# Patient Record
Sex: FEMALE | Race: WHITE | Employment: OTHER | URBAN - METROPOLITAN AREA
[De-identification: names, ages, dates, MRNs, and addresses within clinical notes are randomized per-mention and may not be internally consistent; named-entity substitution may affect disease eponyms.]

---

## 2017-01-23 ENCOUNTER — HOSPITAL ENCOUNTER (OUTPATIENT)
Dept: OTHER | Age: 82
Discharge: HOME | End: 2017-01-23
Attending: INTERNAL MEDICINE | Admitting: INTERNAL MEDICINE

## 2017-01-23 VITALS
OXYGEN SATURATION: 94 % | WEIGHT: 110 LBS | HEIGHT: 60 IN | HEART RATE: 66 BPM | SYSTOLIC BLOOD PRESSURE: 155 MMHG | DIASTOLIC BLOOD PRESSURE: 67 MMHG | BODY MASS INDEX: 21.6 KG/M2 | TEMPERATURE: 98.2 F | RESPIRATION RATE: 12 BRPM

## 2017-01-23 RX ORDER — SODIUM CHLORIDE, SODIUM LACTATE, POTASSIUM CHLORIDE, CALCIUM CHLORIDE 600; 310; 30; 20 MG/100ML; MG/100ML; MG/100ML; MG/100ML
INJECTION, SOLUTION INTRAVENOUS CONTINUOUS
Status: DISCONTINUED | OUTPATIENT
Start: 2017-01-23 | End: 2017-01-24 | Stop reason: HOSPADM

## 2017-01-23 RX ADMIN — SODIUM CHLORIDE, SODIUM LACTATE, POTASSIUM CHLORIDE, CALCIUM CHLORIDE: 600; 310; 30; 20 INJECTION, SOLUTION INTRAVENOUS at 11:30

## 2017-01-23 ASSESSMENT — PAIN - FUNCTIONAL ASSESSMENT: PAIN_FUNCTIONAL_ASSESSMENT: 0-10

## 2017-01-23 ASSESSMENT — PAIN SCALES - GENERAL: PAINLEVEL_OUTOF10: 0

## 2017-08-23 ENCOUNTER — INITIAL CONSULT (OUTPATIENT)
Dept: SURGERY | Age: 82
End: 2017-08-23

## 2017-08-23 VITALS
WEIGHT: 112 LBS | DIASTOLIC BLOOD PRESSURE: 86 MMHG | BODY MASS INDEX: 21.99 KG/M2 | HEIGHT: 60 IN | SYSTOLIC BLOOD PRESSURE: 130 MMHG

## 2017-08-23 DIAGNOSIS — K62.3 RECTAL PROLAPSE: Primary | ICD-10-CM

## 2017-08-23 PROCEDURE — 1090F PRES/ABSN URINE INCON ASSESS: CPT | Performed by: SURGERY

## 2017-08-23 PROCEDURE — 99203 OFFICE O/P NEW LOW 30 MIN: CPT | Performed by: SURGERY

## 2017-08-23 PROCEDURE — 1123F ACP DISCUSS/DSCN MKR DOCD: CPT | Performed by: SURGERY

## 2017-08-23 PROCEDURE — 4040F PNEUMOC VAC/ADMIN/RCVD: CPT | Performed by: SURGERY

## 2017-08-23 PROCEDURE — 1036F TOBACCO NON-USER: CPT | Performed by: SURGERY

## 2017-08-23 PROCEDURE — G8427 DOCREV CUR MEDS BY ELIG CLIN: HCPCS | Performed by: SURGERY

## 2017-08-23 PROCEDURE — G8420 CALC BMI NORM PARAMETERS: HCPCS | Performed by: SURGERY

## 2017-08-23 PROCEDURE — G8400 PT W/DXA NO RESULTS DOC: HCPCS | Performed by: SURGERY

## 2017-08-25 ENCOUNTER — HOSPITAL ENCOUNTER (OUTPATIENT)
Dept: MAMMOGRAPHY | Age: 82
Discharge: HOME | End: 2017-08-25
Attending: FAMILY MEDICINE | Admitting: FAMILY MEDICINE

## 2017-08-25 DIAGNOSIS — Z12.31 SCREENING MAMMOGRAM, ENCOUNTER FOR: ICD-10-CM

## 2018-07-19 ENCOUNTER — OFFICE VISIT (OUTPATIENT)
Dept: ORTHOPEDIC SURGERY | Age: 83
End: 2018-07-19

## 2018-07-19 VITALS
WEIGHT: 113 LBS | SYSTOLIC BLOOD PRESSURE: 160 MMHG | DIASTOLIC BLOOD PRESSURE: 90 MMHG | HEART RATE: 61 BPM | BODY MASS INDEX: 22.19 KG/M2 | HEIGHT: 60 IN

## 2018-07-19 DIAGNOSIS — M17.11 PRIMARY OSTEOARTHRITIS OF RIGHT KNEE: Primary | ICD-10-CM

## 2018-07-19 DIAGNOSIS — M25.561 RIGHT KNEE PAIN, UNSPECIFIED CHRONICITY: ICD-10-CM

## 2018-07-19 PROCEDURE — 1101F PT FALLS ASSESS-DOCD LE1/YR: CPT | Performed by: ORTHOPAEDIC SURGERY

## 2018-07-19 PROCEDURE — 1123F ACP DISCUSS/DSCN MKR DOCD: CPT | Performed by: ORTHOPAEDIC SURGERY

## 2018-07-19 PROCEDURE — G8427 DOCREV CUR MEDS BY ELIG CLIN: HCPCS | Performed by: ORTHOPAEDIC SURGERY

## 2018-07-19 PROCEDURE — G8420 CALC BMI NORM PARAMETERS: HCPCS | Performed by: ORTHOPAEDIC SURGERY

## 2018-07-19 PROCEDURE — 1090F PRES/ABSN URINE INCON ASSESS: CPT | Performed by: ORTHOPAEDIC SURGERY

## 2018-07-19 PROCEDURE — L1812 KO ELASTIC W/JOINTS PRE OTS: HCPCS | Performed by: ORTHOPAEDIC SURGERY

## 2018-07-19 PROCEDURE — 4040F PNEUMOC VAC/ADMIN/RCVD: CPT | Performed by: ORTHOPAEDIC SURGERY

## 2018-07-19 PROCEDURE — 1036F TOBACCO NON-USER: CPT | Performed by: ORTHOPAEDIC SURGERY

## 2018-07-19 PROCEDURE — 99213 OFFICE O/P EST LOW 20 MIN: CPT | Performed by: ORTHOPAEDIC SURGERY

## 2018-07-19 NOTE — PROGRESS NOTES
chronicity Yes       Office Procedures:  Orders Placed This Encounter   Procedures    XR KNEE RIGHT (MIN 4 VIEWS)       Treatment Plan:  I discussed with the patient the nature of osteoarthritis of the knee. We talked about treatment of arthritis and the various options that are involved with this. The patient understands that the treatments can vary from essentially doing nothing to a total joint replacement arthroplasty for arthritis. I then went on to describe the utilization of glucosamine and chondroitin sulfate as a joint nutrition product. We talked about the fact that this is essentially a joint vitamin with typically minimal side effects. We also talked about utilization of prescription over-the-counter anti-inflammatory medications as the next option. We also discussed the possibility of brace wear or orthotic wear if the patient has significant varus alignment. We then went on to discuss the possibility of Visco supplementation with hyaluronate products. We talked about the typical course of this type of treatment and the fact that often times in the treatment for significant arthritis, this is successful less than half the time. We also talked about the corticosteroid injections and the fact that this can give a brief window of relief, but does not cure the problem; in fact, the pain often has a rebound effect in 6-10 weeks after the steroid has worn off. We also discussed arthroscopy surgery in attempts to debride the joint, but the fact that this is relatively unreliable treatment in the face of significant arthritis. It can occasionally be used, particularly if there is significant meniscus pathology. Lastly we discussed total joint replacement arthroplasty as the final and definitive step in treatment of arthritis. Patient realizes the magnitude of this type of treatment as well as having voiced a general understanding to the duration of the prosthesis.  The patient voiced understanding to these

## 2018-07-19 NOTE — LETTER
CONSENT TO SURGICAL OR MEDICAL PROCEDURE    PATIENTS NAMES: Enzo Fuentes 4/18/1932  80 y.o. [phone] (home)   DATE/TIME: 7/19/2018 4:00 PM    1) I consent that Dr. Mitchel Urbina perform one or more surgical and or medical procedures on the above named patient at: [address] [place] [address]/[facility] to treat the condition(s) which appear indicated by the diagnostic studies already preformed. I have been told in general terms the nature and purpose of the procedure(s) and what the procedure(s) is/are expected to accomplish. They procedure(s) are as follows:   RIGHT TOTAL KNEE REPLACEMENT (TS SONAL)     2) It has been explained to me by the informing physician that during the course of any surgical or medical procedure unforeseen condition(s) may be revealed that necessitate an extension of the original procedure(s) or a different procedure(s) than set forth in Paragraph 1. I therefore consent that the above named physician perform such additional or different procedure(s) as are necessary or desirable in the exercise of his professional judgement. 3) I have been made aware by the informing physician of certain reasonably known risks that are associated with the procedure(s) set forth in Paragraph 1.  I understand the reasonably known risk to be: Including but not limited to: CVA, infection, M.I., Phlebitis, Cardiac Arrest and Pulmonary Embolism, Loss of Circulation, Nerve Injury, Delayed Healing, Recurrence, Loss of extremity/digit, R.S.D., Screw breakage, Arthritis, Pain, Swelling, Stiffness, Failure of Prothesis, Fracture, Leg length discrepancy, Wound complication/non-healing, need for further surgery and persistent pain.   4) I have also been informed by the informing physician that there are other risks, from both known and unknown causes, that are attendant to the because_________________________________________________    The undersigned represents that he or she is duly authorized to execute to this consent for and on the behalf of the above named patient.    ________________________________             __________________________________________  Witness                                                                         Parent/Spouse/Guardian/Other:_________________    Medical Record#  Insurance  Smartphone:  Yes   Or   No  Email:                 You have signed a consent to have a total joint replacement surgery performed. Before you can proceed with surgery the following things must be done. Please use this form as a checklist.      _____   Please schedule your Physical Therapy functional evaluation. At the location on your script.    _____   Please take your lab orders and get your blood work done at a [facility].    _____  Tomi Epps will need to go to [employer] to complete registration and the medical questionnaire prior to your physical therapy evaluation. Do not schedule an appointment with your primary care physician until you have a surgery date. This pre-op exam has to be within 30 days of the surgery. _____  CT Scans will be scheduled by our office.    _____  Information about the pre-op class will be in your surgery packet that will be mailed to you after you are scheduled for surgery. Once you have completed both the labs and the evaluation please call Tushar Randolph @ [phone] to let her know. Once verification of the PT Evaluation and completed labs has been determined you will be called and set up for surgery. This may take 1-2 days to check results and return your phone call.

## 2018-08-15 ENCOUNTER — OFFICE VISIT (OUTPATIENT)
Dept: ORTHOPEDIC SURGERY | Age: 83
End: 2018-08-15

## 2018-08-15 VITALS — BODY MASS INDEX: 22.19 KG/M2 | HEIGHT: 60 IN | WEIGHT: 113 LBS

## 2018-08-15 DIAGNOSIS — M17.11 PRIMARY OSTEOARTHRITIS OF RIGHT KNEE: Primary | ICD-10-CM

## 2018-08-15 PROCEDURE — 1090F PRES/ABSN URINE INCON ASSESS: CPT | Performed by: PHYSICIAN ASSISTANT

## 2018-08-15 PROCEDURE — G8427 DOCREV CUR MEDS BY ELIG CLIN: HCPCS | Performed by: PHYSICIAN ASSISTANT

## 2018-08-15 PROCEDURE — 1036F TOBACCO NON-USER: CPT | Performed by: PHYSICIAN ASSISTANT

## 2018-08-15 PROCEDURE — 4040F PNEUMOC VAC/ADMIN/RCVD: CPT | Performed by: PHYSICIAN ASSISTANT

## 2018-08-15 PROCEDURE — 1123F ACP DISCUSS/DSCN MKR DOCD: CPT | Performed by: PHYSICIAN ASSISTANT

## 2018-08-15 PROCEDURE — 1101F PT FALLS ASSESS-DOCD LE1/YR: CPT | Performed by: PHYSICIAN ASSISTANT

## 2018-08-15 PROCEDURE — 99213 OFFICE O/P EST LOW 20 MIN: CPT | Performed by: PHYSICIAN ASSISTANT

## 2018-08-15 PROCEDURE — G8420 CALC BMI NORM PARAMETERS: HCPCS | Performed by: PHYSICIAN ASSISTANT

## 2018-08-15 NOTE — PROGRESS NOTES
Patient Active Problem List    Diagnosis Date Noted    Primary osteoarthritis of right knee 08/15/2018    Hip fracture (HCC) RIGHT 08/04/2015    Hypertension     Hypercholesteremia     Hip fracture ([address] 07/22/2015    Osteoarthritis of hip 07/08/2015    Rectal bleed 10/04/2014    Localized osteoarthrosis, lower leg 02/17/2014     Current Outpatient Prescriptions   Medication Sig Dispense Refill    melatonin 3 MG TABS tablet Take 3 mg by mouth daily      aspirin 81 MG tablet Take 81 mg by mouth daily      oxyCODONE-acetaminophen (PERCOCET) 5-325 MG per tablet Take 1-2 tablets by mouth every 4-6 hours as needed for pain S/P hip surgery 40 tablet 0    timolol (BETIMOL) 0.5 % ophthalmic solution Place 1 drop into the left eye 2 times daily      carvedilol (COREG) 3.125 MG tablet Take 1 tablet by mouth 2 times daily (with meals). (Patient taking differently:   Take 6.25 mg by mouth 2 times daily (with meals) ) 60 tablet 1    flecainide (TAMBOCOR) 50 MG tablet Take 50 mg by mouth 2 times daily.  hydrocortisone 1 % cream   Apply topically 2 times daily as needed       chlordiazepoxide (LIBRIUM) 10 MG capsule   Take 5 mg by mouth 2 times daily       atorvastatin (LIPITOR) 10 MG tablet   Take 10 mg by mouth nightly       Multiple Vitamins-Minerals (CENTRUM PO) Take 1 tablet by mouth daily. Indications: Centrum Silver      polyethylene glycol (GLYCOLAX) powder Take 17 g by mouth daily.  vitamin D (CHOLECALCIFEROL) 400 UNIT TABS tablet   Take 2,000 Units by mouth daily       Psyllium-Calcium (METAMUCIL PLUS CALCIUM) CAPS Take 1 tablet by mouth nightly. Take with 8 oz water.  latanoprost (XALATAN) 0.005 % ophthalmic solution   Place 1 drop into both eyes nightly        No current facility-administered medications for this visit. Medication Management  Patient has been treated with NSAIDs, Steroids and Analgesics with Minimal relief for 10 years.     Review of Systems:  Relevant review of systems reviewed and available in the patient's chart    Vital Signs: There were no vitals filed for this visit. Body mass index is 22.07 kg/m². Limitation in Activities of Daily Living (ADLs)  The patient is able to ambulate with the assistance of wheelchair for Ramp  steps/feet. Walking distance less than 1 block    Patient needs assistance with activities of daily living bathing and cooking. Extended Care / [facility]: No     Safety Issues: Home safety issues, Difficulty with daily activities and Risk of falls  Patient is at risk for falls/fall history: Yes    General Exam:   Constitutional: Patient is adequately groomed with no evidence of malnutrition  DTRs: Deep tendon reflexes are intact  Mental Status: The patient is oriented to time, place and person. The patient's mood and affect are appropriate. Lymphatic: The lymphatic examination bilaterally reveals all areas to be without enlargement or induration. Vascular: Examination reveals no swelling or calf tenderness. Peripheral pulses are palpable and 2+. Neurological: The patient has good coordination. There is no weakness or sensory deficit. Right Knee Examination:    Inspection:  No erythema or signs of infection. Positive knee effusion. There are no cutaneous lesions. There is a 15° valgus deformity. Palpation:  There is tenderness to palpation along the lateral joint line. Range of Motion:  10 extension to 105 of active flexion. Crepitation present throughout range of motion    Strength:  4+/5 quadriceps and hamstrings    Special Tests: The knee is stable to varus valgus stressing/anterior posterior drawer. Negative Homans test.                                 Skin: There are no rashes, ulcerations or lesions. Gait: Brought in via wheelchair. Reflex 2+ patellar    Additional Comments:     Examination of the left knee reveals intact skin.   There is medial joint line tenderness with a varus with computer navigation and Tocagen total knee replacement system. Additionally, she is a Hindu and therefore we will likely use an aquamantis and possibly Cell Saver. We would like to see the patient back once she is obtained all the necessary preoperative clearance before moving forward with surgery. We discussed the risk, benefits, and potential complications of knee replacement arthroplasty surgery. The patient voiced their understanding to concerns that include infection, deep vein thrombosis, neurological injury, and delayed rehabilitation. The patient also realizes that there are concerns regarding the potential need for manipulation under anesthesia if range of motion proves to be problematic. The patient also understands that there is always a chance of dystrophy and anesthetic complications that would include a stroke, cardiopulmonary pathology, and even death. We also discussed the rehabilitation process involved with this operation and options that involved not only the hospitalization but outpatient physical therapy and independent home exercise program.  The patient also realizes the need for a knee brace and ambulatory aids in the rehabilitation process as well as the very significant role that the patient plays in terms of rehabilitation after this type of operation. The patient also understands that although the patient typically functional by 6-8 weeks postop that it may take 9 months to year for full recovery. All questions were answered.

## 2018-08-20 ENCOUNTER — HOSPITAL ENCOUNTER (OUTPATIENT)
Age: 83
Discharge: HOME | End: 2018-08-20
Payer: MEDICARE

## 2018-08-20 LAB
ALBUMIN SERPL-MCNC: 3.7 G/DL (ref 3.4–5)
ANION GAP SERPL CALCULATED.3IONS-SCNC: 14 MMOL/L (ref 3–16)
APTT: 34.5 SEC (ref 26–36)
BACTERIA: ABNORMAL /HPF
BASOPHILS ABSOLUTE: 0 K/UL (ref 0–0.2)
BASOPHILS RELATIVE PERCENT: 0.5 %
BILIRUBIN URINE: NEGATIVE
BLOOD, URINE: ABNORMAL
BUN BLDV-MCNC: 11 MG/DL (ref 7–20)
CALCIUM SERPL-MCNC: 9.4 MG/DL (ref 8.3–10.6)
CHLORIDE BLD-SCNC: 104 MMOL/L (ref 99–110)
CLARITY: CLEAR
CO2: 24 MMOL/L (ref 21–32)
COLOR: YELLOW
CREAT SERPL-MCNC: 0.6 MG/DL (ref 0.6–1.2)
EOSINOPHILS ABSOLUTE: 0.3 K/UL (ref 0–0.6)
EOSINOPHILS RELATIVE PERCENT: 4.5 %
EPITHELIAL CELLS, UA: ABNORMAL /HPF
GFR AFRICAN AMERICAN: >60
GFR NON-AFRICAN AMERICAN: >60
GLUCOSE BLD-MCNC: 152 MG/DL (ref 70–99)
GLUCOSE URINE: NEGATIVE MG/DL
HCT VFR BLD CALC: 35 % (ref 36–48)
HEMOGLOBIN: 11.6 G/DL (ref 12–16)
INR BLD: 1.22 (ref 0.86–1.14)
KETONES, URINE: NEGATIVE MG/DL
LEUKOCYTE ESTERASE, URINE: ABNORMAL
LYMPHOCYTES ABSOLUTE: 0.9 K/UL (ref 1–5.1)
LYMPHOCYTES RELATIVE PERCENT: 13.6 %
MCH RBC QN AUTO: 31.3 PG (ref 26–34)
MCHC RBC AUTO-ENTMCNC: 33.3 G/DL (ref 31–36)
MCV RBC AUTO: 94.2 FL (ref 80–100)
MICROSCOPIC EXAMINATION: YES
MONOCYTES ABSOLUTE: 0.5 K/UL (ref 0–1.3)
MONOCYTES RELATIVE PERCENT: 8.2 %
NEUTROPHILS ABSOLUTE: 4.9 K/UL (ref 1.7–7.7)
NEUTROPHILS RELATIVE PERCENT: 73.2 %
NITRITE, URINE: NEGATIVE
PDW BLD-RTO: 12.3 % (ref 12.4–15.4)
PH UA: 7.5
PLATELET # BLD: 323 K/UL (ref 135–450)
PMV BLD AUTO: 8 FL (ref 5–10.5)
POTASSIUM SERPL-SCNC: 4 MMOL/L (ref 3.5–5.1)
PROTEIN UA: NEGATIVE MG/DL
PROTHROMBIN TIME: 13.9 SEC (ref 9.8–13)
RBC # BLD: 3.71 M/UL (ref 4–5.2)
RBC UA: ABNORMAL /HPF (ref 0–2)
SODIUM BLD-SCNC: 142 MMOL/L (ref 136–145)
SPECIFIC GRAVITY UA: 1.01
TRANSFERRIN: 225 MG/DL (ref 200–360)
URINE TYPE: ABNORMAL
UROBILINOGEN, URINE: 0.2 E.U./DL
WBC # BLD: 6.7 K/UL (ref 4–11)
WBC UA: ABNORMAL /HPF (ref 0–5)

## 2018-08-20 PROCEDURE — 85730 THROMBOPLASTIN TIME PARTIAL: CPT

## 2018-08-20 PROCEDURE — 36415 COLL VENOUS BLD VENIPUNCTURE: CPT

## 2018-08-20 PROCEDURE — 82040 ASSAY OF SERUM ALBUMIN: CPT

## 2018-08-20 PROCEDURE — 85025 COMPLETE CBC W/AUTO DIFF WBC: CPT

## 2018-08-20 PROCEDURE — 81001 URINALYSIS AUTO W/SCOPE: CPT

## 2018-08-20 PROCEDURE — 80048 BASIC METABOLIC PNL TOTAL CA: CPT

## 2018-08-20 PROCEDURE — 84466 ASSAY OF TRANSFERRIN: CPT

## 2018-08-20 PROCEDURE — 87086 URINE CULTURE/COLONY COUNT: CPT

## 2018-08-20 PROCEDURE — 85610 PROTHROMBIN TIME: CPT

## 2018-08-20 PROCEDURE — 83036 HEMOGLOBIN GLYCOSYLATED A1C: CPT

## 2018-08-21 LAB
ESTIMATED AVERAGE GLUCOSE: 128.4 MG/DL
HBA1C MFR BLD: 6.1 %
URINE CULTURE, ROUTINE: NORMAL

## 2018-08-23 ENCOUNTER — TELEPHONE (OUTPATIENT)
Dept: ORTHOPEDIC SURGERY | Age: 83
End: 2018-08-23

## 2018-08-28 ENCOUNTER — TELEPHONE (OUTPATIENT)
Dept: ORTHOPEDIC SURGERY | Age: 83
End: 2018-08-28

## 2018-08-28 DIAGNOSIS — M17.11 PRIMARY OSTEOARTHRITIS OF RIGHT KNEE: Primary | ICD-10-CM

## 2018-08-28 RX ORDER — TRAMADOL HYDROCHLORIDE 50 MG/1
50 TABLET ORAL EVERY 6 HOURS PRN
Qty: 28 TABLET | Refills: 0 | Status: SHIPPED | OUTPATIENT
Start: 2018-08-28 | End: 2018-08-30

## 2018-08-28 NOTE — TELEPHONE ENCOUNTER
Ultram sent to pharmacy. Felisha Milan will you please run and or his report. OARS   Report obtained. Patient got 120 Percocet from Dr. Lila Hummel on 8/7. Ultram prescription cancel.

## 2018-08-30 ENCOUNTER — TELEPHONE (OUTPATIENT)
Dept: ORTHOPEDIC SURGERY | Age: 83
End: 2018-08-30

## 2018-08-30 NOTE — PROGRESS NOTES
Obstructive Sleep Apnea (PREM) Screening     Patient:  Audi Gonsales    YOB: 1932      Medical Record #:  [id number]                     Date:  8/30/2018     1. Are you a loud and/or regular snorer? []  Yes       [x] No    2. Have you been observed to gasp or stop breathing during sleep? []  Yes       [x] No    3. Do you feel tired or groggy upon awakening or do you awaken with a headache?           []  Yes       [x] No    4. Are you often tired or fatigued during the wake time hours? [x]  Yes       [] No    5. Do you fall asleep sitting, reading, watching TV or driving? []  Yes       [x] No    6. Do you often have problems with memory or concentration? []  Yes       [x] No    **If patient's score is ? 3 they are considered high risk for PREM. Notify the anesthesiologist of the high risk and document in focus note. Note:  If the patient's BMI is more than 35 kg m¯² , has neck circumference > 40 cm, and/or high blood pressure the risk is greater (© American Sleep Apnea Association, 2006).

## 2018-08-31 ENCOUNTER — TELEPHONE (OUTPATIENT)
Dept: ORTHOPEDIC SURGERY | Age: 83
End: 2018-08-31

## 2018-09-04 ENCOUNTER — ANESTHESIA EVENT (OUTPATIENT)
Dept: OPERATING ROOM | Age: 83
DRG: 470 | End: 2018-09-04
Payer: MEDICARE

## 2018-09-05 ENCOUNTER — HOSPITAL ENCOUNTER (INPATIENT)
Age: 83
LOS: 2 days | Discharge: NURSING, REHAB OR LONG-TERM CARE | DRG: 470 | End: 2018-09-07
Attending: ORTHOPAEDIC SURGERY | Admitting: ORTHOPAEDIC SURGERY
Payer: MEDICARE

## 2018-09-05 ENCOUNTER — ANESTHESIA (OUTPATIENT)
Dept: OPERATING ROOM | Age: 83
DRG: 470 | End: 2018-09-05
Payer: MEDICARE

## 2018-09-05 VITALS — OXYGEN SATURATION: 100 % | DIASTOLIC BLOOD PRESSURE: 87 MMHG | SYSTOLIC BLOOD PRESSURE: 174 MMHG | TEMPERATURE: 98.6 F

## 2018-09-05 DIAGNOSIS — Z96.651 STATUS POST TOTAL RIGHT KNEE REPLACEMENT: Primary | ICD-10-CM

## 2018-09-05 DIAGNOSIS — M17.11 PRIMARY OSTEOARTHRITIS OF RIGHT KNEE: ICD-10-CM

## 2018-09-05 PROBLEM — T84.84XA PAIN DUE TO TOTAL RIGHT KNEE REPLACEMENT (HCC): Chronic | Status: ACTIVE | Noted: 2018-09-05

## 2018-09-05 PROBLEM — M25.561 CHRONIC KNEE PAIN AFTER TOTAL REPLACEMENT OF RIGHT KNEE JOINT: Status: ACTIVE | Noted: 2018-09-05

## 2018-09-05 PROBLEM — G89.29 CHRONIC KNEE PAIN AFTER TOTAL REPLACEMENT OF RIGHT KNEE JOINT: Status: ACTIVE | Noted: 2018-09-05

## 2018-09-05 LAB
GLUCOSE BLD-MCNC: 118 MG/DL (ref 70–99)
GLUCOSE BLD-MCNC: 204 MG/DL (ref 70–99)
INR BLD: 1.3 (ref 0.86–1.14)
PERFORMED ON: ABNORMAL
PERFORMED ON: ABNORMAL
PROTHROMBIN TIME: 14.8 SEC (ref 9.8–13)

## 2018-09-05 PROCEDURE — 88311 DECALCIFY TISSUE: CPT

## 2018-09-05 PROCEDURE — 88305 TISSUE EXAM BY PATHOLOGIST: CPT

## 2018-09-05 PROCEDURE — 2580000003 HC RX 258: Performed by: ORTHOPAEDIC SURGERY

## 2018-09-05 PROCEDURE — 85610 PROTHROMBIN TIME: CPT

## 2018-09-05 PROCEDURE — 51701 INSERT BLADDER CATHETER: CPT

## 2018-09-05 PROCEDURE — 6360000002 HC RX W HCPCS: Performed by: PHYSICIAN ASSISTANT

## 2018-09-05 PROCEDURE — 6360000002 HC RX W HCPCS: Performed by: ANESTHESIOLOGY

## 2018-09-05 PROCEDURE — C9290 INJ, BUPIVACAINE LIPOSOME: HCPCS | Performed by: ORTHOPAEDIC SURGERY

## 2018-09-05 PROCEDURE — 0SRC0J9 REPLACEMENT OF RIGHT KNEE JOINT WITH SYNTHETIC SUBSTITUTE, CEMENTED, OPEN APPROACH: ICD-10-PCS | Performed by: ORTHOPAEDIC SURGERY

## 2018-09-05 PROCEDURE — 2720000010 HC SURG SUPPLY STERILE: Performed by: ORTHOPAEDIC SURGERY

## 2018-09-05 PROCEDURE — 64447 NJX AA&/STRD FEMORAL NRV IMG: CPT | Performed by: ANESTHESIOLOGY

## 2018-09-05 PROCEDURE — 51798 US URINE CAPACITY MEASURE: CPT

## 2018-09-05 PROCEDURE — 7100000000 HC PACU RECOVERY - FIRST 15 MIN: Performed by: ORTHOPAEDIC SURGERY

## 2018-09-05 PROCEDURE — 0MNN0ZZ RELEASE RIGHT KNEE BURSA AND LIGAMENT, OPEN APPROACH: ICD-10-PCS | Performed by: ORTHOPAEDIC SURGERY

## 2018-09-05 PROCEDURE — C1776 JOINT DEVICE (IMPLANTABLE): HCPCS | Performed by: ORTHOPAEDIC SURGERY

## 2018-09-05 PROCEDURE — 3700000001 HC ADD 15 MINUTES (ANESTHESIA): Performed by: ORTHOPAEDIC SURGERY

## 2018-09-05 PROCEDURE — 6360000002 HC RX W HCPCS: Performed by: ORTHOPAEDIC SURGERY

## 2018-09-05 PROCEDURE — 6370000000 HC RX 637 (ALT 250 FOR IP): Performed by: PHYSICIAN ASSISTANT

## 2018-09-05 PROCEDURE — 2580000003 HC RX 258: Performed by: ANESTHESIOLOGY

## 2018-09-05 PROCEDURE — 2720000001 HC MISC SURG SUPPLY STERILE $51-500: Performed by: ORTHOPAEDIC SURGERY

## 2018-09-05 PROCEDURE — 2709999900 HC NON-CHARGEABLE SUPPLY: Performed by: ORTHOPAEDIC SURGERY

## 2018-09-05 PROCEDURE — 7100000001 HC PACU RECOVERY - ADDTL 15 MIN: Performed by: ORTHOPAEDIC SURGERY

## 2018-09-05 PROCEDURE — 3E0T3BZ INTRODUCTION OF ANESTHETIC AGENT INTO PERIPHERAL NERVES AND PLEXI, PERCUTANEOUS APPROACH: ICD-10-PCS | Performed by: ANESTHESIOLOGY

## 2018-09-05 PROCEDURE — 1200000000 HC SEMI PRIVATE

## 2018-09-05 PROCEDURE — C1713 ANCHOR/SCREW BN/BN,TIS/BN: HCPCS | Performed by: ORTHOPAEDIC SURGERY

## 2018-09-05 PROCEDURE — 2580000003 HC RX 258: Performed by: PHYSICIAN ASSISTANT

## 2018-09-05 PROCEDURE — 2500000003 HC RX 250 WO HCPCS: Performed by: ORTHOPAEDIC SURGERY

## 2018-09-05 PROCEDURE — 3700000000 HC ANESTHESIA ATTENDED CARE: Performed by: ORTHOPAEDIC SURGERY

## 2018-09-05 PROCEDURE — 6370000000 HC RX 637 (ALT 250 FOR IP): Performed by: ORTHOPAEDIC SURGERY

## 2018-09-05 PROCEDURE — 6360000002 HC RX W HCPCS: Performed by: NURSE ANESTHETIST, CERTIFIED REGISTERED

## 2018-09-05 PROCEDURE — 3600000006 HC SURGERY LEVEL 6 BASE: Performed by: ORTHOPAEDIC SURGERY

## 2018-09-05 PROCEDURE — 2500000003 HC RX 250 WO HCPCS: Performed by: NURSE ANESTHETIST, CERTIFIED REGISTERED

## 2018-09-05 PROCEDURE — 3600000016 HC SURGERY LEVEL 6 ADDTL 15MIN: Performed by: ORTHOPAEDIC SURGERY

## 2018-09-05 DEVICE — BASEPLATE TIB SZ 4 UNIV KNEE TRITANIUM TOT STBL CEM: Type: IMPLANTABLE DEVICE | Status: FUNCTIONAL

## 2018-09-05 DEVICE — CEMENT BNE 20ML 41GM FULL DOSE PMMA W/ TOBRA M VISC RADPQ: Type: IMPLANTABLE DEVICE | Status: FUNCTIONAL

## 2018-09-05 DEVICE — INSERT TIB SZ 4 THK16MM KNEE X3 TOT STBL + TRIATHLON: Type: IMPLANTABLE DEVICE | Status: FUNCTIONAL

## 2018-09-05 DEVICE — IMPLANT PAT DIA29MM THK8MM X3 SYMMETRIC TRIATHLON: Type: IMPLANTABLE DEVICE | Status: FUNCTIONAL

## 2018-09-05 DEVICE — COMPONENT FEM SZ 4 R KNEE POST STBL CEM TRIATHLON: Type: IMPLANTABLE DEVICE | Status: FUNCTIONAL

## 2018-09-05 RX ORDER — SENNA AND DOCUSATE SODIUM 50; 8.6 MG/1; MG/1
1 TABLET, FILM COATED ORAL DAILY
Status: DISCONTINUED | OUTPATIENT
Start: 2018-09-06 | End: 2018-09-07 | Stop reason: HOSPADM

## 2018-09-05 RX ORDER — TIMOLOL MALEATE 5 MG/ML
1 SOLUTION/ DROPS OPHTHALMIC 2 TIMES DAILY
Status: DISCONTINUED | OUTPATIENT
Start: 2018-09-05 | End: 2018-09-07 | Stop reason: HOSPADM

## 2018-09-05 RX ORDER — SODIUM CHLORIDE, SODIUM LACTATE, POTASSIUM CHLORIDE, CALCIUM CHLORIDE 600; 310; 30; 20 MG/100ML; MG/100ML; MG/100ML; MG/100ML
INJECTION, SOLUTION INTRAVENOUS CONTINUOUS
Status: DISCONTINUED | OUTPATIENT
Start: 2018-09-05 | End: 2018-09-07 | Stop reason: HOSPADM

## 2018-09-05 RX ORDER — OXYCODONE HYDROCHLORIDE AND ACETAMINOPHEN 5; 325 MG/1; MG/1
1 TABLET ORAL EVERY 4 HOURS PRN
Status: DISCONTINUED | OUTPATIENT
Start: 2018-09-05 | End: 2018-09-07 | Stop reason: HOSPADM

## 2018-09-05 RX ORDER — DEXAMETHASONE SODIUM PHOSPHATE 10 MG/ML
INJECTION INTRAMUSCULAR; INTRAVENOUS PRN
Status: DISCONTINUED | OUTPATIENT
Start: 2018-09-05 | End: 2018-09-05 | Stop reason: SDUPTHER

## 2018-09-05 RX ORDER — LABETALOL HYDROCHLORIDE 5 MG/ML
5 INJECTION, SOLUTION INTRAVENOUS EVERY 10 MIN PRN
Status: DISCONTINUED | OUTPATIENT
Start: 2018-09-05 | End: 2018-09-05 | Stop reason: HOSPADM

## 2018-09-05 RX ORDER — SODIUM CHLORIDE, SODIUM LACTATE, POTASSIUM CHLORIDE, CALCIUM CHLORIDE 600; 310; 30; 20 MG/100ML; MG/100ML; MG/100ML; MG/100ML
INJECTION, SOLUTION INTRAVENOUS CONTINUOUS
Status: DISCONTINUED | OUTPATIENT
Start: 2018-09-05 | End: 2018-09-05

## 2018-09-05 RX ORDER — LIDOCAINE HYDROCHLORIDE 20 MG/ML
INJECTION, SOLUTION INFILTRATION; PERINEURAL PRN
Status: DISCONTINUED | OUTPATIENT
Start: 2018-09-05 | End: 2018-09-05 | Stop reason: SDUPTHER

## 2018-09-05 RX ORDER — PROPOFOL 10 MG/ML
INJECTION, EMULSION INTRAVENOUS PRN
Status: DISCONTINUED | OUTPATIENT
Start: 2018-09-05 | End: 2018-09-05 | Stop reason: SDUPTHER

## 2018-09-05 RX ORDER — OXYCODONE HYDROCHLORIDE AND ACETAMINOPHEN 5; 325 MG/1; MG/1
2 TABLET ORAL EVERY 4 HOURS PRN
Status: DISCONTINUED | OUTPATIENT
Start: 2018-09-05 | End: 2018-09-07 | Stop reason: HOSPADM

## 2018-09-05 RX ORDER — SODIUM CHLORIDE 0.9 % (FLUSH) 0.9 %
10 SYRINGE (ML) INJECTION EVERY 12 HOURS SCHEDULED
Status: DISCONTINUED | OUTPATIENT
Start: 2018-09-05 | End: 2018-09-07 | Stop reason: HOSPADM

## 2018-09-05 RX ORDER — ROCURONIUM BROMIDE 10 MG/ML
INJECTION, SOLUTION INTRAVENOUS PRN
Status: DISCONTINUED | OUTPATIENT
Start: 2018-09-05 | End: 2018-09-05 | Stop reason: SDUPTHER

## 2018-09-05 RX ORDER — MORPHINE SULFATE 4 MG/ML
4 INJECTION, SOLUTION INTRAMUSCULAR; INTRAVENOUS
Status: DISCONTINUED | OUTPATIENT
Start: 2018-09-05 | End: 2018-09-06

## 2018-09-05 RX ORDER — MORPHINE SULFATE 2 MG/ML
2 INJECTION, SOLUTION INTRAMUSCULAR; INTRAVENOUS
Status: DISCONTINUED | OUTPATIENT
Start: 2018-09-05 | End: 2018-09-06

## 2018-09-05 RX ORDER — ACETAMINOPHEN 325 MG/1
650 TABLET ORAL EVERY 4 HOURS PRN
Status: DISCONTINUED | OUTPATIENT
Start: 2018-09-05 | End: 2018-09-07 | Stop reason: HOSPADM

## 2018-09-05 RX ORDER — ATORVASTATIN CALCIUM 10 MG/1
10 TABLET, FILM COATED ORAL NIGHTLY
Status: DISCONTINUED | OUTPATIENT
Start: 2018-09-05 | End: 2018-09-07 | Stop reason: HOSPADM

## 2018-09-05 RX ORDER — ONDANSETRON 2 MG/ML
4 INJECTION INTRAMUSCULAR; INTRAVENOUS EVERY 10 MIN PRN
Status: DISCONTINUED | OUTPATIENT
Start: 2018-09-05 | End: 2018-09-05 | Stop reason: HOSPADM

## 2018-09-05 RX ORDER — POLYETHYLENE GLYCOL 3350 17 G/17G
17 POWDER, FOR SOLUTION ORAL DAILY
Status: DISCONTINUED | OUTPATIENT
Start: 2018-09-05 | End: 2018-09-05 | Stop reason: CLARIF

## 2018-09-05 RX ORDER — TRANEXAMIC ACID 100 MG/ML
INJECTION, SOLUTION INTRAVENOUS PRN
Status: DISCONTINUED | OUTPATIENT
Start: 2018-09-05 | End: 2018-09-05 | Stop reason: HOSPADM

## 2018-09-05 RX ORDER — FAMOTIDINE 20 MG/1
20 TABLET, FILM COATED ORAL 2 TIMES DAILY
Status: DISCONTINUED | OUTPATIENT
Start: 2018-09-05 | End: 2018-09-07 | Stop reason: HOSPADM

## 2018-09-05 RX ORDER — FENTANYL CITRATE 50 UG/ML
INJECTION, SOLUTION INTRAMUSCULAR; INTRAVENOUS PRN
Status: DISCONTINUED | OUTPATIENT
Start: 2018-09-05 | End: 2018-09-05 | Stop reason: SDUPTHER

## 2018-09-05 RX ORDER — DEXTROSE MONOHYDRATE 25 G/50ML
12.5 INJECTION, SOLUTION INTRAVENOUS PRN
Status: DISCONTINUED | OUTPATIENT
Start: 2018-09-05 | End: 2018-09-07 | Stop reason: HOSPADM

## 2018-09-05 RX ORDER — ONDANSETRON 2 MG/ML
INJECTION INTRAMUSCULAR; INTRAVENOUS PRN
Status: DISCONTINUED | OUTPATIENT
Start: 2018-09-05 | End: 2018-09-05 | Stop reason: SDUPTHER

## 2018-09-05 RX ORDER — SODIUM CHLORIDE 0.9 % (FLUSH) 0.9 %
10 SYRINGE (ML) INJECTION PRN
Status: DISCONTINUED | OUTPATIENT
Start: 2018-09-05 | End: 2018-09-07 | Stop reason: HOSPADM

## 2018-09-05 RX ORDER — OXYCODONE HYDROCHLORIDE AND ACETAMINOPHEN 5; 325 MG/1; MG/1
2 TABLET ORAL PRN
Status: DISCONTINUED | OUTPATIENT
Start: 2018-09-05 | End: 2018-09-05 | Stop reason: HOSPADM

## 2018-09-05 RX ORDER — CARVEDILOL 6.25 MG/1
6.25 TABLET ORAL 2 TIMES DAILY WITH MEALS
Status: DISCONTINUED | OUTPATIENT
Start: 2018-09-05 | End: 2018-09-07 | Stop reason: HOSPADM

## 2018-09-05 RX ORDER — ONDANSETRON 2 MG/ML
4 INJECTION INTRAMUSCULAR; INTRAVENOUS EVERY 6 HOURS PRN
Status: DISCONTINUED | OUTPATIENT
Start: 2018-09-05 | End: 2018-09-07 | Stop reason: HOSPADM

## 2018-09-05 RX ORDER — NICOTINE POLACRILEX 4 MG
15 LOZENGE BUCCAL PRN
Status: DISCONTINUED | OUTPATIENT
Start: 2018-09-05 | End: 2018-09-07 | Stop reason: HOSPADM

## 2018-09-05 RX ORDER — SODIUM CHLORIDE 0.9 % (FLUSH) 0.9 %
10 SYRINGE (ML) INJECTION EVERY 12 HOURS SCHEDULED
Status: DISCONTINUED | OUTPATIENT
Start: 2018-09-05 | End: 2018-09-05 | Stop reason: HOSPADM

## 2018-09-05 RX ORDER — OXYCODONE HYDROCHLORIDE AND ACETAMINOPHEN 5; 325 MG/1; MG/1
1 TABLET ORAL PRN
Status: DISCONTINUED | OUTPATIENT
Start: 2018-09-05 | End: 2018-09-05 | Stop reason: HOSPADM

## 2018-09-05 RX ORDER — EPHEDRINE SULFATE 50 MG/ML
INJECTION INTRAVENOUS PRN
Status: DISCONTINUED | OUTPATIENT
Start: 2018-09-05 | End: 2018-09-05 | Stop reason: SDUPTHER

## 2018-09-05 RX ORDER — DEXTROSE MONOHYDRATE 50 MG/ML
100 INJECTION, SOLUTION INTRAVENOUS PRN
Status: DISCONTINUED | OUTPATIENT
Start: 2018-09-05 | End: 2018-09-07 | Stop reason: HOSPADM

## 2018-09-05 RX ORDER — DOCUSATE SODIUM 100 MG/1
100 CAPSULE, LIQUID FILLED ORAL 2 TIMES DAILY
Status: DISCONTINUED | OUTPATIENT
Start: 2018-09-05 | End: 2018-09-07 | Stop reason: HOSPADM

## 2018-09-05 RX ORDER — MEPERIDINE HYDROCHLORIDE 50 MG/ML
12.5 INJECTION INTRAMUSCULAR; INTRAVENOUS; SUBCUTANEOUS EVERY 5 MIN PRN
Status: DISCONTINUED | OUTPATIENT
Start: 2018-09-05 | End: 2018-09-05 | Stop reason: HOSPADM

## 2018-09-05 RX ORDER — LIDOCAINE HYDROCHLORIDE 10 MG/ML
1 INJECTION, SOLUTION EPIDURAL; INFILTRATION; INTRACAUDAL; PERINEURAL
Status: DISCONTINUED | OUTPATIENT
Start: 2018-09-05 | End: 2018-09-05 | Stop reason: HOSPADM

## 2018-09-05 RX ORDER — GLYCOPYRROLATE 0.2 MG/ML
INJECTION INTRAMUSCULAR; INTRAVENOUS PRN
Status: DISCONTINUED | OUTPATIENT
Start: 2018-09-05 | End: 2018-09-05 | Stop reason: SDUPTHER

## 2018-09-05 RX ORDER — SODIUM CHLORIDE 0.9 % (FLUSH) 0.9 %
10 SYRINGE (ML) INJECTION PRN
Status: DISCONTINUED | OUTPATIENT
Start: 2018-09-05 | End: 2018-09-05 | Stop reason: HOSPADM

## 2018-09-05 RX ORDER — POLYETHYLENE GLYCOL 3350 17 G/17G
17 POWDER, FOR SOLUTION ORAL DAILY
Status: DISCONTINUED | OUTPATIENT
Start: 2018-09-05 | End: 2018-09-07 | Stop reason: HOSPADM

## 2018-09-05 RX ORDER — FLECAINIDE ACETATE 100 MG/1
50 TABLET ORAL 2 TIMES DAILY
Status: DISCONTINUED | OUTPATIENT
Start: 2018-09-05 | End: 2018-09-07 | Stop reason: HOSPADM

## 2018-09-05 RX ORDER — LATANOPROST 50 UG/ML
1 SOLUTION/ DROPS OPHTHALMIC NIGHTLY
Status: DISCONTINUED | OUTPATIENT
Start: 2018-09-05 | End: 2018-09-07 | Stop reason: HOSPADM

## 2018-09-05 RX ORDER — HYDRALAZINE HYDROCHLORIDE 20 MG/ML
5 INJECTION INTRAMUSCULAR; INTRAVENOUS EVERY 10 MIN PRN
Status: DISCONTINUED | OUTPATIENT
Start: 2018-09-05 | End: 2018-09-05 | Stop reason: HOSPADM

## 2018-09-05 RX ORDER — CHLORDIAZEPOXIDE HYDROCHLORIDE 5 MG/1
5 CAPSULE, GELATIN COATED ORAL 2 TIMES DAILY
Status: DISCONTINUED | OUTPATIENT
Start: 2018-09-05 | End: 2018-09-07 | Stop reason: HOSPADM

## 2018-09-05 RX ORDER — ACETAMINOPHEN 500 MG
1000 TABLET ORAL ONCE
Status: DISCONTINUED | OUTPATIENT
Start: 2018-09-05 | End: 2018-09-05 | Stop reason: HOSPADM

## 2018-09-05 RX ADMIN — FENTANYL CITRATE 50 MCG: 50 INJECTION INTRAMUSCULAR; INTRAVENOUS at 11:53

## 2018-09-05 RX ADMIN — HYDROMORPHONE HYDROCHLORIDE 0.25 MG: 1 INJECTION, SOLUTION INTRAMUSCULAR; INTRAVENOUS; SUBCUTANEOUS at 15:44

## 2018-09-05 RX ADMIN — ATORVASTATIN CALCIUM 10 MG: 10 TABLET, FILM COATED ORAL at 19:54

## 2018-09-05 RX ADMIN — LIDOCAINE HYDROCHLORIDE 3 ML: 20 INJECTION, SOLUTION INFILTRATION; PERINEURAL at 12:00

## 2018-09-05 RX ADMIN — POLYETHYLENE GLYCOL 3350 17 G: 17 POWDER, FOR SOLUTION ORAL at 20:01

## 2018-09-05 RX ADMIN — SODIUM CHLORIDE, POTASSIUM CHLORIDE, SODIUM LACTATE AND CALCIUM CHLORIDE: 600; 310; 30; 20 INJECTION, SOLUTION INTRAVENOUS at 10:34

## 2018-09-05 RX ADMIN — HYDROMORPHONE HYDROCHLORIDE 0.25 MG: 1 INJECTION, SOLUTION INTRAMUSCULAR; INTRAVENOUS; SUBCUTANEOUS at 15:12

## 2018-09-05 RX ADMIN — FLECAINIDE ACETATE 50 MG: 100 TABLET ORAL at 19:55

## 2018-09-05 RX ADMIN — PROPOFOL 10 MG: 10 INJECTION, EMULSION INTRAVENOUS at 13:04

## 2018-09-05 RX ADMIN — PROPOFOL 10 MG: 10 INJECTION, EMULSION INTRAVENOUS at 13:07

## 2018-09-05 RX ADMIN — Medication 2 G: at 19:54

## 2018-09-05 RX ADMIN — HYDROMORPHONE HYDROCHLORIDE 0.25 MG: 1 INJECTION, SOLUTION INTRAMUSCULAR; INTRAVENOUS; SUBCUTANEOUS at 16:55

## 2018-09-05 RX ADMIN — OXYCODONE HYDROCHLORIDE AND ACETAMINOPHEN 1 TABLET: 5; 325 TABLET ORAL at 19:52

## 2018-09-05 RX ADMIN — SODIUM CHLORIDE, POTASSIUM CHLORIDE, SODIUM LACTATE AND CALCIUM CHLORIDE: 600; 310; 30; 20 INJECTION, SOLUTION INTRAVENOUS at 19:46

## 2018-09-05 RX ADMIN — GLYCOPYRROLATE 0.2 MG: 0.2 INJECTION, SOLUTION INTRAMUSCULAR; INTRAVENOUS at 12:15

## 2018-09-05 RX ADMIN — FENTANYL CITRATE 25 MCG: 50 INJECTION INTRAMUSCULAR; INTRAVENOUS at 12:01

## 2018-09-05 RX ADMIN — PHENYLEPHRINE HYDROCHLORIDE 100 MCG: 10 INJECTION INTRAVENOUS at 12:15

## 2018-09-05 RX ADMIN — DEXAMETHASONE SODIUM PHOSPHATE 10 MG: 10 INJECTION INTRAMUSCULAR; INTRAVENOUS at 12:26

## 2018-09-05 RX ADMIN — ROCURONIUM BROMIDE 10 MG: 10 SOLUTION INTRAVENOUS at 12:45

## 2018-09-05 RX ADMIN — FENTANYL CITRATE 50 MCG: 50 INJECTION INTRAMUSCULAR; INTRAVENOUS at 12:37

## 2018-09-05 RX ADMIN — PROPOFOL 100 MG: 10 INJECTION, EMULSION INTRAVENOUS at 12:00

## 2018-09-05 RX ADMIN — EPHEDRINE SULFATE 5 MG: 50 INJECTION INTRAVENOUS at 12:54

## 2018-09-05 RX ADMIN — INSULIN LISPRO 1 UNITS: 100 INJECTION, SOLUTION INTRAVENOUS; SUBCUTANEOUS at 20:13

## 2018-09-05 RX ADMIN — TIMOLOL MALEATE 1 DROP: 5 SOLUTION OPHTHALMIC at 23:24

## 2018-09-05 RX ADMIN — FENTANYL CITRATE 25 MCG: 50 INJECTION INTRAMUSCULAR; INTRAVENOUS at 12:00

## 2018-09-05 RX ADMIN — OXYCODONE HYDROCHLORIDE AND ACETAMINOPHEN 2 TABLET: 5; 325 TABLET ORAL at 23:23

## 2018-09-05 RX ADMIN — CHLORDIAZEPOXIDE HYDROCHLORIDE 5 MG: 5 CAPSULE ORAL at 23:24

## 2018-09-05 RX ADMIN — ROCURONIUM BROMIDE 50 MG: 10 SOLUTION INTRAVENOUS at 12:00

## 2018-09-05 RX ADMIN — ONDANSETRON 4 MG: 2 INJECTION INTRAMUSCULAR; INTRAVENOUS at 12:26

## 2018-09-05 RX ADMIN — PHENYLEPHRINE HYDROCHLORIDE 100 MCG: 10 INJECTION INTRAVENOUS at 12:00

## 2018-09-05 RX ADMIN — FAMOTIDINE 20 MG: 20 TABLET ORAL at 19:52

## 2018-09-05 RX ADMIN — HYDROMORPHONE HYDROCHLORIDE 0.25 MG: 1 INJECTION, SOLUTION INTRAMUSCULAR; INTRAVENOUS; SUBCUTANEOUS at 16:21

## 2018-09-05 RX ADMIN — SODIUM CHLORIDE, POTASSIUM CHLORIDE, SODIUM LACTATE AND CALCIUM CHLORIDE: 600; 310; 30; 20 INJECTION, SOLUTION INTRAVENOUS at 12:52

## 2018-09-05 RX ADMIN — FENTANYL CITRATE 25 MCG: 50 INJECTION INTRAMUSCULAR; INTRAVENOUS at 12:33

## 2018-09-05 RX ADMIN — FENTANYL CITRATE 25 MCG: 50 INJECTION INTRAMUSCULAR; INTRAVENOUS at 12:34

## 2018-09-05 RX ADMIN — SODIUM CHLORIDE, POTASSIUM CHLORIDE, SODIUM LACTATE AND CALCIUM CHLORIDE: 600; 310; 30; 20 INJECTION, SOLUTION INTRAVENOUS at 11:53

## 2018-09-05 RX ADMIN — LATANOPROST 1 DROP: 50 SOLUTION OPHTHALMIC at 23:24

## 2018-09-05 RX ADMIN — Medication 2 G: at 11:53

## 2018-09-05 RX ADMIN — CARVEDILOL 6.25 MG: 6.25 TABLET, FILM COATED ORAL at 19:52

## 2018-09-05 ASSESSMENT — PULMONARY FUNCTION TESTS
PIF_VALUE: 17
PIF_VALUE: 17
PIF_VALUE: 16
PIF_VALUE: 17
PIF_VALUE: 16
PIF_VALUE: 17
PIF_VALUE: 16
PIF_VALUE: 17
PIF_VALUE: 16
PIF_VALUE: 17
PIF_VALUE: 1
PIF_VALUE: 16
PIF_VALUE: 4
PIF_VALUE: 0
PIF_VALUE: 16
PIF_VALUE: 3
PIF_VALUE: 17
PIF_VALUE: 16
PIF_VALUE: 16
PIF_VALUE: 17
PIF_VALUE: 1
PIF_VALUE: 16
PIF_VALUE: 3
PIF_VALUE: 17
PIF_VALUE: 4
PIF_VALUE: 17
PIF_VALUE: 16
PIF_VALUE: 15
PIF_VALUE: 16
PIF_VALUE: 0
PIF_VALUE: 17
PIF_VALUE: 2
PIF_VALUE: 17
PIF_VALUE: 17
PIF_VALUE: 2
PIF_VALUE: 3
PIF_VALUE: 16
PIF_VALUE: 16
PIF_VALUE: 17
PIF_VALUE: 16
PIF_VALUE: 16
PIF_VALUE: 15
PIF_VALUE: 17
PIF_VALUE: 3
PIF_VALUE: 17
PIF_VALUE: 16
PIF_VALUE: 17
PIF_VALUE: 14
PIF_VALUE: 15
PIF_VALUE: 16
PIF_VALUE: 4
PIF_VALUE: 17
PIF_VALUE: 17
PIF_VALUE: 16
PIF_VALUE: 17
PIF_VALUE: 16
PIF_VALUE: 16
PIF_VALUE: 17
PIF_VALUE: 29
PIF_VALUE: 17
PIF_VALUE: 17
PIF_VALUE: 16
PIF_VALUE: 4
PIF_VALUE: 19
PIF_VALUE: 17
PIF_VALUE: 16
PIF_VALUE: 17
PIF_VALUE: 16
PIF_VALUE: 1
PIF_VALUE: 16
PIF_VALUE: 22
PIF_VALUE: 16
PIF_VALUE: 3
PIF_VALUE: 16
PIF_VALUE: 15
PIF_VALUE: 17
PIF_VALUE: 16
PIF_VALUE: 17
PIF_VALUE: 3
PIF_VALUE: 11
PIF_VALUE: 16
PIF_VALUE: 1
PIF_VALUE: 16
PIF_VALUE: 0
PIF_VALUE: 3
PIF_VALUE: 16
PIF_VALUE: 17
PIF_VALUE: 16
PIF_VALUE: 16
PIF_VALUE: 17
PIF_VALUE: 17
PIF_VALUE: 3
PIF_VALUE: 17
PIF_VALUE: 3
PIF_VALUE: 16
PIF_VALUE: 2
PIF_VALUE: 27
PIF_VALUE: 17
PIF_VALUE: 16
PIF_VALUE: 17
PIF_VALUE: 16
PIF_VALUE: 16
PIF_VALUE: 1
PIF_VALUE: 16
PIF_VALUE: 17
PIF_VALUE: 16
PIF_VALUE: 3
PIF_VALUE: 16
PIF_VALUE: 2
PIF_VALUE: 16
PIF_VALUE: 17
PIF_VALUE: 17
PIF_VALUE: 16
PIF_VALUE: 17
PIF_VALUE: 1
PIF_VALUE: 4
PIF_VALUE: 17
PIF_VALUE: 16
PIF_VALUE: 17
PIF_VALUE: 17
PIF_VALUE: 16
PIF_VALUE: 16
PIF_VALUE: 17
PIF_VALUE: 17
PIF_VALUE: 16
PIF_VALUE: 16
PIF_VALUE: 17
PIF_VALUE: 16

## 2018-09-05 ASSESSMENT — PROMIS GLOBAL HEALTH SCALE
IN THE PAST 7 DAYS, HOW OFTEN HAVE YOU BEEN BOTHERED BY EMOTIONAL PROBLEMS, SUCH AS FEELING ANXIOUS, DEPRESSED, OR IRRITABLE [ON A SCALE FROM 1 (NEVER) TO 5 (ALWAYS)]?: 3
IN GENERAL, HOW WOULD YOU RATE YOUR SATISFACTION WITH YOUR SOCIAL ACTIVITIES AND RELATIONSHIPS [ON A SCALE OF 1 (POOR) TO 5 (EXCELLENT)]?: 2
IN THE PAST 7 DAYS, HOW WOULD YOU RATE YOUR PAIN ON AVERAGE [ON A SCALE FROM 0 (NO PAIN) TO 10 (WORST IMAGINABLE PAIN)]?: 8
IN THE PAST 7 DAYS, HOW WOULD YOU RATE YOUR FATIGUE ON AVERAGE [ON A SCALE FROM 1 (NONE) TO 5 (VERY SEVERE)]?: 2
WHO IS THE PERSON COMPLETING THE PROMIS V1.1 SURVEY?: 0
IN GENERAL, WOULD YOU SAY YOUR HEALTH IS...[ON A SCALE OF 1 (POOR) TO 5 (EXCELLENT)]: 4
IN GENERAL, HOW WOULD YOU RATE YOUR PHYSICAL HEALTH [ON A SCALE OF 1 (POOR) TO 5 (EXCELLENT)]?: 2
TO WHAT EXTENT ARE YOU ABLE TO CARRY OUT YOUR EVERYDAY PHYSICAL ACTIVITIES SUCH AS WALKING, CLIMBING STAIRS, CARRYING GROCERIES, OR MOVING A CHAIR [ON A SCALE OF 1 (NOT AT ALL) TO 5 (COMPLETELY)]?: 2
SUM OF RESPONSES TO QUESTIONS 3, 6, 7, & 8: 14
SUM OF RESPONSES TO QUESTIONS 2, 4, 5, & 10: 10
IN GENERAL, PLEASE RATE HOW WELL YOU CARRY OUT YOUR USUAL SOCIAL ACTIVITIES (INCLUDES ACTIVITIES AT HOME, AT WORK, AND IN YOUR COMMUNITY, AND RESPONSIBILITIES AS A PARENT, CHILD, SPOUSE, EMPLOYEE, FRIEND, ETC) [ON A SCALE OF 1 (POOR) TO 5 (EXCELLENT)]?: 1
HOW IS THE PROMIS V1.1 BEING ADMINISTERED?: 0
IN GENERAL, WOULD YOU SAY YOUR QUALITY OF LIFE IS...[ON A SCALE OF 1 (POOR) TO 5 (EXCELLENT)]: 3
IN GENERAL, HOW WOULD YOU RATE YOUR MENTAL HEALTH, INCLUDING YOUR MOOD AND YOUR ABILITY TO THINK [ON A SCALE OF 1 (POOR) TO 5 (EXCELLENT)]?: 2

## 2018-09-05 ASSESSMENT — KOOS JR
GOING UP OR DOWN STAIRS: 4
RISING FROM SITTING: 3
HOW SEVERE IS YOUR KNEE STIFFNESS AFTER FIRST WAKING IN MORNING: 4
BENDING TO THE FLOOR TO PICK UP OBJECT: 3
TWISING OR PIVOTING ON KNEE: 3
STANDING UPRIGHT: 4
STRAIGHTENING KNEE FULLY: 3

## 2018-09-05 ASSESSMENT — PAIN DESCRIPTION - ORIENTATION: ORIENTATION: RIGHT

## 2018-09-05 ASSESSMENT — PAIN DESCRIPTION - PAIN TYPE: TYPE: SURGICAL PAIN

## 2018-09-05 ASSESSMENT — PAIN SCALES - GENERAL
PAINLEVEL_OUTOF10: 5
PAINLEVEL_OUTOF10: 6
PAINLEVEL_OUTOF10: 8
PAINLEVEL_OUTOF10: 6

## 2018-09-05 ASSESSMENT — PAIN DESCRIPTION - LOCATION: LOCATION: KNEE

## 2018-09-05 ASSESSMENT — PAIN - FUNCTIONAL ASSESSMENT: PAIN_FUNCTIONAL_ASSESSMENT: 0-10

## 2018-09-05 NOTE — OP NOTE
PATIENT NAME Lydia Balderas M.D. SURGERY DATE  9/5/2018 1:49 PM    AGE 80 y.o. PATIENT TYPE female    PRE-OPERATIVE DIAGNOSIS: right knee osteoarthritis    POST-OPERATIVE DIAGNOSIS: right knee osteoarthritis    PROCEDURE PERFORMED: right total knee replacement using computer navigation; all three components were cemented using tobramycin cement; Total stabilizing implants. Median parapatellar arthrotomy; with lateral retinacular release. Implants used:  SONAL TRIATHALON TKR SYSTEM WITH:    Size 4 CR Femoral component                          Size 4 Universal tibial baseplate                          Size 16 mm X3 polyethylene TS tibial insert                          Size 29 x 8 mm symmetric patellar component    FIRST ASSISTANT: Jordan Paiz PA-C    SECOND ASSISTANT: Pablo Ruiz MD    ANESTHESIA: general with adductor canal nerve block. COMPLICATIONS: None apparent. POST-OP CONDITION:  To recovery room. SPECIMENS: BONE  EBL: MINIMAL    INDICATIONS FOR SURGERY: The patient is a 80y.o.-year-old female with a long history of knee pain affecting the activities of daily living. Pt had severe pain inability to ambulate, night pain and frequent falls. The pain was refractory to conservative management including injections (corticosteroid/viscosupplementation); PT, Ambulatory aids; oral medication (NSAIDs and pain medication) and bracing for 3 - 6 months. Preop workup showed radiographic changes with osteophyte formation; loss of cartilage space; subchondral sclerosis and deformity. The patient is scheduled for a right total knee replacement. The risks, benefits and alternatives of surgery were clearly discussed and the patient wished to proceed with surgery. OPERATIVE FINDINGS: severe osteoarthritis with tricompartmental involvement and severe valus deformity and instability. Complete loss of articular cartilage, osteophyte formation and severe synovitis.      DETAILS OF cocktail was injected into the posterior capsule of the medial and lateral gutters. Next, the tibial trial, femoral trial and insert trial were then placed. The patella was resurfaced, resecting 9 mm bone off the articular surface of the patella, resurfacing with a symmetric patellar component. The pegholes were drilled. Trials were placed. The knee was put through a full range of motion using computer navigation with the post-op kinematics showing neutral alignment throughout the entire range of motion with less than 2 degrees of variation through the entire axis. The patella was tracking centrally with no need for lateral retinacular release. The flexion and extension gap was balanced both medially and laterally. Final range of motion: 5 Hyperextension to 130 degrees of flexion. Next all trials were removed. The cut bony surfaces were irrigated with copious amounts of irrigation solution. At the appropriate cement consistency the tibial, femoral and patellar components were cemented in place. All excess cement was removed. The insert was then placed. The knee was held in extension as the cement cured. The knee was irrigated with 5 liters of irrigation solution using pulsatile lavage. The tourniquet was let down before closure. Any bleeding vessels were coagulated using the Bovie cautery. The knee was then closed in layers using #2 Ethibond to close the extensor mechanism, a combination of 0- and 2-0 Monocryl to close the subcutaneous tissue and 4-0 Monocryl to close the skin. Dermaflex was applied to seal the wound and allowed to dry before a dry sterile compression dressing was applied. The patient was then taken to recovery in stable condition having tolerated the procedure well.     Devi rAgueta M.D.

## 2018-09-06 ENCOUNTER — CARE COORDINATION (OUTPATIENT)
Dept: CASE MANAGEMENT | Age: 83
End: 2018-09-06

## 2018-09-06 PROBLEM — I48.0 PAF (PAROXYSMAL ATRIAL FIBRILLATION) (HCC): Status: ACTIVE | Noted: 2018-09-06

## 2018-09-06 PROBLEM — Z95.0 PACEMAKER: Status: ACTIVE | Noted: 2018-09-06

## 2018-09-06 LAB
ANION GAP SERPL CALCULATED.3IONS-SCNC: 11 MMOL/L (ref 3–16)
BASOPHILS ABSOLUTE: 0 K/UL (ref 0–0.2)
BASOPHILS RELATIVE PERCENT: 0.3 %
BUN BLDV-MCNC: 8 MG/DL (ref 7–20)
CALCIUM SERPL-MCNC: 8.5 MG/DL (ref 8.3–10.6)
CHLORIDE BLD-SCNC: 100 MMOL/L (ref 99–110)
CO2: 27 MMOL/L (ref 21–32)
CREAT SERPL-MCNC: <0.5 MG/DL (ref 0.6–1.2)
EOSINOPHILS ABSOLUTE: 0 K/UL (ref 0–0.6)
EOSINOPHILS RELATIVE PERCENT: 0 %
GFR AFRICAN AMERICAN: >60
GFR NON-AFRICAN AMERICAN: >60
GLUCOSE BLD-MCNC: 106 MG/DL (ref 70–99)
GLUCOSE BLD-MCNC: 109 MG/DL (ref 70–99)
GLUCOSE BLD-MCNC: 115 MG/DL (ref 70–99)
GLUCOSE BLD-MCNC: 124 MG/DL (ref 70–99)
GLUCOSE BLD-MCNC: 139 MG/DL (ref 70–99)
HCT VFR BLD CALC: 28.6 % (ref 36–48)
HEMOGLOBIN: 9.6 G/DL (ref 12–16)
LYMPHOCYTES ABSOLUTE: 0.9 K/UL (ref 1–5.1)
LYMPHOCYTES RELATIVE PERCENT: 7.9 %
MCH RBC QN AUTO: 31.4 PG (ref 26–34)
MCHC RBC AUTO-ENTMCNC: 33.6 G/DL (ref 31–36)
MCV RBC AUTO: 93.4 FL (ref 80–100)
MONOCYTES ABSOLUTE: 1.2 K/UL (ref 0–1.3)
MONOCYTES RELATIVE PERCENT: 10.3 %
NEUTROPHILS ABSOLUTE: 9.8 K/UL (ref 1.7–7.7)
NEUTROPHILS RELATIVE PERCENT: 81.5 %
PDW BLD-RTO: 12.3 % (ref 12.4–15.4)
PERFORMED ON: ABNORMAL
PLATELET # BLD: 341 K/UL (ref 135–450)
PMV BLD AUTO: 7.1 FL (ref 5–10.5)
POTASSIUM REFLEX MAGNESIUM: 3.8 MMOL/L (ref 3.5–5.1)
RBC # BLD: 3.06 M/UL (ref 4–5.2)
SODIUM BLD-SCNC: 138 MMOL/L (ref 136–145)
WBC # BLD: 12.1 K/UL (ref 4–11)

## 2018-09-06 PROCEDURE — G8978 MOBILITY CURRENT STATUS: HCPCS

## 2018-09-06 PROCEDURE — 97116 GAIT TRAINING THERAPY: CPT

## 2018-09-06 PROCEDURE — 6360000002 HC RX W HCPCS: Performed by: PHYSICIAN ASSISTANT

## 2018-09-06 PROCEDURE — 80048 BASIC METABOLIC PNL TOTAL CA: CPT

## 2018-09-06 PROCEDURE — 85025 COMPLETE CBC W/AUTO DIFF WBC: CPT

## 2018-09-06 PROCEDURE — 36415 COLL VENOUS BLD VENIPUNCTURE: CPT

## 2018-09-06 PROCEDURE — 2580000003 HC RX 258: Performed by: PHYSICIAN ASSISTANT

## 2018-09-06 PROCEDURE — 97110 THERAPEUTIC EXERCISES: CPT

## 2018-09-06 PROCEDURE — G8979 MOBILITY GOAL STATUS: HCPCS

## 2018-09-06 PROCEDURE — 1200000000 HC SEMI PRIVATE

## 2018-09-06 PROCEDURE — G8988 SELF CARE GOAL STATUS: HCPCS

## 2018-09-06 PROCEDURE — 97535 SELF CARE MNGMENT TRAINING: CPT

## 2018-09-06 PROCEDURE — 6370000000 HC RX 637 (ALT 250 FOR IP): Performed by: ORTHOPAEDIC SURGERY

## 2018-09-06 PROCEDURE — 97162 PT EVAL MOD COMPLEX 30 MIN: CPT

## 2018-09-06 PROCEDURE — G8987 SELF CARE CURRENT STATUS: HCPCS

## 2018-09-06 PROCEDURE — 6370000000 HC RX 637 (ALT 250 FOR IP): Performed by: PHYSICIAN ASSISTANT

## 2018-09-06 PROCEDURE — 97165 OT EVAL LOW COMPLEX 30 MIN: CPT

## 2018-09-06 PROCEDURE — 97530 THERAPEUTIC ACTIVITIES: CPT

## 2018-09-06 RX ADMIN — OXYCODONE HYDROCHLORIDE AND ACETAMINOPHEN 2 TABLET: 5; 325 TABLET ORAL at 08:11

## 2018-09-06 RX ADMIN — CHLORDIAZEPOXIDE HYDROCHLORIDE 5 MG: 5 CAPSULE ORAL at 09:02

## 2018-09-06 RX ADMIN — CARVEDILOL 6.25 MG: 6.25 TABLET, FILM COATED ORAL at 17:44

## 2018-09-06 RX ADMIN — TIMOLOL MALEATE 1 DROP: 5 SOLUTION OPHTHALMIC at 08:13

## 2018-09-06 RX ADMIN — ENOXAPARIN SODIUM 30 MG: 30 INJECTION SUBCUTANEOUS at 19:57

## 2018-09-06 RX ADMIN — SODIUM CHLORIDE, PRESERVATIVE FREE 10 ML: 5 INJECTION INTRAVENOUS at 19:55

## 2018-09-06 RX ADMIN — DOCUSATE SODIUM 100 MG: 100 CAPSULE, LIQUID FILLED ORAL at 19:56

## 2018-09-06 RX ADMIN — OXYCODONE HYDROCHLORIDE AND ACETAMINOPHEN 1 TABLET: 5; 325 TABLET ORAL at 17:46

## 2018-09-06 RX ADMIN — FAMOTIDINE 20 MG: 20 TABLET ORAL at 08:11

## 2018-09-06 RX ADMIN — ENOXAPARIN SODIUM 30 MG: 30 INJECTION SUBCUTANEOUS at 08:12

## 2018-09-06 RX ADMIN — FLECAINIDE ACETATE 50 MG: 100 TABLET ORAL at 19:55

## 2018-09-06 RX ADMIN — FAMOTIDINE 20 MG: 20 TABLET ORAL at 19:55

## 2018-09-06 RX ADMIN — LATANOPROST 1 DROP: 50 SOLUTION OPHTHALMIC at 19:58

## 2018-09-06 RX ADMIN — ATORVASTATIN CALCIUM 10 MG: 10 TABLET, FILM COATED ORAL at 19:57

## 2018-09-06 RX ADMIN — CHLORDIAZEPOXIDE HYDROCHLORIDE 5 MG: 5 CAPSULE ORAL at 17:44

## 2018-09-06 RX ADMIN — OXYCODONE HYDROCHLORIDE AND ACETAMINOPHEN 2 TABLET: 5; 325 TABLET ORAL at 12:12

## 2018-09-06 RX ADMIN — OXYCODONE HYDROCHLORIDE AND ACETAMINOPHEN 1 TABLET: 5; 325 TABLET ORAL at 20:11

## 2018-09-06 RX ADMIN — Medication 2 G: at 03:08

## 2018-09-06 RX ADMIN — FLECAINIDE ACETATE 50 MG: 100 TABLET ORAL at 08:12

## 2018-09-06 RX ADMIN — CARVEDILOL 6.25 MG: 6.25 TABLET, FILM COATED ORAL at 08:11

## 2018-09-06 RX ADMIN — SENNOSIDES AND DOCUSATE SODIUM 1 TABLET: 8.6; 5 TABLET ORAL at 08:11

## 2018-09-06 RX ADMIN — SODIUM CHLORIDE, POTASSIUM CHLORIDE, SODIUM LACTATE AND CALCIUM CHLORIDE: 600; 310; 30; 20 INJECTION, SOLUTION INTRAVENOUS at 03:08

## 2018-09-06 RX ADMIN — DOCUSATE SODIUM 100 MG: 100 CAPSULE, LIQUID FILLED ORAL at 08:11

## 2018-09-06 RX ADMIN — OXYCODONE HYDROCHLORIDE AND ACETAMINOPHEN 2 TABLET: 5; 325 TABLET ORAL at 03:14

## 2018-09-06 RX ADMIN — TIMOLOL MALEATE 1 DROP: 5 SOLUTION OPHTHALMIC at 15:12

## 2018-09-06 ASSESSMENT — PAIN DESCRIPTION - ORIENTATION
ORIENTATION: RIGHT

## 2018-09-06 ASSESSMENT — PAIN DESCRIPTION - PAIN TYPE
TYPE: SURGICAL PAIN
TYPE: SURGICAL PAIN;ACUTE PAIN
TYPE: SURGICAL PAIN
TYPE: SURGICAL PAIN;ACUTE PAIN
TYPE: SURGICAL PAIN
TYPE: SURGICAL PAIN

## 2018-09-06 ASSESSMENT — PAIN DESCRIPTION - DESCRIPTORS: DESCRIPTORS: ACHING

## 2018-09-06 ASSESSMENT — PAIN DESCRIPTION - LOCATION
LOCATION: KNEE

## 2018-09-06 ASSESSMENT — PAIN SCALES - GENERAL
PAINLEVEL_OUTOF10: 10
PAINLEVEL_OUTOF10: 7
PAINLEVEL_OUTOF10: 10
PAINLEVEL_OUTOF10: 6
PAINLEVEL_OUTOF10: 7
PAINLEVEL_OUTOF10: 9
PAINLEVEL_OUTOF10: 9

## 2018-09-06 NOTE — CARE COORDINATION
Case Management Assessment                                                           Initial Evaluation    Patient Interviewed:9/6/18    :DaughterLowell    Support Person:Daughter    Transportation Home:Patient will need transportation to [place] [address] Prior to Admission:No    [facility] Resident Prior to Admission:No    Durable Medical Equipment/Provider: SNF to provide    Discharge Plan:Patient lives with daughter in mobile home and states that she has no assistance during the day because daughter works. Patient is requesting to go to SNF and has chose [facility]. Information faxed to [facility], and call placed to [facility] for bed availability. Will continue to follow for further needs. Discussed Care Transition Coordinator role with patient and patient agrees to be part of the bundle program.    [facility] Bundle Transition Assessment    Patient Interviewed: 9/6/18  Informed patient of [facility] Bundle Program and role of CTS/CTC. CMS Letter Given:  Yes    Living Situation:   Living arrangements: with family:  daughter                   Home: 1-story house/ trailer   Social support:a good social support network    Risk Analysis:  Has the following:  >80 (frequent admissions, frequent ED visits, Homeless, lack of support, drives, age >80, history of falls, compliance issues, balance issues, 10+ medications, uninsured, under insured)   Chronic Illness: Osteoarthritis    Fall Risk & DME:   Any previous falls or injuries in the home? No: denies   Visual Impairment:  Within Normal Limit   Difficulty hearing: None (hard of hearing, Deaf, Wears hearing aids)   Able to express needs/desires? Yes   Home Equipment: SNF to provide. States she has a rolling walker, wheel chair and shower chair. Financial Assessment   Afford medications?  Yes   Connected with the following services? none    Mode of transportation? car    Health Literacy Assessment   Does anyone help with medications at home? No  Anything preventing from taking medications at home?   No    Anticipated Needs Post Evaluation:    Follow up appointments:    Future Appointments  Date Time Provider Patricia Doll   9/18/2018 1:50 PM IDALIA Ngo AND JENI Reddy BSN  Care Transition Coordinator for [facility] bundle  [phone]

## 2018-09-06 NOTE — PLAN OF CARE
Problem: Musculor/Skeletal Functional Status  Intervention: PT Evaluation/treatment  Improve function to baseline

## 2018-09-06 NOTE — PROGRESS NOTES
Component Value Date    WBC 12.1 09/06/2018    RBC 3.06 09/06/2018    HGB 9.6 09/06/2018    HCT 28.6 09/06/2018     09/06/2018       Assessment:     Status Post right Total Knee Arthroplasty. Doing well postoperatively. Plan:      1: Continues current post-op course, IM to follow. Pt hypertensive today, continue to monitor. Pt asymptomatic. Urinary retention noted and kate placed overnight, will plan for voiding trial tomorrow am.  Labs stable this am.    2:  Continue Deep venous thrombosis prophylaxis- lovenox  3:  Continue physical therapy and OT, WBAT  4:  Continue Pain Control PRN. Cannot take NSAIDS d/t allergy. 5:  D/c planning for SNF placement, pt requesting [facility] [place]. Pt states she lives with her daughter but her daughter is unable to take off work to assist her at home.   DCP updated  6:  Bundle Program Patient    Shalom Michael PA-C

## 2018-09-06 NOTE — PLAN OF CARE
Problem: Safety:  Goal: Free from accidental physical injury  Free from accidental physical injury  Bed in low position, side rails up  X2,encouraged to call before getting out of bed,verbalized understanding. Call light remains in reach. bed alarm remains activated, nonskid socks on

## 2018-09-06 NOTE — CONSULTS
Hospital Medicine  Consult History & Physical        Chief Complaint:  Knee pain/ S/p Right total knee     Date of Service: Pt seen/examined in consultation on     History Of Present Illness:      80 y.o. female who we are asked to see/evaluate by Lisa Green MD for medical management. Pt has PMH of hypertension, proximal A. fib, pacemaker. The patient reports a several year hx of progressive knee pain 2nd to OA that is rated 10/10, worse with ambulation and relieved to some degree by rest and analgesic medication. This has progressed to the point that the analgesic meds are minimally effective and the patient decided to undergo an elective total knee replacement. The patient reports good post-operative pain control w/out associated fevers/chills or other signs/sxs of systemic illness. The patient denies any associated cardiopulmonary c/o such as CP/SOB. Denies any significant post-op N/V. Past Medical History:        Diagnosis Date    Arthritis     CAD (coronary artery disease)     Depression     Diverticulosis     Gall stone     Glaucoma     Hypercholesteremia     Hypertension     Irregular heart beat     Rectal prolapse     Uterine cancer ([facility] [address] 2008       Past Surgical History:        Procedure Laterality Date    APPENDECTOMY      COLONOSCOPY  2005    polyp    COLONOSCOPY  1010    diverticulosis    COLONOSCOPY  10/4/2014    diverticulosis, radiation colitis sigmoid    FRACTURE SURGERY      HIP SURGERY Right 8/4/15    hip pinning    HYSTERECTOMY  2008    RT in 2009   [address]  2008    [facility] SURGICAL NAVIGATION IMAGE-LESS Right 9/5/2018    RIGHT TOTAL KNEE REPLACEMENT COMPUTER NAVIGATION WITH ADDUCTOR CANAL BLOCK FOR PAIN CONTROL                     SONAL performed by Lisa Green MD at [place] [address]         Medications Prior to Admission:    Prior to Admission medications    Medication Sig Start Date End Date Taking?

## 2018-09-06 NOTE — PROGRESS NOTES
Tolerance: Patient Tolerated treatment well;Patient limited by endurance; Patient limited by pain      Plan   Plan  Times per week: BID 7x/week  Times per day: Twice a day  Plan weeks: 1 week  Specific instructions for Next Treatment: progression of functional mobility and ther ex  Current Treatment Recommendations: Strengthening, ROM, Balance Training, Transfer Training, Functional Mobility Training, Endurance Training, Stair training, Gait Training, Neuromuscular Re-education, Home Exercise Program, Safety Education & Training, Equipment Evaluation, Education, & procurement  Safety Devices  Type of devices: All fall risk precautions in place, Left in chair, Gait belt, Chair alarm in place, Nurse notified    G-Code  PT G-Codes  Functional Assessment Tool Used: AM-PAC  Score: 17  Functional Limitation: Mobility: Walking and moving around  Mobility: Walking and Moving Around Current Status (): At least 40 percent but less than 60 percent impaired, limited or restricted  Mobility: Walking and Moving Around Goal Status ():  At least 20 percent but less than 40 percent impaired, limited or restricted    AM-PAC Score  AM-PAC Inpatient Mobility Raw Score : 17  AM-PAC Inpatient T-Scale Score : 42.13  Mobility Inpatient CMS 0-100% Score: 50.57  Mobility Inpatient CMS G-Code Modifier : CK     Goals  Short term goals  Time Frame for Short term goals: 1 week, unless otherwise noted  Short term goal 1: Pt will perform bed mobility with supervision  Short term goal 2: Pt will perform sit<>stand transfers with supervision  Short term goal 3: Pt will ambulate 48' with RW and SBA  Short term goal 4: When safely able: Pt will ascend/descend 1 step with RW and SBA  Short term goal 5: By 9/7/18, pt will demonstrate RLE SLR x 10 reps without extensor lag  Patient Goals   Patient goals : \"to get better and get rid of this pain\"       Therapy Time   Individual Concurrent Group Co-treatment   Time In 0820         Time Out 0906

## 2018-09-06 NOTE — PROGRESS NOTES
Occupational Therapy   Occupational Therapy Initial Assessment/Treatment  Date: 2018   Patient Name: Amelia Pantoja  MRN: [id number]     : 1932    Date of Service: 2018    Discharge Recommendations:  [address]         Patient Diagnosis(es): The encounter diagnosis was Primary osteoarthritis of right knee. has a past medical history of Arthritis; CAD (coronary artery disease); Depression; Diverticulosis; Gall stone; Glaucoma; Hypercholesteremia; Hypertension; Irregular heart beat; Rectal prolapse; and Uterine cancer ([facility] [address] has a past surgical history that includes Hysterectomy (); Appendectomy; Pacemaker insertion; Tonsillectomy; Colonoscopy (); Colonoscopy (); Colonoscopy (10/4/2014); hip surgery (Right, 8/4/15); pacemaker placement (); fracture surgery; and pr cptr-asst surgical navigation image-less (Right, 2018). Restrictions  Restrictions/Precautions  Restrictions/Precautions: Weight Bearing, Fall Risk  Required Braces or Orthoses?: Yes (KI to RLE when OOB until adequate quad control)  Lower Extremity Weight Bearing Restrictions  Right Lower Extremity Weight Bearing: Weight Bearing As Tolerated    Subjective   General  Chart Reviewed: Yes  Patient assessed for rehabilitation services?: Yes  Family / Caregiver Present: No  Referring Practitioner: Dr. John Love 18  Diagnosis: R knee OA, s/p R TKR 18  Subjective  Subjective: Pt. pleasant and agreeable to OT evaluation, states hoping to go to rehab.   Pain Assessment  Patient Currently in Pain: Yes  Pain Assessment: 0-10  Pain Level: 9  Pain Type: Surgical pain  Pain Location: Knee  Pain Orientation: Right  Pain Descriptors: Aching  Pain Intervention(s): Repositioned, Therapeutic presence  Response to Pain Intervention: Patient Satisfied     Social/Functional History  Social/Functional History  Lives With: Daughter (who works, so patient is home alone during the day)  Type of Home: Trailer  Home Layout: One level  Home Access: Stairs to enter with rails, Ramped entrance  Entrance Stairs - Number of Steps: 1 ROBERTO  Bathroom Shower/Tub: Tub/Shower unit  Bathroom Toilet: Standard  Bathroom Equipment: Tub transfer bench  Home Equipment: Rolling walker, Quad cane, [employer]  Receives Help From: Family  ADL Assistance: Needs assistance  Bath: Moderate assistance (does sponges bathes,1x a month daughter helps her into shower)  Dressing: Independent  Grooming: Independent  Feeding: Independent  Toileting: Independent  Homemaking Responsibilities: No (daughter completes)  Ambulation Assistance: Independent (with RW in trailer, wc in community)  Transfer Assistance: Independent  Active : No  Mode of Transportation: Family  Occupation: Retired  Type of occupation: homemaker  Leisure & Hobbies: [employer], [place] [employer]       Objective   Vision: Impaired  Vision Exceptions: Wears glasses at all times  Hearing: Exceptions to [facility]  Hearing Exceptions: Hard of hearing/hearing concerns; Left hearing aid (hearing aid is not with patient at this time)    Orientation  Overall Orientation Status: Within Functional Limits  Observation/Palpation  Posture: Fair (Kyphotic in standing)    Balance  Sitting Balance: Supervision  Standing Balance: Dependent/Total (Le of 2 up to RW)  Standing Balance  Sit to stand: Dependent/Total (mod A x 2 up to RW from EOB)  Stand to sit: Dependent/Total (modA of 2)    Functional Mobility  Functional - Mobility Device: Rolling Walker  Activity: To/from bathroom  Assist Level: Minimal assistance    Toilet Transfers  Toilet - Technique: Ambulating  Equipment Used: Standard toilet  Toilet Transfer: Dependent/Total (Le of 2 with grab bar)    ADL  LE Dressing:  Moderate assistance  Toileting: Supervision     Tone RUE  RUE Tone: Normotonic  Tone LUE  LUE Tone: Normotonic  Coordination  Movements Are Fluid And Coordinated: Yes     Bed mobility  Supine to least 20 percent but less than 40 percent impaired, limited or restricted                 AM-PAC Score        AM-PAC Inpatient Daily Activity Raw Score: 17  AM-PAC Inpatient ADL T-Scale Score : 37.26  ADL Inpatient CMS 0-100% Score: 50.11  ADL Inpatient CMS G-Code Modifier : CK    Goals  Short term goals  Time Frame for Short term goals: 1 week unless otherwise specified  Short term goal 1: Pt. will complete LE dressing with Le  Short term goal 2: Pt. will complete toilet transfers with CGA by 8/10  Short term goal 3: Pt. will complete grooming task sink level with CGA for balance  Patient Goals   Patient goals : \"to get better, so I can get the other side done\"       Therapy Time   Individual Concurrent Group Co-treatment   Time In 0820         Time Out 0906         Minutes 46         Timed Code Treatment Minutes: 39 Minutes       Pearline [place], [place]

## 2018-09-07 VITALS
WEIGHT: 113 LBS | SYSTOLIC BLOOD PRESSURE: 133 MMHG | RESPIRATION RATE: 16 BRPM | TEMPERATURE: 98.2 F | HEART RATE: 61 BPM | DIASTOLIC BLOOD PRESSURE: 77 MMHG | BODY MASS INDEX: 22.19 KG/M2 | HEIGHT: 60 IN | OXYGEN SATURATION: 96 %

## 2018-09-07 LAB
BASOPHILS ABSOLUTE: 0 K/UL (ref 0–0.2)
BASOPHILS RELATIVE PERCENT: 0.5 %
EOSINOPHILS ABSOLUTE: 0.1 K/UL (ref 0–0.6)
EOSINOPHILS RELATIVE PERCENT: 1 %
HCT VFR BLD CALC: 25.8 % (ref 36–48)
HEMOGLOBIN: 8.8 G/DL (ref 12–16)
LYMPHOCYTES ABSOLUTE: 1.1 K/UL (ref 1–5.1)
LYMPHOCYTES RELATIVE PERCENT: 12 %
MCH RBC QN AUTO: 31.6 PG (ref 26–34)
MCHC RBC AUTO-ENTMCNC: 34.3 G/DL (ref 31–36)
MCV RBC AUTO: 92.3 FL (ref 80–100)
MONOCYTES ABSOLUTE: 0.9 K/UL (ref 0–1.3)
MONOCYTES RELATIVE PERCENT: 10.6 %
NEUTROPHILS ABSOLUTE: 6.7 K/UL (ref 1.7–7.7)
NEUTROPHILS RELATIVE PERCENT: 75.9 %
PDW BLD-RTO: 12.5 % (ref 12.4–15.4)
PLATELET # BLD: 286 K/UL (ref 135–450)
PMV BLD AUTO: 7.1 FL (ref 5–10.5)
RBC # BLD: 2.8 M/UL (ref 4–5.2)
WBC # BLD: 8.8 K/UL (ref 4–11)

## 2018-09-07 PROCEDURE — 6370000000 HC RX 637 (ALT 250 FOR IP): Performed by: ORTHOPAEDIC SURGERY

## 2018-09-07 PROCEDURE — 36415 COLL VENOUS BLD VENIPUNCTURE: CPT

## 2018-09-07 PROCEDURE — 6370000000 HC RX 637 (ALT 250 FOR IP): Performed by: PHYSICIAN ASSISTANT

## 2018-09-07 PROCEDURE — 97116 GAIT TRAINING THERAPY: CPT

## 2018-09-07 PROCEDURE — 85025 COMPLETE CBC W/AUTO DIFF WBC: CPT

## 2018-09-07 PROCEDURE — 97530 THERAPEUTIC ACTIVITIES: CPT

## 2018-09-07 PROCEDURE — 6360000002 HC RX W HCPCS: Performed by: PHYSICIAN ASSISTANT

## 2018-09-07 PROCEDURE — 97110 THERAPEUTIC EXERCISES: CPT

## 2018-09-07 PROCEDURE — 2580000003 HC RX 258: Performed by: PHYSICIAN ASSISTANT

## 2018-09-07 RX ORDER — OXYCODONE HYDROCHLORIDE AND ACETAMINOPHEN 5; 325 MG/1; MG/1
1-2 TABLET ORAL EVERY 4 HOURS PRN
Qty: 10 TABLET | Refills: 0 | Status: SHIPPED | OUTPATIENT
Start: 2018-09-07 | End: 2018-09-14

## 2018-09-07 RX ADMIN — DOCUSATE SODIUM 100 MG: 100 CAPSULE, LIQUID FILLED ORAL at 09:08

## 2018-09-07 RX ADMIN — SODIUM CHLORIDE, PRESERVATIVE FREE 10 ML: 5 INJECTION INTRAVENOUS at 09:08

## 2018-09-07 RX ADMIN — CHLORDIAZEPOXIDE HYDROCHLORIDE 5 MG: 5 CAPSULE ORAL at 18:05

## 2018-09-07 RX ADMIN — OXYCODONE HYDROCHLORIDE AND ACETAMINOPHEN 1 TABLET: 5; 325 TABLET ORAL at 05:21

## 2018-09-07 RX ADMIN — SENNOSIDES AND DOCUSATE SODIUM 1 TABLET: 8.6; 5 TABLET ORAL at 09:07

## 2018-09-07 RX ADMIN — CHLORDIAZEPOXIDE HYDROCHLORIDE 5 MG: 5 CAPSULE ORAL at 09:08

## 2018-09-07 RX ADMIN — POLYETHYLENE GLYCOL 3350 17 G: 17 POWDER, FOR SOLUTION ORAL at 09:08

## 2018-09-07 RX ADMIN — OXYCODONE HYDROCHLORIDE AND ACETAMINOPHEN 2 TABLET: 5; 325 TABLET ORAL at 00:07

## 2018-09-07 RX ADMIN — FAMOTIDINE 20 MG: 20 TABLET ORAL at 09:08

## 2018-09-07 RX ADMIN — ENOXAPARIN SODIUM 30 MG: 30 INJECTION SUBCUTANEOUS at 09:08

## 2018-09-07 RX ADMIN — OXYCODONE HYDROCHLORIDE AND ACETAMINOPHEN 2 TABLET: 5; 325 TABLET ORAL at 14:10

## 2018-09-07 RX ADMIN — TIMOLOL MALEATE 1 DROP: 5 SOLUTION OPHTHALMIC at 09:09

## 2018-09-07 RX ADMIN — CARVEDILOL 6.25 MG: 6.25 TABLET, FILM COATED ORAL at 09:08

## 2018-09-07 RX ADMIN — OXYCODONE HYDROCHLORIDE AND ACETAMINOPHEN 1 TABLET: 5; 325 TABLET ORAL at 18:05

## 2018-09-07 RX ADMIN — FLECAINIDE ACETATE 50 MG: 100 TABLET ORAL at 09:08

## 2018-09-07 RX ADMIN — OXYCODONE HYDROCHLORIDE AND ACETAMINOPHEN 2 TABLET: 5; 325 TABLET ORAL at 09:24

## 2018-09-07 RX ADMIN — CARVEDILOL 6.25 MG: 6.25 TABLET, FILM COATED ORAL at 18:05

## 2018-09-07 RX ADMIN — TIMOLOL MALEATE 1 DROP: 5 SOLUTION OPHTHALMIC at 16:36

## 2018-09-07 ASSESSMENT — PAIN SCALES - GENERAL
PAINLEVEL_OUTOF10: 7
PAINLEVEL_OUTOF10: 7
PAINLEVEL_OUTOF10: 9
PAINLEVEL_OUTOF10: 7
PAINLEVEL_OUTOF10: 9
PAINLEVEL_OUTOF10: 4
PAINLEVEL_OUTOF10: 4

## 2018-09-07 ASSESSMENT — PAIN DESCRIPTION - LOCATION
LOCATION: KNEE
LOCATION: KNEE

## 2018-09-07 ASSESSMENT — PAIN DESCRIPTION - ORIENTATION
ORIENTATION: RIGHT
ORIENTATION: RIGHT

## 2018-09-07 ASSESSMENT — PAIN DESCRIPTION - PAIN TYPE
TYPE: SURGICAL PAIN
TYPE: SURGICAL PAIN

## 2018-09-07 NOTE — PROGRESS NOTES
Hospitalist Progress Note      PCP: Jorge Mckeon MD    Date of Admission: 9/5/2018    Chief Complaint: Knee pain/ S/p Right total knee     Hospital Course: 80 y.o. female who we are asked to see/evaluate by Dylon Garcia MD for medical management. Pt has PMH of hypertension, proximal A. fib, pacemaker. The patient reports a several year hx of progressive knee pain 2nd to OA that is rated 10/10, worse with ambulation and relieved to some degree by rest and analgesic medication. This has progressed to the point that the analgesic meds are minimally effective and the patient decided to undergo an elective total knee replacement. Subjective: The patient reports good post-operative pain control w/out associated fevers/chills or other signs/sxs of systemic illness. The patient denies any associated cardiopulmonary c/o such as CP/SOB. Denies any significant post-op N/V.        Medications:  Reviewed    Infusion Medications    lactated ringers Stopped (09/06/18 1638)    dextrose       Scheduled Medications    bupivacaine liposome  20 mL Subcutaneous Once    atorvastatin  10 mg Oral Nightly    carvedilol  6.25 mg Oral BID WC    chlordiazePOXIDE  5 mg Oral BID    flecainide  50 mg Oral BID    latanoprost  1 drop Both Eyes Nightly    timolol  1 drop Left Eye BID    sodium chloride flush  10 mL Intravenous 2 times per day    docusate sodium  100 mg Oral BID    sennosides-docusate sodium  1 tablet Oral Daily    famotidine  20 mg Oral BID    enoxaparin  30 mg Subcutaneous BID    polyethylene glycol  17 g Oral Daily     PRN Meds: sodium chloride flush, acetaminophen, oxyCODONE-acetaminophen **OR** oxyCODONE-acetaminophen, magnesium hydroxide, ondansetron, glucose, dextrose, glucagon (rDNA), dextrose      Intake/Output Summary (Last 24 hours) at 09/07/18 1116  Last data filed at 09/07/18 1034   Gross per 24 hour   Intake              360 ml   Output             1500 ml   Net            -1140 ml Physical Exam Performed:    /70   Pulse 60   Temp 98.9 °F (37.2 °C) (Oral)   Resp 16   Ht 5' (1.524 m)   Wt 113 lb (51.3 kg)   SpO2 94%   BMI 22.07 kg/m²     General appearance: No apparent distress, appears stated age and cooperative. HEENT: Normal cephalic, atraumatic without obvious deformity. Pupils equal, round, and reactive to light. Extra ocular muscles intact. Conjunctivae/corneas clear. Neck: Supple, with full range of motion. No jugular venous distention. Trachea midline. Respiratory:  Normal respiratory effort. Clear to auscultation, bilaterally without Rales/Wheezes/Rhonchi. Cardiovascular: Regular rate and rhythm with normal S1/S2 without murmurs, rubs or gallops. Abdomen: Soft, non-tender, non-distended with normal bowel sounds. Musculoskeletal: Knee with imb  No clubbing, cyanosis or edema bilaterally. Skin: Skin color, texture, turgor normal.  No rashes or lesions. Neurologic:  Neurovascularly intact without any focal sensory/motor deficits. Cranial nerves: II-XII intact, grossly non-focal.   Psychiatric: Alert and oriented, thought content appropriate, normal insight  Capillary Refill: Brisk,< 3 seconds   Peripheral Pulses: +2 palpable, equal bilaterally     Labs:   Recent Labs      09/06/18   0619  09/07/18   0603   WBC  12.1*  8.8   HGB  9.6*  8.8*   HCT  28.6*  25.8*   PLT  341  286     Recent Labs      09/06/18   0619   NA  138   K  3.8   CL  100   CO2  27   BUN  8   CREATININE  <0.5*   CALCIUM  8.5     No results for input(s): AST, ALT, BILIDIR, BILITOT, ALKPHOS in the last 72 hours. Recent Labs      09/05/18   1045   INR  1.30*     No results for input(s): Shaheed Grief in the last 72 hours.     Urinalysis:    Lab Results   Component Value Date    NITRU Negative 08/20/2018    WBCUA 3-5 08/20/2018    BACTERIA Rare 08/20/2018    RBCUA 3-5 08/20/2018    BLOODU TRACE-LYSED 08/20/2018    SPECGRAV 1.010 08/20/2018    GLUCOSEU Negative 08/20/2018    GLUCOSEU Neg 04/25/2010       Radiology:  No orders to display           Assessment/Plan:    Active Hospital Problems    Diagnosis Date Noted    [facility]oxysmal [facility] fibrillation[facility] [I48.0] 09/06/2018    Pacemaker [Z95.0] 09/06/2018    Pain due to total right knee replacement ([facility] [V66.02EX, Z96.651] 09/05/2018    Chronic knee pain after total replacement of right knee joint [M25.561, G89.29, Z96.651] 09/05/2018    Status post total right knee replacement [Z96.651] 09/05/2018    Hypertension [I10]     Hip fracture ([facility] [S72.009A] 07/22/2015     Status post right total knee replacement   - PT OT weightbearing status and pain management per primary service  - Continue DVT prophylaxis     Paroxysmal atrial fibrillation  - At home on aspirin only continue Coreg and flecainide     History of sick sinus syndrome pacemaker in situ no current issues     Hypertension- controlled continue current medication       DVT Prophylaxis: Lovenox   Diet: DIET GENERAL;  Code Status: Full Code    PT/OT Eval Status: Active and ongoing     Dispo - Per primary team, no current medical issues     JAMILA Nuñez - CNP

## 2018-09-07 NOTE — PROGRESS NOTES
Physical Therapy  Facility/Department: [facility] C5 - MED SURG/ORTHO  Daily Treatment Note  NAME: Luz Caldwell  : 1932  MRN: [id number]    Date of Service: 2018    Discharge Recommendations:  Subacute/Skilled Nursing Facility   PT Equipment Recommendations  Equipment Needed: No    Patient Diagnosis(es): The primary encounter diagnosis was Status post total right knee replacement. A diagnosis of Primary osteoarthritis of right knee was also pertinent to this visit. has a past medical history of Arthritis; CAD (coronary artery disease); Depression; Diverticulosis; Gall stone; Glaucoma; Hypercholesteremia; Hypertension; Irregular heart beat; Rectal prolapse; and Uterine cancer ([facility] [address] has a past surgical history that includes Hysterectomy (); Appendectomy; Pacemaker insertion; Tonsillectomy; Colonoscopy (); Colonoscopy (); Colonoscopy (10/4/2014); hip surgery (Right, 8/4/15); pacemaker placement (); fracture surgery; and pr cptr-asst surgical navigation image-less (Right, 2018). Restrictions  Restrictions/Precautions  Restrictions/Precautions: Weight Bearing, Fall Risk  Required Braces or Orthoses?: No  Lower Extremity Weight Bearing Restrictions  Right Lower Extremity Weight Bearing: Weight Bearing As Tolerated  Subjective   General  Response To Previous Treatment: Patient with no complaints from previous session. Family / Caregiver Present: No  Referring Practitioner: Ashwin Mckeon PA-C  Subjective  Subjective: still in pain and was given pain med prior to PT visit. She is agreeable to therapy at this time. General Comment  Comments: ZHEN Rocha cleared pt for tx. Py resting in bed at approach. Pain Screening  Patient Currently in Pain: Yes  Pain Assessment  Pain Assessment: 0-10  Pain Level: 9  Pain Intervention(s): Repositioned; Ambulation/Increased activity; Therapeutic presence  Vital Signs  Patient Currently in Pain: Yes       Orientation  Orientation  Overall Orientation term goal 5: By 9/7/18, pt will demonstrate RLE SLR x 10 reps without extensor lag - MET 9/6/2018  Patient Goals   Patient goals : Ellie Vicente get better and get rid of this pain\"    Plan    Plan  Times per week: BID 7x/week  Times per day: Twice a day  Plan weeks: 1 week  Specific instructions for Next Treatment: progression of functional mobility and ther ex  Current Treatment Recommendations: Strengthening, ROM, Balance Training, Transfer Training, Functional Mobility Training, Endurance Training, Stair training, Gait Training, Neuromuscular Re-education, Home Exercise Program, Safety Education & Training, Equipment Evaluation, Education, & procurement  Safety Devices  Type of devices:  All fall risk precautions in place, Left in chair, Gait belt, Chair alarm in place, Nurse notified (Pt education on MD protocol for sitting in chair)     Therapy Time   Individual Concurrent Group Co-treatment   Time In 0910         Time Out 0950         Minutes 40         Timed Code Treatment Minutes: Patricia Thomposn

## 2018-09-07 NOTE — PROGRESS NOTES
SLR x 10 reps without extensor lag - MET 9/6/2018  Patient Goals   Patient goals : \"to get better and get rid of this pain\"    Plan    Plan  Times per week: BID 7x/week  Times per day: Twice a day  Plan weeks: 1 week  Specific instructions for Next Treatment: progression of functional mobility and ther ex  Current Treatment Recommendations: Strengthening, ROM, Balance Training, Transfer Training, Functional Mobility Training, Endurance Training, Stair training, Gait Training, Neuromuscular Re-education, Home Exercise Program, Safety Education & Training, Equipment Evaluation, Education, & procurement  Safety Devices  Type of devices:  All fall risk precautions in place, Left in chair, Gait belt, Chair alarm in place, Nurse notified     Therapy Time   Individual Concurrent Group Co-treatment   Time In 1410         Time Out 1439         Minutes 29         Timed Code Treatment Minutes: 1015 [facility]

## 2018-09-07 NOTE — DISCHARGE SUMMARY
Intravenous, PRN  polyethylene glycol (GLYCOLAX) packet 17 g, 17 g, Oral, Daily    Post-operatively the patients diet was advanced as tolerated and their incision was checked on POD #1. The incision is dressing in place, clean, dry and intact with no signs of infection. The patient remained neurovascularly intact in the lower extremity and had intact pulses distally. Patients calf remained soft and showed no evidence of DVT. The patient was able to move their leg and ankle/foot without any problems post-operatively. Physical therapy and occupational therapy were consulted and began working with the patient post-operatively. The patient progressed with PT/OT as would be expected and continued to improve through their stay. The patients pain was initially controlled with IV medications but we were able to transition to oral pain medications soon after arrival to the floor and their pain remained under good control through their hospital stay. From a medical standpoint the patient remained stable and continued to have the medicine team follow throughout their stay. The patients dressing was changed/incison was checked on day of d/c. The patient will be discharged at this time to SNF with their current diet restrictions and will continue to follow the total knee precautions outlined to them by us and PT/OT. Urinary retention post op, kate placed, voiding trial on POD #2    Condition on Discharge: Stable    Plan  Return visit in 2 weeks. .  Patient was instructed on the use of pain medications, the signs and symptoms of infection, and was given our number to call should they have any questions or concerns following discharge.

## 2018-09-07 NOTE — CARE COORDINATION
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 Facility: [facility]- admissions. MONIKA/AVS faxed to [phone]   RN: Maylene Duane   Phone number for report to facility: [phone]    Note: Discharging nurse to complete MONIKA, reconcile AVS, and place final copy with patient's discharge packet. RN to ensure that written prescriptions for  Level II medications are sent with patient to the facility as per protocol.

## 2018-09-07 NOTE — PLAN OF CARE
Problem: Falls - Risk of:  Goal: Will remain free from falls  Will remain free from falls   Outcome: Ongoing  Bed in lowest position, wheels locked, 2/4 side rails up, nonskid footwear on. Bed/ chair check alarm in place, call light within reach. Pt instructed to call out when needing assistance. Pt stated understanding. Nurse will continue to monitor. Problem: Pain:  Goal: Pain level will decrease  Pain level will decrease    Outcome: Ongoing  Pt scoring pain on 0-10 scale. Pain medications given per MAR. Pt instructed to call out when pain level increasing. Call light within reach. Nurse will continue to reassess and monitor. Problem: Pain - Acute:  Goal: Pain level will decrease  Pain level will decrease    Outcome: Ongoing  Pt scoring pain on 0-10 scale. Pain medications given per MAR. Pt instructed to call out when pain level increasing. Call light within reach. Nurse will continue to reassess and monitor.

## 2018-09-07 NOTE — PROGRESS NOTES
Department of Orthopedic Surgery  Physician Assistant   Progress Note    Subjective:     Post-Operative Day: 2 Status Post right Total Knee Arthroplasty  Systemic or Specific Complaints:  None today, feeling well this am.  Sitting in the chair for 1 hour. Simon still in place. Objective:     Patient Vitals for the past 24 hrs:   BP Temp Temp src Pulse Resp SpO2   09/07/18 0805 119/70 98.9 °F (37.2 °C) Oral 60 16 94 %   09/06/18 2316 126/76 97.7 °F (36.5 °C) Oral 62 16 94 %   09/06/18 1955 (!) 113/58 99.7 °F (37.6 °C) Oral 60 16 92 %       General: alert, appears stated age, cooperative and no distress   Wound: Wound clean and dry no evidence of infection. Motion: Painful range of Motion   DVT Exam: No evidence of DVT seen on physical exam.       Knee swollen but thigh soft to palpation. Moving foot and ankle. Good distal pulses. Data Review  CBC:   Lab Results   Component Value Date    WBC 8.8 09/07/2018    RBC 2.80 09/07/2018    HGB 8.8 09/07/2018    HCT 25.8 09/07/2018     09/07/2018       Assessment:     Status Post right Total Knee Arthroplasty. Doing well postoperatively. Plan:      1: Continues current post-op course, IM signed off. BP stable and labs stable today. Mentation baseline. Simon to be removed today for voiding trial.  Discussed with RN.   2:  Continue Deep venous thrombosis prophylaxis- lovenox  3:  Continue physical therapy and OT, WBAT  4:  Continue Pain Control PRN. Cannot take NSAIDS d/t allergy. 5:  D/c planning for SNF placement, pt requesting [facility]. Pt states she lives with her daughter but her daughter is unable to take off work to assist her at home. Okay for d/c today.    DCP updated  6:  Bundle Program Patient  7:  Scripts in chart    True Funez PA-C

## 2018-09-14 ENCOUNTER — CARE COORDINATION (OUTPATIENT)
Dept: CASE MANAGEMENT | Age: 83
End: 2018-09-14

## 2018-09-14 NOTE — CARE COORDINATION
[facility] Update Call    2018    Patient: Luz Caldwell Patient : 1932   MRN: [id number]  Reason for Admission: There are no discharge diagnoses documented for the most recent discharge. Discharge Date: 18 RARS: Readmission Risk Score: 93 [facility] Update    Care Transitions Interventions  Post Acute Facility:  [facility]  Post Acute Facility Update  ADLs:  Contact Guard Assist - Hands on patient for balance   Bed Mobility:  Contact Guard Assist - Hands on patient for balance   Transfer Assistance:  Contact [address] on patient for balance   Ambulation Assistance:  [address] on patient for balance   How far (in feet) is the patient ambulating?:  50   Does patient use an assistive device?:  Yes   Assistive Devices:  RW        Per Sandrine Cartagena, patient is CGA/SBA for transfers and mobility. Therapy will continue to work with patient. No d/c date set at this time.      Olaf Florence RN  Care Transitions Coordinator  [phone]

## 2018-09-21 ENCOUNTER — CARE COORDINATION (OUTPATIENT)
Dept: CASE MANAGEMENT | Age: 83
End: 2018-09-21

## 2018-09-25 ENCOUNTER — CARE COORDINATION (OUTPATIENT)
Dept: CASE MANAGEMENT | Age: 83
End: 2018-09-25

## 2018-09-25 NOTE — CARE COORDINATION
Spoke with patient. Incision status: States free from redness or drainage. Edema/Swelling: States she is having a lot of swelling. States she is elevating and icing. Pain level and status: States pain is tolerable. Use of pain medications: States she took 2 pain pills prior to discharge from SNF today. Bowels: No complaints. Home Care Agency active: States they are supposed to start. Emailed SNF to find out which agency is seeing patient. Do you have all of your medications: Yes    Changes in medications: No  Instructed patient to make follow up appt with physician within the next two weeks. Follow up appointments:    No future appointments.   Deyanira VILLALBAN  Care Transition Coordinator for ortho bundle  [phone]

## 2018-09-28 ENCOUNTER — TELEPHONE (OUTPATIENT)
Dept: ORTHOPEDIC SURGERY | Age: 83
End: 2018-09-28

## 2018-10-01 ENCOUNTER — CARE COORDINATION (OUTPATIENT)
Dept: CASE MANAGEMENT | Age: 83
End: 2018-10-01

## 2018-10-01 DIAGNOSIS — Z96.651 STATUS POST TOTAL RIGHT KNEE REPLACEMENT: Primary | ICD-10-CM

## 2018-10-01 DIAGNOSIS — M17.11 PRIMARY OSTEOARTHRITIS OF RIGHT KNEE: ICD-10-CM

## 2018-10-02 ENCOUNTER — TELEPHONE (OUTPATIENT)
Dept: ORTHOPEDIC SURGERY | Age: 83
End: 2018-10-02

## 2018-10-04 ENCOUNTER — TELEPHONE (OUTPATIENT)
Dept: ORTHOPEDIC SURGERY | Age: 83
End: 2018-10-04

## 2018-10-04 DIAGNOSIS — M17.11 PRIMARY OSTEOARTHRITIS OF RIGHT KNEE: ICD-10-CM

## 2018-10-04 DIAGNOSIS — Z96.651 STATUS POST TOTAL RIGHT KNEE REPLACEMENT: Primary | ICD-10-CM

## 2018-10-04 NOTE — TELEPHONE ENCOUNTER
----- Message from Pk James RN sent at 10/4/2018 11:56 AM EDT -----  I could not reply to prior message, so sorry for another message. Patient does not know her ROM. She was discharged from SNF on 9/25/18 and has not had any therapy since then. There is no documentation from SNF about ROM. She has an appt next week with Dr. Kyara Piper on 10/8/18. She said she has just been doing therapy on her own. She is a TKR on 9/5/18.

## 2018-10-10 ENCOUNTER — CARE COORDINATION (OUTPATIENT)
Dept: CASE MANAGEMENT | Age: 83
End: 2018-10-10

## 2018-10-10 NOTE — CARE COORDINATION
Spoke with patient. Patient is with home care  and can't talk at this time. Patient states not having any issues at this time. Will continue to follow.       Follow up appointments:    Future Appointments  Date Time Provider Patricia Doll   10/11/2018 10:20 AM BLACK López AND JENI Crane BSN  Care Transition Coordinator for ortho bundle  [phone]

## 2018-10-11 ENCOUNTER — OFFICE VISIT (OUTPATIENT)
Dept: ORTHOPEDIC SURGERY | Age: 83
End: 2018-10-11

## 2018-10-11 VITALS
WEIGHT: 113.1 LBS | HEART RATE: 61 BPM | BODY MASS INDEX: 22.2 KG/M2 | SYSTOLIC BLOOD PRESSURE: 136 MMHG | HEIGHT: 60 IN | DIASTOLIC BLOOD PRESSURE: 82 MMHG

## 2018-10-11 DIAGNOSIS — Z96.651 STATUS POST TOTAL RIGHT KNEE REPLACEMENT: Primary | ICD-10-CM

## 2018-10-11 DIAGNOSIS — M17.11 PRIMARY OSTEOARTHRITIS OF RIGHT KNEE: ICD-10-CM

## 2018-10-11 DIAGNOSIS — M25.561 RIGHT KNEE PAIN, UNSPECIFIED CHRONICITY: ICD-10-CM

## 2018-10-11 PROCEDURE — 99024 POSTOP FOLLOW-UP VISIT: CPT | Performed by: PHYSICIAN ASSISTANT

## 2018-10-17 ENCOUNTER — CARE COORDINATION (OUTPATIENT)
Dept: CASE MANAGEMENT | Age: 83
End: 2018-10-17

## 2018-10-22 ENCOUNTER — TELEPHONE (OUTPATIENT)
Dept: ORTHOPEDIC SURGERY | Age: 83
End: 2018-10-22

## 2018-10-24 ENCOUNTER — CARE COORDINATION (OUTPATIENT)
Dept: CASE MANAGEMENT | Age: 83
End: 2018-10-24

## 2018-10-25 ENCOUNTER — APPOINTMENT (OUTPATIENT)
Dept: GENERAL RADIOLOGY | Age: 83
DRG: 488 | End: 2018-10-25
Payer: MEDICARE

## 2018-10-25 ENCOUNTER — APPOINTMENT (OUTPATIENT)
Dept: CT IMAGING | Age: 83
DRG: 488 | End: 2018-10-25
Payer: MEDICARE

## 2018-10-25 ENCOUNTER — HOSPITAL ENCOUNTER (INPATIENT)
Age: 83
LOS: 12 days | Discharge: HOME HEALTH | DRG: 488 | End: 2018-11-06
Attending: EMERGENCY MEDICINE | Admitting: INTERNAL MEDICINE
Payer: MEDICARE

## 2018-10-25 DIAGNOSIS — S82.031A DISPLACED TRANSVERSE FRACTURE OF RIGHT PATELLA, INITIAL ENCOUNTER FOR CLOSED FRACTURE: Primary | ICD-10-CM
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
## 2018-10-25 PROBLEM — S82.041A DISPLACED COMMINUTED FRACTURE OF RIGHT PATELLA, INITIAL ENCOUNTER FOR CLOSED FRACTURE: Status: ACTIVE | Noted: 2018-10-25

## 2018-10-25 LAB
A/G RATIO: 1.2 (ref 1.1–2.2)
ALBUMIN SERPL-MCNC: 3.4 G/DL (ref 3.4–5)
ALP BLD-CCNC: 111 U/L (ref 40–129)
ALT SERPL-CCNC: 9 U/L (ref 10–40)
ANION GAP SERPL CALCULATED.3IONS-SCNC: 11 MMOL/L (ref 3–16)
AST SERPL-CCNC: 15 U/L (ref 15–37)
BASOPHILS ABSOLUTE: 0 K/UL (ref 0–0.2)
BASOPHILS RELATIVE PERCENT: 0.3 %
BILIRUB SERPL-MCNC: 0.5 MG/DL (ref 0–1)
BUN BLDV-MCNC: 10 MG/DL (ref 7–20)
CALCIUM SERPL-MCNC: 8.9 MG/DL (ref 8.3–10.6)
CHLORIDE BLD-SCNC: 100 MMOL/L (ref 99–110)
CO2: 26 MMOL/L (ref 21–32)
CREAT SERPL-MCNC: <0.5 MG/DL (ref 0.6–1.2)
EOSINOPHILS ABSOLUTE: 0.3 K/UL (ref 0–0.6)
EOSINOPHILS RELATIVE PERCENT: 2.5 %
GFR AFRICAN AMERICAN: >60
GFR NON-AFRICAN AMERICAN: >60
GLOBULIN: 2.9 G/DL
GLUCOSE BLD-MCNC: 109 MG/DL (ref 70–99)
HCT VFR BLD CALC: 30 % (ref 36–48)
HEMOGLOBIN: 10 G/DL (ref 12–16)
LYMPHOCYTES ABSOLUTE: 1.2 K/UL (ref 1–5.1)
LYMPHOCYTES RELATIVE PERCENT: 10.8 %
MCH RBC QN AUTO: 31.2 PG (ref 26–34)
MCHC RBC AUTO-ENTMCNC: 33.5 G/DL (ref 31–36)
MCV RBC AUTO: 93.3 FL (ref 80–100)
MONOCYTES ABSOLUTE: 0.7 K/UL (ref 0–1.3)
MONOCYTES RELATIVE PERCENT: 6.6 %
NEUTROPHILS ABSOLUTE: 8.7 K/UL (ref 1.7–7.7)
NEUTROPHILS RELATIVE PERCENT: 79.8 %
PDW BLD-RTO: 13.7 % (ref 12.4–15.4)
PLATELET # BLD: 394 K/UL (ref 135–450)
PMV BLD AUTO: 6.7 FL (ref 5–10.5)
POTASSIUM REFLEX MAGNESIUM: 4.2 MMOL/L (ref 3.5–5.1)
RBC # BLD: 3.22 M/UL (ref 4–5.2)
SODIUM BLD-SCNC: 137 MMOL/L (ref 136–145)
TOTAL PROTEIN: 6.3 G/DL (ref 6.4–8.2)
WBC # BLD: 10.9 K/UL (ref 4–11)

## 2018-10-25 PROCEDURE — 1200000000 HC SEMI PRIVATE

## 2018-10-25 PROCEDURE — 80053 COMPREHEN METABOLIC PANEL: CPT

## 2018-10-25 PROCEDURE — 6370000000 HC RX 637 (ALT 250 FOR IP): Performed by: PHYSICIAN ASSISTANT

## 2018-10-25 PROCEDURE — 6360000002 HC RX W HCPCS: Performed by: INTERNAL MEDICINE

## 2018-10-25 PROCEDURE — 2580000003 HC RX 258: Performed by: INTERNAL MEDICINE

## 2018-10-25 PROCEDURE — 6370000000 HC RX 637 (ALT 250 FOR IP): Performed by: INTERNAL MEDICINE

## 2018-10-25 PROCEDURE — 99285 EMERGENCY DEPT VISIT HI MDM: CPT

## 2018-10-25 PROCEDURE — 72125 CT NECK SPINE W/O DYE: CPT

## 2018-10-25 PROCEDURE — 85025 COMPLETE CBC W/AUTO DIFF WBC: CPT

## 2018-10-25 PROCEDURE — 93005 ELECTROCARDIOGRAM TRACING: CPT | Performed by: INTERNAL MEDICINE

## 2018-10-25 PROCEDURE — 70450 CT HEAD/BRAIN W/O DYE: CPT

## 2018-10-25 PROCEDURE — 73560 X-RAY EXAM OF KNEE 1 OR 2: CPT

## 2018-10-25 RX ORDER — ASPIRIN 81 MG/1
81 TABLET, CHEWABLE ORAL DAILY
Status: DISCONTINUED | OUTPATIENT
Start: 2018-10-26 | End: 2018-10-29

## 2018-10-25 RX ORDER — CARVEDILOL 6.25 MG/1
6.25 TABLET ORAL 2 TIMES DAILY WITH MEALS
Status: DISCONTINUED | OUTPATIENT
Start: 2018-10-26 | End: 2018-10-25

## 2018-10-25 RX ORDER — CARVEDILOL 3.12 MG/1
3.12 TABLET ORAL 2 TIMES DAILY WITH MEALS
Status: DISCONTINUED | OUTPATIENT
Start: 2018-10-25 | End: 2018-10-25

## 2018-10-25 RX ORDER — ACETAMINOPHEN 500 MG
1000 TABLET ORAL EVERY 6 HOURS PRN
Status: DISCONTINUED | OUTPATIENT
Start: 2018-10-25 | End: 2018-10-29

## 2018-10-25 RX ORDER — MORPHINE SULFATE 2 MG/ML
2 INJECTION, SOLUTION INTRAMUSCULAR; INTRAVENOUS
Status: DISCONTINUED | OUTPATIENT
Start: 2018-10-25 | End: 2018-10-26 | Stop reason: SDUPTHER

## 2018-10-25 RX ORDER — FLECAINIDE ACETATE 100 MG/1
50 TABLET ORAL 2 TIMES DAILY
Status: DISCONTINUED | OUTPATIENT
Start: 2018-10-25 | End: 2018-11-06 | Stop reason: HOSPADM

## 2018-10-25 RX ORDER — SODIUM CHLORIDE 0.9 % (FLUSH) 0.9 %
10 SYRINGE (ML) INJECTION PRN
Status: DISCONTINUED | OUTPATIENT
Start: 2018-10-25 | End: 2018-10-29

## 2018-10-25 RX ORDER — FLECAINIDE ACETATE 50 MG/1
50 TABLET ORAL 2 TIMES DAILY
Status: ON HOLD | COMMUNITY
End: 2021-03-24 | Stop reason: HOSPADM

## 2018-10-25 RX ORDER — HYDROCODONE BITARTRATE AND ACETAMINOPHEN 5; 325 MG/1; MG/1
1 TABLET ORAL ONCE
Status: COMPLETED | OUTPATIENT
Start: 2018-10-25 | End: 2018-10-25

## 2018-10-25 RX ORDER — CARVEDILOL 6.25 MG/1
6.25 TABLET ORAL 2 TIMES DAILY WITH MEALS
Status: DISCONTINUED | OUTPATIENT
Start: 2018-10-25 | End: 2018-11-06 | Stop reason: HOSPADM

## 2018-10-25 RX ORDER — LATANOPROST 50 UG/ML
1 SOLUTION/ DROPS OPHTHALMIC NIGHTLY
Status: DISCONTINUED | OUTPATIENT
Start: 2018-10-25 | End: 2018-11-06 | Stop reason: HOSPADM

## 2018-10-25 RX ORDER — TIMOLOL MALEATE 5 MG/ML
1 SOLUTION/ DROPS OPHTHALMIC 2 TIMES DAILY
Status: DISCONTINUED | OUTPATIENT
Start: 2018-10-25 | End: 2018-10-26

## 2018-10-25 RX ORDER — ONDANSETRON 2 MG/ML
4 INJECTION INTRAMUSCULAR; INTRAVENOUS EVERY 6 HOURS PRN
Status: DISCONTINUED | OUTPATIENT
Start: 2018-10-25 | End: 2018-10-29

## 2018-10-25 RX ORDER — SODIUM CHLORIDE 0.9 % (FLUSH) 0.9 %
10 SYRINGE (ML) INJECTION EVERY 12 HOURS SCHEDULED
Status: DISCONTINUED | OUTPATIENT
Start: 2018-10-25 | End: 2018-10-29

## 2018-10-25 RX ORDER — ATORVASTATIN CALCIUM 10 MG/1
10 TABLET, FILM COATED ORAL NIGHTLY
Status: DISCONTINUED | OUTPATIENT
Start: 2018-10-25 | End: 2018-11-06 | Stop reason: HOSPADM

## 2018-10-25 RX ORDER — CHLORDIAZEPOXIDE HYDROCHLORIDE 5 MG/1
5 CAPSULE, GELATIN COATED ORAL 2 TIMES DAILY
Status: DISCONTINUED | OUTPATIENT
Start: 2018-10-25 | End: 2018-11-06 | Stop reason: HOSPADM

## 2018-10-25 RX ADMIN — HYDROCODONE BITARTRATE AND ACETAMINOPHEN 1 TABLET: 5; 325 TABLET ORAL at 18:06

## 2018-10-25 RX ADMIN — FLECAINIDE ACETATE 50 MG: 100 TABLET ORAL at 21:55

## 2018-10-25 RX ADMIN — LATANOPROST 1 DROP: 50 SOLUTION OPHTHALMIC at 21:56

## 2018-10-25 RX ADMIN — CHLORDIAZEPOXIDE HYDROCHLORIDE 5 MG: 5 CAPSULE ORAL at 21:55

## 2018-10-25 RX ADMIN — ENOXAPARIN SODIUM 40 MG: 40 INJECTION SUBCUTANEOUS at 21:55

## 2018-10-25 RX ADMIN — Medication 10 ML: at 21:58

## 2018-10-25 RX ADMIN — ATORVASTATIN CALCIUM 10 MG: 10 TABLET, FILM COATED ORAL at 21:55

## 2018-10-25 RX ADMIN — CARVEDILOL 6.25 MG: 6.25 TABLET, FILM COATED ORAL at 22:18

## 2018-10-25 ASSESSMENT — ENCOUNTER SYMPTOMS
EYES NEGATIVE: 1
GASTROINTESTINAL NEGATIVE: 1
RESPIRATORY NEGATIVE: 1

## 2018-10-25 ASSESSMENT — PAIN DESCRIPTION - ONSET: ONSET: OTHER (COMMENT)

## 2018-10-25 ASSESSMENT — PAIN DESCRIPTION - ORIENTATION
ORIENTATION: RIGHT

## 2018-10-25 ASSESSMENT — PAIN SCALES - GENERAL
PAINLEVEL_OUTOF10: 0
PAINLEVEL_OUTOF10: 10
PAINLEVEL_OUTOF10: 2
PAINLEVEL_OUTOF10: 3

## 2018-10-25 ASSESSMENT — PAIN DESCRIPTION - LOCATION
LOCATION: KNEE

## 2018-10-25 ASSESSMENT — PAIN DESCRIPTION - FREQUENCY: FREQUENCY: INTERMITTENT

## 2018-10-25 ASSESSMENT — PAIN SCALES - WONG BAKER: WONGBAKER_NUMERICALRESPONSE: 10

## 2018-10-25 ASSESSMENT — PAIN DESCRIPTION - PAIN TYPE: TYPE: ACUTE PAIN

## 2018-10-25 NOTE — ED PROVIDER NOTES
negatives as per HPI. Except as noted abovein the ROS, all other systems were reviewed and negative. PAST MEDICAL HISTORY     Past Medical History:   Diagnosis Date    Arthritis     CAD (coronary artery disease)     Depression     Diverticulosis     Gall stone     Glaucoma     Hypercholesteremia     Hypertension     Irregular heart beat     Rectal prolapse     Uterine cancer ([address] 2008         SURGICAL HISTORY     Past Surgical History:   Procedure Laterality Date    APPENDECTOMY      COLONOSCOPY  2005    polyp    COLONOSCOPY  1010    diverticulosis    COLONOSCOPY  10/4/2014    diverticulosis, radiation colitis sigmoid    FRACTURE SURGERY      HIP SURGERY Right 8/4/15    hip pinning    HYSTERECTOMY  2008    RT in 2009   [address]  2008    [place] CPTR-ASST SURGICAL NAVIGATION Suhail Dickson Right 9/5/2018    RIGHT TOTAL KNEE REPLACEMENT COMPUTER NAVIGATION WITH ADDUCTOR CANAL BLOCK FOR PAIN CONTROL                     SONAL performed by Flako Vincent MD at [facility] [address]       Current Discharge Medication List      CONTINUE these medications which have NOT CHANGED    Details   aspirin 81 MG tablet Take 81 mg by mouth daily      timolol (BETIMOL) 0.5 % ophthalmic solution Place 1 drop into the left eye 2 times daily      carvedilol (COREG) 3.125 MG tablet Take 1 tablet by mouth 2 times daily (with meals). Qty: 60 tablet, Refills: 1      chlordiazepoxide (LIBRIUM) 10 MG capsule   Take 5 mg by mouth 2 times daily       atorvastatin (LIPITOR) 10 MG tablet   Take 10 mg by mouth nightly       Multiple Vitamins-Minerals (CENTRUM PO) Take 1 tablet by mouth daily. Indications: Centrum Silver      polyethylene glycol (GLYCOLAX) powder Take 17 g by mouth daily.       vitamin D (CHOLECALCIFEROL) 400 UNIT TABS tablet   Take 2,000 Units by mouth daily       Psyllium-Calcium (METAMUCIL PLUS CALCIUM) CAPS Take 1 tablet by mouth nightly. Take with 8 oz water. latanoprost (XALATAN) 0.005 % ophthalmic solution   Place 1 drop into both eyes nightly                ALLERGIES     Lisinopril; Levofloxacin; Levofloxacin; Vancomycin; Amlodipine; Avelox [moxifloxacin hcl in nacl]; Buspirone; Hydrochlorothiazide; Lidocaine; Moxifloxacin; Moxifloxacin hcl in nacl; Mupirocin; Nsaids; Paroxetine; Phenergan [promethazine hcl]; Phenothiazines; Promethazine hcl; Sulindac; Tolmetin; and Ciprofloxacin    FAMILYHISTORY       Family History   Problem Relation Age of Onset    Mental Retardation Mother     Cancer Father         lung          SOCIAL HISTORY       Social History     Social History    Marital status:      Spouse name: N/A    Number of children: N/A    Years of education: N/A     Social History Main Topics    Smoking status: Former Smoker    Smokeless tobacco: Never Used      Comment: only smoked from age [age] yrs old    Alcohol use No    Drug use: No    Sexual activity: Not Currently     Other Topics Concern    None     Social History Narrative    None       SCREENINGS    [place] Coma Scale  Eye Opening: Spontaneous  Best Verbal Response: Oriented  Best Motor Response: Obeys commands  [place] Coma Scale Score: 15        PHYSICAL EXAM    (up to 7 for level 4, 8 or more for level 5)     ED Triage Vitals   BP Temp Temp src Pulse Resp SpO2 Height Weight   -- -- -- -- -- -- -- --       Physical Exam   Constitutional: She is oriented to person, place, and time. She appears well-developed and well-nourished. HENT:   Head: Normocephalic and atraumatic. Head is without raccoon's eyes, without Jimenez's sign, without abrasion, without contusion, without right periorbital erythema and without left periorbital erythema. Right Ear: Tympanic membrane normal.   Left Ear: Tympanic membrane normal.   Nose: Nose normal. No mucosal edema or nasal deformity. No epistaxis.    Eyes: Pupils are equal, round,

## 2018-10-25 NOTE — ED NOTES
1903- Consult placed to hosptalist Dr Karolina Swartz for Eileen Albarado PA   5158- Dr. Karolina Swartz returned page and spoke to [address]  10/25/18 3715 [facility] 280  10/25/18 1909

## 2018-10-25 NOTE — ED PROVIDER NOTES
I independently performed a history and physical on [address] All diagnostic, treatment, and disposition decisions were made by myself in conjunction with the advanced practice provider. Briefly, this is a 80 y.o. female here for fall occurred at home. The patient states that she landed on her buttocks, but struck her right knee on another \"sitting\" her house. Patient states that she has significant swelling to left knee, and came here for further evaluation patient is unable to bear weight left knee. Patient does have a prior history of having a knee replacement to that knee. .    On exam, patient has significant ecchymosis, and decreased range of motion secondary to pain. The patient's x-rays demonstrate a displaced patellar fracture that will require surgery. Patient had blood drawn and sent, we did speak with orthopedics and with the hospitalist for admission. For further details of [address] [place] [address] emergency department encounter, please see documentation by advanced practice provider  Soniya Barreto.           Doreen Ramirez MD  10/25/18 8302

## 2018-10-26 PROCEDURE — 1200000000 HC SEMI PRIVATE

## 2018-10-26 PROCEDURE — 6370000000 HC RX 637 (ALT 250 FOR IP): Performed by: INTERNAL MEDICINE

## 2018-10-26 PROCEDURE — 2580000003 HC RX 258: Performed by: INTERNAL MEDICINE

## 2018-10-26 PROCEDURE — 99222 1ST HOSP IP/OBS MODERATE 55: CPT | Performed by: PHYSICIAN ASSISTANT

## 2018-10-26 PROCEDURE — 6370000000 HC RX 637 (ALT 250 FOR IP): Performed by: HOSPITALIST

## 2018-10-26 PROCEDURE — 93010 ELECTROCARDIOGRAM REPORT: CPT | Performed by: INTERNAL MEDICINE

## 2018-10-26 PROCEDURE — 6360000002 HC RX W HCPCS: Performed by: INTERNAL MEDICINE

## 2018-10-26 RX ORDER — OXYCODONE HYDROCHLORIDE AND ACETAMINOPHEN 5; 325 MG/1; MG/1
1 TABLET ORAL EVERY 6 HOURS PRN
Status: DISCONTINUED | OUTPATIENT
Start: 2018-10-26 | End: 2018-10-27

## 2018-10-26 RX ORDER — TIMOLOL MALEATE 5 MG/ML
1 SOLUTION/ DROPS OPHTHALMIC 2 TIMES DAILY
Status: DISCONTINUED | OUTPATIENT
Start: 2018-10-26 | End: 2018-11-06 | Stop reason: HOSPADM

## 2018-10-26 RX ORDER — MORPHINE SULFATE 2 MG/ML
2 INJECTION, SOLUTION INTRAMUSCULAR; INTRAVENOUS
Status: DISCONTINUED | OUTPATIENT
Start: 2018-10-26 | End: 2018-10-29

## 2018-10-26 RX ADMIN — TIMOLOL MALEATE 1 DROP: 5 SOLUTION/ DROPS OPHTHALMIC at 17:10

## 2018-10-26 RX ADMIN — CARVEDILOL 6.25 MG: 6.25 TABLET, FILM COATED ORAL at 09:26

## 2018-10-26 RX ADMIN — OXYCODONE HYDROCHLORIDE AND ACETAMINOPHEN 1 TABLET: 5; 325 TABLET ORAL at 17:34

## 2018-10-26 RX ADMIN — OXYCODONE HYDROCHLORIDE AND ACETAMINOPHEN 1 TABLET: 5; 325 TABLET ORAL at 23:35

## 2018-10-26 RX ADMIN — OXYCODONE HYDROCHLORIDE AND ACETAMINOPHEN 1 TABLET: 5; 325 TABLET ORAL at 11:25

## 2018-10-26 RX ADMIN — Medication 10 ML: at 20:45

## 2018-10-26 RX ADMIN — LATANOPROST 1 DROP: 50 SOLUTION OPHTHALMIC at 20:43

## 2018-10-26 RX ADMIN — ATORVASTATIN CALCIUM 10 MG: 10 TABLET, FILM COATED ORAL at 20:45

## 2018-10-26 RX ADMIN — Medication 10 ML: at 09:58

## 2018-10-26 RX ADMIN — FLECAINIDE ACETATE 50 MG: 100 TABLET ORAL at 20:45

## 2018-10-26 RX ADMIN — ACETAMINOPHEN 1000 MG: 500 TABLET ORAL at 06:03

## 2018-10-26 RX ADMIN — TIMOLOL MALEATE 1 DROP: 5 SOLUTION OPHTHALMIC at 10:01

## 2018-10-26 RX ADMIN — CHLORDIAZEPOXIDE HYDROCHLORIDE 5 MG: 5 CAPSULE ORAL at 09:55

## 2018-10-26 RX ADMIN — ENOXAPARIN SODIUM 40 MG: 40 INJECTION SUBCUTANEOUS at 09:54

## 2018-10-26 RX ADMIN — ASPIRIN 81 MG 81 MG: 81 TABLET ORAL at 09:54

## 2018-10-26 RX ADMIN — FLECAINIDE ACETATE 50 MG: 100 TABLET ORAL at 09:54

## 2018-10-26 RX ADMIN — CHLORDIAZEPOXIDE HYDROCHLORIDE 5 MG: 5 CAPSULE ORAL at 17:34

## 2018-10-26 ASSESSMENT — PAIN SCALES - GENERAL
PAINLEVEL_OUTOF10: 8
PAINLEVEL_OUTOF10: 4
PAINLEVEL_OUTOF10: 1
PAINLEVEL_OUTOF10: 3
PAINLEVEL_OUTOF10: 4

## 2018-10-26 ASSESSMENT — PAIN DESCRIPTION - LOCATION
LOCATION: KNEE
LOCATION: KNEE

## 2018-10-26 ASSESSMENT — PAIN DESCRIPTION - DESCRIPTORS
DESCRIPTORS: DISCOMFORT
DESCRIPTORS: ACHING

## 2018-10-26 ASSESSMENT — PAIN DESCRIPTION - PROGRESSION: CLINICAL_PROGRESSION: NOT CHANGED

## 2018-10-26 ASSESSMENT — PAIN DESCRIPTION - PAIN TYPE
TYPE: ACUTE PAIN
TYPE: ACUTE PAIN

## 2018-10-26 ASSESSMENT — PAIN DESCRIPTION - ORIENTATION
ORIENTATION: RIGHT
ORIENTATION: RIGHT

## 2018-10-26 ASSESSMENT — PAIN DESCRIPTION - ONSET: ONSET: OTHER (COMMENT)

## 2018-10-26 ASSESSMENT — PAIN DESCRIPTION - FREQUENCY: FREQUENCY: INTERMITTENT

## 2018-10-26 NOTE — PROGRESS NOTES
Alert and oriented, thought content appropriate, normal insight  Capillary Refill: Brisk,< 3 seconds   Peripheral Pulses: +2 palpable, equal bilaterally       Labs:   Recent Labs      10/25/18   1900   WBC  10.9   HGB  10.0*   HCT  30.0*   PLT  394     Recent Labs      10/25/18   1900   NA  137   K  4.2   CL  100   CO2  26   BUN  10   CREATININE  <0.5*   CALCIUM  8.9     Recent Labs      10/25/18   1900   AST  15   ALT  9*   BILITOT  0.5   ALKPHOS  111       Radiology:  CT CERVICAL SPINE WO CONTRAST   Final Result   No acute abnormality of the cervical spine. CT Head WO Contrast   Final Result   No acute intracranial hemorrhage or mass effect. Significant cerebellar atrophy. XR KNEE RIGHT (1-2 VIEWS)   Final Result   Displaced patellar fracture.                  Assessment/Plan:    Active Hospital Problems    Diagnosis Date Noted    Displaced comminuted fracture of right patella, initial encounter for closed fracture [S82.041A] 10/25/2018     PAF, paCED   S/p accidental fall ,patella fracture    PAIN MX  receNT  rt knee replacement    Ortho  Consult  PendignPOR  DVT Prophylaxis:  Diet: DIET GENERAL;  Diet NPO, After Midnight  Code Status: Full Code    PT/OT Eval Status: p    Dispo - Robin Bryan MD

## 2018-10-26 NOTE — PLAN OF CARE
Problem: Falls - Risk of:  Goal: Will remain free from falls  Will remain free from falls   Outcome: Ongoing      Problem: Risk for Impaired Skin Integrity  Goal: Tissue integrity - skin and mucous membranes  Structural intactness and normal physiological function of skin and  mucous membranes.    Outcome: Ongoing      Problem: Pain:  Goal: Patient's pain/discomfort is manageable  Patient's pain/discomfort is manageable   Outcome: Ongoing

## 2018-10-26 NOTE — PROGRESS NOTES
Knee immobilizer placed on patient's right knee/leg. Pedal pulse present, patient able to feel touch in right foot. Patient repositioned in bed to right side lying with pillow support. Patient's coccyx is WNL, heels are pink and mushy. Heels elevated on pillow. Foot of bed locked. Bed check on for patient safety. Call light within reach.  Ashley Ventura

## 2018-10-26 NOTE — CARE COORDINATION
[facility]  Received referral regarding HC services from Tyler LUTZ CM. Pt is active with [facility]. Services are currently on HOLD due to admit. Spoke with pt in follow up regarding [facility] services. Pt is agreeable to REGGIE with [facility]. Verified demographics. Liaison will continue to follow for [facility] needs until discharge.     Electronically signed by Carlos Weston RN on 10/26/2018 at 11:26 AM

## 2018-10-26 NOTE — DISCHARGE INSTR - COC
Continuity of Care Form    Patient Name: Tereza Bailey   :  1932  MRN:  [id number]    Admit date:  10/25/2018  Discharge date:  2018    Code Status Order: Full Code   Advance Directives:   [facility] Documentation     Date/Time Healthcare Directive Type of Healthcare Directive Copy in [address] Agent's Name Healthcare Agent's Phone Number    10/25/18 3777  Yes, patient has an advance directive for healthcare treatment  Living will  No, copy requested from family   daughter can bring copy  Adult Children  --  --          Admitting Physician:  Elda Bloom MD  PCP: Saúl Tejeda MD    Discharging Nurse: Count includes the [facility] Unit/Room#: 0210/0210-01  Discharging Unit Phone Number: [phone]    Emergency Contact:   Extended Emergency Contact Information  Primary Emergency Contact: Stacie Hernandez [address] Phone: [phone]  Mobile Phone: [phone]  Relation: Child    Past Surgical History:  Past Surgical History:   Procedure Laterality Date    APPENDECTOMY      COLONOSCOPY  2005    polyp    COLONOSCOPY  1010    diverticulosis    COLONOSCOPY  10/4/2014    diverticulosis, radiation colitis sigmoid    FRACTURE SURGERY      HIP SURGERY Right 8/4/15    hip pinning    HYSTERECTOMY  2008    RT in    [address]  2008    [place] CPTR-ASST SURGICAL NAVIGATION Wilfred Marilee Right 2018    RIGHT TOTAL KNEE REPLACEMENT COMPUTER NAVIGATION WITH ADDUCTOR CANAL BLOCK FOR PAIN CONTROL                     SONAL performed by Ming Ruelas MD at [place] [address].         Immunization History:   Immunization History   Administered Date(s) Administered    Influenza Virus Vaccine 09/10/2014    Pneumococcal Conjugate 7-valent 10/06/2011       Active Problems:  Patient Active Problem List   Diagnosis Code    Localized osteoarthrosis, Therapy:  Current Nutrition Therapy:   - Oral Diet:  General    Routes of Feeding: Oral  Liquids: Thin Liquids  Daily Fluid Restriction: no  Last Modified Barium Swallow with Video (Video Swallowing Test): not done    Treatments at the Time of Hospital Discharge:   Respiratory Treatments:   Oxygen Therapy:  is not on home oxygen therapy. Ventilator:        Rehab Therapies: Physical Therapy, Occupational Therapy and Skilled Nurse    HOME HEALTH CARE: LEVEL 4 SICK        -Initial home health evaluation to occur within 24-48 hours, in patient home    -Home health agency to establish plan of care for patient over 60 day period    -Medication Reconciliation    -PT/OT/Speech evaluations in home within 24-48 hours of discharge; including     -DME and home safety    -Frontload therapy 5 days, then 3x a week    -OT to evaluate if patient has [address] needs for personal care    -Frontload nursing visits daily, then qod with progression as warranted; establish plan for 60-day period    -Nursing to perform telephone visit on the days that a visit is not being made in person for the first 30 days    -Palliative Care referral    -Dietician evaluation within five days of discharge    -PCP visit within three days of discharge, followed by visit at 10 days, and 21 days    - evaluation within 24-48 hours, includes evaluation of resources and insurance to determine [place], [place], LTC, and Medicaid options       Weight Bearing Status/Restrictions: Partial weight bearing (30-50%) only on leg right leg  Other Medical Equipment (for information only, NOT a DME order):     Other Treatments:     Patient's personal belongings (please select all that are sent with patient):  Clothing,cell phone glasses    RN SIGNATURE:  Electronically signed by Myrtle Mann RN on 11/6/18 at 12:09 PM    CASE MANAGEMENT/SOCIAL WORK SECTION    Inpatient Status Date: 10/25/2018    Readmission Risk Assessment Score:  Readmission Risk Risk of Unplanned Readmission:        11           Discharging to Facility/ Agency   Name:  [facility] care    [facility] [address]  Phone: [phone]  Fax: [phone]      / signature: Electronically signed by Aliya Quach RN on 11/6/18 at 11:06 AM    PHYSICIAN SECTION    Prognosis: Good    Condition at Discharge: Stable    Rehab Potential (if transferring to Rehab): Good    Recommended Labs or Other Treatments After Discharge:   CBC, BMP in 1 week     Physician Certification: I certify the above information and transfer of Reji Hoang  is necessary for the continuing treatment of the diagnosis listed and that she requires [facility] for less 30 days.      Update Admission H&P: No change in H&P    PHYSICIAN SIGNATURE: James Ferrell MD on 11/6/18 at 11:05 PM Pisano Drum

## 2018-10-26 NOTE — PROGRESS NOTES
Pt medicated for pain 4/10 in right knee. Medicated with PRN tylenol given. See MAR and pain flow sheet. No further assistance needed at this time. Call light within reach. Bed alarm is on. Will monitor.   Velia Hand

## 2018-10-27 ENCOUNTER — ANESTHESIA EVENT (OUTPATIENT)
Dept: OPERATING ROOM | Age: 83
DRG: 488 | End: 2018-10-27
Payer: MEDICARE

## 2018-10-27 PROCEDURE — 6360000002 HC RX W HCPCS: Performed by: INTERNAL MEDICINE

## 2018-10-27 PROCEDURE — 1200000000 HC SEMI PRIVATE

## 2018-10-27 PROCEDURE — 6370000000 HC RX 637 (ALT 250 FOR IP): Performed by: INTERNAL MEDICINE

## 2018-10-27 PROCEDURE — 6370000000 HC RX 637 (ALT 250 FOR IP): Performed by: HOSPITALIST

## 2018-10-27 PROCEDURE — 2580000003 HC RX 258: Performed by: INTERNAL MEDICINE

## 2018-10-27 RX ORDER — TRAMADOL HYDROCHLORIDE 50 MG/1
50 TABLET ORAL EVERY 6 HOURS PRN
Status: DISCONTINUED | OUTPATIENT
Start: 2018-10-27 | End: 2018-10-29

## 2018-10-27 RX ORDER — POLYETHYLENE GLYCOL 3350 17 G/17G
17 POWDER, FOR SOLUTION ORAL DAILY
Status: DISCONTINUED | OUTPATIENT
Start: 2018-10-27 | End: 2018-11-06 | Stop reason: HOSPADM

## 2018-10-27 RX ADMIN — CHLORDIAZEPOXIDE HYDROCHLORIDE 5 MG: 5 CAPSULE ORAL at 08:10

## 2018-10-27 RX ADMIN — ENOXAPARIN SODIUM 40 MG: 40 INJECTION SUBCUTANEOUS at 08:11

## 2018-10-27 RX ADMIN — TRAMADOL HYDROCHLORIDE 50 MG: 50 TABLET, FILM COATED ORAL at 22:59

## 2018-10-27 RX ADMIN — CARVEDILOL 6.25 MG: 6.25 TABLET, FILM COATED ORAL at 08:10

## 2018-10-27 RX ADMIN — TIMOLOL MALEATE 1 DROP: 5 SOLUTION/ DROPS OPHTHALMIC at 08:15

## 2018-10-27 RX ADMIN — Medication 10 ML: at 08:11

## 2018-10-27 RX ADMIN — Medication 10 ML: at 21:40

## 2018-10-27 RX ADMIN — OXYCODONE HYDROCHLORIDE AND ACETAMINOPHEN 1 TABLET: 5; 325 TABLET ORAL at 06:52

## 2018-10-27 RX ADMIN — ASPIRIN 81 MG 81 MG: 81 TABLET ORAL at 08:10

## 2018-10-27 RX ADMIN — CARVEDILOL 6.25 MG: 6.25 TABLET, FILM COATED ORAL at 16:44

## 2018-10-27 RX ADMIN — TIMOLOL MALEATE 1 DROP: 5 SOLUTION/ DROPS OPHTHALMIC at 15:17

## 2018-10-27 RX ADMIN — FLECAINIDE ACETATE 50 MG: 100 TABLET ORAL at 21:40

## 2018-10-27 RX ADMIN — ONDANSETRON 4 MG: 2 INJECTION INTRAMUSCULAR; INTRAVENOUS at 16:45

## 2018-10-27 RX ADMIN — ONDANSETRON 4 MG: 2 INJECTION INTRAMUSCULAR; INTRAVENOUS at 09:23

## 2018-10-27 RX ADMIN — POLYETHYLENE GLYCOL (3350) 17 G: 17 POWDER, FOR SOLUTION ORAL at 09:23

## 2018-10-27 RX ADMIN — ATORVASTATIN CALCIUM 10 MG: 10 TABLET, FILM COATED ORAL at 21:41

## 2018-10-27 RX ADMIN — TRAMADOL HYDROCHLORIDE 50 MG: 50 TABLET, FILM COATED ORAL at 16:44

## 2018-10-27 RX ADMIN — FLECAINIDE ACETATE 50 MG: 100 TABLET ORAL at 08:10

## 2018-10-27 RX ADMIN — CHLORDIAZEPOXIDE HYDROCHLORIDE 5 MG: 5 CAPSULE ORAL at 16:44

## 2018-10-27 RX ADMIN — LATANOPROST 1 DROP: 50 SOLUTION OPHTHALMIC at 21:39

## 2018-10-27 ASSESSMENT — PAIN DESCRIPTION - DESCRIPTORS
DESCRIPTORS: DISCOMFORT
DESCRIPTORS: ACHING;DISCOMFORT
DESCRIPTORS: ACHING

## 2018-10-27 ASSESSMENT — PAIN DESCRIPTION - PAIN TYPE
TYPE: ACUTE PAIN

## 2018-10-27 ASSESSMENT — PAIN DESCRIPTION - LOCATION
LOCATION: HIP;KNEE
LOCATION: KNEE

## 2018-10-27 ASSESSMENT — PAIN SCALES - GENERAL
PAINLEVEL_OUTOF10: 5
PAINLEVEL_OUTOF10: 6
PAINLEVEL_OUTOF10: 0
PAINLEVEL_OUTOF10: 7
PAINLEVEL_OUTOF10: 0
PAINLEVEL_OUTOF10: 2
PAINLEVEL_OUTOF10: 0

## 2018-10-27 ASSESSMENT — PAIN DESCRIPTION - ORIENTATION
ORIENTATION: RIGHT

## 2018-10-27 NOTE — PROGRESS NOTES
Patient resting in bed. No s/s distress noted. Pt has declined to turn in bed this shift. Educated on importance of turning/repositioning every 2 hours to prevent skin breakdown, verbalizes understanding, but did not wish to be turned at this time, voiced she wants to \"take a nap\". Asked pt if we could help her repositioned in 1 hour, pt agrees to that.

## 2018-10-27 NOTE — ANESTHESIA PRE PROCEDURE
Department of Anesthesiology  Preprocedure Note       Name:  Dimple Betancur   Age:  80 y.o.  :  1932                                          MRN:  [id number]         Date:  10/27/2018      Surgeon: Trevor Jones):  Betina Nelson MD    Procedure: PATELLA OPEN REDUCTION INTERNAL FIXATION (Right )    Medications prior to admission:   Prior to Admission medications    Medication Sig Start Date End Date Taking? Authorizing Provider   flecainide (TAMBOCOR) 50 MG tablet Take 50 mg by mouth 2 times daily   Yes Historical Provider, MD   aspirin 81 MG tablet Take 81 mg by mouth daily   Yes Historical Provider, MD   timolol (BETIMOL) 0.5 % ophthalmic solution Place 1 drop into the left eye 2 times daily   Yes Historical Provider, MD   carvedilol (COREG) 3.125 MG tablet Take 1 tablet by mouth 2 times daily (with meals). Patient taking differently:   Take 6.25 mg by mouth 2 times daily (with meals)  10/7/14  Yes Yvonne Hagan MD   chlordiazepoxide (LIBRIUM) 10 MG capsule   Take 5 mg by mouth 2 times daily    Yes Historical Provider, MD   atorvastatin (LIPITOR) 10 MG tablet   Take 10 mg by mouth nightly    Yes Historical Provider, MD   Multiple Vitamins-Minerals (CENTRUM PO) Take 1 tablet by mouth daily. Indications: Centrum Silver 12/24/10  Yes Historical Provider, MD   polyethylene glycol (GLYCOLAX) powder Take 17 g by mouth daily. Yes Historical Provider, MD   vitamin D (CHOLECALCIFEROL) 400 UNIT TABS tablet   Take 2,000 Units by mouth daily    Yes Historical Provider, MD   Psyllium-Calcium (METAMUCIL PLUS CALCIUM) CAPS Take 1 tablet by mouth nightly. Take with 8 oz water.   12/24/10  Yes Historical Provider, MD   latanoprost (XALATAN) 0.005 % ophthalmic solution   Place 1 drop into both eyes nightly    Yes Historical Provider, MD       Current medications:    Current Facility-Administered Medications   Medication Dose Route Frequency Provider Last Rate Last Dose    polyethylene glycol (GLYCOLAX) packet 17 g  17 g Other (See Comments)     Not sure    Moxifloxacin     Moxifloxacin Hcl In Nacl Other (See Comments)     Not sure    Mupirocin     Nsaids      bleeding    Paroxetine Other (See Comments)    Phenergan [Promethazine Hcl] Swelling    Phenothiazines     Promethazine Hcl Swelling    Sulindac      Gi bleed    Tolmetin      bleeding    Ciprofloxacin Rash and Swelling       Problem List:    Patient Active Problem List   Diagnosis Code    Localized osteoarthrosis, lower leg M17.10    Rectal bleed K62.5    Osteoarthritis of hip M16.9    Hip fracture ([facility]) S72.009A    Hip fracture ([facility]) RIGHT S72.009A    Hypertension I10    Hypercholesteremia E78.00    Primary osteoarthritis of right knee M17.11    Pain due to total right knee replacement ([facility]) T84.84XA, Z96.651    Chronic knee pain after total replacement of right knee joint M25.561, G89.29, Z96.651    Status post total right knee replacement Z96.651    PAF (paroxysmal atrial fibrillation) ([facility]) I48.0    Pacemaker Z95.0    Displaced comminuted fracture of right patella, initial encounter for closed fracture S82.041A       Past Medical History:        Diagnosis Date    Arthritis     CAD (coronary artery disease)     Depression     Diverticulosis     Gall stone     Glaucoma     Hypercholesteremia     Hypertension     Irregular heart beat     Rectal prolapse     Uterine cancer ([facility] [address] 2008       Past Surgical History:        Procedure Laterality Date    APPENDECTOMY      COLONOSCOPY  2005    polyp    COLONOSCOPY  1010    diverticulosis    COLONOSCOPY  10/4/2014    diverticulosis, radiation colitis sigmoid    FRACTURE SURGERY      HIP SURGERY Right 8/4/15    hip pinning    HYSTERECTOMY  2008    RT in 2009    JOINT REPLACEMENT      KNEE SURGERY      PACEMAKER INSERTION      PACEMAKER PLACEMENT  2008    [place] CPTR-ASST SURGICAL NAVIGATION IMAGE-LESS Right 9/5/2018    RIGHT TOTAL KNEE REPLACEMENT COMPUTER NAVIGATION WITH ADDUCTOR CANAL BLOCK 98.8 °F (37.1 °C)   TempSrc: Oral Oral Oral Oral   SpO2: 92% 93% 94% 95%   Weight:       Height:           Allergies   Allergen Reactions    Lisinopril Swelling     angioedema  angioedema    Levofloxacin     Levofloxacin Other (See Comments)     unsure    Vancomycin Rash     Rash and hives. CARLY'S SYNDROME    Amlodipine     Avelox [Moxifloxacin Hcl In Nacl]     Buspirone Itching    Hydrochlorothiazide Swelling    Lidocaine Other (See Comments)     Not sure    Moxifloxacin     Moxifloxacin Hcl In Nacl Other (See Comments)     Not sure    Mupirocin     Nsaids      bleeding    Paroxetine Other (See Comments)    Phenergan [Promethazine Hcl] Swelling    Phenothiazines     Promethazine Hcl Swelling    Sulindac      Gi bleed    Tolmetin      bleeding    Ciprofloxacin Rash and Swelling       Social History   Substance Use Topics    Smoking status: Former Smoker    Smokeless tobacco: Never Used      Comment: only smoked from age [age] yrs old    Alcohol use No       LABS:    CBC  Lab Results   Component Value Date/Time    WBC 6.8 10/29/2018 09:34 AM    HGB 8.4 (L) 10/29/2018 09:34 AM    HCT 24.8 (L) 10/29/2018 09:34 AM     10/29/2018 09:34 AM     RENAL  Lab Results   Component Value Date/Time     10/29/2018 09:34 AM    K 3.9 10/29/2018 09:34 AM    K 4.2 10/25/2018 07:00 PM    CL 99 10/29/2018 09:34 AM    CO2 28 10/29/2018 09:34 AM    BUN 8 10/29/2018 09:34 AM    CREATININE <0.5 (L) 10/29/2018 09:34 AM    GLUCOSE 143 (H) 10/29/2018 09:34 AM     COAGS  Lab Results   Component Value Date/Time    PROTIME 14.3 (H) 10/29/2018 09:34 AM    INR 1.25 (H) 10/29/2018 09:34 AM    APTT 34.5 08/20/2018 11:22 AM       Hailey Stevens MD   10/27/2018

## 2018-10-27 NOTE — PROGRESS NOTES
Pt alert and oriented x 4. Morning assessment complete-see flow sheet. Due meds given- see MAR. VS stable. No s/s of distress noted. R knee immobilizer in place. Continue to monitor. Call light in reach.

## 2018-10-27 NOTE — PROGRESS NOTES
Hospitalist Progress Note      PCP: Nancy Alas MD    Date of Admission: 10/25/2018      Hospital Course:, this is a 80 y.o. female here for fall occurred at home. Patient states that she has significant swelling to left knee, and came here for further evaluation patient is unable to bear weight left knee. Patient does have a prior history of having a knee replacement to that knee. just week ago    Subjective: pain controlled  Nausea this am   Unable to  walk      Medications:  Reviewed    Infusion Medications   Scheduled Medications    polyethylene glycol  17 g Oral Daily    timolol  1 drop Left Eye BID    aspirin  81 mg Oral Daily    atorvastatin  10 mg Oral Nightly    chlordiazePOXIDE  5 mg Oral BID    latanoprost  1 drop Both Eyes Nightly    sodium chloride flush  10 mL Intravenous 2 times per day    enoxaparin  40 mg Subcutaneous Daily    flecainide  50 mg Oral BID    carvedilol  6.25 mg Oral BID WC     PRN Meds: traMADol, morphine, sodium chloride flush, acetaminophen, ondansetron      Intake/Output Summary (Last 24 hours) at 10/27/18 1414  Last data filed at 10/27/18 1334   Gross per 24 hour   Intake              840 ml   Output              800 ml   Net               40 ml       Physical Exam Performed:    /72   Pulse 80   Temp 98 °F (36.7 °C) (Axillary)   Resp 16   Ht 5' (1.524 m)   Wt 105 lb (47.6 kg)   SpO2 93%   BMI 20.51 kg/m²     General appearance: No apparent distress, appears stated age and cooperative. HEENT: Pupils equal, r  Respiratory:  Normal respiratory effort. Clear to auscultation, bilaterally without Rales/Wheezes/Rhonchi. Cardiovascular: Regular rate and rhythm with normal S1/S2 without murmurs, rubs or gallops. Abdomen: Soft, non-tender, non-distended with normal bowel sounds. Musculoskeletal: No clubbing, cyanosis or edema bilaterally. RT KNEE SWOLLEN tender  Neurologic:  Neurovascularly intact without any focal sensory/motor deficits.  Cranial

## 2018-10-28 PROCEDURE — 6360000002 HC RX W HCPCS: Performed by: INTERNAL MEDICINE

## 2018-10-28 PROCEDURE — 6370000000 HC RX 637 (ALT 250 FOR IP): Performed by: HOSPITALIST

## 2018-10-28 PROCEDURE — 2580000003 HC RX 258: Performed by: INTERNAL MEDICINE

## 2018-10-28 PROCEDURE — 1200000000 HC SEMI PRIVATE

## 2018-10-28 PROCEDURE — 6370000000 HC RX 637 (ALT 250 FOR IP): Performed by: INTERNAL MEDICINE

## 2018-10-28 RX ADMIN — TIMOLOL MALEATE 1 DROP: 5 SOLUTION/ DROPS OPHTHALMIC at 16:32

## 2018-10-28 RX ADMIN — ATORVASTATIN CALCIUM 10 MG: 10 TABLET, FILM COATED ORAL at 20:17

## 2018-10-28 RX ADMIN — FLECAINIDE ACETATE 50 MG: 100 TABLET ORAL at 20:17

## 2018-10-28 RX ADMIN — TRAMADOL HYDROCHLORIDE 50 MG: 50 TABLET, FILM COATED ORAL at 05:18

## 2018-10-28 RX ADMIN — CARVEDILOL 6.25 MG: 6.25 TABLET, FILM COATED ORAL at 08:14

## 2018-10-28 RX ADMIN — ENOXAPARIN SODIUM 40 MG: 40 INJECTION SUBCUTANEOUS at 08:15

## 2018-10-28 RX ADMIN — ASPIRIN 81 MG 81 MG: 81 TABLET ORAL at 08:14

## 2018-10-28 RX ADMIN — TIMOLOL MALEATE 1 DROP: 5 SOLUTION/ DROPS OPHTHALMIC at 08:15

## 2018-10-28 RX ADMIN — FLECAINIDE ACETATE 50 MG: 100 TABLET ORAL at 08:14

## 2018-10-28 RX ADMIN — LATANOPROST 1 DROP: 50 SOLUTION OPHTHALMIC at 20:18

## 2018-10-28 RX ADMIN — POLYETHYLENE GLYCOL (3350) 17 G: 17 POWDER, FOR SOLUTION ORAL at 08:15

## 2018-10-28 RX ADMIN — CHLORDIAZEPOXIDE HYDROCHLORIDE 5 MG: 5 CAPSULE ORAL at 16:32

## 2018-10-28 RX ADMIN — CHLORDIAZEPOXIDE HYDROCHLORIDE 5 MG: 5 CAPSULE ORAL at 08:14

## 2018-10-28 RX ADMIN — Medication 10 ML: at 20:17

## 2018-10-28 RX ADMIN — Medication 10 ML: at 08:15

## 2018-10-28 ASSESSMENT — PAIN SCALES - GENERAL: PAINLEVEL_OUTOF10: 7

## 2018-10-28 ASSESSMENT — PAIN DESCRIPTION - PAIN TYPE: TYPE: ACUTE PAIN

## 2018-10-28 ASSESSMENT — PAIN DESCRIPTION - LOCATION: LOCATION: HIP;KNEE

## 2018-10-28 ASSESSMENT — PAIN DESCRIPTION - DESCRIPTORS: DESCRIPTORS: ACHING

## 2018-10-28 ASSESSMENT — PAIN DESCRIPTION - ORIENTATION: ORIENTATION: RIGHT

## 2018-10-28 NOTE — PROGRESS NOTES
Hospitalist Progress Note      PCP: Alessio Leung MD    Date of Admission: 10/25/2018      Hospital Course:, this is a 80 y.o. female here for fall occurred at home. Patient states that she has significant swelling to left knee, and came here for further evaluation patient is unable to bear weight left knee. Patient does have a prior history of having a knee replacement to that knee. just week ago    Subjective:   pain controlled  Nausea this am   Unable to  Walk in bed with brace   [place]      Medications:  Reviewed    Infusion Medications   Scheduled Medications    polyethylene glycol  17 g Oral Daily    timolol  1 drop Left Eye BID    aspirin  81 mg Oral Daily    atorvastatin  10 mg Oral Nightly    chlordiazePOXIDE  5 mg Oral BID    latanoprost  1 drop Both Eyes Nightly    sodium chloride flush  10 mL Intravenous 2 times per day    enoxaparin  40 mg Subcutaneous Daily    flecainide  50 mg Oral BID    carvedilol  6.25 mg Oral BID WC     PRN Meds: traMADol, morphine, sodium chloride flush, acetaminophen, ondansetron      Intake/Output Summary (Last 24 hours) at 10/28/18 1225  Last data filed at 10/28/18 1039   Gross per 24 hour   Intake              480 ml   Output             1500 ml   Net            -1020 ml       Physical Exam Performed:    /80   Pulse 60   Temp 98.4 °F (36.9 °C) (Oral)   Resp 16   Ht 5' (1.524 m)   Wt 105 lb (47.6 kg)   SpO2 92%   BMI 20.51 kg/m²     General appearance: No apparent distress, appears stated age and cooperative. HEENT: Pupils equal,   Respiratory:  Normal respiratory effort. Clear to auscultation, bilaterally without Rales/Wheezes/Rhonchi. Cardiovascular: Regular rate and rhythm with normal S1/S2 without murmurs, rubs or gallops. Abdomen: Soft, non-tender, non-distended with normal bowel sounds. Musculoskeletal: No clubbing, cyanosis or edema bilaterally.   RT KNEE SWOLLEN tender  Neurologic:  Neurovascularly intact without any focal

## 2018-10-28 NOTE — PLAN OF CARE
Problem: Falls - Risk of:  Goal: Will remain free from falls  Will remain free from falls   Outcome: Ongoing    Goal: Absence of physical injury  Absence of physical injury   Outcome: Ongoing      Problem: Risk for Impaired Skin Integrity  Goal: Tissue integrity - skin and mucous membranes  Structural intactness and normal physiological function of skin and  mucous membranes.    Outcome: Ongoing      Problem: Infection:  Goal: Will remain free from infection  Will remain free from infection   Outcome: Ongoing      Problem: Safety:  Goal: Free from accidental physical injury  Free from accidental physical injury   Outcome: Ongoing    Goal: Free from intentional harm  Free from intentional harm   Outcome: Ongoing      Problem: Daily Care:  Goal: Daily care needs are met  Daily care needs are met   Outcome: Ongoing      Problem: Pain:  Goal: Patient's pain/discomfort is manageable  Patient's pain/discomfort is manageable   Outcome: Ongoing      Problem: Skin Integrity:  Goal: Skin integrity will stabilize  Skin integrity will stabilize   Outcome: Ongoing      Problem: Discharge Planning:  Goal: Patients continuum of care needs are met  Patients continuum of care needs are met   Outcome: Ongoing      Problem: Nutrition  Goal: Optimal nutrition therapy  Outcome: Ongoing

## 2018-10-29 ENCOUNTER — ANESTHESIA (OUTPATIENT)
Dept: OPERATING ROOM | Age: 83
DRG: 488 | End: 2018-10-29
Payer: MEDICARE

## 2018-10-29 VITALS — SYSTOLIC BLOOD PRESSURE: 132 MMHG | DIASTOLIC BLOOD PRESSURE: 69 MMHG | OXYGEN SATURATION: 100 %

## 2018-10-29 LAB
ABO/RH: NORMAL
ANION GAP SERPL CALCULATED.3IONS-SCNC: 9 MMOL/L (ref 3–16)
ANTIBODY SCREEN: NORMAL
BUN BLDV-MCNC: 8 MG/DL (ref 7–20)
CALCIUM SERPL-MCNC: 8.4 MG/DL (ref 8.3–10.6)
CHLORIDE BLD-SCNC: 99 MMOL/L (ref 99–110)
CO2: 28 MMOL/L (ref 21–32)
CREAT SERPL-MCNC: <0.5 MG/DL (ref 0.6–1.2)
GFR AFRICAN AMERICAN: >60
GFR NON-AFRICAN AMERICAN: >60
GLUCOSE BLD-MCNC: 132 MG/DL (ref 70–99)
GLUCOSE BLD-MCNC: 143 MG/DL (ref 70–99)
HCT VFR BLD CALC: 24.8 % (ref 36–48)
HEMOGLOBIN: 8.4 G/DL (ref 12–16)
INR BLD: 1.25 (ref 0.86–1.14)
MCH RBC QN AUTO: 31.4 PG (ref 26–34)
MCHC RBC AUTO-ENTMCNC: 33.9 G/DL (ref 31–36)
MCV RBC AUTO: 92.6 FL (ref 80–100)
PDW BLD-RTO: 13.6 % (ref 12.4–15.4)
PERFORMED ON: ABNORMAL
PLATELET # BLD: 349 K/UL (ref 135–450)
PMV BLD AUTO: 6.7 FL (ref 5–10.5)
POTASSIUM SERPL-SCNC: 3.9 MMOL/L (ref 3.5–5.1)
PROTHROMBIN TIME: 14.3 SEC (ref 9.8–13)
RBC # BLD: 2.68 M/UL (ref 4–5.2)
SODIUM BLD-SCNC: 136 MMOL/L (ref 136–145)
WBC # BLD: 6.8 K/UL (ref 4–11)

## 2018-10-29 PROCEDURE — 2580000003 HC RX 258: Performed by: ORTHOPAEDIC SURGERY

## 2018-10-29 PROCEDURE — 7100000001 HC PACU RECOVERY - ADDTL 15 MIN: Performed by: ORTHOPAEDIC SURGERY

## 2018-10-29 PROCEDURE — 0QBD0ZZ EXCISION OF RIGHT PATELLA, OPEN APPROACH: ICD-10-PCS | Performed by: ORTHOPAEDIC SURGERY

## 2018-10-29 PROCEDURE — 2580000003 HC RX 258: Performed by: PHYSICIAN ASSISTANT

## 2018-10-29 PROCEDURE — 85610 PROTHROMBIN TIME: CPT

## 2018-10-29 PROCEDURE — 3700000000 HC ANESTHESIA ATTENDED CARE: Performed by: ORTHOPAEDIC SURGERY

## 2018-10-29 PROCEDURE — 6370000000 HC RX 637 (ALT 250 FOR IP): Performed by: PHYSICIAN ASSISTANT

## 2018-10-29 PROCEDURE — 85027 COMPLETE CBC AUTOMATED: CPT

## 2018-10-29 PROCEDURE — 2580000003 HC RX 258: Performed by: INTERNAL MEDICINE

## 2018-10-29 PROCEDURE — 2709999900 HC NON-CHARGEABLE SUPPLY: Performed by: ORTHOPAEDIC SURGERY

## 2018-10-29 PROCEDURE — 0LQQ0ZZ REPAIR RIGHT KNEE TENDON, OPEN APPROACH: ICD-10-PCS | Performed by: ORTHOPAEDIC SURGERY

## 2018-10-29 PROCEDURE — 1200000000 HC SEMI PRIVATE

## 2018-10-29 PROCEDURE — 6360000002 HC RX W HCPCS: Performed by: PHYSICIAN ASSISTANT

## 2018-10-29 PROCEDURE — 6360000002 HC RX W HCPCS: Performed by: ANESTHESIOLOGY

## 2018-10-29 PROCEDURE — 2580000003 HC RX 258: Performed by: ANESTHESIOLOGY

## 2018-10-29 PROCEDURE — 86900 BLOOD TYPING SEROLOGIC ABO: CPT

## 2018-10-29 PROCEDURE — 86901 BLOOD TYPING SEROLOGIC RH(D): CPT

## 2018-10-29 PROCEDURE — 6370000000 HC RX 637 (ALT 250 FOR IP): Performed by: HOSPITALIST

## 2018-10-29 PROCEDURE — 2500000003 HC RX 250 WO HCPCS: Performed by: ORTHOPAEDIC SURGERY

## 2018-10-29 PROCEDURE — 3600000004 HC SURGERY LEVEL 4 BASE: Performed by: ORTHOPAEDIC SURGERY

## 2018-10-29 PROCEDURE — 6370000000 HC RX 637 (ALT 250 FOR IP): Performed by: INTERNAL MEDICINE

## 2018-10-29 PROCEDURE — 36415 COLL VENOUS BLD VENIPUNCTURE: CPT

## 2018-10-29 PROCEDURE — 3700000001 HC ADD 15 MINUTES (ANESTHESIA): Performed by: ORTHOPAEDIC SURGERY

## 2018-10-29 PROCEDURE — 80048 BASIC METABOLIC PNL TOTAL CA: CPT

## 2018-10-29 PROCEDURE — 86850 RBC ANTIBODY SCREEN: CPT

## 2018-10-29 PROCEDURE — APPSS15 APP SPLIT SHARED TIME 0-15 MINUTES: Performed by: PHYSICIAN ASSISTANT

## 2018-10-29 PROCEDURE — 3600000014 HC SURGERY LEVEL 4 ADDTL 15MIN: Performed by: ORTHOPAEDIC SURGERY

## 2018-10-29 PROCEDURE — 7100000000 HC PACU RECOVERY - FIRST 15 MIN: Performed by: ORTHOPAEDIC SURGERY

## 2018-10-29 PROCEDURE — 64447 NJX AA&/STRD FEMORAL NRV IMG: CPT | Performed by: ANESTHESIOLOGY

## 2018-10-29 RX ORDER — SODIUM CHLORIDE, SODIUM LACTATE, POTASSIUM CHLORIDE, CALCIUM CHLORIDE 600; 310; 30; 20 MG/100ML; MG/100ML; MG/100ML; MG/100ML
INJECTION, SOLUTION INTRAVENOUS CONTINUOUS
Status: DISCONTINUED | OUTPATIENT
Start: 2018-10-29 | End: 2018-10-30

## 2018-10-29 RX ORDER — BUPIVACAINE HYDROCHLORIDE 5 MG/ML
INJECTION, SOLUTION PERINEURAL PRN
Status: DISCONTINUED | OUTPATIENT
Start: 2018-10-29 | End: 2018-10-29 | Stop reason: HOSPADM

## 2018-10-29 RX ORDER — DEXTROSE MONOHYDRATE 25 G/50ML
12.5 INJECTION, SOLUTION INTRAVENOUS PRN
Status: DISCONTINUED | OUTPATIENT
Start: 2018-10-29 | End: 2018-11-06 | Stop reason: HOSPADM

## 2018-10-29 RX ORDER — DOCUSATE SODIUM 100 MG/1
100 CAPSULE, LIQUID FILLED ORAL 2 TIMES DAILY
Status: DISCONTINUED | OUTPATIENT
Start: 2018-10-29 | End: 2018-11-06 | Stop reason: HOSPADM

## 2018-10-29 RX ORDER — SENNA AND DOCUSATE SODIUM 50; 8.6 MG/1; MG/1
1 TABLET, FILM COATED ORAL DAILY
Status: DISCONTINUED | OUTPATIENT
Start: 2018-10-30 | End: 2018-11-06 | Stop reason: HOSPADM

## 2018-10-29 RX ORDER — MORPHINE SULFATE 2 MG/ML
2 INJECTION, SOLUTION INTRAMUSCULAR; INTRAVENOUS
Status: DISCONTINUED | OUTPATIENT
Start: 2018-10-29 | End: 2018-11-05 | Stop reason: SDUPTHER

## 2018-10-29 RX ORDER — HYDROCODONE BITARTRATE AND ACETAMINOPHEN 5; 325 MG/1; MG/1
2 TABLET ORAL EVERY 4 HOURS PRN
Status: DISCONTINUED | OUTPATIENT
Start: 2018-10-29 | End: 2018-11-06 | Stop reason: HOSPADM

## 2018-10-29 RX ORDER — SODIUM CHLORIDE 0.9 % (FLUSH) 0.9 %
10 SYRINGE (ML) INJECTION PRN
Status: DISCONTINUED | OUTPATIENT
Start: 2018-10-29 | End: 2018-11-06 | Stop reason: HOSPADM

## 2018-10-29 RX ORDER — SODIUM CHLORIDE, SODIUM LACTATE, POTASSIUM CHLORIDE, CALCIUM CHLORIDE 600; 310; 30; 20 MG/100ML; MG/100ML; MG/100ML; MG/100ML
INJECTION, SOLUTION INTRAVENOUS CONTINUOUS PRN
Status: DISCONTINUED | OUTPATIENT
Start: 2018-10-29 | End: 2018-10-29 | Stop reason: SDUPTHER

## 2018-10-29 RX ORDER — CEFAZOLIN SODIUM 2 G/50ML
2 SOLUTION INTRAVENOUS
Status: COMPLETED | OUTPATIENT
Start: 2018-10-29 | End: 2018-10-29

## 2018-10-29 RX ORDER — HYDROCODONE BITARTRATE AND ACETAMINOPHEN 5; 325 MG/1; MG/1
1 TABLET ORAL EVERY 4 HOURS PRN
Status: DISCONTINUED | OUTPATIENT
Start: 2018-10-29 | End: 2018-11-06 | Stop reason: HOSPADM

## 2018-10-29 RX ORDER — ACETAMINOPHEN 325 MG/1
650 TABLET ORAL EVERY 4 HOURS PRN
Status: DISCONTINUED | OUTPATIENT
Start: 2018-10-29 | End: 2018-11-06 | Stop reason: HOSPADM

## 2018-10-29 RX ORDER — ONDANSETRON 2 MG/ML
INJECTION INTRAMUSCULAR; INTRAVENOUS PRN
Status: DISCONTINUED | OUTPATIENT
Start: 2018-10-29 | End: 2018-10-29 | Stop reason: SDUPTHER

## 2018-10-29 RX ORDER — DEXTROSE MONOHYDRATE 50 MG/ML
100 INJECTION, SOLUTION INTRAVENOUS PRN
Status: DISCONTINUED | OUTPATIENT
Start: 2018-10-29 | End: 2018-11-06 | Stop reason: HOSPADM

## 2018-10-29 RX ORDER — MAGNESIUM HYDROXIDE 1200 MG/15ML
LIQUID ORAL CONTINUOUS PRN
Status: COMPLETED | OUTPATIENT
Start: 2018-10-29 | End: 2018-10-29

## 2018-10-29 RX ORDER — SODIUM CHLORIDE 0.9 % (FLUSH) 0.9 %
10 SYRINGE (ML) INJECTION EVERY 12 HOURS SCHEDULED
Status: DISCONTINUED | OUTPATIENT
Start: 2018-10-29 | End: 2018-11-06 | Stop reason: HOSPADM

## 2018-10-29 RX ORDER — NICOTINE POLACRILEX 4 MG
15 LOZENGE BUCCAL PRN
Status: DISCONTINUED | OUTPATIENT
Start: 2018-10-29 | End: 2018-11-06 | Stop reason: HOSPADM

## 2018-10-29 RX ORDER — PROPOFOL 10 MG/ML
INJECTION, EMULSION INTRAVENOUS PRN
Status: DISCONTINUED | OUTPATIENT
Start: 2018-10-29 | End: 2018-10-29 | Stop reason: SDUPTHER

## 2018-10-29 RX ORDER — DEXAMETHASONE SODIUM PHOSPHATE 10 MG/ML
INJECTION INTRAMUSCULAR; INTRAVENOUS PRN
Status: DISCONTINUED | OUTPATIENT
Start: 2018-10-29 | End: 2018-10-29 | Stop reason: SDUPTHER

## 2018-10-29 RX ORDER — ONDANSETRON 2 MG/ML
4 INJECTION INTRAMUSCULAR; INTRAVENOUS EVERY 6 HOURS PRN
Status: DISCONTINUED | OUTPATIENT
Start: 2018-10-29 | End: 2018-11-06 | Stop reason: HOSPADM

## 2018-10-29 RX ORDER — FAMOTIDINE 20 MG/1
20 TABLET, FILM COATED ORAL 2 TIMES DAILY
Status: DISCONTINUED | OUTPATIENT
Start: 2018-10-29 | End: 2018-11-06 | Stop reason: HOSPADM

## 2018-10-29 RX ORDER — MORPHINE SULFATE 2 MG/ML
4 INJECTION, SOLUTION INTRAMUSCULAR; INTRAVENOUS
Status: DISCONTINUED | OUTPATIENT
Start: 2018-10-29 | End: 2018-11-05 | Stop reason: SDUPTHER

## 2018-10-29 RX ADMIN — TRAMADOL HYDROCHLORIDE 50 MG: 50 TABLET, FILM COATED ORAL at 02:45

## 2018-10-29 RX ADMIN — DOCUSATE SODIUM 100 MG: 100 CAPSULE, LIQUID FILLED ORAL at 22:36

## 2018-10-29 RX ADMIN — TRAMADOL HYDROCHLORIDE 50 MG: 50 TABLET, FILM COATED ORAL at 13:58

## 2018-10-29 RX ADMIN — SODIUM CHLORIDE, POTASSIUM CHLORIDE, SODIUM LACTATE AND CALCIUM CHLORIDE: 600; 310; 30; 20 INJECTION, SOLUTION INTRAVENOUS at 18:27

## 2018-10-29 RX ADMIN — CARVEDILOL 6.25 MG: 6.25 TABLET, FILM COATED ORAL at 07:48

## 2018-10-29 RX ADMIN — TIMOLOL MALEATE 1 DROP: 5 SOLUTION/ DROPS OPHTHALMIC at 15:59

## 2018-10-29 RX ADMIN — ATORVASTATIN CALCIUM 10 MG: 10 TABLET, FILM COATED ORAL at 22:36

## 2018-10-29 RX ADMIN — Medication 10 ML: at 07:50

## 2018-10-29 RX ADMIN — ONDANSETRON 4 MG: 2 INJECTION, SOLUTION INTRAMUSCULAR; INTRAVENOUS at 18:27

## 2018-10-29 RX ADMIN — DEXAMETHASONE SODIUM PHOSPHATE 10 MG: 10 INJECTION INTRAMUSCULAR; INTRAVENOUS at 18:27

## 2018-10-29 RX ADMIN — FLECAINIDE ACETATE 50 MG: 100 TABLET ORAL at 22:36

## 2018-10-29 RX ADMIN — HYDROCODONE BITARTRATE AND ACETAMINOPHEN 2 TABLET: 5; 325 TABLET ORAL at 22:36

## 2018-10-29 RX ADMIN — TIMOLOL MALEATE 1 DROP: 5 SOLUTION/ DROPS OPHTHALMIC at 07:52

## 2018-10-29 RX ADMIN — CEFAZOLIN SODIUM 2 G: 2 SOLUTION INTRAVENOUS at 18:17

## 2018-10-29 RX ADMIN — PROPOFOL 100 MG: 10 INJECTION, EMULSION INTRAVENOUS at 18:27

## 2018-10-29 RX ADMIN — LATANOPROST 1 DROP: 50 SOLUTION OPHTHALMIC at 22:36

## 2018-10-29 RX ADMIN — FLECAINIDE ACETATE 50 MG: 100 TABLET ORAL at 07:49

## 2018-10-29 RX ADMIN — FAMOTIDINE 20 MG: 20 TABLET, FILM COATED ORAL at 22:36

## 2018-10-29 RX ADMIN — Medication 10 ML: at 22:46

## 2018-10-29 RX ADMIN — CHLORDIAZEPOXIDE HYDROCHLORIDE 5 MG: 5 CAPSULE ORAL at 07:49

## 2018-10-29 RX ADMIN — SODIUM CHLORIDE, POTASSIUM CHLORIDE, SODIUM LACTATE AND CALCIUM CHLORIDE: 600; 310; 30; 20 INJECTION, SOLUTION INTRAVENOUS at 22:38

## 2018-10-29 ASSESSMENT — PULMONARY FUNCTION TESTS
PIF_VALUE: 11
PIF_VALUE: 10
PIF_VALUE: 11
PIF_VALUE: 11
PIF_VALUE: 18
PIF_VALUE: 10
PIF_VALUE: 11
PIF_VALUE: 10
PIF_VALUE: 11
PIF_VALUE: 10
PIF_VALUE: 11
PIF_VALUE: 5
PIF_VALUE: 0
PIF_VALUE: 10
PIF_VALUE: 11
PIF_VALUE: 11
PIF_VALUE: 10
PIF_VALUE: 10
PIF_VALUE: 11
PIF_VALUE: 10
PIF_VALUE: 11
PIF_VALUE: 11
PIF_VALUE: 10
PIF_VALUE: 11
PIF_VALUE: 10
PIF_VALUE: 11
PIF_VALUE: 10
PIF_VALUE: 10
PIF_VALUE: 11
PIF_VALUE: 10
PIF_VALUE: 11
PIF_VALUE: 22
PIF_VALUE: 10
PIF_VALUE: 11
PIF_VALUE: 10
PIF_VALUE: 11
PIF_VALUE: 10
PIF_VALUE: 11
PIF_VALUE: 10
PIF_VALUE: 11
PIF_VALUE: 0
PIF_VALUE: 11
PIF_VALUE: 10
PIF_VALUE: 10
PIF_VALUE: 20
PIF_VALUE: 11
PIF_VALUE: 10
PIF_VALUE: 11
PIF_VALUE: 10
PIF_VALUE: 10
PIF_VALUE: 11
PIF_VALUE: 10
PIF_VALUE: 11
PIF_VALUE: 10
PIF_VALUE: 11
PIF_VALUE: 10
PIF_VALUE: 11
PIF_VALUE: 10
PIF_VALUE: 1
PIF_VALUE: 11
PIF_VALUE: 11

## 2018-10-29 ASSESSMENT — PAIN DESCRIPTION - FREQUENCY: FREQUENCY: INTERMITTENT

## 2018-10-29 ASSESSMENT — PAIN DESCRIPTION - PAIN TYPE
TYPE: ACUTE PAIN
TYPE: ACUTE PAIN
TYPE: SURGICAL PAIN

## 2018-10-29 ASSESSMENT — PAIN SCALES - GENERAL
PAINLEVEL_OUTOF10: 7
PAINLEVEL_OUTOF10: 3
PAINLEVEL_OUTOF10: 9
PAINLEVEL_OUTOF10: 0
PAINLEVEL_OUTOF10: 0
PAINLEVEL_OUTOF10: 7

## 2018-10-29 ASSESSMENT — PAIN DESCRIPTION - ORIENTATION
ORIENTATION: RIGHT

## 2018-10-29 ASSESSMENT — PAIN DESCRIPTION - DESCRIPTORS: DESCRIPTORS: DISCOMFORT

## 2018-10-29 ASSESSMENT — PAIN DESCRIPTION - PROGRESSION: CLINICAL_PROGRESSION: NOT CHANGED

## 2018-10-29 ASSESSMENT — PAIN DESCRIPTION - LOCATION
LOCATION: KNEE

## 2018-10-29 ASSESSMENT — PAIN DESCRIPTION - ONSET: ONSET: GRADUAL

## 2018-10-29 NOTE — PROGRESS NOTES
Pt requesting pain med for pain level 7/10 located R knee. Gave prn pain med see mar/pain flowsheet. A/O x 4. Call light within reach.

## 2018-10-30 PROBLEM — E43 SEVERE PROTEIN-CALORIE MALNUTRITION (HCC): Status: ACTIVE | Noted: 2018-10-30

## 2018-10-30 LAB
ANION GAP SERPL CALCULATED.3IONS-SCNC: 10 MMOL/L (ref 3–16)
BUN BLDV-MCNC: 11 MG/DL (ref 7–20)
CALCIUM SERPL-MCNC: 8.8 MG/DL (ref 8.3–10.6)
CHLORIDE BLD-SCNC: 102 MMOL/L (ref 99–110)
CO2: 26 MMOL/L (ref 21–32)
CREAT SERPL-MCNC: <0.5 MG/DL (ref 0.6–1.2)
GFR AFRICAN AMERICAN: >60
GFR NON-AFRICAN AMERICAN: >60
GLUCOSE BLD-MCNC: 166 MG/DL (ref 70–99)
HCT VFR BLD CALC: 24.7 % (ref 36–48)
HEMOGLOBIN: 8.6 G/DL (ref 12–16)
MCH RBC QN AUTO: 31.8 PG (ref 26–34)
MCHC RBC AUTO-ENTMCNC: 34.9 G/DL (ref 31–36)
MCV RBC AUTO: 91.1 FL (ref 80–100)
PDW BLD-RTO: 13.2 % (ref 12.4–15.4)
PLATELET # BLD: 345 K/UL (ref 135–450)
PMV BLD AUTO: 6.9 FL (ref 5–10.5)
POTASSIUM REFLEX MAGNESIUM: 4.4 MMOL/L (ref 3.5–5.1)
RBC # BLD: 2.71 M/UL (ref 4–5.2)
SODIUM BLD-SCNC: 138 MMOL/L (ref 136–145)
WBC # BLD: 6.8 K/UL (ref 4–11)

## 2018-10-30 PROCEDURE — G8978 MOBILITY CURRENT STATUS: HCPCS

## 2018-10-30 PROCEDURE — 99232 SBSQ HOSP IP/OBS MODERATE 35: CPT | Performed by: PHYSICIAN ASSISTANT

## 2018-10-30 PROCEDURE — 1200000000 HC SEMI PRIVATE

## 2018-10-30 PROCEDURE — 6370000000 HC RX 637 (ALT 250 FOR IP): Performed by: INTERNAL MEDICINE

## 2018-10-30 PROCEDURE — 97530 THERAPEUTIC ACTIVITIES: CPT

## 2018-10-30 PROCEDURE — G8979 MOBILITY GOAL STATUS: HCPCS

## 2018-10-30 PROCEDURE — 36415 COLL VENOUS BLD VENIPUNCTURE: CPT

## 2018-10-30 PROCEDURE — 85027 COMPLETE CBC AUTOMATED: CPT

## 2018-10-30 PROCEDURE — 6360000002 HC RX W HCPCS: Performed by: PHYSICIAN ASSISTANT

## 2018-10-30 PROCEDURE — 97166 OT EVAL MOD COMPLEX 45 MIN: CPT

## 2018-10-30 PROCEDURE — 97535 SELF CARE MNGMENT TRAINING: CPT

## 2018-10-30 PROCEDURE — 2580000003 HC RX 258: Performed by: PHYSICIAN ASSISTANT

## 2018-10-30 PROCEDURE — G8988 SELF CARE GOAL STATUS: HCPCS

## 2018-10-30 PROCEDURE — 97161 PT EVAL LOW COMPLEX 20 MIN: CPT

## 2018-10-30 PROCEDURE — 6370000000 HC RX 637 (ALT 250 FOR IP): Performed by: PHYSICIAN ASSISTANT

## 2018-10-30 PROCEDURE — 80048 BASIC METABOLIC PNL TOTAL CA: CPT

## 2018-10-30 PROCEDURE — 6370000000 HC RX 637 (ALT 250 FOR IP): Performed by: HOSPITALIST

## 2018-10-30 PROCEDURE — 6360000002 HC RX W HCPCS: Performed by: INTERNAL MEDICINE

## 2018-10-30 PROCEDURE — G8987 SELF CARE CURRENT STATUS: HCPCS

## 2018-10-30 RX ADMIN — CARVEDILOL 6.25 MG: 6.25 TABLET, FILM COATED ORAL at 08:41

## 2018-10-30 RX ADMIN — TIMOLOL MALEATE 1 DROP: 5 SOLUTION/ DROPS OPHTHALMIC at 08:52

## 2018-10-30 RX ADMIN — HYDROCODONE BITARTRATE AND ACETAMINOPHEN 1 TABLET: 5; 325 TABLET ORAL at 12:19

## 2018-10-30 RX ADMIN — FLECAINIDE ACETATE 50 MG: 100 TABLET ORAL at 20:35

## 2018-10-30 RX ADMIN — DOCUSATE SODIUM 100 MG: 100 CAPSULE, LIQUID FILLED ORAL at 20:35

## 2018-10-30 RX ADMIN — STANDARDIZED SENNA CONCENTRATE AND DOCUSATE SODIUM 1 TABLET: 8.6; 5 TABLET ORAL at 08:42

## 2018-10-30 RX ADMIN — FAMOTIDINE 20 MG: 20 TABLET, FILM COATED ORAL at 08:42

## 2018-10-30 RX ADMIN — Medication 10 ML: at 08:46

## 2018-10-30 RX ADMIN — ENOXAPARIN SODIUM 40 MG: 40 INJECTION SUBCUTANEOUS at 08:45

## 2018-10-30 RX ADMIN — Medication 10 ML: at 20:34

## 2018-10-30 RX ADMIN — CHLORDIAZEPOXIDE HYDROCHLORIDE 5 MG: 5 CAPSULE ORAL at 08:43

## 2018-10-30 RX ADMIN — ATORVASTATIN CALCIUM 10 MG: 10 TABLET, FILM COATED ORAL at 20:35

## 2018-10-30 RX ADMIN — FAMOTIDINE 20 MG: 20 TABLET, FILM COATED ORAL at 20:34

## 2018-10-30 RX ADMIN — LATANOPROST 1 DROP: 50 SOLUTION OPHTHALMIC at 20:37

## 2018-10-30 RX ADMIN — TIMOLOL MALEATE 1 DROP: 5 SOLUTION/ DROPS OPHTHALMIC at 17:21

## 2018-10-30 RX ADMIN — HYDROCODONE BITARTRATE AND ACETAMINOPHEN 1 TABLET: 5; 325 TABLET ORAL at 20:35

## 2018-10-30 RX ADMIN — Medication 2 G: at 02:17

## 2018-10-30 RX ADMIN — POLYETHYLENE GLYCOL (3350) 17 G: 17 POWDER, FOR SOLUTION ORAL at 08:45

## 2018-10-30 RX ADMIN — FLECAINIDE ACETATE 50 MG: 100 TABLET ORAL at 08:44

## 2018-10-30 RX ADMIN — SODIUM CHLORIDE, POTASSIUM CHLORIDE, SODIUM LACTATE AND CALCIUM CHLORIDE: 600; 310; 30; 20 INJECTION, SOLUTION INTRAVENOUS at 05:40

## 2018-10-30 RX ADMIN — Medication 2 G: at 09:57

## 2018-10-30 RX ADMIN — CHLORDIAZEPOXIDE HYDROCHLORIDE 5 MG: 5 CAPSULE ORAL at 17:22

## 2018-10-30 RX ADMIN — DOCUSATE SODIUM 100 MG: 100 CAPSULE, LIQUID FILLED ORAL at 08:43

## 2018-10-30 ASSESSMENT — PAIN DESCRIPTION - FREQUENCY: FREQUENCY: INTERMITTENT

## 2018-10-30 ASSESSMENT — PAIN DESCRIPTION - LOCATION
LOCATION: KNEE
LOCATION: KNEE

## 2018-10-30 ASSESSMENT — PAIN DESCRIPTION - PAIN TYPE
TYPE: SURGICAL PAIN
TYPE: SURGICAL PAIN

## 2018-10-30 ASSESSMENT — PAIN DESCRIPTION - PROGRESSION: CLINICAL_PROGRESSION: NOT CHANGED

## 2018-10-30 ASSESSMENT — PAIN DESCRIPTION - ORIENTATION
ORIENTATION: RIGHT
ORIENTATION: RIGHT

## 2018-10-30 ASSESSMENT — PAIN SCALES - GENERAL
PAINLEVEL_OUTOF10: 4
PAINLEVEL_OUTOF10: 2
PAINLEVEL_OUTOF10: 5
PAINLEVEL_OUTOF10: 2

## 2018-10-30 ASSESSMENT — PAIN DESCRIPTION - ONSET: ONSET: GRADUAL

## 2018-10-30 ASSESSMENT — PAIN DESCRIPTION - DESCRIPTORS: DESCRIPTORS: ACHING;DISCOMFORT

## 2018-10-30 NOTE — PROGRESS NOTES
transfusion   - Hgb stable now 8.4-->8.9     DVT Prophylaxis: Lovenox  Diet: Dietary Nutrition Supplements: Standard High Calorie Oral Supplement  DIET GENERAL;  Code Status: Full Code     Vita Nugent PA-C  10/30/2018 10:53 AM

## 2018-10-30 NOTE — PROGRESS NOTES
IM. Post-op labs reviewed and stable. KI on at all times except for hygiene. Remove Simon. 2:  Continue Deep venous thrombosis prophylaxis  3:  Continue Pain Control  4:  PT, OT. 50% WB right LE. KI, no knee flexion. 5:  Discharge pending therapy's assessment and progress with therapy. Patient would like to go to [facility] at discharge per CM's note. CM following.     Merritt Can, CNP

## 2018-10-30 NOTE — PROGRESS NOTES
Bedside report given to [facility] pt in stable condition no needs at this time.  Call light within reach

## 2018-10-30 NOTE — PROGRESS NOTES
Am assessment completed. Gave AM meds due. See MAR. A/O x4. Denies any pain. Refilled cup with ice water. Call light within reach.

## 2018-10-30 NOTE — BRIEF OP NOTE
Brief Postoperative Note  ______________________________________________________________    Patient: Morris Troy  YOB: 1932  MRN: [id number]  Date of Procedure: 10/29/2018    Pre-Op Diagnosis: RT KNEE PATELLA FRACTURE; S/P RT TKR     Post-Op Diagnosis: Same       Procedure(s):  RT KNEE INFERIOR POLE PATELLECTOMY AND PRIMARY REPAIR OF PATELLAR TENDON    Anesthesia: General    Surgeon(s):  Rodríguez العلي MD    Staff:  Surgical Assistant: Sakshi Ortega  Scrub Person First: Puja Lacy RN     Estimated Blood Loss: < 50 CC     Complications: None    Specimens:   NONE    Implants:  * No implants in log *      Drains:   Closed/Suction Drain Right Knee Accordion (Active)       Closed/Suction Drain Right Knee Accordion (Active)       Urethral Catheter Non-latex 16 fr (Active)   Catheter Indications Perioperative use in selected surgeries including but not limited to urologic, pelvic or need for intraoperative monitoring of urinary output due to prolonged surgery, large volume infusion or need for diuretic therapy in surgery; Other (specify) 10/29/2018  7:47 AM   Urine Color Yellow 10/29/2018  1:50 PM   Urine Appearance Clear 10/29/2018  1:50 PM   Output (mL) 450 mL 10/29/2018  1:50 PM       Findings: AS ABOVE    Lita Oconnor MD  Date: 10/29/2018  Time: 8:08 PM

## 2018-10-30 NOTE — PROGRESS NOTES
Nutrition Assessment    Type and Reason for Visit: Reassess    Nutrition Recommendations:   1. 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 2. Added Ensure Enlive with meals - patient prefers chocolate flavor. 3. Monitor appetite and po intake. 4. Please obtain actual, current weight for this patient to monitor for in-patient weight changes during this admission. 5. Monitor nutrition-related labs, bowel activity, and fluid/electrolyte management. Malnutrition Assessment:  · Malnutrition Status: Meets the criteria for severe malnutrition  · Context: Acute illness or injury  · Findings of the 6 clinical characteristics of malnutrition (Minimum of 2 out of 6 clinical characteristics is required to make the diagnosis of moderate or severe Protein Calorie Malnutrition based on AND/ASPEN Guidelines):  1. Energy Intake-Less than or equal to 75%, greater than or equal to 1 month    2. Weight Loss-5% loss or greater, in 1 month  3. Fat Loss-Severe subcutaneous fat loss, Orbital  4. Muscle Loss-Severe muscle mass loss, Temples (temporalis muscle), Clavicles (pectoralis and deltoids), Scapula (trapezius)  5. Fluid Accumulation-Mild fluid accumulation, Extremities (RLE + 2 edema)  6.   Strength-Not measured    Nutrition Diagnosis:   · Problem: Severe malnutrition  · Etiology: related to Acute injury/trauma, Insufficient energy/nutrient consumption, Increased demand for energy/nutrients due to     Signs and symptoms:  as evidenced by Weight loss greater than or equal to 5% in 1 month, Severe muscle loss, Severe loss of subcutaneous fat, Diet history of poor intake    Nutrition Assessment:  · Subjective Assessment: patient is severely malnourished as evidenced by fat loss, muscle wasting, recent weight loss, and decreased po intake AND she is at risk for further compromise d/t surgery on 10/29 and

## 2018-10-30 NOTE — PROGRESS NOTES
Pt requesting pain med for pain level 4/10 located in R knee sx. Gave prn pain med see mar/pain flowsheet. A/O x 4. Call light within reach.

## 2018-10-30 NOTE — OP NOTE
which  was evacuated. The skin in the anterior aspect of her knee was very  tenuous and friable. We closed this up with nylon very loosely and  would like to delay any type of physical therapy for at least the  first 3 or 4 weeks to allow for this anterior soft tissue to heal.    DETAILS OF THE PROCEDURE:  The patient was brought to the operating  room. After administration of general anesthesia and femoral nerve  block, she was placed on OR table in supine position. Right lower  extremity was prepped and draped in normal sterile fashion. The limb  was exsanguinated. Tourniquet was inflated to 300 mmHg. Straight  midline incision was made. We evacuated a fairly large organized  hematoma. The wound was then irrigated with copious amount of  irrigation solution. The inferior pole of the patella was comminuted  in numerous pieces, very tenuous and not amenable for ORIF. Therefore, we excised this using sharp dissection 10 blade. Next, we  used two core stitches of # 5 FiberWire interlocking Krackow type of  stitch. We then passed the 4 limbs of the core suture through the  patella through 3 holes, one medial, one central,and one lateral.  The  core stitches were then passed through the patella from inferior to  superior and then tied with the knee held in a hyperextended position  with an excellent repair. We further reinforced the repair using #2  Ethibond, closing the medial and lateral retinaculum as well as #5  FiberWire. Paratenon was closed using 0 Ethibond suture, subcutaneous  tissues closed using 2-0 Monocryl sutures. The tourniquet was let  down before closure and the bleeding vessels were coagulated with  Bovie cautery. The skin was very loosely approximated using 3-0 nylon  suture. Dry sterile compressive dressing and knee immobilizer were  placed. The patient was then taken to recovery room in stable  condition, having tolerated the procedure well.         Benson Hand MD    D:

## 2018-10-31 ENCOUNTER — TELEPHONE (OUTPATIENT)
Dept: ORTHOPEDIC SURGERY | Age: 83
End: 2018-10-31

## 2018-10-31 LAB
ANION GAP SERPL CALCULATED.3IONS-SCNC: 5 MMOL/L (ref 3–16)
BASOPHILS ABSOLUTE: 0 K/UL (ref 0–0.2)
BASOPHILS RELATIVE PERCENT: 0.4 %
BUN BLDV-MCNC: 17 MG/DL (ref 7–20)
CALCIUM SERPL-MCNC: 8.9 MG/DL (ref 8.3–10.6)
CHLORIDE BLD-SCNC: 98 MMOL/L (ref 99–110)
CO2: 32 MMOL/L (ref 21–32)
CREAT SERPL-MCNC: 0.6 MG/DL (ref 0.6–1.2)
EOSINOPHILS ABSOLUTE: 0.2 K/UL (ref 0–0.6)
EOSINOPHILS RELATIVE PERCENT: 2.9 %
GFR AFRICAN AMERICAN: >60
GFR NON-AFRICAN AMERICAN: >60
GLUCOSE BLD-MCNC: 128 MG/DL (ref 70–99)
GLUCOSE BLD-MCNC: 177 MG/DL (ref 70–99)
HCT VFR BLD CALC: 25 % (ref 36–48)
HEMOGLOBIN: 8.4 G/DL (ref 12–16)
LYMPHOCYTES ABSOLUTE: 1.1 K/UL (ref 1–5.1)
LYMPHOCYTES RELATIVE PERCENT: 13.5 %
MCH RBC QN AUTO: 31 PG (ref 26–34)
MCHC RBC AUTO-ENTMCNC: 33.6 G/DL (ref 31–36)
MCV RBC AUTO: 92.4 FL (ref 80–100)
MONOCYTES ABSOLUTE: 0.6 K/UL (ref 0–1.3)
MONOCYTES RELATIVE PERCENT: 7.2 %
NEUTROPHILS ABSOLUTE: 6.4 K/UL (ref 1.7–7.7)
NEUTROPHILS RELATIVE PERCENT: 76 %
PDW BLD-RTO: 13.8 % (ref 12.4–15.4)
PERFORMED ON: ABNORMAL
PLATELET # BLD: 422 K/UL (ref 135–450)
PMV BLD AUTO: 7.2 FL (ref 5–10.5)
POTASSIUM SERPL-SCNC: 3.9 MMOL/L (ref 3.5–5.1)
RBC # BLD: 2.71 M/UL (ref 4–5.2)
SODIUM BLD-SCNC: 135 MMOL/L (ref 136–145)
WBC # BLD: 8.5 K/UL (ref 4–11)

## 2018-10-31 PROCEDURE — 97530 THERAPEUTIC ACTIVITIES: CPT

## 2018-10-31 PROCEDURE — 6370000000 HC RX 637 (ALT 250 FOR IP): Performed by: INTERNAL MEDICINE

## 2018-10-31 PROCEDURE — 6360000002 HC RX W HCPCS: Performed by: INTERNAL MEDICINE

## 2018-10-31 PROCEDURE — 6370000000 HC RX 637 (ALT 250 FOR IP): Performed by: NURSE PRACTITIONER

## 2018-10-31 PROCEDURE — 2500000003 HC RX 250 WO HCPCS: Performed by: PHYSICIAN ASSISTANT

## 2018-10-31 PROCEDURE — 85025 COMPLETE CBC W/AUTO DIFF WBC: CPT

## 2018-10-31 PROCEDURE — 6370000000 HC RX 637 (ALT 250 FOR IP): Performed by: PHYSICIAN ASSISTANT

## 2018-10-31 PROCEDURE — 97535 SELF CARE MNGMENT TRAINING: CPT

## 2018-10-31 PROCEDURE — 1200000000 HC SEMI PRIVATE

## 2018-10-31 PROCEDURE — 80048 BASIC METABOLIC PNL TOTAL CA: CPT

## 2018-10-31 PROCEDURE — 97110 THERAPEUTIC EXERCISES: CPT

## 2018-10-31 PROCEDURE — 2580000003 HC RX 258: Performed by: PHYSICIAN ASSISTANT

## 2018-10-31 PROCEDURE — 6370000000 HC RX 637 (ALT 250 FOR IP): Performed by: HOSPITALIST

## 2018-10-31 PROCEDURE — 36415 COLL VENOUS BLD VENIPUNCTURE: CPT

## 2018-10-31 PROCEDURE — 99232 SBSQ HOSP IP/OBS MODERATE 35: CPT | Performed by: INTERNAL MEDICINE

## 2018-10-31 RX ORDER — BACITRACIN, NEOMYCIN, POLYMYXIN B 400; 3.5; 5 [USP'U]/G; MG/G; [USP'U]/G
OINTMENT TOPICAL DAILY
Status: DISCONTINUED | OUTPATIENT
Start: 2018-10-31 | End: 2018-11-06 | Stop reason: HOSPADM

## 2018-10-31 RX ORDER — BACITRACIN, NEOMYCIN, POLYMYXIN B 400; 3.5; 5 [USP'U]/G; MG/G; [USP'U]/G
OINTMENT TOPICAL 2 TIMES DAILY
Status: DISCONTINUED | OUTPATIENT
Start: 2018-10-31 | End: 2018-10-31

## 2018-10-31 RX ADMIN — FLECAINIDE ACETATE 50 MG: 100 TABLET ORAL at 09:04

## 2018-10-31 RX ADMIN — CARVEDILOL 6.25 MG: 6.25 TABLET, FILM COATED ORAL at 16:31

## 2018-10-31 RX ADMIN — ENOXAPARIN SODIUM 40 MG: 40 INJECTION SUBCUTANEOUS at 09:04

## 2018-10-31 RX ADMIN — Medication 10 ML: at 09:04

## 2018-10-31 RX ADMIN — CARVEDILOL 6.25 MG: 6.25 TABLET, FILM COATED ORAL at 09:04

## 2018-10-31 RX ADMIN — HYDROCODONE BITARTRATE AND ACETAMINOPHEN 1 TABLET: 5; 325 TABLET ORAL at 10:00

## 2018-10-31 RX ADMIN — HYDROCODONE BITARTRATE AND ACETAMINOPHEN 2 TABLET: 5; 325 TABLET ORAL at 14:07

## 2018-10-31 RX ADMIN — FLECAINIDE ACETATE 50 MG: 100 TABLET ORAL at 21:19

## 2018-10-31 RX ADMIN — CHLORDIAZEPOXIDE HYDROCHLORIDE 5 MG: 5 CAPSULE ORAL at 09:04

## 2018-10-31 RX ADMIN — HYDROCODONE BITARTRATE AND ACETAMINOPHEN 2 TABLET: 5; 325 TABLET ORAL at 04:42

## 2018-10-31 RX ADMIN — MORPHINE SULFATE 4 MG: 2 INJECTION, SOLUTION INTRAMUSCULAR; INTRAVENOUS at 11:11

## 2018-10-31 RX ADMIN — DOCUSATE SODIUM 100 MG: 100 CAPSULE, LIQUID FILLED ORAL at 09:04

## 2018-10-31 RX ADMIN — HYDROCODONE BITARTRATE AND ACETAMINOPHEN 2 TABLET: 5; 325 TABLET ORAL at 00:32

## 2018-10-31 RX ADMIN — TIMOLOL MALEATE 1 DROP: 5 SOLUTION/ DROPS OPHTHALMIC at 09:08

## 2018-10-31 RX ADMIN — TIMOLOL MALEATE 1 DROP: 5 SOLUTION/ DROPS OPHTHALMIC at 16:31

## 2018-10-31 RX ADMIN — CHLORDIAZEPOXIDE HYDROCHLORIDE 5 MG: 5 CAPSULE ORAL at 16:31

## 2018-10-31 RX ADMIN — ATORVASTATIN CALCIUM 10 MG: 10 TABLET, FILM COATED ORAL at 21:18

## 2018-10-31 RX ADMIN — BACITRACIN ZINC NEOMYCIN SULFATE POLYMYXIN B SULFATE: 400; 3.5; 5 OINTMENT TOPICAL at 21:22

## 2018-10-31 RX ADMIN — FAMOTIDINE 20 MG: 20 TABLET, FILM COATED ORAL at 21:18

## 2018-10-31 RX ADMIN — STANDARDIZED SENNA CONCENTRATE AND DOCUSATE SODIUM 1 TABLET: 8.6; 5 TABLET ORAL at 09:04

## 2018-10-31 RX ADMIN — HYDROCODONE BITARTRATE AND ACETAMINOPHEN 1 TABLET: 5; 325 TABLET ORAL at 18:08

## 2018-10-31 RX ADMIN — POLYETHYLENE GLYCOL (3350) 17 G: 17 POWDER, FOR SOLUTION ORAL at 09:04

## 2018-10-31 RX ADMIN — DOCUSATE SODIUM 100 MG: 100 CAPSULE, LIQUID FILLED ORAL at 21:19

## 2018-10-31 RX ADMIN — LATANOPROST 1 DROP: 50 SOLUTION OPHTHALMIC at 21:22

## 2018-10-31 RX ADMIN — FAMOTIDINE 20 MG: 20 TABLET, FILM COATED ORAL at 09:04

## 2018-10-31 ASSESSMENT — PAIN SCALES - GENERAL
PAINLEVEL_OUTOF10: 0
PAINLEVEL_OUTOF10: 6
PAINLEVEL_OUTOF10: 3
PAINLEVEL_OUTOF10: 0
PAINLEVEL_OUTOF10: 7
PAINLEVEL_OUTOF10: 9
PAINLEVEL_OUTOF10: 10
PAINLEVEL_OUTOF10: 10
PAINLEVEL_OUTOF10: 5
PAINLEVEL_OUTOF10: 2
PAINLEVEL_OUTOF10: 7
PAINLEVEL_OUTOF10: 4
PAINLEVEL_OUTOF10: 6

## 2018-10-31 ASSESSMENT — PAIN DESCRIPTION - ORIENTATION
ORIENTATION: RIGHT

## 2018-10-31 ASSESSMENT — PAIN DESCRIPTION - FREQUENCY
FREQUENCY: INTERMITTENT
FREQUENCY: CONTINUOUS
FREQUENCY: INTERMITTENT

## 2018-10-31 ASSESSMENT — PAIN DESCRIPTION - PAIN TYPE
TYPE: SURGICAL PAIN

## 2018-10-31 ASSESSMENT — PAIN DESCRIPTION - ONSET
ONSET: GRADUAL
ONSET: ON-GOING
ONSET: SUDDEN

## 2018-10-31 ASSESSMENT — PAIN DESCRIPTION - PROGRESSION
CLINICAL_PROGRESSION: GRADUALLY IMPROVING
CLINICAL_PROGRESSION: NOT CHANGED
CLINICAL_PROGRESSION: GRADUALLY WORSENING
CLINICAL_PROGRESSION: RAPIDLY WORSENING
CLINICAL_PROGRESSION: GRADUALLY WORSENING
CLINICAL_PROGRESSION: GRADUALLY IMPROVING
CLINICAL_PROGRESSION: GRADUALLY WORSENING
CLINICAL_PROGRESSION: GRADUALLY WORSENING
CLINICAL_PROGRESSION: GRADUALLY IMPROVING

## 2018-10-31 ASSESSMENT — PAIN DESCRIPTION - DESCRIPTORS
DESCRIPTORS: ACHING;DISCOMFORT
DESCRIPTORS: ACHING;CRAMPING;SHARP;DISCOMFORT
DESCRIPTORS: DISCOMFORT
DESCRIPTORS: DISCOMFORT
DESCRIPTORS: ACHING;DISCOMFORT;CRAMPING

## 2018-10-31 ASSESSMENT — PAIN DESCRIPTION - LOCATION
LOCATION: KNEE

## 2018-10-31 NOTE — PROGRESS NOTES
Department of Orthopedic Surgery  Nurse Practitioner   Progress Note    Subjective:     POD #2 right knee inferior pole patellectomy and repair of patellar tendon  Systemic or Specific Complaints: No Complaints. Patient resting in bed. Patient generally anxious about her situation and discharge planning. Objective:     Patient Vitals for the past 24 hrs:   BP Temp Temp src Pulse Resp SpO2 Height   10/31/18 0900 131/71 97.9 °F (36.6 °C) Oral 62 18 92 % -   10/31/18 0735 108/69 97.2 °F (36.2 °C) Oral 60 16 96 % -   10/31/18 0309 (!) 141/67 96.9 °F (36.1 °C) Oral 63 16 93 % -   10/30/18 2312 96/60 97.1 °F (36.2 °C) Oral 60 16 95 % -   10/30/18 1918 102/61 97.9 °F (36.6 °C) Oral 60 16 98 % -   10/30/18 1715 119/64 - - - - - -   10/30/18 1651 112/67 98.6 °F (37 °C) Oral 59 16 95 % -   10/30/18 0944 - - - - - - 5' (1.524 m)       General: alert, appears stated age, cooperative and no distress   Wound: Wound clean and dry no evidence of infection. Dressing removed. Incision CDI. Ecchymosis noted to right knee. Small open area noted lateral to incision. Draining serosanguineous. Skin otherwise intact. Minimal swelling. Motion: deferred in affected extremity   DVT Exam: No evidence of DVT seen on physical exam.     Additional exam: NVI. Moving foot and ankle without difficulty. Good pulse. Hemovac removed. Data Review  CBC:   Lab Results   Component Value Date    WBC 6.8 10/30/2018    RBC 2.71 10/30/2018    HGB 8.6 10/30/2018    HCT 24.7 10/30/2018     10/30/2018           Assessment:      s/p right knee inferior pole patellectomy and repair of patellar tendon    Plan:      1:  Continue current plan of care per IM. Post-op labs reviewed and stable. KI on at all times except for hygiene. Removed Hemovac today, 15 mL out in 24 hours. Acute blood loss anemia, monitor. 2:  Continue Deep venous thrombosis prophylaxis - Lovenox  3:  Continue Pain Control. Limit ice.   4:  PT, OT. 50% WB right LE. KI, no knee

## 2018-10-31 NOTE — PROGRESS NOTES
Pt a/o x4. Right Knee dressing changed with BLACK Perdomo. Abd pads and gauze applied with new ace wrap. Removed hemovac and gauze applied. Immobilizer in place with leg straight. Pt medicated for pain 6/10 in left knee. Medicated with PRN norco 1 tab PO. Call light within reach. Bed alarm is on. Will monitor.       10/31/18 1000   Pain Assessment   Pain Assessment 0-10   Pain Level 6   Pain Type Surgical pain   Pain Location Knee   Pain Orientation Right   Pain Frequency Intermittent   Pain Onset Gradual   Pain Descriptors Aching;Discomfort   Clinical Progression Gradually worsening   Patient's Stated Pain Goal 3   Pain Intervention(s) Medication (see eMar)

## 2018-10-31 NOTE — TELEPHONE ENCOUNTER
DATE OF PROCEDURE:  10/29/2018     PREOPERATIVE DIAGNOSIS:  Right knee patellar fracture status post  right total knee replacement.     PROCEDURE PERFORMED:  Right knee inferior pole patellectomy, primary  repair of patellar tendon.       Jessica Iqbal (Daughter) is calling. She wants to know what her prognosis is and what her restrictions are. Would like someone to call her.   [phone] or [phone]

## 2018-10-31 NOTE — PROGRESS NOTES
flecainide  50 mg Oral BID    carvedilol  6.25 mg Oral BID WC       Continuous Infusions:   dextrose         PRN Meds:  sodium chloride flush, acetaminophen, morphine **OR** morphine, magnesium hydroxide, ondansetron, glucose, dextrose, glucagon (rDNA), dextrose, HYDROcodone 5 mg - acetaminophen **OR** HYDROcodone 5 mg - acetaminophen      Data:  CBC:   Recent Labs      10/29/18   0934  10/30/18   0545  10/31/18   0948   WBC  6.8  6.8  8.5   HGB  8.4*  8.6*  8.4*   HCT  24.8*  24.7*  25.0*   MCV  92.6  91.1  92.4   PLT  349  345  422     BMP:   Recent Labs      10/29/18   0934  10/30/18   0545  10/31/18   0948   NA  136  138  135*   K  3.9  4.4  3.9   CL  99  102  98*   CO2  28  26  32   BUN  8  11  17   CREATININE  <0.5*  <0.5*  0.6     LIVER PROFILE: No results for input(s): AST, ALT, LIPASE, BILIDIR, BILITOT, ALKPHOS in the last 72 hours. Invalid input(s): AMYLASE,  ALB  PT/INR:   Recent Labs      10/29/18   0934   PROTIME  14.3*   INR  1.25*       CULTURES  None     RADIOLOGY  CT CERVICAL SPINE WO CONTRAST   Final Result   No acute abnormality of the cervical spine. CT Head WO Contrast   Final Result   No acute intracranial hemorrhage or mass effect. Significant cerebellar atrophy. XR KNEE RIGHT (1-2 VIEWS)   Final Result   Displaced patellar fracture. Assessment/Plan:  Acute comminuted closed displaced right patellar fracture  secondary to mechanical fall  S/p R knee inferior pole patellectomy and primary repair of patellar tendon   - POD# 2  - Pain is controlled  -continue  PT/OT   -encourage IS   - DC kate  - Plans for skilled rehab per patient     PAF  - paced  - Continue home medications      HTN  - Continue home medications   - Episode of hypotension over night but resolved with fluids     HLD  - Continue home medications     ABLA   - hb dropped from 10-->8. 4 pt refused blood transfusion   - Hgb stable now 8.4-->8.9-- > 8.4     DVT Prophylaxis: Lovenox  Diet:

## 2018-10-31 NOTE — PLAN OF CARE
Problem: Falls - Risk of:  Goal: Will remain free from falls  Will remain free from falls   Outcome: Ongoing  Pt is alert and oriented x4 and has remained free from falls today at this point. Pt is a high fall risk. Arm band in place and bed check on. Call light is within reach. Problem: Risk for Impaired Skin Integrity  Goal: Tissue integrity - skin and mucous membranes  Structural intactness and normal physiological function of skin and  mucous membranes. Outcome: Not Met This Shift  Right knee POD #2, dressing changed today 10/31. Pt's knee will remain straight in immobilizer. Problem: Infection:  Goal: Will remain free from infection  Will remain free from infection   Outcome: Ongoing  Afebrile this shift so far. Problem: Pain:  Goal: Patient's pain/discomfort is manageable  Patient's pain/discomfort is manageable   Outcome: Ongoing  Patient is able to rate, locate, and request meds for pain. PRN norco q4 / morphine q2 available. Pt had increase in pain today with activity up to [facility]. Per MD's to limit activity as much as possible to [facility] and back. Alternative measures used were repositioning, pillow support, and temperature control. Will reassess and monitor.

## 2018-11-01 LAB
ANION GAP SERPL CALCULATED.3IONS-SCNC: 6 MMOL/L (ref 3–16)
BASOPHILS ABSOLUTE: 0 K/UL (ref 0–0.2)
BASOPHILS RELATIVE PERCENT: 0.4 %
BUN BLDV-MCNC: 17 MG/DL (ref 7–20)
CALCIUM SERPL-MCNC: 8.9 MG/DL (ref 8.3–10.6)
CHLORIDE BLD-SCNC: 99 MMOL/L (ref 99–110)
CO2: 29 MMOL/L (ref 21–32)
CREAT SERPL-MCNC: <0.5 MG/DL (ref 0.6–1.2)
EOSINOPHILS ABSOLUTE: 0.3 K/UL (ref 0–0.6)
EOSINOPHILS RELATIVE PERCENT: 3.8 %
GFR AFRICAN AMERICAN: >60
GFR NON-AFRICAN AMERICAN: >60
GLUCOSE BLD-MCNC: 98 MG/DL (ref 70–99)
HCT VFR BLD CALC: 23.2 % (ref 36–48)
HEMOGLOBIN: 7.7 G/DL (ref 12–16)
LYMPHOCYTES ABSOLUTE: 1.3 K/UL (ref 1–5.1)
LYMPHOCYTES RELATIVE PERCENT: 15.4 %
MCH RBC QN AUTO: 30.6 PG (ref 26–34)
MCHC RBC AUTO-ENTMCNC: 33.4 G/DL (ref 31–36)
MCV RBC AUTO: 91.7 FL (ref 80–100)
MONOCYTES ABSOLUTE: 0.8 K/UL (ref 0–1.3)
MONOCYTES RELATIVE PERCENT: 9.7 %
NEUTROPHILS ABSOLUTE: 6 K/UL (ref 1.7–7.7)
NEUTROPHILS RELATIVE PERCENT: 70.7 %
PDW BLD-RTO: 13.6 % (ref 12.4–15.4)
PLATELET # BLD: 388 K/UL (ref 135–450)
PMV BLD AUTO: 7.3 FL (ref 5–10.5)
POTASSIUM SERPL-SCNC: 4.1 MMOL/L (ref 3.5–5.1)
RBC # BLD: 2.53 M/UL (ref 4–5.2)
SODIUM BLD-SCNC: 134 MMOL/L (ref 136–145)
WBC # BLD: 8.6 K/UL (ref 4–11)

## 2018-11-01 PROCEDURE — 6370000000 HC RX 637 (ALT 250 FOR IP): Performed by: HOSPITALIST

## 2018-11-01 PROCEDURE — 36415 COLL VENOUS BLD VENIPUNCTURE: CPT

## 2018-11-01 PROCEDURE — 2500000003 HC RX 250 WO HCPCS: Performed by: PHYSICIAN ASSISTANT

## 2018-11-01 PROCEDURE — 6360000002 HC RX W HCPCS: Performed by: INTERNAL MEDICINE

## 2018-11-01 PROCEDURE — 6370000000 HC RX 637 (ALT 250 FOR IP): Performed by: INTERNAL MEDICINE

## 2018-11-01 PROCEDURE — 1200000000 HC SEMI PRIVATE

## 2018-11-01 PROCEDURE — 2580000003 HC RX 258: Performed by: PHYSICIAN ASSISTANT

## 2018-11-01 PROCEDURE — 6370000000 HC RX 637 (ALT 250 FOR IP): Performed by: PHYSICIAN ASSISTANT

## 2018-11-01 PROCEDURE — APPSS15 APP SPLIT SHARED TIME 0-15 MINUTES: Performed by: PHYSICIAN ASSISTANT

## 2018-11-01 PROCEDURE — 85025 COMPLETE CBC W/AUTO DIFF WBC: CPT

## 2018-11-01 PROCEDURE — 99232 SBSQ HOSP IP/OBS MODERATE 35: CPT | Performed by: INTERNAL MEDICINE

## 2018-11-01 PROCEDURE — 80048 BASIC METABOLIC PNL TOTAL CA: CPT

## 2018-11-01 RX ORDER — FAMOTIDINE 20 MG/1
20 TABLET, FILM COATED ORAL 2 TIMES DAILY
Qty: 60 TABLET | Refills: 1 | Status: SHIPPED | OUTPATIENT
Start: 2018-11-01 | End: 2019-02-13 | Stop reason: ALTCHOICE

## 2018-11-01 RX ORDER — HYDROCODONE BITARTRATE AND ACETAMINOPHEN 5; 325 MG/1; MG/1
1-2 TABLET ORAL
Qty: 20 TABLET | Refills: 0 | Status: SHIPPED | OUTPATIENT
Start: 2018-11-01 | End: 2018-11-08

## 2018-11-01 RX ORDER — PSEUDOEPHEDRINE HCL 30 MG
100 TABLET ORAL 2 TIMES DAILY
Qty: 60 CAPSULE | Refills: 0 | Status: SHIPPED | OUTPATIENT
Start: 2018-11-01 | End: 2019-02-13 | Stop reason: ALTCHOICE

## 2018-11-01 RX ORDER — SENNA AND DOCUSATE SODIUM 50; 8.6 MG/1; MG/1
1 TABLET, FILM COATED ORAL DAILY
Qty: 30 TABLET | Refills: 1 | Status: SHIPPED | OUTPATIENT
Start: 2018-11-02

## 2018-11-01 RX ADMIN — CHLORDIAZEPOXIDE HYDROCHLORIDE 5 MG: 5 CAPSULE ORAL at 17:06

## 2018-11-01 RX ADMIN — DOCUSATE SODIUM 100 MG: 100 CAPSULE, LIQUID FILLED ORAL at 09:48

## 2018-11-01 RX ADMIN — HYDROCODONE BITARTRATE AND ACETAMINOPHEN 1 TABLET: 5; 325 TABLET ORAL at 17:39

## 2018-11-01 RX ADMIN — ACETAMINOPHEN 650 MG: 325 TABLET ORAL at 16:33

## 2018-11-01 RX ADMIN — FLECAINIDE ACETATE 50 MG: 100 TABLET ORAL at 09:48

## 2018-11-01 RX ADMIN — CHLORDIAZEPOXIDE HYDROCHLORIDE 5 MG: 5 CAPSULE ORAL at 09:48

## 2018-11-01 RX ADMIN — TIMOLOL MALEATE 1 DROP: 5 SOLUTION/ DROPS OPHTHALMIC at 17:00

## 2018-11-01 RX ADMIN — TIMOLOL MALEATE 1 DROP: 5 SOLUTION/ DROPS OPHTHALMIC at 09:53

## 2018-11-01 RX ADMIN — FAMOTIDINE 20 MG: 20 TABLET, FILM COATED ORAL at 20:31

## 2018-11-01 RX ADMIN — FLECAINIDE ACETATE 50 MG: 100 TABLET ORAL at 20:31

## 2018-11-01 RX ADMIN — DOCUSATE SODIUM 100 MG: 100 CAPSULE, LIQUID FILLED ORAL at 20:30

## 2018-11-01 RX ADMIN — HYDROCODONE BITARTRATE AND ACETAMINOPHEN 1 TABLET: 5; 325 TABLET ORAL at 13:12

## 2018-11-01 RX ADMIN — MORPHINE SULFATE 2 MG: 2 INJECTION, SOLUTION INTRAMUSCULAR; INTRAVENOUS at 20:31

## 2018-11-01 RX ADMIN — CARVEDILOL 6.25 MG: 6.25 TABLET, FILM COATED ORAL at 09:48

## 2018-11-01 RX ADMIN — Medication 10 ML: at 09:49

## 2018-11-01 RX ADMIN — CARVEDILOL 6.25 MG: 6.25 TABLET, FILM COATED ORAL at 17:05

## 2018-11-01 RX ADMIN — ENOXAPARIN SODIUM 40 MG: 40 INJECTION SUBCUTANEOUS at 09:50

## 2018-11-01 RX ADMIN — BACITRACIN ZINC NEOMYCIN SULFATE POLYMYXIN B SULFATE: 400; 3.5; 5 OINTMENT TOPICAL at 09:52

## 2018-11-01 RX ADMIN — FAMOTIDINE 20 MG: 20 TABLET, FILM COATED ORAL at 09:48

## 2018-11-01 RX ADMIN — STANDARDIZED SENNA CONCENTRATE AND DOCUSATE SODIUM 1 TABLET: 8.6; 5 TABLET ORAL at 09:48

## 2018-11-01 RX ADMIN — Medication 10 ML: at 20:31

## 2018-11-01 RX ADMIN — HYDROCODONE BITARTRATE AND ACETAMINOPHEN 1 TABLET: 5; 325 TABLET ORAL at 02:27

## 2018-11-01 RX ADMIN — ATORVASTATIN CALCIUM 10 MG: 10 TABLET, FILM COATED ORAL at 20:30

## 2018-11-01 RX ADMIN — POLYETHYLENE GLYCOL (3350) 17 G: 17 POWDER, FOR SOLUTION ORAL at 09:49

## 2018-11-01 RX ADMIN — LATANOPROST 1 DROP: 50 SOLUTION OPHTHALMIC at 20:33

## 2018-11-01 ASSESSMENT — PAIN DESCRIPTION - LOCATION
LOCATION: KNEE

## 2018-11-01 ASSESSMENT — PAIN DESCRIPTION - PAIN TYPE
TYPE: SURGICAL PAIN

## 2018-11-01 ASSESSMENT — PAIN SCALES - GENERAL
PAINLEVEL_OUTOF10: 6
PAINLEVEL_OUTOF10: 6
PAINLEVEL_OUTOF10: 3
PAINLEVEL_OUTOF10: 6
PAINLEVEL_OUTOF10: 5
PAINLEVEL_OUTOF10: 0
PAINLEVEL_OUTOF10: 4

## 2018-11-01 ASSESSMENT — PAIN DESCRIPTION - PROGRESSION
CLINICAL_PROGRESSION: GRADUALLY WORSENING

## 2018-11-01 ASSESSMENT — PAIN DESCRIPTION - ORIENTATION
ORIENTATION: RIGHT

## 2018-11-01 ASSESSMENT — PAIN DESCRIPTION - ONSET
ONSET: GRADUAL

## 2018-11-01 ASSESSMENT — PAIN DESCRIPTION - DESCRIPTORS
DESCRIPTORS: DISCOMFORT
DESCRIPTORS: DISCOMFORT
DESCRIPTORS: SORE

## 2018-11-01 ASSESSMENT — PAIN DESCRIPTION - FREQUENCY
FREQUENCY: INTERMITTENT

## 2018-11-01 NOTE — PROGRESS NOTES
Report received from [facility]. Pt denies any needs at this time. Assumed care.

## 2018-11-01 NOTE — PLAN OF CARE
Problem: Falls - Risk of:  Goal: Absence of physical injury  Absence of physical injury   Outcome: Ongoing      Problem: Safety:  Goal: Free from accidental physical injury  Free from accidental physical injury   Outcome: Ongoing    Goal: Free from intentional harm  Free from intentional harm   Outcome: Ongoing      Problem: Daily Care:  Goal: Daily care needs are met  Daily care needs are met   Outcome: Ongoing      Problem: Pain:  Goal: Pain level will decrease  Pain level will decrease   Outcome: Ongoing    Goal: Control of acute pain  Control of acute pain   Outcome: Ongoing    Goal: Control of chronic pain  Control of chronic pain   Outcome: Ongoing      Problem: Skin Integrity:  Goal: Skin integrity will stabilize  Skin integrity will stabilize   Outcome: Ongoing      Problem: Discharge Planning:  Goal: Patients continuum of care needs are met  Patients continuum of care needs are met   Outcome: Ongoing      Problem: Nutrition  Goal: Understanding of nutritional guidelines  Outcome: Ongoing

## 2018-11-01 NOTE — DISCHARGE SUMMARY
Physical Exam at Discharge:    BP (!) 143/75   Pulse 60   Temp 98 °F (36.7 °C) (Oral)   Resp 16   Ht 5' (1.524 m)   Wt 105 lb (47.6 kg)   SpO2 95%   BMI 20.51 kg/m²     Gen: Elderly female. No distress. Alert. Eyes: PERRL. No sclera icterus. No conjunctival injection. Neck: No JVD. Trachea midline. Resp: No accessory muscle use. No crackles. No wheezes. No rhonchi. CV: Regular rate. Regular rhythm. No murmur. No rub. No edema. Capillary Refill: Brisk,< 3 seconds   Peripheral Pulses: +2 palpable, equal bilaterally   GI/: Non-tender. Non-distended. Normal bowel sounds. Simon in place with clear, yellow urine  Skin: Warm and dry. No nodule on exposed extremities. No rash on exposed extremities. M/S: chronic deformities of distal joints on bilateral hands/fingers, knee immobilizer in place to RLE with expected post op swelling, able to wiggle toes on right, no pain or swelling to the LLE  Neuro: Awake. Grossly nonfocal    Psych: Oriented x 3. No anxiety or agitation. CBC:   Recent Labs      11/04/18   0543  11/05/18   0536  11/06/18   0610   WBC  6.0  6.0  5.9   HGB  7.8*  7.5*  8.4*   HCT  23.1*  22.6*  24.8*   MCV  92.6  92.8  92.4   PLT  447  445  500*     BMP:   Recent Labs      11/04/18   0543  11/05/18   0536  11/06/18   0609   NA  141  140  137   K  4.3  4.4  4.1   CL  101  103  103   CO2  29  28  27   BUN  13  12  12   CREATININE  <0.5*  <0.5*  <0.5*     CULTURES  None     RADIOLOGY  CT CERVICAL SPINE WO CONTRAST   Final Result   No acute abnormality of the cervical spine. CT Head WO Contrast   Final Result   No acute intracranial hemorrhage or mass effect. Significant cerebellar atrophy. XR KNEE RIGHT (1-2 VIEWS)   Final Result   Displaced patellar fracture.                Discharge Medications     Medication List      START taking these medications    bisacodyl 10 MG suppository  Commonly known as:  DULCOLAX  Place 1 suppository rectally daily  Start

## 2018-11-01 NOTE — PROGRESS NOTES
Progress Note    Admit Date:  10/25/2018    Subjective:  Ms. Pancho Garcia is POD#3 from S/p R knee inferior pole patellectomy and primary repair of patellar tendon with Dr. Brisa Perez. She is feeling well. States she eating without problems and her pain is controlled. seen by PT, OT   needs SNF  H/H trending down. Refuses PRBC transfusion    Objective:   Patient Vitals for the past 4 hrs:   BP Temp Temp src Pulse Resp SpO2   11/01/18 1945 103/60 98.9 °F (37.2 °C) Oral 61 16 96 %            Intake/Output Summary (Last 24 hours) at 11/01/18 1205  Last data filed at 11/01/18 0842   Gross per 24 hour   Intake              840 ml   Output                0 ml   Net              840 ml       Physical Exam:  Gen: Elderly female. No distress. Alert. Eyes: PERRL. No sclera icterus. No conjunctival injection. Neck: No JVD. Trachea midline. Resp: No accessory muscle use. No crackles. No wheezes. No rhonchi. CV: Regular rate. Regular rhythm. No murmur. No rub. No edema. Capillary Refill: Brisk,< 3 seconds   Peripheral Pulses: +2 palpable, equal bilaterally   GI/: Non-tender. Non-distended. Normal bowel sounds. Simon in place with clear, yellow urine  Skin: Warm and dry. No nodule on exposed extremities. No rash on exposed extremities. M/S: chronic deformities of distal joints on bilateral hands/fingers, knee immobilizer in place to RLE with expected post op swelling, able to wiggle toes on right, no pain or swelling to the LLE  Neuro: Awake. Grossly nonfocal    Psych: Oriented x 3. No anxiety or agitation.        Scheduled Meds:   neomycin-bacitracin-polymyxin   Topical Daily    sodium chloride flush  10 mL Intravenous 2 times per day    docusate sodium  100 mg Oral BID    sennosides-docusate sodium  1 tablet Oral Daily    famotidine  20 mg Oral BID    insulin lispro  0-3 Units Subcutaneous Nightly    polyethylene glycol  17 g Oral Daily    timolol  1 drop Left Eye BID    atorvastatin  10 mg Oral Nightly    chlordiazePOXIDE  5 mg Oral BID    latanoprost  1 drop Both Eyes Nightly    enoxaparin  40 mg Subcutaneous Daily    flecainide  50 mg Oral BID    carvedilol  6.25 mg Oral BID WC       Continuous Infusions:   dextrose         PRN Meds:  sodium chloride flush, acetaminophen, morphine **OR** morphine, magnesium hydroxide, ondansetron, glucose, dextrose, glucagon (rDNA), dextrose, HYDROcodone 5 mg - acetaminophen **OR** HYDROcodone 5 mg - acetaminophen      Data:  CBC:   Recent Labs      10/30/18   0545  10/31/18   0948  11/01/18 0522   WBC  6.8  8.5  8.6   HGB  8.6*  8.4*  7.7*   HCT  24.7*  25.0*  23.2*   MCV  91.1  92.4  91.7   PLT  345  422  388     BMP:   Recent Labs      10/30/18   0545  10/31/18   0948  11/01/18 0522   NA  138  135*  134*   K  4.4  3.9  4.1   CL  102  98*  99   CO2  26  32  29   BUN  11  17  17   CREATININE  <0.5*  0.6  <0.5*     LIVER PROFILE: No results for input(s): AST, ALT, LIPASE, BILIDIR, BILITOT, ALKPHOS in the last 72 hours. Invalid input(s): AMYLASE,  ALB  PT/INR:   No results for input(s): PROTIME, INR in the last 72 hours. CULTURES  None     RADIOLOGY  CT CERVICAL SPINE WO CONTRAST   Final Result   No acute abnormality of the cervical spine. CT Head WO Contrast   Final Result   No acute intracranial hemorrhage or mass effect. Significant cerebellar atrophy. XR KNEE RIGHT (1-2 VIEWS)   Final Result   Displaced patellar fracture.                Assessment/Plan:  Acute comminuted closed displaced right patellar fracture  secondary to mechanical fall  S/p R knee inferior pole patellectomy and primary repair of patellar tendon   - POD# 3  - Pain is controlled  - continue  PT/OT   - Knee immobilizer  - encourage IS   - DC humble  - Plans for skilled rehab      PAF  - paced  - Continue home medications      HTN  - Continue home medications   - Episode of hypotension over night but resolved with fluids     HLD  - Continue home medications     ABLA  - pt

## 2018-11-02 LAB
ANION GAP SERPL CALCULATED.3IONS-SCNC: 9 MMOL/L (ref 3–16)
BASOPHILS ABSOLUTE: 0 K/UL (ref 0–0.2)
BASOPHILS RELATIVE PERCENT: 0.3 %
BUN BLDV-MCNC: 12 MG/DL (ref 7–20)
CALCIUM SERPL-MCNC: 8.6 MG/DL (ref 8.3–10.6)
CHLORIDE BLD-SCNC: 99 MMOL/L (ref 99–110)
CO2: 28 MMOL/L (ref 21–32)
CREAT SERPL-MCNC: <0.5 MG/DL (ref 0.6–1.2)
EOSINOPHILS ABSOLUTE: 0.3 K/UL (ref 0–0.6)
EOSINOPHILS RELATIVE PERCENT: 3.3 %
GFR AFRICAN AMERICAN: >60
GFR NON-AFRICAN AMERICAN: >60
GLUCOSE BLD-MCNC: 110 MG/DL (ref 70–99)
HCT VFR BLD CALC: 22 % (ref 36–48)
HEMOGLOBIN: 7.4 G/DL (ref 12–16)
LYMPHOCYTES ABSOLUTE: 0.9 K/UL (ref 1–5.1)
LYMPHOCYTES RELATIVE PERCENT: 9.7 %
MCH RBC QN AUTO: 30.8 PG (ref 26–34)
MCHC RBC AUTO-ENTMCNC: 33.6 G/DL (ref 31–36)
MCV RBC AUTO: 91.6 FL (ref 80–100)
MONOCYTES ABSOLUTE: 0.8 K/UL (ref 0–1.3)
MONOCYTES RELATIVE PERCENT: 8.4 %
NEUTROPHILS ABSOLUTE: 7.3 K/UL (ref 1.7–7.7)
NEUTROPHILS RELATIVE PERCENT: 78.3 %
PDW BLD-RTO: 13.6 % (ref 12.4–15.4)
PLATELET # BLD: 385 K/UL (ref 135–450)
PMV BLD AUTO: 7.5 FL (ref 5–10.5)
POTASSIUM SERPL-SCNC: 3.7 MMOL/L (ref 3.5–5.1)
RBC # BLD: 2.4 M/UL (ref 4–5.2)
SODIUM BLD-SCNC: 136 MMOL/L (ref 136–145)
WBC # BLD: 9.3 K/UL (ref 4–11)

## 2018-11-02 PROCEDURE — 6370000000 HC RX 637 (ALT 250 FOR IP): Performed by: PHYSICIAN ASSISTANT

## 2018-11-02 PROCEDURE — 2500000003 HC RX 250 WO HCPCS: Performed by: PHYSICIAN ASSISTANT

## 2018-11-02 PROCEDURE — 99232 SBSQ HOSP IP/OBS MODERATE 35: CPT | Performed by: INTERNAL MEDICINE

## 2018-11-02 PROCEDURE — 6360000002 HC RX W HCPCS: Performed by: INTERNAL MEDICINE

## 2018-11-02 PROCEDURE — 80048 BASIC METABOLIC PNL TOTAL CA: CPT

## 2018-11-02 PROCEDURE — 6370000000 HC RX 637 (ALT 250 FOR IP): Performed by: HOSPITALIST

## 2018-11-02 PROCEDURE — 36415 COLL VENOUS BLD VENIPUNCTURE: CPT

## 2018-11-02 PROCEDURE — 94761 N-INVAS EAR/PLS OXIMETRY MLT: CPT

## 2018-11-02 PROCEDURE — 6370000000 HC RX 637 (ALT 250 FOR IP): Performed by: INTERNAL MEDICINE

## 2018-11-02 PROCEDURE — 1200000000 HC SEMI PRIVATE

## 2018-11-02 PROCEDURE — 6370000000 HC RX 637 (ALT 250 FOR IP): Performed by: NURSE PRACTITIONER

## 2018-11-02 PROCEDURE — 85025 COMPLETE CBC W/AUTO DIFF WBC: CPT

## 2018-11-02 PROCEDURE — 2580000003 HC RX 258: Performed by: PHYSICIAN ASSISTANT

## 2018-11-02 RX ORDER — BISACODYL 10 MG
10 SUPPOSITORY, RECTAL RECTAL DAILY
Status: DISCONTINUED | OUTPATIENT
Start: 2018-11-02 | End: 2018-11-06 | Stop reason: HOSPADM

## 2018-11-02 RX ORDER — FERROUS SULFATE 325(65) MG
325 TABLET ORAL 2 TIMES DAILY WITH MEALS
Status: DISCONTINUED | OUTPATIENT
Start: 2018-11-03 | End: 2018-11-06 | Stop reason: HOSPADM

## 2018-11-02 RX ADMIN — POLYETHYLENE GLYCOL (3350) 17 G: 17 POWDER, FOR SOLUTION ORAL at 08:07

## 2018-11-02 RX ADMIN — HYDROCODONE BITARTRATE AND ACETAMINOPHEN 1 TABLET: 5; 325 TABLET ORAL at 12:29

## 2018-11-02 RX ADMIN — LATANOPROST 1 DROP: 50 SOLUTION OPHTHALMIC at 21:43

## 2018-11-02 RX ADMIN — HYDROCODONE BITARTRATE AND ACETAMINOPHEN 2 TABLET: 5; 325 TABLET ORAL at 08:06

## 2018-11-02 RX ADMIN — DOCUSATE SODIUM 100 MG: 100 CAPSULE, LIQUID FILLED ORAL at 08:06

## 2018-11-02 RX ADMIN — HYDROCODONE BITARTRATE AND ACETAMINOPHEN 2 TABLET: 5; 325 TABLET ORAL at 03:59

## 2018-11-02 RX ADMIN — FAMOTIDINE 20 MG: 20 TABLET, FILM COATED ORAL at 21:43

## 2018-11-02 RX ADMIN — FAMOTIDINE 20 MG: 20 TABLET, FILM COATED ORAL at 08:06

## 2018-11-02 RX ADMIN — FLECAINIDE ACETATE 50 MG: 100 TABLET ORAL at 21:43

## 2018-11-02 RX ADMIN — HYDROCODONE BITARTRATE AND ACETAMINOPHEN 1 TABLET: 5; 325 TABLET ORAL at 21:43

## 2018-11-02 RX ADMIN — STANDARDIZED SENNA CONCENTRATE AND DOCUSATE SODIUM 1 TABLET: 8.6; 5 TABLET ORAL at 08:06

## 2018-11-02 RX ADMIN — Medication 10 ML: at 21:44

## 2018-11-02 RX ADMIN — Medication 10 ML: at 08:07

## 2018-11-02 RX ADMIN — ATORVASTATIN CALCIUM 10 MG: 10 TABLET, FILM COATED ORAL at 21:44

## 2018-11-02 RX ADMIN — CARVEDILOL 6.25 MG: 6.25 TABLET, FILM COATED ORAL at 08:06

## 2018-11-02 RX ADMIN — TIMOLOL MALEATE 1 DROP: 5 SOLUTION/ DROPS OPHTHALMIC at 08:11

## 2018-11-02 RX ADMIN — CHLORDIAZEPOXIDE HYDROCHLORIDE 5 MG: 5 CAPSULE ORAL at 16:38

## 2018-11-02 RX ADMIN — MORPHINE SULFATE 2 MG: 2 INJECTION, SOLUTION INTRAMUSCULAR; INTRAVENOUS at 15:40

## 2018-11-02 RX ADMIN — ENOXAPARIN SODIUM 40 MG: 40 INJECTION SUBCUTANEOUS at 08:06

## 2018-11-02 RX ADMIN — CARVEDILOL 6.25 MG: 6.25 TABLET, FILM COATED ORAL at 16:38

## 2018-11-02 RX ADMIN — BISACODYL 10 MG: 10 SUPPOSITORY RECTAL at 10:49

## 2018-11-02 RX ADMIN — FLECAINIDE ACETATE 50 MG: 100 TABLET ORAL at 08:06

## 2018-11-02 RX ADMIN — DOCUSATE SODIUM 100 MG: 100 CAPSULE, LIQUID FILLED ORAL at 21:44

## 2018-11-02 RX ADMIN — CHLORDIAZEPOXIDE HYDROCHLORIDE 5 MG: 5 CAPSULE ORAL at 08:06

## 2018-11-02 RX ADMIN — BACITRACIN ZINC NEOMYCIN SULFATE POLYMYXIN B SULFATE: 400; 3.5; 5 OINTMENT TOPICAL at 08:11

## 2018-11-02 RX ADMIN — TIMOLOL MALEATE 1 DROP: 5 SOLUTION/ DROPS OPHTHALMIC at 15:48

## 2018-11-02 ASSESSMENT — PAIN DESCRIPTION - LOCATION
LOCATION: KNEE

## 2018-11-02 ASSESSMENT — PAIN DESCRIPTION - ORIENTATION
ORIENTATION: RIGHT

## 2018-11-02 ASSESSMENT — PAIN DESCRIPTION - FREQUENCY
FREQUENCY: INTERMITTENT

## 2018-11-02 ASSESSMENT — PAIN DESCRIPTION - DESCRIPTORS
DESCRIPTORS: SORE
DESCRIPTORS: SORE
DESCRIPTORS: ACHING;SORE
DESCRIPTORS: ACHING;SORE

## 2018-11-02 ASSESSMENT — PAIN SCALES - GENERAL
PAINLEVEL_OUTOF10: 4
PAINLEVEL_OUTOF10: 6
PAINLEVEL_OUTOF10: 4
PAINLEVEL_OUTOF10: 7
PAINLEVEL_OUTOF10: 4
PAINLEVEL_OUTOF10: 0
PAINLEVEL_OUTOF10: 6
PAINLEVEL_OUTOF10: 7

## 2018-11-02 ASSESSMENT — PAIN DESCRIPTION - ONSET
ONSET: ON-GOING
ONSET: GRADUAL

## 2018-11-02 ASSESSMENT — PAIN DESCRIPTION - PROGRESSION: CLINICAL_PROGRESSION: NOT CHANGED

## 2018-11-02 ASSESSMENT — PAIN DESCRIPTION - PAIN TYPE
TYPE: SURGICAL PAIN

## 2018-11-02 NOTE — PROGRESS NOTES
Progress Note    Admit Date:  10/25/2018    Subjective:  Ms. Bryan Ortega is POD# 4    S/p R knee inferior pole patellectomy and primary repair of patellar tendon with Dr. Tonya Paz. She is feeling well. States she eating without problems and her pain is controlled. seen by PT, OT   needs SNF  H/H trending down. Refuses PRBC transfusion    Patient ready for DC to SNF, but needs medicaid pending number     Objective:   Patient Vitals for the past 4 hrs:   BP Temp Temp src Pulse Resp SpO2   11/02/18 1458 (!) 146/67 98 °F (36.7 °C) Oral 61 17 96 %            Intake/Output Summary (Last 24 hours) at 11/02/18 1704  Last data filed at 11/02/18 1301   Gross per 24 hour   Intake              710 ml   Output                5 ml   Net              705 ml       Physical Exam:  Gen: Elderly female. No distress. Alert. Eyes: PERRL. No sclera icterus. No conjunctival injection. Neck: No JVD. Trachea midline. Resp: No accessory muscle use. No crackles. No wheezes. No rhonchi. CV: Regular rate. Regular rhythm. No murmur. No rub. No edema. Capillary Refill: Brisk,< 3 seconds   Peripheral Pulses: +2 palpable, equal bilaterally   GI/: Non-tender. Non-distended. Normal bowel sounds. Simon in place with clear, yellow urine  Skin: Warm and dry. No nodule on exposed extremities. No rash on exposed extremities. M/S: chronic deformities of distal joints on bilateral hands/fingers, knee immobilizer in place to E with expected post op swelling, able to wiggle toes on right, no pain or swelling to the LLE  Neuro: Awake. Grossly nonfocal    Psych: Oriented x 3. No anxiety or agitation.        Scheduled Meds:   bisacodyl  10 mg Rectal Daily    neomycin-bacitracin-polymyxin   Topical Daily    sodium chloride flush  10 mL Intravenous 2 times per day    docusate sodium  100 mg Oral BID    sennosides-docusate sodium  1 tablet Oral Daily    famotidine  20 mg Oral BID    insulin lispro  0-3 Units Subcutaneous Nightly    polyethylene resolved with fluids     HLD  - Continue home medications     ABLA  - pt refused blood transfusion   - Hgb trending down 10 --> 8.4-->8.9-- > 8.4 -- > 7.7-- > 7.4  - start PO FeSO4    DVT Prophylaxis: Lovenox  Diet: Dietary Nutrition Supplements: Standard High Calorie Oral Supplement  DIET GENERAL;  Code Status: Full Code      plan DC to SNF . DC planner working on placement .  Needs medicaid       Donnie Patrick MD  11/2/2018 5:04 PM

## 2018-11-03 LAB
ANION GAP SERPL CALCULATED.3IONS-SCNC: 7 MMOL/L (ref 3–16)
BASOPHILS ABSOLUTE: 0 K/UL (ref 0–0.2)
BASOPHILS RELATIVE PERCENT: 0.4 %
BUN BLDV-MCNC: 11 MG/DL (ref 7–20)
CALCIUM SERPL-MCNC: 8.8 MG/DL (ref 8.3–10.6)
CHLORIDE BLD-SCNC: 99 MMOL/L (ref 99–110)
CO2: 30 MMOL/L (ref 21–32)
CREAT SERPL-MCNC: <0.5 MG/DL (ref 0.6–1.2)
EOSINOPHILS ABSOLUTE: 0.5 K/UL (ref 0–0.6)
EOSINOPHILS RELATIVE PERCENT: 6.6 %
GFR AFRICAN AMERICAN: >60
GFR NON-AFRICAN AMERICAN: >60
GLUCOSE BLD-MCNC: 95 MG/DL (ref 70–99)
HCT VFR BLD CALC: 23.4 % (ref 36–48)
HEMOGLOBIN: 7.8 G/DL (ref 12–16)
LYMPHOCYTES ABSOLUTE: 1.5 K/UL (ref 1–5.1)
LYMPHOCYTES RELATIVE PERCENT: 20.4 %
MCH RBC QN AUTO: 30.5 PG (ref 26–34)
MCHC RBC AUTO-ENTMCNC: 33.3 G/DL (ref 31–36)
MCV RBC AUTO: 91.8 FL (ref 80–100)
MONOCYTES ABSOLUTE: 0.7 K/UL (ref 0–1.3)
MONOCYTES RELATIVE PERCENT: 9.7 %
NEUTROPHILS ABSOLUTE: 4.6 K/UL (ref 1.7–7.7)
NEUTROPHILS RELATIVE PERCENT: 62.9 %
PDW BLD-RTO: 13.6 % (ref 12.4–15.4)
PLATELET # BLD: 430 K/UL (ref 135–450)
PMV BLD AUTO: 7.2 FL (ref 5–10.5)
POTASSIUM SERPL-SCNC: 4.1 MMOL/L (ref 3.5–5.1)
RBC # BLD: 2.55 M/UL (ref 4–5.2)
SODIUM BLD-SCNC: 136 MMOL/L (ref 136–145)
WBC # BLD: 7.3 K/UL (ref 4–11)

## 2018-11-03 PROCEDURE — 6370000000 HC RX 637 (ALT 250 FOR IP): Performed by: PHYSICIAN ASSISTANT

## 2018-11-03 PROCEDURE — 85025 COMPLETE CBC W/AUTO DIFF WBC: CPT

## 2018-11-03 PROCEDURE — 36415 COLL VENOUS BLD VENIPUNCTURE: CPT

## 2018-11-03 PROCEDURE — 80048 BASIC METABOLIC PNL TOTAL CA: CPT

## 2018-11-03 PROCEDURE — 94761 N-INVAS EAR/PLS OXIMETRY MLT: CPT

## 2018-11-03 PROCEDURE — 6360000002 HC RX W HCPCS: Performed by: INTERNAL MEDICINE

## 2018-11-03 PROCEDURE — 97530 THERAPEUTIC ACTIVITIES: CPT

## 2018-11-03 PROCEDURE — 1200000000 HC SEMI PRIVATE

## 2018-11-03 PROCEDURE — 97535 SELF CARE MNGMENT TRAINING: CPT

## 2018-11-03 PROCEDURE — 6370000000 HC RX 637 (ALT 250 FOR IP): Performed by: INTERNAL MEDICINE

## 2018-11-03 PROCEDURE — 2580000003 HC RX 258: Performed by: PHYSICIAN ASSISTANT

## 2018-11-03 PROCEDURE — 97110 THERAPEUTIC EXERCISES: CPT

## 2018-11-03 RX ADMIN — CARVEDILOL 6.25 MG: 6.25 TABLET, FILM COATED ORAL at 09:38

## 2018-11-03 RX ADMIN — FERROUS SULFATE TAB 325 MG (65 MG ELEMENTAL FE) 325 MG: 325 (65 FE) TAB at 17:48

## 2018-11-03 RX ADMIN — FLECAINIDE ACETATE 50 MG: 100 TABLET ORAL at 20:29

## 2018-11-03 RX ADMIN — LATANOPROST 1 DROP: 50 SOLUTION OPHTHALMIC at 20:29

## 2018-11-03 RX ADMIN — DOCUSATE SODIUM 100 MG: 100 CAPSULE, LIQUID FILLED ORAL at 20:30

## 2018-11-03 RX ADMIN — HYDROCODONE BITARTRATE AND ACETAMINOPHEN 2 TABLET: 5; 325 TABLET ORAL at 02:47

## 2018-11-03 RX ADMIN — HYDROCODONE BITARTRATE AND ACETAMINOPHEN 1 TABLET: 5; 325 TABLET ORAL at 20:30

## 2018-11-03 RX ADMIN — TIMOLOL MALEATE 1 DROP: 5 SOLUTION/ DROPS OPHTHALMIC at 15:42

## 2018-11-03 RX ADMIN — ATORVASTATIN CALCIUM 10 MG: 10 TABLET, FILM COATED ORAL at 20:30

## 2018-11-03 RX ADMIN — FERROUS SULFATE TAB 325 MG (65 MG ELEMENTAL FE) 325 MG: 325 (65 FE) TAB at 09:38

## 2018-11-03 RX ADMIN — ENOXAPARIN SODIUM 40 MG: 40 INJECTION SUBCUTANEOUS at 09:39

## 2018-11-03 RX ADMIN — HYDROCODONE BITARTRATE AND ACETAMINOPHEN 1 TABLET: 5; 325 TABLET ORAL at 06:55

## 2018-11-03 RX ADMIN — FLECAINIDE ACETATE 50 MG: 100 TABLET ORAL at 09:39

## 2018-11-03 RX ADMIN — CHLORDIAZEPOXIDE HYDROCHLORIDE 5 MG: 5 CAPSULE ORAL at 09:38

## 2018-11-03 RX ADMIN — CHLORDIAZEPOXIDE HYDROCHLORIDE 5 MG: 5 CAPSULE ORAL at 17:48

## 2018-11-03 RX ADMIN — FAMOTIDINE 20 MG: 20 TABLET, FILM COATED ORAL at 20:29

## 2018-11-03 RX ADMIN — Medication 10 ML: at 09:39

## 2018-11-03 RX ADMIN — BACITRACIN ZINC NEOMYCIN SULFATE POLYMYXIN B SULFATE: 400; 3.5; 5 OINTMENT TOPICAL at 11:33

## 2018-11-03 RX ADMIN — HYDROCODONE BITARTRATE AND ACETAMINOPHEN 1 TABLET: 5; 325 TABLET ORAL at 11:13

## 2018-11-03 RX ADMIN — FAMOTIDINE 20 MG: 20 TABLET, FILM COATED ORAL at 09:38

## 2018-11-03 RX ADMIN — CARVEDILOL 6.25 MG: 6.25 TABLET, FILM COATED ORAL at 17:48

## 2018-11-03 RX ADMIN — HYDROCODONE BITARTRATE AND ACETAMINOPHEN 1 TABLET: 5; 325 TABLET ORAL at 15:42

## 2018-11-03 RX ADMIN — TIMOLOL MALEATE 1 DROP: 5 SOLUTION/ DROPS OPHTHALMIC at 11:16

## 2018-11-03 RX ADMIN — Medication 10 ML: at 20:31

## 2018-11-03 ASSESSMENT — PAIN DESCRIPTION - PAIN TYPE
TYPE: ACUTE PAIN

## 2018-11-03 ASSESSMENT — PAIN SCALES - GENERAL
PAINLEVEL_OUTOF10: 7
PAINLEVEL_OUTOF10: 2
PAINLEVEL_OUTOF10: 4
PAINLEVEL_OUTOF10: 3
PAINLEVEL_OUTOF10: 4
PAINLEVEL_OUTOF10: 5
PAINLEVEL_OUTOF10: 3

## 2018-11-03 ASSESSMENT — PAIN DESCRIPTION - LOCATION
LOCATION: KNEE

## 2018-11-03 ASSESSMENT — PAIN DESCRIPTION - ORIENTATION
ORIENTATION: RIGHT

## 2018-11-03 ASSESSMENT — PAIN DESCRIPTION - PROGRESSION
CLINICAL_PROGRESSION: NOT CHANGED
CLINICAL_PROGRESSION: NOT CHANGED

## 2018-11-03 ASSESSMENT — PAIN DESCRIPTION - ONSET
ONSET: ON-GOING

## 2018-11-03 ASSESSMENT — PAIN DESCRIPTION - DESCRIPTORS
DESCRIPTORS: ACHING;DISCOMFORT
DESCRIPTORS: ACHING;SORE
DESCRIPTORS: ACHING;DISCOMFORT

## 2018-11-03 ASSESSMENT — PAIN DESCRIPTION - FREQUENCY
FREQUENCY: CONTINUOUS

## 2018-11-03 NOTE — PROGRESS NOTES
arthritis   Elbow flex/ext: x5, reports elbow pain     Patient Education: Role of OT, POC, d/c recommendations     Positioning Needs: Pt in bed with needs in place and alarm activated. Family Present: None     Staff Transfer Message (as written in room): bedrest with bathroom privileges     Assessment: Pt with limited progress 2/2 bedrest order with bathroom privileges. Unable to progress mobility status at this time. Pt. Limited during treatment session by pain. Pt will benefit from skilled OT while in the hospital and upon d/c (when able to progress therapy) in order to facilitate progress toward baseline functioning. At end of treatment, pt. In bed, alarm activated with call light and needs within reach. RN notified. Goals: To be met in 3 Visits:  1). Indep with UE ex x 10 reps     To be met in 5 Visits:  1). Supine to Sit - min assist   2). Bed to Chair/BSC- min assist with RW (d/c goal, no appropriate at this time 2/2 bedrest order)   3). Upper Body Bathing- SBA except for back  4). Lower Body Bathing- mod assist   5). Upper Body Dressing- min assist   6). Lower Body Dressing- mod assist   7).  Pt to jerrod UE exs [id number]    Plan: cont with POC    Timed Code Treatment Minutes:  55  minutes  Total Treatment Time: 55  minutes    Signature, License #  GURU Wu, OTR/L   [id number]       If patient discharges from this facility prior to next visit, this note will serve as the Discharge Summary

## 2018-11-03 NOTE — PROGRESS NOTES
Orthopedic Surgery Progress Note    Hong Peak  11/3/2018    Subjective:     Post-Operative Day # 5 right knee inferior pole patellectomy and repair patella tendon. She is doing ok today. She had a large BM yesterday and is passing gas today. Able to tolerate some PO. Objective:     /71   Pulse 62   Temp 98.6 °F (37 °C) (Oral)   Resp 16   Ht 5' (1.524 m)   Wt 105 lb (47.6 kg)   SpO2 95%   BMI 20.51 kg/m²     Intake/Output Summary (Last 24 hours) at 11/03/18 1120  Last data filed at 11/03/18 0855   Gross per 24 hour   Intake              940 ml   Output                0 ml   Net              940 ml     General: alert, appears stated age, cooperative and no distress   Wound: Dressing clean and dry no evidence of infection. ACE, KI in place. Motion: deferred in affected extremity   DVT Exam: No evidence of DVT seen on physical exam.      Additional exam: SILT s/sp/dp/s/t, +DF/PF/EHL, 2+DP pulse       Data Review  CBC: Lab Results   Component Value Date    WBC 7.3 11/03/2018    RBC 2.55 11/03/2018    HGB 7.8 11/03/2018    HCT 23.4 11/03/2018     11/03/2018     Sodium 136  136 - 145 mmol/L Final 11/03/2018  6:36 AM [facility]   Potassium 4.1  3.5 - 5.1 mmol/L Final 11/03/2018  6:36 AM [facility]   Chloride 99  99 - 110 mmol/L Final 11/03/2018  6:36 AM [facility]   CO2 30  21 - 32 mmol/L Final 11/03/2018  6:36 AM [facility]   Anion Gap 7  3 - 16 Final 11/03/2018  6:36 AM [facility]   Glucose 95  70 - 99 mg/dL Final 11/03/2018  6:36 AM [facility]   BUN 11  7 - 20 mg/dL Final 11/03/2018  6:36 AM [facility]   CREATININE <0.5   0.6 - 1.2 mg/dL Final 11/03/2018  6:36 AM [facility]         Assessment:     80year old female POD #5 s/p right knee inferior pole patellectomy and repair patellar tendon    Plan:     1. Medical care per IM appreciate  2. Pain control  3.  PT/OT -

## 2018-11-04 LAB
ANION GAP SERPL CALCULATED.3IONS-SCNC: 11 MMOL/L (ref 3–16)
BASOPHILS ABSOLUTE: 0 K/UL (ref 0–0.2)
BASOPHILS RELATIVE PERCENT: 0.4 %
BUN BLDV-MCNC: 13 MG/DL (ref 7–20)
CALCIUM SERPL-MCNC: 8.6 MG/DL (ref 8.3–10.6)
CHLORIDE BLD-SCNC: 101 MMOL/L (ref 99–110)
CO2: 29 MMOL/L (ref 21–32)
CREAT SERPL-MCNC: <0.5 MG/DL (ref 0.6–1.2)
EOSINOPHILS ABSOLUTE: 0.4 K/UL (ref 0–0.6)
EOSINOPHILS RELATIVE PERCENT: 7 %
GFR AFRICAN AMERICAN: >60
GFR NON-AFRICAN AMERICAN: >60
GLUCOSE BLD-MCNC: 102 MG/DL (ref 70–99)
GLUCOSE BLD-MCNC: 103 MG/DL (ref 70–99)
GLUCOSE BLD-MCNC: 124 MG/DL (ref 70–99)
GLUCOSE BLD-MCNC: 127 MG/DL (ref 70–99)
GLUCOSE BLD-MCNC: 136 MG/DL (ref 70–99)
GLUCOSE BLD-MCNC: 152 MG/DL (ref 70–99)
HCT VFR BLD CALC: 23.1 % (ref 36–48)
HEMOGLOBIN: 7.8 G/DL (ref 12–16)
LYMPHOCYTES ABSOLUTE: 1.1 K/UL (ref 1–5.1)
LYMPHOCYTES RELATIVE PERCENT: 17.7 %
MCH RBC QN AUTO: 31.1 PG (ref 26–34)
MCHC RBC AUTO-ENTMCNC: 33.6 G/DL (ref 31–36)
MCV RBC AUTO: 92.6 FL (ref 80–100)
MONOCYTES ABSOLUTE: 0.7 K/UL (ref 0–1.3)
MONOCYTES RELATIVE PERCENT: 11.1 %
NEUTROPHILS ABSOLUTE: 3.8 K/UL (ref 1.7–7.7)
NEUTROPHILS RELATIVE PERCENT: 63.8 %
PDW BLD-RTO: 14.1 % (ref 12.4–15.4)
PERFORMED ON: ABNORMAL
PLATELET # BLD: 447 K/UL (ref 135–450)
PMV BLD AUTO: 7.3 FL (ref 5–10.5)
POTASSIUM SERPL-SCNC: 4.3 MMOL/L (ref 3.5–5.1)
RBC # BLD: 2.5 M/UL (ref 4–5.2)
SODIUM BLD-SCNC: 141 MMOL/L (ref 136–145)
WBC # BLD: 6 K/UL (ref 4–11)

## 2018-11-04 PROCEDURE — 6360000002 HC RX W HCPCS: Performed by: INTERNAL MEDICINE

## 2018-11-04 PROCEDURE — 36415 COLL VENOUS BLD VENIPUNCTURE: CPT

## 2018-11-04 PROCEDURE — 2580000003 HC RX 258: Performed by: PHYSICIAN ASSISTANT

## 2018-11-04 PROCEDURE — 85025 COMPLETE CBC W/AUTO DIFF WBC: CPT

## 2018-11-04 PROCEDURE — 94761 N-INVAS EAR/PLS OXIMETRY MLT: CPT

## 2018-11-04 PROCEDURE — 80048 BASIC METABOLIC PNL TOTAL CA: CPT

## 2018-11-04 PROCEDURE — 6370000000 HC RX 637 (ALT 250 FOR IP): Performed by: INTERNAL MEDICINE

## 2018-11-04 PROCEDURE — 1200000000 HC SEMI PRIVATE

## 2018-11-04 PROCEDURE — 6370000000 HC RX 637 (ALT 250 FOR IP): Performed by: HOSPITALIST

## 2018-11-04 PROCEDURE — 6370000000 HC RX 637 (ALT 250 FOR IP): Performed by: PHYSICIAN ASSISTANT

## 2018-11-04 RX ADMIN — FERROUS SULFATE TAB 325 MG (65 MG ELEMENTAL FE) 325 MG: 325 (65 FE) TAB at 17:16

## 2018-11-04 RX ADMIN — TIMOLOL MALEATE 1 DROP: 5 SOLUTION/ DROPS OPHTHALMIC at 17:17

## 2018-11-04 RX ADMIN — ATORVASTATIN CALCIUM 10 MG: 10 TABLET, FILM COATED ORAL at 21:09

## 2018-11-04 RX ADMIN — FERROUS SULFATE TAB 325 MG (65 MG ELEMENTAL FE) 325 MG: 325 (65 FE) TAB at 08:35

## 2018-11-04 RX ADMIN — HYDROCODONE BITARTRATE AND ACETAMINOPHEN 2 TABLET: 5; 325 TABLET ORAL at 22:48

## 2018-11-04 RX ADMIN — HYDROCODONE BITARTRATE AND ACETAMINOPHEN 1 TABLET: 5; 325 TABLET ORAL at 18:34

## 2018-11-04 RX ADMIN — TIMOLOL MALEATE 1 DROP: 5 SOLUTION/ DROPS OPHTHALMIC at 10:21

## 2018-11-04 RX ADMIN — DOCUSATE SODIUM 100 MG: 100 CAPSULE, LIQUID FILLED ORAL at 21:09

## 2018-11-04 RX ADMIN — CARVEDILOL 6.25 MG: 6.25 TABLET, FILM COATED ORAL at 17:16

## 2018-11-04 RX ADMIN — CHLORDIAZEPOXIDE HYDROCHLORIDE 5 MG: 5 CAPSULE ORAL at 17:16

## 2018-11-04 RX ADMIN — HYDROCODONE BITARTRATE AND ACETAMINOPHEN 2 TABLET: 5; 325 TABLET ORAL at 03:36

## 2018-11-04 RX ADMIN — FLECAINIDE ACETATE 50 MG: 100 TABLET ORAL at 21:09

## 2018-11-04 RX ADMIN — FLECAINIDE ACETATE 50 MG: 100 TABLET ORAL at 08:35

## 2018-11-04 RX ADMIN — CARVEDILOL 6.25 MG: 6.25 TABLET, FILM COATED ORAL at 08:35

## 2018-11-04 RX ADMIN — HYDROCODONE BITARTRATE AND ACETAMINOPHEN 1 TABLET: 5; 325 TABLET ORAL at 08:35

## 2018-11-04 RX ADMIN — LATANOPROST 1 DROP: 50 SOLUTION OPHTHALMIC at 21:11

## 2018-11-04 RX ADMIN — STANDARDIZED SENNA CONCENTRATE AND DOCUSATE SODIUM 1 TABLET: 8.6; 5 TABLET ORAL at 08:35

## 2018-11-04 RX ADMIN — CHLORDIAZEPOXIDE HYDROCHLORIDE 5 MG: 5 CAPSULE ORAL at 08:35

## 2018-11-04 RX ADMIN — FAMOTIDINE 20 MG: 20 TABLET, FILM COATED ORAL at 21:09

## 2018-11-04 RX ADMIN — Medication 10 ML: at 08:36

## 2018-11-04 RX ADMIN — POLYETHYLENE GLYCOL (3350) 17 G: 17 POWDER, FOR SOLUTION ORAL at 08:36

## 2018-11-04 RX ADMIN — FAMOTIDINE 20 MG: 20 TABLET, FILM COATED ORAL at 08:35

## 2018-11-04 RX ADMIN — BACITRACIN ZINC NEOMYCIN SULFATE POLYMYXIN B SULFATE: 400; 3.5; 5 OINTMENT TOPICAL at 10:22

## 2018-11-04 RX ADMIN — Medication 10 ML: at 21:10

## 2018-11-04 RX ADMIN — HYDROCODONE BITARTRATE AND ACETAMINOPHEN 1 TABLET: 5; 325 TABLET ORAL at 13:44

## 2018-11-04 RX ADMIN — DOCUSATE SODIUM 100 MG: 100 CAPSULE, LIQUID FILLED ORAL at 08:35

## 2018-11-04 RX ADMIN — ENOXAPARIN SODIUM 40 MG: 40 INJECTION SUBCUTANEOUS at 08:34

## 2018-11-04 RX ADMIN — HYDROCODONE BITARTRATE AND ACETAMINOPHEN 2 TABLET: 5; 325 TABLET ORAL at 00:28

## 2018-11-04 ASSESSMENT — PAIN DESCRIPTION - PROGRESSION
CLINICAL_PROGRESSION: NOT CHANGED
CLINICAL_PROGRESSION: NOT CHANGED

## 2018-11-04 ASSESSMENT — PAIN DESCRIPTION - PAIN TYPE
TYPE: ACUTE PAIN
TYPE: ACUTE PAIN

## 2018-11-04 ASSESSMENT — PAIN DESCRIPTION - FREQUENCY
FREQUENCY: CONTINUOUS
FREQUENCY: CONTINUOUS

## 2018-11-04 ASSESSMENT — PAIN SCALES - GENERAL
PAINLEVEL_OUTOF10: 5
PAINLEVEL_OUTOF10: 6
PAINLEVEL_OUTOF10: 4
PAINLEVEL_OUTOF10: 7

## 2018-11-04 ASSESSMENT — PAIN DESCRIPTION - DESCRIPTORS
DESCRIPTORS: ACHING;DISCOMFORT
DESCRIPTORS: ACHING;DISCOMFORT

## 2018-11-04 ASSESSMENT — PAIN DESCRIPTION - ORIENTATION
ORIENTATION: RIGHT
ORIENTATION: RIGHT

## 2018-11-04 ASSESSMENT — PAIN DESCRIPTION - ONSET
ONSET: ON-GOING
ONSET: ON-GOING

## 2018-11-04 ASSESSMENT — PAIN DESCRIPTION - LOCATION
LOCATION: KNEE
LOCATION: KNEE

## 2018-11-04 NOTE — PLAN OF CARE
Problem: Falls - Risk of:  Goal: Will remain free from falls  Will remain free from falls    Outcome: Ongoing  Pt has not suffered from falls this shift    Problem: Risk for Impaired Skin Integrity  Goal: Tissue integrity - skin and mucous membranes  Structural intactness and normal physiological function of skin and  mucous membranes.     Outcome: Ongoing      Problem: Infection:  Goal: Will remain free from infection  Will remain free from infection    Outcome: Ongoing  Pt shows no s/s of infection this shift

## 2018-11-04 NOTE — FLOWSHEET NOTE
11/03/18 1909   Vital Signs   Temp 97.4 °F (36.3 °C)   Temp Source Oral   Pulse 60   Heart Rate Source Monitor   Resp 16   /62   BP Location Right upper arm   BP Upper/Lower Upper   Patient Position Semi fowlers   Level of Consciousness 0   MEWS Score 1   Oxygen Therapy   SpO2 95 %   O2 Device None (Room air)   Assessment complete, see flowsheets. Pt given pain medication per pt request and PRN parameters, pt denies further needs at this time. Will continue to monitor.   Maria Esther Rivera RN

## 2018-11-05 LAB
ANION GAP SERPL CALCULATED.3IONS-SCNC: 9 MMOL/L (ref 3–16)
BASOPHILS ABSOLUTE: 0 K/UL (ref 0–0.2)
BASOPHILS RELATIVE PERCENT: 0.6 %
BUN BLDV-MCNC: 12 MG/DL (ref 7–20)
CALCIUM SERPL-MCNC: 8.8 MG/DL (ref 8.3–10.6)
CHLORIDE BLD-SCNC: 103 MMOL/L (ref 99–110)
CO2: 28 MMOL/L (ref 21–32)
CREAT SERPL-MCNC: <0.5 MG/DL (ref 0.6–1.2)
EOSINOPHILS ABSOLUTE: 0.4 K/UL (ref 0–0.6)
EOSINOPHILS RELATIVE PERCENT: 6.1 %
GFR AFRICAN AMERICAN: >60
GFR NON-AFRICAN AMERICAN: >60
GLUCOSE BLD-MCNC: 105 MG/DL (ref 70–99)
GLUCOSE BLD-MCNC: 108 MG/DL (ref 70–99)
GLUCOSE BLD-MCNC: 113 MG/DL (ref 70–99)
GLUCOSE BLD-MCNC: 117 MG/DL (ref 70–99)
GLUCOSE BLD-MCNC: 135 MG/DL (ref 70–99)
GLUCOSE BLD-MCNC: 153 MG/DL (ref 70–99)
HCT VFR BLD CALC: 22.6 % (ref 36–48)
HEMOGLOBIN: 7.5 G/DL (ref 12–16)
LYMPHOCYTES ABSOLUTE: 1.2 K/UL (ref 1–5.1)
LYMPHOCYTES RELATIVE PERCENT: 20.6 %
MCH RBC QN AUTO: 30.9 PG (ref 26–34)
MCHC RBC AUTO-ENTMCNC: 33.3 G/DL (ref 31–36)
MCV RBC AUTO: 92.8 FL (ref 80–100)
MONOCYTES ABSOLUTE: 0.7 K/UL (ref 0–1.3)
MONOCYTES RELATIVE PERCENT: 11.2 %
NEUTROPHILS ABSOLUTE: 3.7 K/UL (ref 1.7–7.7)
NEUTROPHILS RELATIVE PERCENT: 61.5 %
PDW BLD-RTO: 14.2 % (ref 12.4–15.4)
PERFORMED ON: ABNORMAL
PLATELET # BLD: 445 K/UL (ref 135–450)
PMV BLD AUTO: 6.8 FL (ref 5–10.5)
POTASSIUM SERPL-SCNC: 4.4 MMOL/L (ref 3.5–5.1)
RBC # BLD: 2.43 M/UL (ref 4–5.2)
SODIUM BLD-SCNC: 140 MMOL/L (ref 136–145)
WBC # BLD: 6 K/UL (ref 4–11)

## 2018-11-05 PROCEDURE — 2580000003 HC RX 258: Performed by: PHYSICIAN ASSISTANT

## 2018-11-05 PROCEDURE — 6370000000 HC RX 637 (ALT 250 FOR IP): Performed by: INTERNAL MEDICINE

## 2018-11-05 PROCEDURE — 85025 COMPLETE CBC W/AUTO DIFF WBC: CPT

## 2018-11-05 PROCEDURE — 80048 BASIC METABOLIC PNL TOTAL CA: CPT

## 2018-11-05 PROCEDURE — 97110 THERAPEUTIC EXERCISES: CPT

## 2018-11-05 PROCEDURE — 6370000000 HC RX 637 (ALT 250 FOR IP): Performed by: PHYSICIAN ASSISTANT

## 2018-11-05 PROCEDURE — 36415 COLL VENOUS BLD VENIPUNCTURE: CPT

## 2018-11-05 PROCEDURE — 6360000002 HC RX W HCPCS: Performed by: INTERNAL MEDICINE

## 2018-11-05 PROCEDURE — 1200000000 HC SEMI PRIVATE

## 2018-11-05 PROCEDURE — 99232 SBSQ HOSP IP/OBS MODERATE 35: CPT | Performed by: INTERNAL MEDICINE

## 2018-11-05 RX ORDER — MORPHINE SULFATE 2 MG/ML
4 INJECTION, SOLUTION INTRAMUSCULAR; INTRAVENOUS
Status: DISCONTINUED | OUTPATIENT
Start: 2018-11-05 | End: 2018-11-06 | Stop reason: HOSPADM

## 2018-11-05 RX ORDER — MORPHINE SULFATE 2 MG/ML
2 INJECTION, SOLUTION INTRAMUSCULAR; INTRAVENOUS
Status: DISCONTINUED | OUTPATIENT
Start: 2018-11-05 | End: 2018-11-06 | Stop reason: HOSPADM

## 2018-11-05 RX ADMIN — HYDROCODONE BITARTRATE AND ACETAMINOPHEN 2 TABLET: 5; 325 TABLET ORAL at 23:17

## 2018-11-05 RX ADMIN — CHLORDIAZEPOXIDE HYDROCHLORIDE 5 MG: 5 CAPSULE ORAL at 08:43

## 2018-11-05 RX ADMIN — STANDARDIZED SENNA CONCENTRATE AND DOCUSATE SODIUM 1 TABLET: 8.6; 5 TABLET ORAL at 08:44

## 2018-11-05 RX ADMIN — HYDROCODONE BITARTRATE AND ACETAMINOPHEN 1 TABLET: 5; 325 TABLET ORAL at 18:35

## 2018-11-05 RX ADMIN — HYDROCODONE BITARTRATE AND ACETAMINOPHEN 2 TABLET: 5; 325 TABLET ORAL at 03:14

## 2018-11-05 RX ADMIN — BACITRACIN ZINC NEOMYCIN SULFATE POLYMYXIN B SULFATE: 400; 3.5; 5 OINTMENT TOPICAL at 10:16

## 2018-11-05 RX ADMIN — ACETAMINOPHEN 650 MG: 325 TABLET ORAL at 08:43

## 2018-11-05 RX ADMIN — LATANOPROST 1 DROP: 50 SOLUTION OPHTHALMIC at 21:24

## 2018-11-05 RX ADMIN — CHLORDIAZEPOXIDE HYDROCHLORIDE 5 MG: 5 CAPSULE ORAL at 17:03

## 2018-11-05 RX ADMIN — CARVEDILOL 6.25 MG: 6.25 TABLET, FILM COATED ORAL at 17:03

## 2018-11-05 RX ADMIN — ACETAMINOPHEN 650 MG: 325 TABLET ORAL at 14:54

## 2018-11-05 RX ADMIN — Medication 10 ML: at 21:11

## 2018-11-05 RX ADMIN — CARVEDILOL 6.25 MG: 6.25 TABLET, FILM COATED ORAL at 08:43

## 2018-11-05 RX ADMIN — FERROUS SULFATE TAB 325 MG (65 MG ELEMENTAL FE) 325 MG: 325 (65 FE) TAB at 08:43

## 2018-11-05 RX ADMIN — DOCUSATE SODIUM 100 MG: 100 CAPSULE, LIQUID FILLED ORAL at 21:11

## 2018-11-05 RX ADMIN — DOCUSATE SODIUM 100 MG: 100 CAPSULE, LIQUID FILLED ORAL at 08:44

## 2018-11-05 RX ADMIN — FLECAINIDE ACETATE 50 MG: 100 TABLET ORAL at 21:11

## 2018-11-05 RX ADMIN — TIMOLOL MALEATE 1 DROP: 5 SOLUTION/ DROPS OPHTHALMIC at 17:03

## 2018-11-05 RX ADMIN — ENOXAPARIN SODIUM 40 MG: 40 INJECTION SUBCUTANEOUS at 08:44

## 2018-11-05 RX ADMIN — TIMOLOL MALEATE 1 DROP: 5 SOLUTION/ DROPS OPHTHALMIC at 10:16

## 2018-11-05 RX ADMIN — Medication 10 ML: at 08:44

## 2018-11-05 RX ADMIN — FERROUS SULFATE TAB 325 MG (65 MG ELEMENTAL FE) 325 MG: 325 (65 FE) TAB at 17:03

## 2018-11-05 RX ADMIN — FAMOTIDINE 20 MG: 20 TABLET, FILM COATED ORAL at 21:11

## 2018-11-05 RX ADMIN — FLECAINIDE ACETATE 50 MG: 100 TABLET ORAL at 08:43

## 2018-11-05 RX ADMIN — FAMOTIDINE 20 MG: 20 TABLET, FILM COATED ORAL at 08:43

## 2018-11-05 RX ADMIN — ATORVASTATIN CALCIUM 10 MG: 10 TABLET, FILM COATED ORAL at 21:11

## 2018-11-05 ASSESSMENT — PAIN DESCRIPTION - PROGRESSION: CLINICAL_PROGRESSION: NOT CHANGED

## 2018-11-05 ASSESSMENT — PAIN DESCRIPTION - LOCATION
LOCATION: KNEE
LOCATION: KNEE

## 2018-11-05 ASSESSMENT — PAIN DESCRIPTION - ORIENTATION
ORIENTATION: RIGHT
ORIENTATION: RIGHT

## 2018-11-05 ASSESSMENT — PAIN SCALES - GENERAL
PAINLEVEL_OUTOF10: 3
PAINLEVEL_OUTOF10: 6
PAINLEVEL_OUTOF10: 7
PAINLEVEL_OUTOF10: 1
PAINLEVEL_OUTOF10: 7
PAINLEVEL_OUTOF10: 3
PAINLEVEL_OUTOF10: 1

## 2018-11-05 ASSESSMENT — PAIN DESCRIPTION - FREQUENCY
FREQUENCY: CONTINUOUS
FREQUENCY: INTERMITTENT

## 2018-11-05 ASSESSMENT — PAIN DESCRIPTION - ONSET
ONSET: GRADUAL
ONSET: ON-GOING

## 2018-11-05 ASSESSMENT — PAIN DESCRIPTION - DESCRIPTORS
DESCRIPTORS: ACHING;DISCOMFORT
DESCRIPTORS: ACHING;DISCOMFORT

## 2018-11-05 ASSESSMENT — PAIN DESCRIPTION - PAIN TYPE
TYPE: ACUTE PAIN
TYPE: ACUTE PAIN

## 2018-11-05 NOTE — PROGRESS NOTES
Nutrition Assessment    Type and Reason for Visit: Reassess    Nutrition Recommendations:  1. Monitor appetite and po intake  2. Monitor ONS tolerance/intake (Ensure Enlive)  4. Please obtain actual, current weight for this patient to monitor for in-patient weight changes during this admission. 5. Monitor nutrition-related labs, bowel activity, and fluid/electrolyte management. Malnutrition Assessment:  · Malnutrition Status: Meets the criteria for severe malnutrition  · Context: Acute illness or injury  · Findings of the 6 clinical characteristics of malnutrition (Minimum of 2 out of 6 clinical characteristics is required to make the diagnosis of moderate or severe Protein Calorie Malnutrition based on AND/ASPEN Guidelines):  1. Energy Intake-Less than or equal to 75%, greater than or equal to 1 month    2. Weight Loss-5% loss or greater, in 1 month  3. Fat Loss-Severe subcutaneous fat loss, Orbital  4. Muscle Loss-Severe muscle mass loss, Temples (temporalis muscle), Clavicles (pectoralis and deltoids), Scapula (trapezius)  5. Fluid Accumulation-Mild fluid accumulation, Extremities (RLE + 2 edema)  6.   Strength-Not measured    Nutrition Diagnosis:   · Problem: Severe malnutrition  · Etiology: related to Acute injury/trauma, Insufficient energy/nutrient consumption, Increased demand for energy/nutrients due to     Signs and symptoms:  as evidenced by Weight loss greater than or equal to 5% in 1 month, Severe muscle loss, Severe loss of subcutaneous fat, Diet history of poor intake    Nutrition Assessment:  · Subjective Assessment: Pt is severely malnourished as evidenced by fat loss, muscle wasting, recent weight loss, and decreased po intake AND she is at risk for further compromise d/t surgery on 10/29 and continued inconsistent po intake; pt was alert and able to communicate; pt was sitting on walker; pt appeared comfortable, but wanted to be moved to her bed; I had pt call her nurse after our visit;

## 2018-11-05 NOTE — PROGRESS NOTES
Inpatient Physical Therapy Daily Treatment Note    Unit: 2[facility]  Date:  11/5/2018  Patient Name:    Jesus Benitez  Admitting diagnosis:  Displaced comminuted fracture of right patella, initial encounter for closed fracture [S82.041A]  Admit Date:  10/25/2018  Precautions/Restrictions:  50% WB RLE, no ROM RLE, no strengthening exercises to R LE, no SLR, KI RLE, fall risk, bed/chair alarm, IV, Bedrest with bathroom privileges   S/p R knee inferior pole patellectomy and repair of patellar tendon 10/29/18    Discharge Recommendations: 24/7 care  DME needs at discharge: Defer to facility    AM-PAC Mobility Score   AM-PAC Inpatient Mobility Raw Score : 12       Treatment Time: 09:06-09:20  Treatment Number:  4    Subjective    Pt. Supine in bed with no family present      Pain    2/10 R knee, RN aware    Bed Mobility  Supine to Sit:   Sit to Supine:   Rolling:   Scooting:     Transfer Training Pt declined reporting she had already gotten up to [facility] this morning and it has been going well  Sit to stand:   Stand to sit:   Bed to [facility]:     Balance      Gait Training Deferred, pt cleared for in bed exercises only  Patient ambulated with     Therapeutic Exercise All performed on LLE (per orders no ROM or strengthening to R LE)  Supine in bed, with HOB elevated: Ankle Pumps: x 15  Quad Sets: x 15  Heel Slides: x 15  Hip ABD/ADD: x 15    B UE exercises supine with HOB elevated:  scap squeezes x10  Shoulder shrugs x10  Shoulder flex- deferred ROM limited  Elbow flex/ext x10  Shoulder abd (with elbows bent) x10    Patient Education       Role of acute care PT, HEP     Positioning Needs       Pt supine in bed with alarm activated and needs/call light within reach. Activity Tolerance   No issues with shortness of breath or dizziness. Tolerated L LE and B UE exercises. Assessment   Patient completing transfers with assist of 2. Patient will continue with this with the nursing staff.    Patient will require 24/7 assistance/care

## 2018-11-05 NOTE — PROGRESS NOTES
BID    latanoprost  1 drop Both Eyes Nightly    enoxaparin  40 mg Subcutaneous Daily    flecainide  50 mg Oral BID    carvedilol  6.25 mg Oral BID WC       Continuous Infusions:   dextrose         PRN Meds:  morphine **OR** morphine, sodium chloride flush, acetaminophen, magnesium hydroxide, ondansetron, glucose, dextrose, glucagon (rDNA), dextrose, HYDROcodone 5 mg - acetaminophen **OR** HYDROcodone 5 mg - acetaminophen      Data:  CBC:   Recent Labs      11/03/18 0636  11/04/18 0543 11/05/18 0536   WBC  7.3  6.0  6.0   HGB  7.8*  7.8*  7.5*   HCT  23.4*  23.1*  22.6*   MCV  91.8  92.6  92.8   PLT  430  447  445     BMP:   Recent Labs      11/03/18 0636  11/04/18 0543 11/05/18 0536   NA  136  141  140   K  4.1  4.3  4.4   CL  99  101  103   CO2  30  29  28   BUN  11  13  12   CREATININE  <0.5*  <0.5*  <0.5*     LIVER PROFILE: No results for input(s): AST, ALT, LIPASE, BILIDIR, BILITOT, ALKPHOS in the last 72 hours. Invalid input(s): AMYLASE,  ALB  PT/INR:   No results for input(s): PROTIME, INR in the last 72 hours. CULTURES  None     RADIOLOGY  CT CERVICAL SPINE WO CONTRAST   Final Result   No acute abnormality of the cervical spine. CT Head WO Contrast   Final Result   No acute intracranial hemorrhage or mass effect. Significant cerebellar atrophy. XR KNEE RIGHT (1-2 VIEWS)   Final Result   Displaced patellar fracture. Alex Oliveira have reviewed the chart on [facility] and personally interviewed and examined patient, reviewed the data (labs and imaging) and after discussion with my PA formulated the plan. Agree with note with the following edits. HPI:     Awaiting pre [facility] and medicaid for disposition. I reviewed the patient's Past Medical History, Past Surgical History, Medications, and Allergies.      Physical exam:    /73   Pulse 61   Temp 97.4 °F (36.3 °C) (Oral)   Resp 16   Ht 5' (1.524 m)   Wt 105 lb (47.6 kg)

## 2018-11-05 NOTE — PROGRESS NOTES
Shift assessment completed, see flowsheets. AM meds given per orders. Pt c/o pain 3/10, prn tylenol provided, see MAR. Pt denies further needs at this time. Call light and personal belongings within reach.

## 2018-11-05 NOTE — PROGRESS NOTES
Orthopedic Surgery Progress Note    Jesus Benitez  11/5/2018    Subjective:     Post-Operative Day # 7 right knee inferior pole patellectomy and repair patella tendon. Pt states she is doing okay today, she is upset to be \"dependent on her family for the rest of her life\". She has been up to the bathroom with assistance today. No family at bedside. Objective:     /71   Pulse 65   Temp 97.9 °F (36.6 °C) (Oral)   Resp 16   Ht 5' (1.524 m)   Wt 105 lb (47.6 kg)   SpO2 98%   BMI 20.51 kg/m²     Intake/Output Summary (Last 24 hours) at 11/05/18 1455  Last data filed at 11/05/18 1216   Gross per 24 hour   Intake              960 ml   Output                0 ml   Net              960 ml     General: alert, appears stated age, cooperative and no distress   Wound: Dressing clean and dry no evidence of infection. ACE, KI in place. Motion: deferred in affected extremity   DVT Exam: No evidence of DVT seen on physical exam.      Additional exam:   NVI to right LE. Pt able to PF her foot today, unable to DF her foot or keep foot in DF this afternoon. Pt states she cannot perform this due to \"bandage on her ankle\" - she does not have a bandage on her ankle.         Data Review  CBC:   Lab Results   Component Value Date    WBC 6.0 11/05/2018    RBC 2.43 11/05/2018    HGB 7.5 11/05/2018    HCT 22.6 11/05/2018     11/05/2018     Sodium 141  136 - 145 mmol/L Final 11/04/2018  5:43 AM [facility]   Potassium 4.3  3.5 - 5.1 mmol/L Final 11/04/2018  5:43 AM [facility]   Chloride 101  99 - 110 mmol/L Final 11/04/2018  5:43 AM [facility]   CO2 29  21 - 32 mmol/L Final 11/04/2018  5:43 AM [facility]   Anion Gap 11  3 - 16 Final 11/04/2018  5:43 AM [facility]   Glucose 102   70 - 99 mg/dL Final 11/04/2018  5:43 AM [facility]   BUN 13  7 - 20 mg/dL Final 11/04/2018  5:43 AM - [address] <0.5

## 2018-11-06 VITALS
BODY MASS INDEX: 20.62 KG/M2 | HEART RATE: 60 BPM | HEIGHT: 60 IN | DIASTOLIC BLOOD PRESSURE: 75 MMHG | SYSTOLIC BLOOD PRESSURE: 143 MMHG | OXYGEN SATURATION: 95 % | TEMPERATURE: 98 F | WEIGHT: 105 LBS | RESPIRATION RATE: 16 BRPM

## 2018-11-06 LAB
ANION GAP SERPL CALCULATED.3IONS-SCNC: 7 MMOL/L (ref 3–16)
BASOPHILS ABSOLUTE: 0 K/UL (ref 0–0.2)
BASOPHILS RELATIVE PERCENT: 0.6 %
BUN BLDV-MCNC: 12 MG/DL (ref 7–20)
CALCIUM SERPL-MCNC: 9.1 MG/DL (ref 8.3–10.6)
CHLORIDE BLD-SCNC: 103 MMOL/L (ref 99–110)
CO2: 27 MMOL/L (ref 21–32)
CREAT SERPL-MCNC: <0.5 MG/DL (ref 0.6–1.2)
EOSINOPHILS ABSOLUTE: 0.4 K/UL (ref 0–0.6)
EOSINOPHILS RELATIVE PERCENT: 6.9 %
GFR AFRICAN AMERICAN: >60
GFR NON-AFRICAN AMERICAN: >60
GLUCOSE BLD-MCNC: 107 MG/DL (ref 70–99)
GLUCOSE BLD-MCNC: 111 MG/DL (ref 70–99)
GLUCOSE BLD-MCNC: 131 MG/DL (ref 70–99)
HCT VFR BLD CALC: 24.8 % (ref 36–48)
HEMOGLOBIN: 8.4 G/DL (ref 12–16)
LYMPHOCYTES ABSOLUTE: 1.3 K/UL (ref 1–5.1)
LYMPHOCYTES RELATIVE PERCENT: 21.9 %
MCH RBC QN AUTO: 31.2 PG (ref 26–34)
MCHC RBC AUTO-ENTMCNC: 33.8 G/DL (ref 31–36)
MCV RBC AUTO: 92.4 FL (ref 80–100)
MONOCYTES ABSOLUTE: 0.6 K/UL (ref 0–1.3)
MONOCYTES RELATIVE PERCENT: 9.4 %
NEUTROPHILS ABSOLUTE: 3.6 K/UL (ref 1.7–7.7)
NEUTROPHILS RELATIVE PERCENT: 61.2 %
PDW BLD-RTO: 14 % (ref 12.4–15.4)
PERFORMED ON: ABNORMAL
PERFORMED ON: ABNORMAL
PLATELET # BLD: 500 K/UL (ref 135–450)
PMV BLD AUTO: 7.2 FL (ref 5–10.5)
POTASSIUM SERPL-SCNC: 4.1 MMOL/L (ref 3.5–5.1)
RBC # BLD: 2.69 M/UL (ref 4–5.2)
SODIUM BLD-SCNC: 137 MMOL/L (ref 136–145)
WBC # BLD: 5.9 K/UL (ref 4–11)

## 2018-11-06 PROCEDURE — 85025 COMPLETE CBC W/AUTO DIFF WBC: CPT

## 2018-11-06 PROCEDURE — 6370000000 HC RX 637 (ALT 250 FOR IP): Performed by: INTERNAL MEDICINE

## 2018-11-06 PROCEDURE — 99239 HOSP IP/OBS DSCHRG MGMT >30: CPT | Performed by: INTERNAL MEDICINE

## 2018-11-06 PROCEDURE — 36415 COLL VENOUS BLD VENIPUNCTURE: CPT

## 2018-11-06 PROCEDURE — 6370000000 HC RX 637 (ALT 250 FOR IP): Performed by: PHYSICIAN ASSISTANT

## 2018-11-06 PROCEDURE — 6370000000 HC RX 637 (ALT 250 FOR IP): Performed by: NURSE PRACTITIONER

## 2018-11-06 PROCEDURE — 6360000002 HC RX W HCPCS: Performed by: INTERNAL MEDICINE

## 2018-11-06 PROCEDURE — 2580000003 HC RX 258: Performed by: PHYSICIAN ASSISTANT

## 2018-11-06 PROCEDURE — 80048 BASIC METABOLIC PNL TOTAL CA: CPT

## 2018-11-06 PROCEDURE — 6370000000 HC RX 637 (ALT 250 FOR IP): Performed by: HOSPITALIST

## 2018-11-06 RX ORDER — BISACODYL 10 MG
10 SUPPOSITORY, RECTAL RECTAL DAILY
Qty: 30 SUPPOSITORY | Refills: 0 | Status: SHIPPED | OUTPATIENT
Start: 2018-11-07 | End: 2018-12-07

## 2018-11-06 RX ORDER — FERROUS SULFATE 325(65) MG
325 TABLET ORAL
Qty: 30 TABLET | Refills: 0 | Status: SHIPPED | OUTPATIENT
Start: 2018-11-07

## 2018-11-06 RX ADMIN — DOCUSATE SODIUM 100 MG: 100 CAPSULE, LIQUID FILLED ORAL at 09:15

## 2018-11-06 RX ADMIN — FLECAINIDE ACETATE 50 MG: 100 TABLET ORAL at 09:15

## 2018-11-06 RX ADMIN — BACITRACIN ZINC NEOMYCIN SULFATE POLYMYXIN B SULFATE: 400; 3.5; 5 OINTMENT TOPICAL at 09:16

## 2018-11-06 RX ADMIN — HYDROCODONE BITARTRATE AND ACETAMINOPHEN 2 TABLET: 5; 325 TABLET ORAL at 13:33

## 2018-11-06 RX ADMIN — HYDROCODONE BITARTRATE AND ACETAMINOPHEN 2 TABLET: 5; 325 TABLET ORAL at 07:29

## 2018-11-06 RX ADMIN — TIMOLOL MALEATE 1 DROP: 5 SOLUTION/ DROPS OPHTHALMIC at 09:16

## 2018-11-06 RX ADMIN — BISACODYL 10 MG: 10 SUPPOSITORY RECTAL at 09:16

## 2018-11-06 RX ADMIN — Medication 10 ML: at 09:16

## 2018-11-06 RX ADMIN — STANDARDIZED SENNA CONCENTRATE AND DOCUSATE SODIUM 1 TABLET: 8.6; 5 TABLET ORAL at 09:15

## 2018-11-06 RX ADMIN — CHLORDIAZEPOXIDE HYDROCHLORIDE 5 MG: 5 CAPSULE ORAL at 09:15

## 2018-11-06 RX ADMIN — FAMOTIDINE 20 MG: 20 TABLET, FILM COATED ORAL at 09:15

## 2018-11-06 RX ADMIN — CARVEDILOL 6.25 MG: 6.25 TABLET, FILM COATED ORAL at 09:15

## 2018-11-06 RX ADMIN — POLYETHYLENE GLYCOL (3350) 17 G: 17 POWDER, FOR SOLUTION ORAL at 09:16

## 2018-11-06 RX ADMIN — HYDROCODONE BITARTRATE AND ACETAMINOPHEN 2 TABLET: 5; 325 TABLET ORAL at 03:14

## 2018-11-06 RX ADMIN — ENOXAPARIN SODIUM 40 MG: 40 INJECTION SUBCUTANEOUS at 09:16

## 2018-11-06 RX ADMIN — FERROUS SULFATE TAB 325 MG (65 MG ELEMENTAL FE) 325 MG: 325 (65 FE) TAB at 09:16

## 2018-11-06 ASSESSMENT — PAIN DESCRIPTION - ONSET: ONSET: GRADUAL

## 2018-11-06 ASSESSMENT — PAIN DESCRIPTION - ORIENTATION
ORIENTATION: RIGHT
ORIENTATION: RIGHT

## 2018-11-06 ASSESSMENT — PAIN SCALES - GENERAL
PAINLEVEL_OUTOF10: 7
PAINLEVEL_OUTOF10: 5
PAINLEVEL_OUTOF10: 4
PAINLEVEL_OUTOF10: 7
PAINLEVEL_OUTOF10: 7
PAINLEVEL_OUTOF10: 8

## 2018-11-06 ASSESSMENT — PAIN DESCRIPTION - LOCATION
LOCATION: KNEE
LOCATION: KNEE

## 2018-11-06 ASSESSMENT — PAIN DESCRIPTION - PAIN TYPE
TYPE: ACUTE PAIN
TYPE: ACUTE PAIN

## 2018-11-06 ASSESSMENT — PAIN DESCRIPTION - FREQUENCY
FREQUENCY: INTERMITTENT
FREQUENCY: INTERMITTENT

## 2018-11-06 ASSESSMENT — PAIN DESCRIPTION - DESCRIPTORS
DESCRIPTORS: ACHING;DISCOMFORT
DESCRIPTORS: ACHING;DISCOMFORT

## 2018-11-06 NOTE — CARE COORDINATION
[facility]      Spoke to patient again this morning after speaking with the CM regarding DC plans. Patient is aware of [address] need on DC and I have made the patient an S4 model for DC. All docs have been faxed to [facility].       [place]  Work mobile: [phone]  [facility] office: [phone]

## 2018-11-06 NOTE — PLAN OF CARE
Problem: Falls - Risk of:  Goal: Will remain free from falls  Will remain free from falls    Outcome: Ongoing  Patient will use call light in advance of needs and wait for staff prior to getting out of bed. Problem: Risk for Impaired Skin Integrity  Goal: Tissue integrity - skin and mucous membranes  Structural intactness and normal physiological function of skin and  mucous membranes. Outcome: Ongoing  Patient will be encouraged to change positions every 2 hours and assisted when needed.        Problem: Infection:  Goal: Will remain free from infection  Will remain free from infection    Outcome: Ongoing

## 2018-11-06 NOTE — PROGRESS NOTES
Dressing changed to right knee surgical site per order. No s.s of infection noted to area. No redness, drainage or swelling noted. Patient with no complaints of pain or discomfort voiced during the dressing change.

## 2018-11-06 NOTE — PROGRESS NOTES
Pt stable for transport at this time. Transported via stretcher with first care to home resident. All belongings with patient.

## 2018-11-06 NOTE — PROGRESS NOTES
Discharge instruction and prescriptions given to pt ,verbalize understanding. Pt being discharged to home says son will be at house to meet her. Scheduled to  at 3pm by ambulance.

## 2018-11-07 ENCOUNTER — TELEPHONE (OUTPATIENT)
Dept: ORTHOPEDIC SURGERY | Age: 83
End: 2018-11-07

## 2018-11-07 ENCOUNTER — CARE COORDINATION (OUTPATIENT)
Dept: CASE MANAGEMENT | Age: 83
End: 2018-11-07

## 2018-11-07 LAB
EKG ATRIAL RATE: 59 BPM
EKG DIAGNOSIS: NORMAL
EKG P AXIS: 67 DEGREES
EKG P-R INTERVAL: 190 MS
EKG Q-T INTERVAL: 414 MS
EKG QRS DURATION: 88 MS
EKG QTC CALCULATION (BAZETT): 414 MS
EKG R AXIS: -21 DEGREES
EKG T AXIS: 29 DEGREES
EKG VENTRICULAR RATE: 60 BPM

## 2018-11-09 ENCOUNTER — HOSPITAL ENCOUNTER (OUTPATIENT)
Age: 83
Setting detail: OBSERVATION
Discharge: HOME | End: 2018-11-11
Attending: EMERGENCY MEDICINE | Admitting: INTERNAL MEDICINE
Payer: MEDICARE

## 2018-11-09 ENCOUNTER — CARE COORDINATION (OUTPATIENT)
Dept: CASE MANAGEMENT | Age: 83
End: 2018-11-09

## 2018-11-09 ENCOUNTER — TELEPHONE (OUTPATIENT)
Dept: ORTHOPEDIC SURGERY | Age: 83
End: 2018-11-09

## 2018-11-09 DIAGNOSIS — Z78.9 IMPAIRED MOBILITY AND ADLS: Primary | ICD-10-CM

## 2018-11-09 DIAGNOSIS — Z74.3 REQUIRES CONTINUOUS SUPERVISION FOR ACTIVITIES OF DAILY LIVING (ADL): ICD-10-CM

## 2018-11-09 DIAGNOSIS — Z98.890 S/P RIGHT KNEE SURGERY: ICD-10-CM

## 2018-11-09 DIAGNOSIS — Z74.09 IMPAIRED MOBILITY AND ADLS: Primary | ICD-10-CM

## 2018-11-09 DIAGNOSIS — S82.044D CLOSED NONDISPLACED COMMINUTED FRACTURE OF RIGHT PATELLA WITH ROUTINE HEALING, SUBSEQUENT ENCOUNTER: ICD-10-CM

## 2018-11-09 PROBLEM — R26.81 UNSTABLE GAIT: Status: ACTIVE | Noted: 2018-11-09

## 2018-11-09 LAB
A/G RATIO: 0.9 (ref 1.1–2.2)
ALBUMIN SERPL-MCNC: 3.4 G/DL (ref 3.4–5)
ALP BLD-CCNC: 111 U/L (ref 40–129)
ALT SERPL-CCNC: 17 U/L (ref 10–40)
ANION GAP SERPL CALCULATED.3IONS-SCNC: 10 MMOL/L (ref 3–16)
AST SERPL-CCNC: 23 U/L (ref 15–37)
BASOPHILS ABSOLUTE: 0.1 K/UL (ref 0–0.2)
BASOPHILS RELATIVE PERCENT: 0.8 %
BILIRUB SERPL-MCNC: 0.3 MG/DL (ref 0–1)
BUN BLDV-MCNC: 12 MG/DL (ref 7–20)
CALCIUM SERPL-MCNC: 8.9 MG/DL (ref 8.3–10.6)
CHLORIDE BLD-SCNC: 103 MMOL/L (ref 99–110)
CO2: 26 MMOL/L (ref 21–32)
CREAT SERPL-MCNC: <0.5 MG/DL (ref 0.6–1.2)
EOSINOPHILS ABSOLUTE: 0.2 K/UL (ref 0–0.6)
EOSINOPHILS RELATIVE PERCENT: 3.3 %
GFR AFRICAN AMERICAN: >60
GFR NON-AFRICAN AMERICAN: >60
GLOBULIN: 3.6 G/DL
GLUCOSE BLD-MCNC: 102 MG/DL (ref 70–99)
HCT VFR BLD CALC: 27.8 % (ref 36–48)
HEMOGLOBIN: 9.1 G/DL (ref 12–16)
LACTIC ACID: 0.7 MMOL/L (ref 0.4–2)
LYMPHOCYTES ABSOLUTE: 1.3 K/UL (ref 1–5.1)
LYMPHOCYTES RELATIVE PERCENT: 18.8 %
MCH RBC QN AUTO: 30.2 PG (ref 26–34)
MCHC RBC AUTO-ENTMCNC: 32.7 G/DL (ref 31–36)
MCV RBC AUTO: 92.2 FL (ref 80–100)
MONOCYTES ABSOLUTE: 0.6 K/UL (ref 0–1.3)
MONOCYTES RELATIVE PERCENT: 9.4 %
NEUTROPHILS ABSOLUTE: 4.6 K/UL (ref 1.7–7.7)
NEUTROPHILS RELATIVE PERCENT: 67.7 %
PDW BLD-RTO: 14.8 % (ref 12.4–15.4)
PLATELET # BLD: 586 K/UL (ref 135–450)
PMV BLD AUTO: 7 FL (ref 5–10.5)
POTASSIUM SERPL-SCNC: 4.2 MMOL/L (ref 3.5–5.1)
RBC # BLD: 3.02 M/UL (ref 4–5.2)
SODIUM BLD-SCNC: 139 MMOL/L (ref 136–145)
TOTAL PROTEIN: 7 G/DL (ref 6.4–8.2)
WBC # BLD: 6.8 K/UL (ref 4–11)

## 2018-11-09 PROCEDURE — 2580000003 HC RX 258: Performed by: EMERGENCY MEDICINE

## 2018-11-09 PROCEDURE — 85025 COMPLETE CBC W/AUTO DIFF WBC: CPT

## 2018-11-09 PROCEDURE — 6370000000 HC RX 637 (ALT 250 FOR IP): Performed by: EMERGENCY MEDICINE

## 2018-11-09 PROCEDURE — 80053 COMPREHEN METABOLIC PANEL: CPT

## 2018-11-09 PROCEDURE — 99285 EMERGENCY DEPT VISIT HI MDM: CPT

## 2018-11-09 PROCEDURE — 83605 ASSAY OF LACTIC ACID: CPT

## 2018-11-09 PROCEDURE — 1200000000 HC SEMI PRIVATE

## 2018-11-09 RX ORDER — ACETAMINOPHEN 325 MG/1
650 TABLET ORAL EVERY 4 HOURS PRN
Status: DISCONTINUED | OUTPATIENT
Start: 2018-11-09 | End: 2018-11-11 | Stop reason: HOSPADM

## 2018-11-09 RX ORDER — FAMOTIDINE 20 MG/1
20 TABLET, FILM COATED ORAL 2 TIMES DAILY
Status: DISCONTINUED | OUTPATIENT
Start: 2018-11-09 | End: 2018-11-11 | Stop reason: HOSPADM

## 2018-11-09 RX ORDER — HYDROCODONE BITARTRATE AND ACETAMINOPHEN 5; 325 MG/1; MG/1
2 TABLET ORAL EVERY 4 HOURS PRN
Status: DISCONTINUED | OUTPATIENT
Start: 2018-11-09 | End: 2018-11-11 | Stop reason: HOSPADM

## 2018-11-09 RX ORDER — CHLORDIAZEPOXIDE HYDROCHLORIDE 5 MG/1
5 CAPSULE, GELATIN COATED ORAL 2 TIMES DAILY
Status: DISCONTINUED | OUTPATIENT
Start: 2018-11-10 | End: 2018-11-11 | Stop reason: HOSPADM

## 2018-11-09 RX ORDER — SODIUM CHLORIDE 0.9 % (FLUSH) 0.9 %
10 SYRINGE (ML) INJECTION PRN
Status: DISCONTINUED | OUTPATIENT
Start: 2018-11-09 | End: 2018-11-11 | Stop reason: HOSPADM

## 2018-11-09 RX ORDER — DOCUSATE SODIUM 100 MG/1
100 CAPSULE, LIQUID FILLED ORAL 2 TIMES DAILY
Status: DISCONTINUED | OUTPATIENT
Start: 2018-11-09 | End: 2018-11-11 | Stop reason: HOSPADM

## 2018-11-09 RX ORDER — POLYETHYLENE GLYCOL 3350 17 G/17G
17 POWDER, FOR SOLUTION ORAL DAILY
Status: DISCONTINUED | OUTPATIENT
Start: 2018-11-10 | End: 2018-11-11 | Stop reason: HOSPADM

## 2018-11-09 RX ORDER — ASPIRIN 81 MG/1
81 TABLET, CHEWABLE ORAL DAILY
Status: DISCONTINUED | OUTPATIENT
Start: 2018-11-10 | End: 2018-11-11 | Stop reason: HOSPADM

## 2018-11-09 RX ORDER — FLECAINIDE ACETATE 100 MG/1
50 TABLET ORAL 2 TIMES DAILY
Status: DISCONTINUED | OUTPATIENT
Start: 2018-11-09 | End: 2018-11-11 | Stop reason: HOSPADM

## 2018-11-09 RX ORDER — ATORVASTATIN CALCIUM 10 MG/1
10 TABLET, FILM COATED ORAL NIGHTLY
Status: DISCONTINUED | OUTPATIENT
Start: 2018-11-09 | End: 2018-11-11 | Stop reason: HOSPADM

## 2018-11-09 RX ORDER — OXYCODONE HYDROCHLORIDE AND ACETAMINOPHEN 5; 325 MG/1; MG/1
1 TABLET ORAL ONCE
Status: COMPLETED | OUTPATIENT
Start: 2018-11-09 | End: 2018-11-09

## 2018-11-09 RX ORDER — FERROUS SULFATE 325(65) MG
325 TABLET ORAL
Status: DISCONTINUED | OUTPATIENT
Start: 2018-11-09 | End: 2018-11-11 | Stop reason: HOSPADM

## 2018-11-09 RX ORDER — CALCIUM POLYCARBOPHIL 625 MG 625 MG/1
1250 TABLET ORAL DAILY
Status: DISCONTINUED | OUTPATIENT
Start: 2018-11-10 | End: 2018-11-11 | Stop reason: HOSPADM

## 2018-11-09 RX ORDER — SENNA AND DOCUSATE SODIUM 50; 8.6 MG/1; MG/1
1 TABLET, FILM COATED ORAL DAILY
Status: DISCONTINUED | OUTPATIENT
Start: 2018-11-10 | End: 2018-11-11 | Stop reason: HOSPADM

## 2018-11-09 RX ORDER — CARVEDILOL 6.25 MG/1
6.25 TABLET ORAL 2 TIMES DAILY WITH MEALS
Status: DISCONTINUED | OUTPATIENT
Start: 2018-11-10 | End: 2018-11-11 | Stop reason: HOSPADM

## 2018-11-09 RX ORDER — 0.9 % SODIUM CHLORIDE 0.9 %
1000 INTRAVENOUS SOLUTION INTRAVENOUS ONCE
Status: COMPLETED | OUTPATIENT
Start: 2018-11-09 | End: 2018-11-10

## 2018-11-09 RX ORDER — ONDANSETRON 2 MG/ML
4 INJECTION INTRAMUSCULAR; INTRAVENOUS EVERY 6 HOURS PRN
Status: DISCONTINUED | OUTPATIENT
Start: 2018-11-09 | End: 2018-11-11 | Stop reason: HOSPADM

## 2018-11-09 RX ORDER — BISACODYL 10 MG
10 SUPPOSITORY, RECTAL RECTAL DAILY
Status: DISCONTINUED | OUTPATIENT
Start: 2018-11-10 | End: 2018-11-11 | Stop reason: HOSPADM

## 2018-11-09 RX ORDER — SODIUM CHLORIDE 0.9 % (FLUSH) 0.9 %
10 SYRINGE (ML) INJECTION EVERY 12 HOURS SCHEDULED
Status: DISCONTINUED | OUTPATIENT
Start: 2018-11-09 | End: 2018-11-11 | Stop reason: HOSPADM

## 2018-11-09 RX ORDER — HYDROCODONE BITARTRATE AND ACETAMINOPHEN 5; 325 MG/1; MG/1
1 TABLET ORAL EVERY 4 HOURS PRN
Status: DISCONTINUED | OUTPATIENT
Start: 2018-11-09 | End: 2018-11-11 | Stop reason: HOSPADM

## 2018-11-09 RX ADMIN — OXYCODONE HYDROCHLORIDE AND ACETAMINOPHEN 1 TABLET: 5; 325 TABLET ORAL at 19:55

## 2018-11-09 RX ADMIN — SODIUM CHLORIDE 1000 ML: 9 INJECTION, SOLUTION INTRAVENOUS at 21:41

## 2018-11-09 ASSESSMENT — PAIN DESCRIPTION - ORIENTATION
ORIENTATION: RIGHT
ORIENTATION: RIGHT

## 2018-11-09 ASSESSMENT — PAIN SCALES - GENERAL
PAINLEVEL_OUTOF10: 4
PAINLEVEL_OUTOF10: 2
PAINLEVEL_OUTOF10: 2
PAINLEVEL_OUTOF10: 4

## 2018-11-09 ASSESSMENT — PAIN DESCRIPTION - DESCRIPTORS: DESCRIPTORS: SORE

## 2018-11-09 ASSESSMENT — PAIN DESCRIPTION - PAIN TYPE: TYPE: ACUTE PAIN

## 2018-11-09 ASSESSMENT — PAIN DESCRIPTION - LOCATION
LOCATION: KNEE
LOCATION: KNEE

## 2018-11-09 ASSESSMENT — PAIN SCALES - WONG BAKER: WONGBAKER_NUMERICALRESPONSE: 4

## 2018-11-10 LAB
BACTERIA: ABNORMAL /HPF
BILIRUBIN URINE: NEGATIVE
BLOOD, URINE: NEGATIVE
CLARITY: CLEAR
COLOR: YELLOW
EPITHELIAL CELLS, UA: ABNORMAL /HPF
GLUCOSE URINE: NEGATIVE MG/DL
KETONES, URINE: NEGATIVE MG/DL
LEUKOCYTE ESTERASE, URINE: ABNORMAL
MICROSCOPIC EXAMINATION: YES
NITRITE, URINE: NEGATIVE
PH UA: 7.5
PROTEIN UA: NEGATIVE MG/DL
RBC UA: ABNORMAL /HPF (ref 0–2)
SPECIFIC GRAVITY UA: 1.01
URINE REFLEX TO CULTURE: YES
URINE TYPE: ABNORMAL
UROBILINOGEN, URINE: 0.2 E.U./DL
WBC UA: ABNORMAL /HPF (ref 0–5)

## 2018-11-10 PROCEDURE — 2580000003 HC RX 258: Performed by: NURSE PRACTITIONER

## 2018-11-10 PROCEDURE — 6370000000 HC RX 637 (ALT 250 FOR IP): Performed by: NURSE PRACTITIONER

## 2018-11-10 PROCEDURE — 96372 THER/PROPH/DIAG INJ SC/IM: CPT

## 2018-11-10 PROCEDURE — 81001 URINALYSIS AUTO W/SCOPE: CPT

## 2018-11-10 PROCEDURE — G8978 MOBILITY CURRENT STATUS: HCPCS

## 2018-11-10 PROCEDURE — G8988 SELF CARE GOAL STATUS: HCPCS

## 2018-11-10 PROCEDURE — 97535 SELF CARE MNGMENT TRAINING: CPT

## 2018-11-10 PROCEDURE — 97166 OT EVAL MOD COMPLEX 45 MIN: CPT

## 2018-11-10 PROCEDURE — 97110 THERAPEUTIC EXERCISES: CPT

## 2018-11-10 PROCEDURE — G8979 MOBILITY GOAL STATUS: HCPCS

## 2018-11-10 PROCEDURE — 87086 URINE CULTURE/COLONY COUNT: CPT

## 2018-11-10 PROCEDURE — G8987 SELF CARE CURRENT STATUS: HCPCS

## 2018-11-10 PROCEDURE — 97530 THERAPEUTIC ACTIVITIES: CPT

## 2018-11-10 PROCEDURE — 6370000000 HC RX 637 (ALT 250 FOR IP): Performed by: INTERNAL MEDICINE

## 2018-11-10 PROCEDURE — 97162 PT EVAL MOD COMPLEX 30 MIN: CPT

## 2018-11-10 PROCEDURE — 1200000000 HC SEMI PRIVATE

## 2018-11-10 PROCEDURE — 6360000002 HC RX W HCPCS: Performed by: NURSE PRACTITIONER

## 2018-11-10 RX ORDER — TIMOLOL MALEATE 5 MG/ML
1 SOLUTION/ DROPS OPHTHALMIC 2 TIMES DAILY
Status: DISCONTINUED | OUTPATIENT
Start: 2018-11-10 | End: 2018-11-11 | Stop reason: HOSPADM

## 2018-11-10 RX ORDER — M-VIT,TX,IRON,MINS/CALC/FOLIC 27MG-0.4MG
1 TABLET ORAL DAILY
Status: DISCONTINUED | OUTPATIENT
Start: 2018-11-10 | End: 2018-11-11 | Stop reason: HOSPADM

## 2018-11-10 RX ADMIN — CARVEDILOL 6.25 MG: 6.25 TABLET, FILM COATED ORAL at 08:40

## 2018-11-10 RX ADMIN — ATORVASTATIN CALCIUM 10 MG: 10 TABLET, FILM COATED ORAL at 20:57

## 2018-11-10 RX ADMIN — FAMOTIDINE 20 MG: 20 TABLET, FILM COATED ORAL at 08:39

## 2018-11-10 RX ADMIN — Medication 10 ML: at 20:58

## 2018-11-10 RX ADMIN — FLECAINIDE ACETATE 50 MG: 100 TABLET ORAL at 20:57

## 2018-11-10 RX ADMIN — FAMOTIDINE 20 MG: 20 TABLET, FILM COATED ORAL at 20:57

## 2018-11-10 RX ADMIN — ACETAMINOPHEN 325 MG: 325 TABLET ORAL at 14:41

## 2018-11-10 RX ADMIN — TIMOLOL MALEATE 1 DROP: 5 SOLUTION/ DROPS OPHTHALMIC at 20:59

## 2018-11-10 RX ADMIN — DOCUSATE SODIUM 100 MG: 100 CAPSULE, LIQUID FILLED ORAL at 08:40

## 2018-11-10 RX ADMIN — POLYETHYLENE GLYCOL (3350) 17 G: 17 POWDER, FOR SOLUTION ORAL at 11:07

## 2018-11-10 RX ADMIN — CHLORDIAZEPOXIDE HYDROCHLORIDE 5 MG: 5 CAPSULE ORAL at 08:40

## 2018-11-10 RX ADMIN — DOCUSATE SODIUM 100 MG: 100 CAPSULE, LIQUID FILLED ORAL at 20:57

## 2018-11-10 RX ADMIN — HYDROCODONE BITARTRATE AND ACETAMINOPHEN 1 TABLET: 5; 325 TABLET ORAL at 10:18

## 2018-11-10 RX ADMIN — CHLORDIAZEPOXIDE HYDROCHLORIDE 5 MG: 5 CAPSULE ORAL at 17:26

## 2018-11-10 RX ADMIN — VITAMIN D, TAB 1000IU (100/BT) 2000 UNITS: 25 TAB at 08:42

## 2018-11-10 RX ADMIN — HYDROCODONE BITARTRATE AND ACETAMINOPHEN 2 TABLET: 5; 325 TABLET ORAL at 04:08

## 2018-11-10 RX ADMIN — HYDROCODONE BITARTRATE AND ACETAMINOPHEN 1 TABLET: 5; 325 TABLET ORAL at 18:00

## 2018-11-10 RX ADMIN — HYDROCODONE BITARTRATE AND ACETAMINOPHEN 1 TABLET: 5; 325 TABLET ORAL at 21:51

## 2018-11-10 RX ADMIN — CARVEDILOL 6.25 MG: 6.25 TABLET, FILM COATED ORAL at 17:26

## 2018-11-10 RX ADMIN — Medication 10 ML: at 08:46

## 2018-11-10 RX ADMIN — HYDROCODONE BITARTRATE AND ACETAMINOPHEN 1 TABLET: 5; 325 TABLET ORAL at 11:07

## 2018-11-10 RX ADMIN — ASPIRIN 81 MG 81 MG: 81 TABLET ORAL at 08:40

## 2018-11-10 RX ADMIN — FLECAINIDE ACETATE 50 MG: 100 TABLET ORAL at 08:34

## 2018-11-10 RX ADMIN — ENOXAPARIN SODIUM 50 MG: 60 INJECTION SUBCUTANEOUS at 08:43

## 2018-11-10 ASSESSMENT — PAIN DESCRIPTION - ORIENTATION
ORIENTATION: RIGHT

## 2018-11-10 ASSESSMENT — PAIN SCALES - GENERAL
PAINLEVEL_OUTOF10: 0
PAINLEVEL_OUTOF10: 4
PAINLEVEL_OUTOF10: 3
PAINLEVEL_OUTOF10: 7
PAINLEVEL_OUTOF10: 7
PAINLEVEL_OUTOF10: 8
PAINLEVEL_OUTOF10: 4
PAINLEVEL_OUTOF10: 4
PAINLEVEL_OUTOF10: 3

## 2018-11-10 ASSESSMENT — PAIN DESCRIPTION - LOCATION
LOCATION: KNEE

## 2018-11-10 ASSESSMENT — PAIN DESCRIPTION - DESCRIPTORS: DESCRIPTORS: SORE

## 2018-11-10 ASSESSMENT — PAIN DESCRIPTION - PAIN TYPE
TYPE: ACUTE PAIN

## 2018-11-10 NOTE — PROGRESS NOTES
Perfect serve Dr. Lamar Mcdonald: missing home eye drops (xakabtab 0.005% QHS & Betimol 0.5% BID) requesting ensure TID and multi vit,

## 2018-11-10 NOTE — H&P
Hospital Medicine History & Physical      PCP: Naveed Boyer MD    Date of Admission: 11/9/2018    Date of Service: Pt seen/examined on 11/9/18 and Admitted to Inpatient with expected LOS greater than two midnights due to medical therapy. Chief Complaint:  Fever       History Of Present Illness:    80 y.o. female who presented to [facility] with past medical history of: Hypertension, hyperlipidemia, osteoarthritis, rectal bleed, post right patella fracture repair. She was here 10/25-11/6/18,  for fall and patella fracture repair. Patient was needing skilled nursing facility placement for rehab. Patient's daughter refused and took her home. Patient states that her daughter works, and she kept her up all night caring for her. Daughter is requesting rehab placement. Patient denies chest pain, shortness of breath, cough, nausea, vomiting, diarrhea, falls. Patient states that she was running a fever at home, 101 oral.  Right knee has sutures, area is slightly erythematous, edematous, increased warmth. The knee currently does not look infected. No elevation found on WBC, or neutrophils.     Past Medical History:          Diagnosis Date    Arthritis     CAD (coronary artery disease)     Depression     Diverticulosis     Gall stone     Glaucoma     Hypercholesteremia     Hypertension     Irregular heart beat     Rectal prolapse     Uterine cancer ([facility] [address] 2008       Past Surgical History:          Procedure Laterality Date    APPENDECTOMY      COLONOSCOPY  2005    polyp    COLONOSCOPY  1010    diverticulosis    COLONOSCOPY  10/4/2014    diverticulosis, radiation colitis sigmoid    FRACTURE SURGERY      HIP SURGERY Right 8/4/15    hip pinning    [address]STERECTOMY  2008    RT in [address]      OTHER SURGICAL HISTORY Right 10/29/2018    PATELLA OPEN REDUCTION INTERNAL FIXATION     PACEMAKER INSERTION      PACEMAKER PLACEMENT  2008    AR CPTR-ASST Vitamins-Minerals (CENTRUM PO) Take 1 tablet by mouth daily. Indications: Centrum Silver 12/24/10   Historical Provider, MD   polyethylene glycol (GLYCOLAX) powder Take 17 g by mouth daily. Historical Provider, MD   vitamin D (CHOLECALCIFEROL) 400 UNIT TABS tablet   Take 2,000 Units by mouth daily     Historical Provider, MD   Psyllium-Calcium (METAMUCIL PLUS CALCIUM) CAPS Take 1 tablet by mouth nightly. Take with 8 oz water. 12/24/10   Historical Provider, MD   latanoprost (XALATAN) 0.005 % ophthalmic solution   Place 1 drop into both eyes nightly     Historical Provider, MD       Allergies:  Lisinopril; Levofloxacin; Levofloxacin; Vancomycin; Amlodipine; Avelox [moxifloxacin hcl in nacl]; Buspirone; Hydrochlorothiazide; Lidocaine; Moxifloxacin; Moxifloxacin hcl in nacl; Mupirocin; Nsaids; Paroxetine; Phenergan [promethazine hcl]; Phenothiazines; Promethazine hcl; Sulindac; Tolmetin; and Ciprofloxacin    Social History:      The patient currently lives with daughter    TOBACCO:   reports that she has quit smoking. She has never used smokeless tobacco.  ETOH:   reports that she does not drink alcohol. Family History:      Reviewed in detail. Positive as follows:    Family History   Problem Relation Age of Onset    Mental Retardation Mother     Cancer Father         lung       REVIEW OF SYSTEMS:   Pertinent positives as noted in the HPI. All other systems reviewed and negative. PHYSICAL EXAM PERFORMED:    /64   Pulse 61   Temp 99.3 °F (37.4 °C) (Oral)   Resp 16   Ht 5' (1.524 m)   Wt 105 lb (47.6 kg)   SpO2 95%   BMI 20.51 kg/m²     General appearance:  No apparent distress, appears stated age and cooperative. HEENT:  Normal cephalic, atraumatic without obvious deformity. Pupils equal, round, and reactive to light. Extra ocular muscles intact. Conjunctivae/corneas clear. Neck: Supple, with full range of motion. No jugular venous distention. Trachea midline.   Respiratory:  Normal respiratory effort. dim to auscultation, bilaterally without Rales/Wheezes/Rhonchi. Cardiovascular:  Regular rate and rhythm with normal S1/S2 without murmurs, rubs or gallops. Abdomen: Soft, non-tender, non-distended with normal bowel sounds. Musculoskeletal:  No clubbing, cyanosis or edema bilaterally. Full range of motion without deformity. Skin: Skin color, texture, turgor normal.  11 day postop right knee, sutures in place, slightly erythematous, edematous, and one to touch  Neurologic:  Neurovascularly intact without any focal sensory/motor deficits. Cranial nerves: II-XII intact, grossly non-focal.  Psychiatric:  Alert and oriented, thought content appropriate, normal insight  Capillary Refill: Brisk,< 3 seconds   Peripheral Pulses: +2 palpable, equal bilaterally       Labs:     Recent Labs      11/09/18   1707   WBC  6.8   HGB  9.1*   HCT  27.8*   PLT  586*     Recent Labs      11/09/18   1707   NA  139   K  4.2   CL  103   CO2  26   BUN  12   CREATININE  <0.5*   CALCIUM  8.9     Recent Labs      11/09/18   1707   AST  23   ALT  17   BILITOT  0.3   ALKPHOS  111     No results for input(s): INR in the last 72 hours. No results for input(s): Claretha Rocker in the last 72 hours.     Urinalysis:      Lab Results   Component Value Date    NITRU Negative 08/20/2018    WBCUA 3-5 08/20/2018    BACTERIA Rare 08/20/2018    RBCUA 3-5 08/20/2018    BLOODU TRACE-LYSED 08/20/2018    SPECGRAV 1.010 08/20/2018    GLUCOSEU Negative 08/20/2018    GLUCOSEU Neg 04/25/2010       Radiology:     CXR: I have reviewed the CXR with the following interpretation: None  EKG:  I have reviewed the EKG with the following interpretation: None    No orders to display       ASSESSMENT:    Active Hospital Problems    Diagnosis Date Noted    Unstable gait [R26.81] 11/09/2018    Anemia [D64.9]     Hyperlipidemia [E78.5]     Displaced transverse fracture of right patella, initial encounter for closed fracture [S82.031A]    

## 2018-11-10 NOTE — PROGRESS NOTES
Occupational Therapy   Occupational Therapy Initial Assessment  Date: 11/10/2018   Patient Name: Gerianne Bumpers  MRN: [id number]     : 1932    Date of Service: 11/10/2018    Discharge Recommendations:  S Level 3, Home with Home health OT, 24 hour supervision or assist  OT Equipment Recommendations  Equipment Needed: No      Patient Diagnosis(es): The primary encounter diagnosis was Impaired mobility and ADLs. Diagnoses of Requires continuous supervision for activities of daily living (ADL), S/P right knee surgery, and Closed nondisplaced comminuted fracture of right patella with routine healing, subsequent encounter were also pertinent to this visit. has a past medical history of Arthritis; CAD (coronary artery disease); Depression; Diverticulosis; Gall stone; Glaucoma; Hypercholesteremia; Hypertension; Irregular heart beat; Rectal prolapse; and Uterine cancer ([facility] [address] has a past surgical history that includes Hysterectomy (); Appendectomy; Pacemaker insertion; Tonsillectomy; Colonoscopy (); Colonoscopy (); Colonoscopy (10/4/2014); hip surgery (Right, 8/4/15); pacemaker placement (); fracture surgery; pr cptr-asst surgical navigation image-less (Right, 2018); joint replacement; knee surgery; other surgical history (Right, 10/29/2018); and pr office/outpt visit,procedure only (Right, 10/29/2018). Restrictions  Restrictions/Precautions  Restrictions/Precautions: Weight Bearing  Required Braces or Orthoses?: Yes (KI at all times)  Lower Extremity Weight Bearing Restrictions  Right Lower Extremity Weight Bearing: Weight Bearing As Tolerated (for transfers only per PA telephone order )  Position Activity Restriction  Other position/activity restrictions: The patient is weightbearing as tolerated for transfers only per [employer] note on . NO WALKING, TRANSFERS ONLY.  NO ROM RLE, NO SLR, KI RLE    Subjective   General  Chart Reviewed: Yes  Patient assessed for

## 2018-11-10 NOTE — PROGRESS NOTES
Subjective  General  Chart Reviewed: Yes  Patient assessed for rehabilitation services?: Yes  Additional Pertinent Hx: S/p R knee inferior pole patellectomy and repair of patellar tendon 10/29/18  Response To Previous Treatment: Not applicable  Referring Practitioner: JAMILA Gutierrez CNP  Referral Date : 11/09/18  Diagnosis: fever, Debility  Follows Commands: Within Functional Limits  General Comment  Comments: Pt resting in bed  Subjective  Subjective: Pt agreeable to PT   Pain Screening  Patient Currently in Pain: Yes  Pain Assessment  Pain Assessment: 0-10  Pain Level: 3  Pain Type: Acute pain  Pain Location: Knee  Pain Orientation: Right  Pain Intervention(s): Repositioned;Elevation  Vital Signs  Patient Currently in Pain: Yes       Orientation  Orientation  Overall Orientation Status: Within Functional Limits  Social/Functional History  Social/Functional History  Lives With: Daughter (daughter works during day, son & grandson come to stay with patient during day)  Type of Home: Trailer  Home Layout: One level  Home Access: Stairs to enter with rails, Ramped entrance  Entrance Stairs - Number of Steps: 1 ROBERTO (family bumps up patient in w/c)   Bathroom Shower/Tub: Tub/Shower unit  Bathroom Toilet: Bedside commode  Bathroom Equipment: Tub transfer bench  Home Equipment: [employer], Rolling walker, Quad cane  Receives Help From: Family, Home health  ADL Assistance: Needs assistance  Bath: Minimal assistance  Dressing: Minimal assistance  Homemaking Assistance: Needs assistance (family responsible)  Ambulation Assistance:  (patient restricted from amb since patellar sx)  Transfer Assistance: Needs assistance (family assisting with transfers)  Occupation: Retired  Type of occupation: homemaker   Leisure & Hobbies: [employer], lakhwinder Davis is Right   Additional Comments: patient completing sponge bath at home    Objective     AROM RLE (degrees)  RLE General AROM: Right knee not

## 2018-11-10 NOTE — PROGRESS NOTES
Hospitalist Progress Note  11/10/2018 3:40 PM  Subjective:   Admit Date: 11/9/2018  PCP: Vivian Mcfarland MD Status: Inpatient  Interval History: Hospital Day: 2, admitted overnight with debility and unstable gait due to right patella fracture surgery on 10/30. Daughter is unable to take care of her at home. History of present illness:  History of hypertension, hyperlipidemia, osteoarthritis, rectal bleed, post right patella fracture repair. Ad[facility], for fall requiring a right knee inferior pole patellectomy and repair patella tendon (10/30, Emilie Patino). She had right total knee replacement two months prior. Patient was needing skilled nursing facility placement for rehab. Patient's daughter refused and took her home. Patient states that her daughter works, and she kept her up all night caring for her. Daughter is requesting rehab placement. Patient denies chest pain, shortness of breath, cough, nausea, vomiting, diarrhea, falls. Patient states that she was running a fever at home, 101 oral.  Right knee has sutures, area is slightly erythematous, edematous, increased warmth. The knee currently does not look infected. No elevation found on WBC, or neutrophils.     DIET CARDIAC; Low Sodium (2 GM)  Dietary Nutrition Supplements: Standard High Calorie Oral Supplement  Medications:     enoxaparin  1 mg/kg Subcutaneous Daily   timolol  1 drop Both Eyes BID   aspirin  81 mg Oral Daily   atorvastatin  10 mg Oral Nightly   bisacodyl  10 mg Rectal Daily   carvedilol  6.25 mg Oral BID WC   chlordiazePOXIDE  5 mg Oral BID   docusate sodium  100 mg Oral BID   famotidine  20 mg Oral BID   ferrous sulfate  325 mg Oral Mon Wed Fri   flecainide  50 mg Oral BID   polyethylene glycol  17 g Oral Daily   sennosides-docusate   1 tablet Oral Daily   vitamin D  2,000 Units Oral Daily   polycarbophil  1,250 mg Oral Daily     Recent Labs      11/09/18   1707   WBC  6.8   HGB  9.1*   PLT  586*   MCV 92.2     Recent Labs      11/09/18   1707   NA  139   K  4.2   CL  103   CO2  26   BUN  12   CREATININE  <0.5*   GLUCOSE  102*     Recent Labs      11/09/18   1707   AST  23   ALT  17   BILITOT  0.3   ALKPHOS  111       Objective:   Vitals:  /81   Pulse 64   Temp 98 °F (oral)   Resp 16   Ht 5'  Wt 47.6 kg   SpO2 93% on RA  BMI 20.5  General appearance: alert and cooperative with exam  Lungs: clear to auscultation bilaterally  Heart: regular rate and rhythm, S1, S2 normal, no murmur, click, rub or gallop  Abdomen: soft, non-tender; bowel sounds normal; no masses,  no organomegaly  Extremities: right knee brace, neurovascular status intact  Neurologic: No obvious focal neurologic deficits. Assessment and Plan:   1. Debility and unsteady gait secondary to right patella fracture:  Continues with PT/OT and plans for post-acute rehab if she qualifies. Orthopedics consult for assistance with appropriate management. 2. Normocytic anemia:  Iron replacement therapy with ferrous sulfate. 3. Paroxysmal atrial fibrillation:  Anticoagulation with post-op enoxaparin 1 mg/kg daily, rate control with carvedilol 6.25 mg BID. She is also on an anti-arrhythmic flecainide 50 mg BID. 4. Dyslipidemia:  Continues on atorvastatin 10 mg nightly. Advance Directive: Full Code  DVT prophylaxis with enoxaparin 1 mg/kg sub-Q daily. Discharge planning:  Patient and family   PT/OT evaluation 24 hour supervision or assist, Home with Home health PT. We will attempt to place inpatient rehab.         Whitley Trinh MD  [facility] Hospitalist

## 2018-11-10 NOTE — ED PROVIDER NOTES
12/24/2010    Levofloxacin  12/24/2010    Levofloxacin Other (See Comments) 12/24/2010    Vancomycin Rash 08/04/2015    Amlodipine  06/05/2018    Avelox [moxifloxacin hcl in nacl]  02/25/2011    Buspirone Itching 06/12/2012    Hydrochlorothiazide Swelling 06/12/2012    Lidocaine Other (See Comments) 06/12/2012    Moxifloxacin  02/25/2011    Moxifloxacin hcl in nacl Other (See Comments) 06/12/2012    Mupirocin  09/18/2009    Nsaids  10/29/2015    Paroxetine Other (See Comments) 06/12/2012    Phenergan [promethazine hcl] Swelling 12/24/2010    Phenothiazines  09/18/2009    Promethazine hcl Swelling 12/24/2010    Sulindac  01/11/2017    Tolmetin  10/29/2015    Ciprofloxacin Rash and Swelling 12/24/2010       Past Medical History:   Diagnosis Date    Arthritis     CAD (coronary artery disease)     Depression     Diverticulosis     Gall stone     Glaucoma     Hypercholesteremia     Hypertension     Irregular heart beat     Rectal prolapse     Uterine cancer ([facility] [address] 2008        Surgical History:   Past Surgical History:   Procedure Laterality Date    APPENDECTOMY      COLONOSCOPY  2005    polyp    COLONOSCOPY  1010    diverticulosis    COLONOSCOPY  10/4/2014    diverticulosis, radiation colitis sigmoid    FRACTURE SURGERY      HIP SURGERY Right 8/4/15    hip pinning    HYSTERECTOMY  2008    RT in [address]   [address]      OTHER SURGICAL HISTORY Right 10/29/2018    PATELLA OPEN REDUCTION INTERNAL FIXATION     PACEMAKER INSERTION      PACEMAKER PLACEMENT  2008    NE CPTR-ASST SURGICAL NAVIGATION IMAGE-LESS Right 9/5/2018    RIGHT TOTAL KNEE REPLACEMENT COMPUTER NAVIGATION WITH ADDUCTOR CANAL BLOCK FOR PAIN CONTROL                     SONAL performed by Heath Rivas MD at [address] [facility] OFFICE/OUTPT [address] Right 10/29/2018    Right knee inferior pole patellectomy, primary repair of patellar tendon.  performed by Heath Rivas MD at [place] [address]

## 2018-11-10 NOTE — PLAN OF CARE
Problem: Musculor/Skeletal Functional Status  Intervention: OT Evaluation/treatment  Facilitate return to baseline.

## 2018-11-11 VITALS
HEART RATE: 60 BPM | OXYGEN SATURATION: 96 % | HEIGHT: 60 IN | BODY MASS INDEX: 20.62 KG/M2 | TEMPERATURE: 98.6 F | RESPIRATION RATE: 16 BRPM | WEIGHT: 105 LBS | DIASTOLIC BLOOD PRESSURE: 73 MMHG | SYSTOLIC BLOOD PRESSURE: 127 MMHG

## 2018-11-11 PROBLEM — R50.9 FEVER: Status: RESOLVED | Noted: 2018-11-11 | Resolved: 2018-11-11

## 2018-11-11 PROBLEM — R50.9 FEVER: Status: ACTIVE | Noted: 2018-11-11

## 2018-11-11 LAB
IRON SATURATION: 12 % (ref 15–50)
IRON: 33 UG/DL (ref 37–145)
TOTAL IRON BINDING CAPACITY: 283 UG/DL (ref 260–445)

## 2018-11-11 PROCEDURE — 6360000002 HC RX W HCPCS: Performed by: NURSE PRACTITIONER

## 2018-11-11 PROCEDURE — 96372 THER/PROPH/DIAG INJ SC/IM: CPT

## 2018-11-11 PROCEDURE — 6370000000 HC RX 637 (ALT 250 FOR IP): Performed by: INTERNAL MEDICINE

## 2018-11-11 PROCEDURE — G0378 HOSPITAL OBSERVATION PER HR: HCPCS

## 2018-11-11 PROCEDURE — 2580000003 HC RX 258: Performed by: NURSE PRACTITIONER

## 2018-11-11 PROCEDURE — 83540 ASSAY OF IRON: CPT

## 2018-11-11 PROCEDURE — 83550 IRON BINDING TEST: CPT

## 2018-11-11 PROCEDURE — 6370000000 HC RX 637 (ALT 250 FOR IP): Performed by: NURSE PRACTITIONER

## 2018-11-11 PROCEDURE — 36415 COLL VENOUS BLD VENIPUNCTURE: CPT

## 2018-11-11 RX ADMIN — VITAMIN D, TAB 1000IU (100/BT) 2000 UNITS: 25 TAB at 09:11

## 2018-11-11 RX ADMIN — FAMOTIDINE 20 MG: 20 TABLET, FILM COATED ORAL at 09:11

## 2018-11-11 RX ADMIN — Medication 10 ML: at 09:18

## 2018-11-11 RX ADMIN — CHLORDIAZEPOXIDE HYDROCHLORIDE 5 MG: 5 CAPSULE ORAL at 09:11

## 2018-11-11 RX ADMIN — CARVEDILOL 6.25 MG: 6.25 TABLET, FILM COATED ORAL at 17:22

## 2018-11-11 RX ADMIN — POLYETHYLENE GLYCOL (3350) 17 G: 17 POWDER, FOR SOLUTION ORAL at 09:11

## 2018-11-11 RX ADMIN — ASPIRIN 81 MG 81 MG: 81 TABLET ORAL at 09:11

## 2018-11-11 RX ADMIN — CHLORDIAZEPOXIDE HYDROCHLORIDE 5 MG: 5 CAPSULE ORAL at 17:22

## 2018-11-11 RX ADMIN — HYDROCODONE BITARTRATE AND ACETAMINOPHEN 1 TABLET: 5; 325 TABLET ORAL at 04:06

## 2018-11-11 RX ADMIN — FLECAINIDE ACETATE 50 MG: 100 TABLET ORAL at 09:12

## 2018-11-11 RX ADMIN — CARVEDILOL 6.25 MG: 6.25 TABLET, FILM COATED ORAL at 09:11

## 2018-11-11 RX ADMIN — ENOXAPARIN SODIUM 50 MG: 60 INJECTION SUBCUTANEOUS at 09:17

## 2018-11-11 RX ADMIN — HYDROCODONE BITARTRATE AND ACETAMINOPHEN 1 TABLET: 5; 325 TABLET ORAL at 18:05

## 2018-11-11 RX ADMIN — HYDROCODONE BITARTRATE AND ACETAMINOPHEN 1 TABLET: 5; 325 TABLET ORAL at 13:46

## 2018-11-11 RX ADMIN — DOCUSATE SODIUM 100 MG: 100 CAPSULE, LIQUID FILLED ORAL at 09:11

## 2018-11-11 RX ADMIN — Medication 1 TABLET: at 09:11

## 2018-11-11 RX ADMIN — TIMOLOL MALEATE 1 DROP: 5 SOLUTION/ DROPS OPHTHALMIC at 09:18

## 2018-11-11 ASSESSMENT — PAIN DESCRIPTION - LOCATION: LOCATION: KNEE

## 2018-11-11 ASSESSMENT — PAIN SCALES - GENERAL
PAINLEVEL_OUTOF10: 3
PAINLEVEL_OUTOF10: 4
PAINLEVEL_OUTOF10: 6
PAINLEVEL_OUTOF10: 0
PAINLEVEL_OUTOF10: 0

## 2018-11-11 ASSESSMENT — PAIN DESCRIPTION - ORIENTATION: ORIENTATION: RIGHT

## 2018-11-11 ASSESSMENT — PAIN DESCRIPTION - PAIN TYPE: TYPE: SURGICAL PAIN

## 2018-11-11 NOTE — DISCHARGE INSTR - COC
Continuity of Care Form    Patient Name: Williams Bhakta   :  1932  MRN:  [id number]    Admit date:  2018  Discharge date:  ***    Code Status Order: Full Code   Advance Directives:   Advance Care Flowsheet Documentation     Date/Time Healthcare Directive Type of Healthcare Directive Copy in [address] Agent's Name Healthcare Agent's Phone Number    [phone]  Yes, patient has an advance directive for healthcare treatment  Living will  No, copy requested from family --  Candy Gonzalez  --          Admitting Physician:  Marisela Spear MD  PCP: Guy Carrasquillo MD    Discharging Nurse: [facility] Unit/Room#: 1718/8560-99  Discharging Unit Phone Number: ***    Emergency Contact:   Extended Emergency Contact Information  Primary Emergency Contact: [address] [place] [address] Phone: [phone]  Mobile Phone: [phone]  Relation: Child    Past Surgical History:  Past Surgical History:   Procedure Laterality Date    APPENDECTOMY      COLONOSCOPY  2005    polyp    COLONOSCOPY  1010    diverticulosis    COLONOSCOPY  10/4/2014    diverticulosis, radiation colitis sigmoid    FRACTURE SURGERY      HIP SURGERY Right 8/4/15    hip pinning    HYSTERECTOMY  2008    RT in    [address] OTHER SURGICAL HISTORY Right 10/29/2018    PATELLA OPEN REDUCTION INTERNAL FIXATION     PACEMAKER INSERTION      PACEMAKER PLACEMENT      [place] CPTR-ASST SURGICAL NAVIGATION [facility] Right 2018    RIGHT TOTAL KNEE REPLACEMENT COMPUTER NAVIGATION WITH ADDUCTOR CANAL BLOCK FOR PAIN CONTROL                     SONAL performed by Jade Plasencia MD at [address] [facility] OFFICE/OUTPT [address] [facility] Right 10/29/2018    Right knee inferior pole patellectomy, primary repair of patellar tendon.  performed by Jade Plasencia MD at [address]         Immunization History:   Immunization History Administered Date(s) Administered    Influenza Virus Vaccine 09/10/2014    Pneumococcal Conjugate 7-valent 10/06/2011       Active Problems:  Patient Active Problem List   Diagnosis Code    Localized osteoarthrosis, lower leg M17.10    Rectal bleed K62.5    Osteoarthritis of hip M16.9    Hip fracture ([facility] [address] S72.009A    Hip fracture (HCC) RIGHT S72.009A    Hypertension I10    Hypercholesteremia E78.00    Primary osteoarthritis of right knee M17.11    Pain due to total right knee replacement (HCC) T84.84XA, Z96.651    Chronic knee pain after total replacement of right knee joint M25.561, G89.29, Z96.651    Status post total right knee replacement Z96.651    Paroxysmal atrial fibrillation (HCC) I48.0    Pacemaker Z95.0    Displaced comminuted fracture of right patella, initial encounter for closed fracture S82.041A    Severe protein-calorie malnutrition ([facility] [address] E43    Displaced transverse fracture of right patella, initial encounter for closed fracture S82.031A    Hyperlipidemia E78.5    Anemia D64.9    Fall W19. XXXA    Unstable gait R26.81       Isolation/Infection:   Isolation          No Isolation            Nurse Assessment:  Last Vital Signs: /73   Pulse 60   Temp 98.6 °F (37 °C) (Oral)   Resp 16   Ht 5' (1.524 m)   Wt 105 lb (47.6 kg)   SpO2 96%   BMI 20.51 kg/m²     Last documented pain score (0-10 scale): Pain Level: 0  Last Weight:   Wt Readings from Last 1 Encounters:   11/09/18 105 lb (47.6 kg)     Mental Status:  {IP PT MENTAL STATUS:20030}    IV Access:  {[facility] IV ACCESS:902267264}    Nursing Mobility/ADLs:  Walking   {[facility] DME DKXM:429795225}  Transfer  {[facility] DME DIUM:330146651}  Bathing  {[facility] DME NHHJ:066603806}  Dressing  {[facility] DME HIDN:909240330}  Toileting  {[facility] DME FTNU:369932400}  Feeding  {[facility] DME NZQX:332069522}  Med Admin  {[facility] DME RVRB:075574477}  Med Delivery   {[facility] MED Delivery:821224797}    Wound Care Documentation and Therapy:  Incision 10/29/18 Knee Right (Active)   Wound Assessment Other (Comment) 11/10/2018  8:30 AM   Mary-wound Assessment Other (Comment) 11/10/2018  8:30 AM   Closure Sutures 2018  8:30 AM   Culture Taken No 2018 10:30 PM   Drainage Amount None 2018  8:30 AM   Drainage Description Serosanguinous 2018  9:48 AM   Odor None 11/10/2018  8:30 AM   Dressing/Treatment Dry dressing 2018  8:30 AM   Dressing Changed Changed/New 2018  3:05 PM   Dressing Status Clean;Dry; Intact 11/10/2018  8:30 AM   Dressing Change Due 18  7:20 AM   Number of days: 12        Elimination:  Continence:   · Bowel: {YES / NP:44740}  · Bladder: {YES / BK:31477}  Urinary Catheter: {Urinary Catheter:630577474}   Colostomy/Ileostomy/Ileal Conduit: {YES / WM:53675}       Date of Last BM: ***    Intake/Output Summary (Last 24 hours) at 18 1731  Last data filed at 18 0607   Gross per 24 hour   Intake              170 ml   Output             1000 ml   Net             -830 ml     I/O last 3 completed shifts:   In: 170 [P.O.:170]  Out: 1000 [Urine:1000]    Safety Concerns:     [address] Safety Concerns:025672085}    Impairments/Disabilities:      [address] Impairments/Disabilities:639717149}    Nutrition Therapy:  Current Nutrition Therapy:   [address] Diet List:642238397}    Routes of Feeding: {CHP DME Other Feedings:828843257}  Liquids: {Slp liquid thickness:52038}  Daily Fluid Restriction: {CHP DME Yes amt example:068849655}  Last Modified Barium Swallow with Video (Video Swallowing Test): {Done Not Done PQGY:497627050}    Treatments at the Time of Hospital Discharge:   Respiratory Treatments: ***  Oxygen Therapy:  {Therapy; copd oxygen:82891}  Ventilator:    { CC Vent KLDQ:580942095}    Rehab Therapies: {THERAPEUTIC INTERVENTION:7529392206}  Weight Bearing Status/Restrictions: [address]  Weight Bearin}  Other Medical Equipment (for information only, NOT a DME order):  {EQUIPMENT:150440925}  Other Treatments: ***    Patient's

## 2018-11-11 NOTE — CARE COORDINATION
Per RN, pt is moving around room well and PT/OT cannot see again today and will not likely change recs for home with HHPT/OT. Will discuss with dtr. Also placed call to [facility]note 2[facility] with [facility] to see if pt could enter under her medicaid respite without recs for SNF. Pt does not qualify for IP in hospital according to UR.

## 2018-11-11 NOTE — PROGRESS NOTES
The Utilization Review Committee members, including its physician members, have reviewed this case and agree that this patient does not meet evidenced based criteria for inpatient services, requiring a change in patient status from [facility] as inpatient to place in observation, in accordance with condition code 44.       RAVIN VICTOR  Utilization review committee

## 2018-11-11 NOTE — DISCHARGE SUMMARY
Hospital Medicine Discharge Summary    Swathi Lynn  :  1932  MRN:  [id number]    Admit date:  2018  Discharge date:  11/10/2018    Admitting Physician:  Shana Elmore MD  Primary Care Physician:  Yen Virk MD  Code Status:   Full Code  Status: Observation  Discharged Condition: Stable  Activity: up with assistance, PT home health  Diet:  DIET CARDIAC; Low Sodium (2 GM)  Dietary Nutrition Supplements: Standard High Calorie Oral Supplement      Discharge Diagnoses:      Hypertension    Paroxysmal atrial fibrillation     Pacemaker    Hyperlipidemia    Iron deficiency anemia    Unstable gait    Hospital Course:   80 y.o. female admitted with debility and unstable gait due to right patella fracture surgery on 10/30. Daughter is unable to take care of her at home. Evaluation by PT/OT recommends home health physical therapy.      History of present illness:  History of hypertension, hyperlipidemia, osteoarthritis, rectal bleed, post right patella fracture repair.   Admitted to [facility] 10/25-18, for fall requiring a right knee inferior pole patellectomy and repair patella tendon (10/30, Josse Joe). Veronica Altamirano had right total knee replacement two months prior. Patient was needing skilled nursing facility placement for rehab.  Patient's daughter refused and took her home. Akbar Stanley states that her daughter works, and she kept her up all night caring for her. [facility] is requesting rehab placement.  Patient denies chest pain, shortness of breath, cough, nausea, vomiting, diarrhea, falls.  Patient states that she was running a fever at home, 101 oral.  Right knee has sutures, area is slightly erythematous, edematous, increased warmth.  The knee currently does not look infected.  No elevation found on WBC, or neutrophils. 1. Normocytic anemia:  Iron replacement therapy with ferrous sulfate.     2. Paroxysmal atrial fibrillation:  Anticoagulation with post-op enoxaparin 1 mg/kg daily, rate control with carvedilol 6.25 mg BID. She is also on an anti-arrhythmic flecainide 50 mg BID. 3. Dyslipidemia:  Continues on atorvastatin 10 mg nightly. Discharge Medications:    aspirin 81 MG tablet  Take 81 mg by mouth daily     atorvastatin (LIPITOR) 10 MG tablet  Take 10 mg by mouth nightly      bisacodyl (DULCOLAX) 10 MG suppository  Place 1 suppository rectally daily     carvedilol (COREG) 3.125 MG tablet  Take 1 tablet by mouth 2 times daily (with meals). chlordiazepoxide (LIBRIUM) 10 MG capsule  Take 5 mg by mouth 2 times daily      docusate sodium (COLACE, DULCOLAX) 100 MG CAPS  Take 100 mg by mouth 2 times daily     enoxaparin (LOVENOX) 40 MG/0.4ML injection  Inject 0.4 mLs into the skin daily for 20 days     famotidine (PEPCID) 20 MG tablet  Take 1 tablet by mouth 2 times daily     ferrous sulfate 325 (65 Fe) MG tablet  Take 1 tablet by mouth three times a week     flecainide (TAMBOCOR) 50 MG tablet  Take 50 mg by mouth 2 times daily     latanoprost (XALATAN) 0.005 % ophthalmic solution  Place 1 drop into both eyes nightly      Multiple Vitamins-Minerals (CENTRUM PO)  Take 1 tablet by mouth daily. Indications: Centrum Silver     polyethylene glycol (GLYCOLAX) powder  Take 17 g by mouth daily. sennosides-docusate sodium (SENOKOT-S) 8.6-50 MG tablet  Take 1 tablet by mouth daily     timolol (BETIMOL) 0.5 % ophthalmic solution  Place 1 drop into the left eye 2 times daily     vitamin D (CHOLECALCIFEROL) 400 UNIT TABS tablet  Take 2,000 Units by mouth daily          Consults:   Social Work, Case management, pastoral care    Significant Diagnostic Studies:    Iron / TIBC = 33 / 283 = 12% c/w iron deficiency  Urine culture no growth, Urinalysis small leukocyte esterase  Lactate 0.7 mmol/L    Treatments:   Observation    Disposition:   Home with home health  PT/OT evaluation 24 hour supervision or assist, Home with Home health PT.     May be admitted to [facility] for [facility]

## 2018-11-12 LAB — URINE CULTURE, ROUTINE: NORMAL

## 2018-11-12 NOTE — TELEPHONE ENCOUNTER
Dry sterile dressing, daily. They should put some Polysporin ointment, Adaptic, 4 x 4 sponges, web roll and an Ace bandage.

## 2018-11-16 ENCOUNTER — TELEPHONE (OUTPATIENT)
Dept: ORTHOPEDIC SURGERY | Age: 83
End: 2018-11-16

## 2018-11-16 ENCOUNTER — CARE COORDINATION (OUTPATIENT)
Dept: CASE MANAGEMENT | Age: 83
End: 2018-11-16

## 2018-11-26 ENCOUNTER — OFFICE VISIT (OUTPATIENT)
Dept: ORTHOPEDIC SURGERY | Age: 83
End: 2018-11-26
Payer: COMMERCIAL

## 2018-11-26 DIAGNOSIS — M25.561 PAIN, JOINT, KNEE, RIGHT: ICD-10-CM

## 2018-11-26 DIAGNOSIS — S82.041A DISPLACED COMMINUTED FRACTURE OF RIGHT PATELLA, INITIAL ENCOUNTER FOR CLOSED FRACTURE: Primary | ICD-10-CM

## 2018-11-26 DIAGNOSIS — Z96.651 STATUS POST TOTAL RIGHT KNEE REPLACEMENT: ICD-10-CM

## 2018-11-26 PROCEDURE — L1832 KO ADJ JNT POS R SUP PRE CST: HCPCS | Performed by: ORTHOPAEDIC SURGERY

## 2018-11-26 PROCEDURE — 99024 POSTOP FOLLOW-UP VISIT: CPT | Performed by: ORTHOPAEDIC SURGERY

## 2018-11-27 ENCOUNTER — TELEPHONE (OUTPATIENT)
Dept: ORTHOPEDIC SURGERY | Age: 83
End: 2018-11-27

## 2018-11-28 ENCOUNTER — CARE COORDINATION (OUTPATIENT)
Dept: CASE MANAGEMENT | Age: 83
End: 2018-11-28

## 2018-11-28 NOTE — CARE COORDINATION
Emailed Delfina Schaffer @ [facility] for patient updates. Belle replied: Transfers-CGA <>Min A  Gait-0' Due to NWB  Bed Mobility-CGA  Stairs-Unable  Bathing-Mod A 2/2 NWB  UB/LB Dressing-Mod A 2/2 NWB  Toileting-Mod-Mas A 2/2 NWB  iADLs-Max A 2/2 NWB  Ortho Dr. Kyara Piper made patient NWB on 11/26/18. Patient is following up with ortho Dr. Rosalba Sheriff in office on 11/29/18 @ 10am to discuss possible skin graft.    Tentative Discharge Date: 12/12/18    Nay VILLALBAN  Care Transition Coordinator for ortho bundle  [phone]

## 2018-11-29 ENCOUNTER — HOSPITAL ENCOUNTER (INPATIENT)
Age: 83
LOS: 4 days | Discharge: NURSING, REHAB OR LONG-TERM CARE | DRG: 904 | End: 2018-12-03
Attending: ORTHOPAEDIC SURGERY | Admitting: ORTHOPAEDIC SURGERY
Payer: MEDICARE

## 2018-11-29 DIAGNOSIS — S81.001S OPEN KNEE WOUND, RIGHT, SEQUELA: Primary | ICD-10-CM

## 2018-11-29 LAB
ANION GAP SERPL CALCULATED.3IONS-SCNC: 9 MMOL/L (ref 3–16)
BASOPHILS ABSOLUTE: 0 K/UL (ref 0–0.2)
BASOPHILS RELATIVE PERCENT: 0.6 %
BUN BLDV-MCNC: 14 MG/DL (ref 7–20)
CALCIUM SERPL-MCNC: 9.3 MG/DL (ref 8.3–10.6)
CHLORIDE BLD-SCNC: 100 MMOL/L (ref 99–110)
CO2: 28 MMOL/L (ref 21–32)
CREAT SERPL-MCNC: <0.5 MG/DL (ref 0.6–1.2)
EOSINOPHILS ABSOLUTE: 0.2 K/UL (ref 0–0.6)
EOSINOPHILS RELATIVE PERCENT: 3.5 %
GFR AFRICAN AMERICAN: >60
GFR NON-AFRICAN AMERICAN: >60
GLUCOSE BLD-MCNC: 116 MG/DL (ref 70–99)
HCT VFR BLD CALC: 33 % (ref 36–48)
HEMOGLOBIN: 10.8 G/DL (ref 12–16)
INR BLD: 1.2 (ref 0.86–1.14)
LYMPHOCYTES ABSOLUTE: 1.2 K/UL (ref 1–5.1)
LYMPHOCYTES RELATIVE PERCENT: 18 %
MCH RBC QN AUTO: 29.8 PG (ref 26–34)
MCHC RBC AUTO-ENTMCNC: 32.7 G/DL (ref 31–36)
MCV RBC AUTO: 91.1 FL (ref 80–100)
MONOCYTES ABSOLUTE: 0.7 K/UL (ref 0–1.3)
MONOCYTES RELATIVE PERCENT: 10.3 %
NEUTROPHILS ABSOLUTE: 4.4 K/UL (ref 1.7–7.7)
NEUTROPHILS RELATIVE PERCENT: 67.6 %
PDW BLD-RTO: 14.6 % (ref 12.4–15.4)
PLATELET # BLD: 402 K/UL (ref 135–450)
PMV BLD AUTO: 7.4 FL (ref 5–10.5)
POTASSIUM REFLEX MAGNESIUM: 3.7 MMOL/L (ref 3.5–5.1)
PROTHROMBIN TIME: 13.7 SEC (ref 9.8–13)
RBC # BLD: 3.62 M/UL (ref 4–5.2)
SODIUM BLD-SCNC: 137 MMOL/L (ref 136–145)
WBC # BLD: 6.6 K/UL (ref 4–11)

## 2018-11-29 PROCEDURE — 0KBS0ZZ EXCISION OF RIGHT LOWER LEG MUSCLE, OPEN APPROACH: ICD-10-PCS | Performed by: ORTHOPAEDIC SURGERY

## 2018-11-29 PROCEDURE — 1200000000 HC SEMI PRIVATE

## 2018-11-29 PROCEDURE — 0HRKX74 REPLACEMENT OF RIGHT LOWER LEG SKIN WITH AUTOLOGOUS TISSUE SUBSTITUTE, PARTIAL THICKNESS, EXTERNAL APPROACH: ICD-10-PCS | Performed by: ORTHOPAEDIC SURGERY

## 2018-11-29 PROCEDURE — 6370000000 HC RX 637 (ALT 250 FOR IP): Performed by: PHYSICIAN ASSISTANT

## 2018-11-29 PROCEDURE — 0HBKXZZ EXCISION OF RIGHT LOWER LEG SKIN, EXTERNAL APPROACH: ICD-10-PCS | Performed by: ORTHOPAEDIC SURGERY

## 2018-11-29 PROCEDURE — 2580000003 HC RX 258: Performed by: PHYSICIAN ASSISTANT

## 2018-11-29 PROCEDURE — 80048 BASIC METABOLIC PNL TOTAL CA: CPT

## 2018-11-29 PROCEDURE — 2580000003 HC RX 258

## 2018-11-29 PROCEDURE — 6360000002 HC RX W HCPCS: Performed by: PHYSICIAN ASSISTANT

## 2018-11-29 PROCEDURE — 36415 COLL VENOUS BLD VENIPUNCTURE: CPT

## 2018-11-29 PROCEDURE — APPSS30 APP SPLIT SHARED TIME 16-30 MINUTES: Performed by: PHYSICIAN ASSISTANT

## 2018-11-29 PROCEDURE — 0KUS07Z SUPPLEMENT RIGHT LOWER LEG MUSCLE WITH AUTOLOGOUS TISSUE SUBSTITUTE, OPEN APPROACH: ICD-10-PCS | Performed by: ORTHOPAEDIC SURGERY

## 2018-11-29 PROCEDURE — 0SBC0ZZ EXCISION OF RIGHT KNEE JOINT, OPEN APPROACH: ICD-10-PCS | Performed by: ORTHOPAEDIC SURGERY

## 2018-11-29 PROCEDURE — 85025 COMPLETE CBC W/AUTO DIFF WBC: CPT

## 2018-11-29 PROCEDURE — 85610 PROTHROMBIN TIME: CPT

## 2018-11-29 RX ORDER — DOCUSATE SODIUM 100 MG/1
100 CAPSULE, LIQUID FILLED ORAL 2 TIMES DAILY
Status: DISCONTINUED | OUTPATIENT
Start: 2018-11-29 | End: 2018-12-03 | Stop reason: HOSPADM

## 2018-11-29 RX ORDER — OXYCODONE HYDROCHLORIDE AND ACETAMINOPHEN 5; 325 MG/1; MG/1
1 TABLET ORAL EVERY 4 HOURS PRN
Status: DISCONTINUED | OUTPATIENT
Start: 2018-11-29 | End: 2018-12-03 | Stop reason: HOSPADM

## 2018-11-29 RX ORDER — ACETAMINOPHEN 325 MG/1
650 TABLET ORAL EVERY 4 HOURS PRN
Status: DISCONTINUED | OUTPATIENT
Start: 2018-11-29 | End: 2018-12-03 | Stop reason: HOSPADM

## 2018-11-29 RX ORDER — OXYCODONE HYDROCHLORIDE AND ACETAMINOPHEN 5; 325 MG/1; MG/1
2 TABLET ORAL EVERY 4 HOURS PRN
Status: DISCONTINUED | OUTPATIENT
Start: 2018-11-29 | End: 2018-12-03 | Stop reason: HOSPADM

## 2018-11-29 RX ORDER — SODIUM CHLORIDE 0.9 % (FLUSH) 0.9 %
SYRINGE (ML) INJECTION
Status: COMPLETED
Start: 2018-11-29 | End: 2018-11-29

## 2018-11-29 RX ORDER — SODIUM CHLORIDE 9 MG/ML
INJECTION, SOLUTION INTRAVENOUS CONTINUOUS
Status: DISCONTINUED | OUTPATIENT
Start: 2018-11-29 | End: 2018-12-03 | Stop reason: HOSPADM

## 2018-11-29 RX ORDER — FAMOTIDINE 20 MG/1
20 TABLET, FILM COATED ORAL 2 TIMES DAILY
Status: DISCONTINUED | OUTPATIENT
Start: 2018-11-29 | End: 2018-11-29

## 2018-11-29 RX ORDER — ONDANSETRON 2 MG/ML
4 INJECTION INTRAMUSCULAR; INTRAVENOUS EVERY 6 HOURS PRN
Status: DISCONTINUED | OUTPATIENT
Start: 2018-11-29 | End: 2018-12-03 | Stop reason: HOSPADM

## 2018-11-29 RX ORDER — SODIUM CHLORIDE 0.9 % (FLUSH) 0.9 %
10 SYRINGE (ML) INJECTION PRN
Status: DISCONTINUED | OUTPATIENT
Start: 2018-11-29 | End: 2018-12-03 | Stop reason: HOSPADM

## 2018-11-29 RX ORDER — SODIUM CHLORIDE 0.9 % (FLUSH) 0.9 %
10 SYRINGE (ML) INJECTION EVERY 12 HOURS SCHEDULED
Status: DISCONTINUED | OUTPATIENT
Start: 2018-11-29 | End: 2018-12-03 | Stop reason: HOSPADM

## 2018-11-29 RX ORDER — MORPHINE SULFATE 2 MG/ML
4 INJECTION, SOLUTION INTRAMUSCULAR; INTRAVENOUS
Status: DISCONTINUED | OUTPATIENT
Start: 2018-11-29 | End: 2018-12-01

## 2018-11-29 RX ORDER — MORPHINE SULFATE 2 MG/ML
2 INJECTION, SOLUTION INTRAMUSCULAR; INTRAVENOUS
Status: DISCONTINUED | OUTPATIENT
Start: 2018-11-29 | End: 2018-12-01

## 2018-11-29 RX ORDER — FAMOTIDINE 20 MG/1
20 TABLET, FILM COATED ORAL DAILY
Status: DISCONTINUED | OUTPATIENT
Start: 2018-11-29 | End: 2018-12-03 | Stop reason: HOSPADM

## 2018-11-29 RX ADMIN — SODIUM CHLORIDE, PRESERVATIVE FREE 10 ML: 5 INJECTION INTRAVENOUS at 11:40

## 2018-11-29 RX ADMIN — DOCUSATE SODIUM 100 MG: 100 CAPSULE, LIQUID FILLED ORAL at 20:56

## 2018-11-29 RX ADMIN — OXYCODONE AND ACETAMINOPHEN 1 TABLET: 5; 325 TABLET ORAL at 20:56

## 2018-11-29 RX ADMIN — OXYCODONE AND ACETAMINOPHEN 1 TABLET: 5; 325 TABLET ORAL at 15:17

## 2018-11-29 RX ADMIN — SODIUM CHLORIDE, PRESERVATIVE FREE 10 ML: 5 INJECTION INTRAVENOUS at 20:57

## 2018-11-29 RX ADMIN — ENOXAPARIN SODIUM 30 MG: 30 INJECTION SUBCUTANEOUS at 16:27

## 2018-11-29 RX ADMIN — FAMOTIDINE 20 MG: 20 TABLET ORAL at 16:27

## 2018-11-29 ASSESSMENT — PAIN SCALES - GENERAL
PAINLEVEL_OUTOF10: 5
PAINLEVEL_OUTOF10: 4

## 2018-11-30 ENCOUNTER — ANESTHESIA (OUTPATIENT)
Dept: OPERATING ROOM | Age: 83
DRG: 904 | End: 2018-11-30
Payer: MEDICARE

## 2018-11-30 ENCOUNTER — ANESTHESIA EVENT (OUTPATIENT)
Dept: OPERATING ROOM | Age: 83
DRG: 904 | End: 2018-11-30
Payer: MEDICARE

## 2018-11-30 VITALS — OXYGEN SATURATION: 100 % | DIASTOLIC BLOOD PRESSURE: 64 MMHG | SYSTOLIC BLOOD PRESSURE: 122 MMHG

## 2018-11-30 PROCEDURE — 1200000000 HC SEMI PRIVATE

## 2018-11-30 PROCEDURE — 87075 CULTR BACTERIA EXCEPT BLOOD: CPT

## 2018-11-30 PROCEDURE — 94761 N-INVAS EAR/PLS OXIMETRY MLT: CPT

## 2018-11-30 PROCEDURE — 7100000001 HC PACU RECOVERY - ADDTL 15 MIN: Performed by: ORTHOPAEDIC SURGERY

## 2018-11-30 PROCEDURE — 6370000000 HC RX 637 (ALT 250 FOR IP): Performed by: ORTHOPAEDIC SURGERY

## 2018-11-30 PROCEDURE — 6360000002 HC RX W HCPCS: Performed by: PHYSICIAN ASSISTANT

## 2018-11-30 PROCEDURE — 3600000015 HC SURGERY LEVEL 5 ADDTL 15MIN: Performed by: ORTHOPAEDIC SURGERY

## 2018-11-30 PROCEDURE — 87205 SMEAR GRAM STAIN: CPT

## 2018-11-30 PROCEDURE — 87070 CULTURE OTHR SPECIMN AEROBIC: CPT

## 2018-11-30 PROCEDURE — 2500000003 HC RX 250 WO HCPCS: Performed by: NURSE ANESTHETIST, CERTIFIED REGISTERED

## 2018-11-30 PROCEDURE — 2580000003 HC RX 258: Performed by: PHYSICIAN ASSISTANT

## 2018-11-30 PROCEDURE — 7100000000 HC PACU RECOVERY - FIRST 15 MIN: Performed by: ORTHOPAEDIC SURGERY

## 2018-11-30 PROCEDURE — 2580000003 HC RX 258: Performed by: ORTHOPAEDIC SURGERY

## 2018-11-30 PROCEDURE — 6360000002 HC RX W HCPCS: Performed by: ANESTHESIOLOGY

## 2018-11-30 PROCEDURE — 87206 SMEAR FLUORESCENT/ACID STAI: CPT

## 2018-11-30 PROCEDURE — 2700000000 HC OXYGEN THERAPY PER DAY

## 2018-11-30 PROCEDURE — 87015 SPECIMEN INFECT AGNT CONCNTJ: CPT

## 2018-11-30 PROCEDURE — 3600000005 HC SURGERY LEVEL 5 BASE: Performed by: ORTHOPAEDIC SURGERY

## 2018-11-30 PROCEDURE — 3700000001 HC ADD 15 MINUTES (ANESTHESIA): Performed by: ORTHOPAEDIC SURGERY

## 2018-11-30 PROCEDURE — 6360000002 HC RX W HCPCS: Performed by: NURSE ANESTHETIST, CERTIFIED REGISTERED

## 2018-11-30 PROCEDURE — 6370000000 HC RX 637 (ALT 250 FOR IP): Performed by: PHYSICIAN ASSISTANT

## 2018-11-30 PROCEDURE — 6370000000 HC RX 637 (ALT 250 FOR IP): Performed by: INTERNAL MEDICINE

## 2018-11-30 PROCEDURE — 3700000000 HC ANESTHESIA ATTENDED CARE: Performed by: ORTHOPAEDIC SURGERY

## 2018-11-30 PROCEDURE — 2709999900 HC NON-CHARGEABLE SUPPLY: Performed by: ORTHOPAEDIC SURGERY

## 2018-11-30 PROCEDURE — 87102 FUNGUS ISOLATION CULTURE: CPT

## 2018-11-30 PROCEDURE — 87116 MYCOBACTERIA CULTURE: CPT

## 2018-11-30 RX ORDER — MORPHINE SULFATE 4 MG/ML
1 INJECTION, SOLUTION INTRAMUSCULAR; INTRAVENOUS EVERY 5 MIN PRN
Status: DISCONTINUED | OUTPATIENT
Start: 2018-11-30 | End: 2018-11-30 | Stop reason: HOSPADM

## 2018-11-30 RX ORDER — LABETALOL HYDROCHLORIDE 5 MG/ML
5 INJECTION, SOLUTION INTRAVENOUS EVERY 10 MIN PRN
Status: DISCONTINUED | OUTPATIENT
Start: 2018-11-30 | End: 2018-11-30 | Stop reason: HOSPADM

## 2018-11-30 RX ORDER — FENTANYL CITRATE 50 UG/ML
INJECTION, SOLUTION INTRAMUSCULAR; INTRAVENOUS PRN
Status: DISCONTINUED | OUTPATIENT
Start: 2018-11-30 | End: 2018-11-30 | Stop reason: SDUPTHER

## 2018-11-30 RX ORDER — TIMOLOL MALEATE 5 MG/ML
1 SOLUTION/ DROPS OPHTHALMIC 2 TIMES DAILY
Status: DISCONTINUED | OUTPATIENT
Start: 2018-11-30 | End: 2018-12-03 | Stop reason: HOSPADM

## 2018-11-30 RX ORDER — PROPOFOL 10 MG/ML
INJECTION, EMULSION INTRAVENOUS PRN
Status: DISCONTINUED | OUTPATIENT
Start: 2018-11-30 | End: 2018-11-30 | Stop reason: SDUPTHER

## 2018-11-30 RX ORDER — MEPERIDINE HYDROCHLORIDE 50 MG/ML
12.5 INJECTION INTRAMUSCULAR; INTRAVENOUS; SUBCUTANEOUS EVERY 5 MIN PRN
Status: DISCONTINUED | OUTPATIENT
Start: 2018-11-30 | End: 2018-11-30 | Stop reason: HOSPADM

## 2018-11-30 RX ORDER — HYDRALAZINE HYDROCHLORIDE 20 MG/ML
5 INJECTION INTRAMUSCULAR; INTRAVENOUS EVERY 10 MIN PRN
Status: DISCONTINUED | OUTPATIENT
Start: 2018-11-30 | End: 2018-11-30 | Stop reason: HOSPADM

## 2018-11-30 RX ORDER — DIPHENHYDRAMINE HYDROCHLORIDE 50 MG/ML
12.5 INJECTION INTRAMUSCULAR; INTRAVENOUS
Status: DISCONTINUED | OUTPATIENT
Start: 2018-11-30 | End: 2018-11-30 | Stop reason: HOSPADM

## 2018-11-30 RX ORDER — LATANOPROST 50 UG/ML
1 SOLUTION/ DROPS OPHTHALMIC NIGHTLY
Status: DISCONTINUED | OUTPATIENT
Start: 2018-11-30 | End: 2018-12-03 | Stop reason: HOSPADM

## 2018-11-30 RX ORDER — MAGNESIUM CARB/ALUMINUM HYDROX 105-160MG
TABLET,CHEWABLE ORAL PRN
Status: DISCONTINUED | OUTPATIENT
Start: 2018-11-30 | End: 2018-11-30 | Stop reason: HOSPADM

## 2018-11-30 RX ORDER — MAGNESIUM HYDROXIDE 1200 MG/15ML
LIQUID ORAL CONTINUOUS PRN
Status: COMPLETED | OUTPATIENT
Start: 2018-11-30 | End: 2018-11-30

## 2018-11-30 RX ORDER — ONDANSETRON 2 MG/ML
INJECTION INTRAMUSCULAR; INTRAVENOUS PRN
Status: DISCONTINUED | OUTPATIENT
Start: 2018-11-30 | End: 2018-11-30 | Stop reason: SDUPTHER

## 2018-11-30 RX ORDER — OXYCODONE HYDROCHLORIDE AND ACETAMINOPHEN 5; 325 MG/1; MG/1
2 TABLET ORAL PRN
Status: DISCONTINUED | OUTPATIENT
Start: 2018-11-30 | End: 2018-11-30 | Stop reason: HOSPADM

## 2018-11-30 RX ORDER — ROCURONIUM BROMIDE 10 MG/ML
INJECTION, SOLUTION INTRAVENOUS PRN
Status: DISCONTINUED | OUTPATIENT
Start: 2018-11-30 | End: 2018-11-30 | Stop reason: SDUPTHER

## 2018-11-30 RX ORDER — MORPHINE SULFATE 4 MG/ML
2 INJECTION, SOLUTION INTRAMUSCULAR; INTRAVENOUS EVERY 5 MIN PRN
Status: DISCONTINUED | OUTPATIENT
Start: 2018-11-30 | End: 2018-11-30 | Stop reason: HOSPADM

## 2018-11-30 RX ORDER — ONDANSETRON 2 MG/ML
4 INJECTION INTRAMUSCULAR; INTRAVENOUS
Status: DISCONTINUED | OUTPATIENT
Start: 2018-11-30 | End: 2018-11-30 | Stop reason: HOSPADM

## 2018-11-30 RX ORDER — DEXAMETHASONE SODIUM PHOSPHATE 10 MG/ML
INJECTION INTRAMUSCULAR; INTRAVENOUS PRN
Status: DISCONTINUED | OUTPATIENT
Start: 2018-11-30 | End: 2018-11-30 | Stop reason: SDUPTHER

## 2018-11-30 RX ORDER — LIDOCAINE HYDROCHLORIDE 20 MG/ML
INJECTION, SOLUTION INFILTRATION; PERINEURAL PRN
Status: DISCONTINUED | OUTPATIENT
Start: 2018-11-30 | End: 2018-11-30 | Stop reason: SDUPTHER

## 2018-11-30 RX ORDER — OXYCODONE HYDROCHLORIDE AND ACETAMINOPHEN 5; 325 MG/1; MG/1
1 TABLET ORAL PRN
Status: DISCONTINUED | OUTPATIENT
Start: 2018-11-30 | End: 2018-11-30 | Stop reason: HOSPADM

## 2018-11-30 RX ADMIN — PHENYLEPHRINE HYDROCHLORIDE 50 MCG: 10 INJECTION INTRAVENOUS at 16:49

## 2018-11-30 RX ADMIN — FENTANYL CITRATE 25 MCG: 50 INJECTION INTRAMUSCULAR; INTRAVENOUS at 15:56

## 2018-11-30 RX ADMIN — ROCURONIUM BROMIDE 30 MG: 10 SOLUTION INTRAVENOUS at 15:59

## 2018-11-30 RX ADMIN — MORPHINE SULFATE 4 MG: 2 INJECTION, SOLUTION INTRAMUSCULAR; INTRAVENOUS at 20:01

## 2018-11-30 RX ADMIN — OXYCODONE AND ACETAMINOPHEN 1 TABLET: 5; 325 TABLET ORAL at 02:10

## 2018-11-30 RX ADMIN — ONDANSETRON 4 MG: 2 INJECTION INTRAMUSCULAR; INTRAVENOUS at 16:23

## 2018-11-30 RX ADMIN — DOCUSATE SODIUM 100 MG: 100 CAPSULE, LIQUID FILLED ORAL at 20:01

## 2018-11-30 RX ADMIN — OXYCODONE AND ACETAMINOPHEN 1 TABLET: 5; 325 TABLET ORAL at 06:17

## 2018-11-30 RX ADMIN — PROPOFOL 100 MG: 10 INJECTION, EMULSION INTRAVENOUS at 15:56

## 2018-11-30 RX ADMIN — LIDOCAINE HYDROCHLORIDE 40 MG: 20 INJECTION, SOLUTION INFILTRATION; PERINEURAL at 15:56

## 2018-11-30 RX ADMIN — LATANOPROST 1 DROP: 50 SOLUTION OPHTHALMIC at 20:08

## 2018-11-30 RX ADMIN — OXYCODONE AND ACETAMINOPHEN 2 TABLET: 5; 325 TABLET ORAL at 22:48

## 2018-11-30 RX ADMIN — SUGAMMADEX 100 MG: 100 INJECTION, SOLUTION INTRAVENOUS at 16:58

## 2018-11-30 RX ADMIN — FENTANYL CITRATE 50 MCG: 50 INJECTION INTRAMUSCULAR; INTRAVENOUS at 16:19

## 2018-11-30 RX ADMIN — OXYCODONE AND ACETAMINOPHEN 1 TABLET: 5; 325 TABLET ORAL at 10:26

## 2018-11-30 RX ADMIN — HYDROMORPHONE HYDROCHLORIDE 0.25 MG: 1 INJECTION, SOLUTION INTRAMUSCULAR; INTRAVENOUS; SUBCUTANEOUS at 17:24

## 2018-11-30 RX ADMIN — PHENYLEPHRINE HYDROCHLORIDE 100 MCG: 10 INJECTION INTRAVENOUS at 16:37

## 2018-11-30 RX ADMIN — SODIUM CHLORIDE, PRESERVATIVE FREE 10 ML: 5 INJECTION INTRAVENOUS at 08:23

## 2018-11-30 RX ADMIN — PROPOFOL 30 MG: 10 INJECTION, EMULSION INTRAVENOUS at 16:16

## 2018-11-30 RX ADMIN — TIMOLOL MALEATE 1 DROP: 5 SOLUTION OPHTHALMIC at 13:36

## 2018-11-30 RX ADMIN — TIMOLOL MALEATE 1 DROP: 5 SOLUTION OPHTHALMIC at 20:08

## 2018-11-30 RX ADMIN — DEXAMETHASONE SODIUM PHOSPHATE 5 MG: 10 INJECTION INTRAMUSCULAR; INTRAVENOUS at 16:23

## 2018-11-30 RX ADMIN — FENTANYL CITRATE 25 MCG: 50 INJECTION INTRAMUSCULAR; INTRAVENOUS at 16:11

## 2018-11-30 RX ADMIN — SODIUM CHLORIDE: 9 INJECTION, SOLUTION INTRAVENOUS at 13:36

## 2018-11-30 RX ADMIN — SODIUM CHLORIDE: 9 INJECTION, SOLUTION INTRAVENOUS at 00:44

## 2018-11-30 RX ADMIN — SODIUM CHLORIDE, PRESERVATIVE FREE 10 ML: 5 INJECTION INTRAVENOUS at 20:00

## 2018-11-30 ASSESSMENT — PULMONARY FUNCTION TESTS
PIF_VALUE: 11
PIF_VALUE: 11
PIF_VALUE: 13
PIF_VALUE: 8
PIF_VALUE: 13
PIF_VALUE: 1
PIF_VALUE: 14
PIF_VALUE: 13
PIF_VALUE: 13
PIF_VALUE: 12
PIF_VALUE: 1
PIF_VALUE: 18
PIF_VALUE: 10
PIF_VALUE: 13
PIF_VALUE: 12
PIF_VALUE: 13
PIF_VALUE: 13
PIF_VALUE: 15
PIF_VALUE: 13
PIF_VALUE: 25
PIF_VALUE: 13
PIF_VALUE: 12
PIF_VALUE: 14
PIF_VALUE: 13
PIF_VALUE: 11
PIF_VALUE: 15
PIF_VALUE: 12
PIF_VALUE: 10
PIF_VALUE: 13
PIF_VALUE: 12
PIF_VALUE: 2
PIF_VALUE: 1
PIF_VALUE: 11
PIF_VALUE: 1
PIF_VALUE: 13
PIF_VALUE: 1
PIF_VALUE: 1
PIF_VALUE: 13
PIF_VALUE: 21
PIF_VALUE: 14
PIF_VALUE: 14
PIF_VALUE: 8
PIF_VALUE: 11
PIF_VALUE: 1
PIF_VALUE: 13
PIF_VALUE: 12
PIF_VALUE: 10
PIF_VALUE: 10
PIF_VALUE: 1
PIF_VALUE: 12
PIF_VALUE: 11
PIF_VALUE: 1
PIF_VALUE: 15
PIF_VALUE: 13
PIF_VALUE: 10
PIF_VALUE: 12
PIF_VALUE: 1
PIF_VALUE: 13
PIF_VALUE: 11
PIF_VALUE: 8
PIF_VALUE: 10
PIF_VALUE: 13
PIF_VALUE: 12
PIF_VALUE: 13
PIF_VALUE: 13
PIF_VALUE: 1
PIF_VALUE: 13
PIF_VALUE: 15
PIF_VALUE: 18
PIF_VALUE: 12
PIF_VALUE: 13

## 2018-11-30 ASSESSMENT — PAIN DESCRIPTION - DESCRIPTORS: DESCRIPTORS: ACHING;DISCOMFORT

## 2018-11-30 ASSESSMENT — PAIN SCALES - GENERAL
PAINLEVEL_OUTOF10: 4
PAINLEVEL_OUTOF10: 8
PAINLEVEL_OUTOF10: 4
PAINLEVEL_OUTOF10: 7
PAINLEVEL_OUTOF10: 8
PAINLEVEL_OUTOF10: 4
PAINLEVEL_OUTOF10: 8
PAINLEVEL_OUTOF10: 7
PAINLEVEL_OUTOF10: 8

## 2018-11-30 ASSESSMENT — PAIN DESCRIPTION - ORIENTATION
ORIENTATION: RIGHT

## 2018-11-30 ASSESSMENT — PAIN DESCRIPTION - PAIN TYPE
TYPE: SURGICAL PAIN

## 2018-11-30 ASSESSMENT — PAIN DESCRIPTION - LOCATION
LOCATION: KNEE

## 2018-11-30 ASSESSMENT — PAIN DESCRIPTION - FREQUENCY: FREQUENCY: CONTINUOUS

## 2018-11-30 ASSESSMENT — PAIN DESCRIPTION - PROGRESSION: CLINICAL_PROGRESSION: NOT CHANGED

## 2018-11-30 ASSESSMENT — PAIN DESCRIPTION - ONSET: ONSET: ON-GOING

## 2018-11-30 NOTE — ANESTHESIA PRE PROCEDURE
20 mg at 11/29/18 1627     Facility-Administered Medications Ordered in Other Encounters   Medication Dose Route Frequency Provider Last Rate Last Dose    propofol injection    PRN Akron Christel, APRN - CRNA   30 mg at 11/30/18 1616    lidocaine 2 % injection    PRN Elías Christel, APRN - CRNA   40 mg at 11/30/18 1556    fentaNYL (SUBLIMAZE) injection    PRN Akron Christel, APRN - CRNA   50 mcg at 11/30/18 1619    rocuronium (ZEMURON) injection    PRN Akron Christel, APRN - CRNA   30 mg at 11/30/18 1559    dexamethasone (DECADRON) injection    PRN Akron Christel, APRN - CRNA   5 mg at 11/30/18 1623    ondansetron (ZOFRAN) injection    PRN Akron Christel, APRN - CRNA   4 mg at 11/30/18 1623       Allergies: Allergies   Allergen Reactions    Lisinopril Swelling     angioedema  angioedema    Levofloxacin     Levofloxacin Other (See Comments)     unsure    Vancomycin Rash     Rash and hives. CARLY'S SYNDROME    Amlodipine     Avelox [Moxifloxacin Hcl In Nacl]     Buspirone Itching    Hydrochlorothiazide Swelling    Lidocaine Other (See Comments)     Not sure    Moxifloxacin     Moxifloxacin Hcl In Nacl Other (See Comments)     Not sure    Mupirocin     Nsaids      bleeding    Paroxetine Other (See Comments)    Phenergan [Promethazine Hcl] Swelling    Phenothiazines     Promethazine Hcl Swelling    Sulindac      Gi bleed    Tolmetin      bleeding    Ciprofloxacin Rash and Swelling       Problem List:    Patient Active Problem List   Diagnosis Code    Localized osteoarthrosis, lower leg M17.10    Rectal bleed K62.5    Osteoarthritis of hip M16.9    Hip fracture ([facility]) S72.009A    Hip fracture ([facility]) RIGHT S72.009A    Hypertension I10    Hypercholesteremia E78.00    Primary osteoarthritis of right knee M17.11    Pain due to total right knee replacement ([facility]) T84.84XA, Z96.651    Chronic knee pain after total replacement of right knee joint M25.561, G89.29, Z96.651    Status post

## 2018-12-01 LAB
ANION GAP SERPL CALCULATED.3IONS-SCNC: 10 MMOL/L (ref 3–16)
BUN BLDV-MCNC: 13 MG/DL (ref 7–20)
CALCIUM SERPL-MCNC: 8.4 MG/DL (ref 8.3–10.6)
CHLORIDE BLD-SCNC: 103 MMOL/L (ref 99–110)
CO2: 23 MMOL/L (ref 21–32)
CREAT SERPL-MCNC: <0.5 MG/DL (ref 0.6–1.2)
GFR AFRICAN AMERICAN: >60
GFR NON-AFRICAN AMERICAN: >60
GLUCOSE BLD-MCNC: 107 MG/DL (ref 70–99)
HCT VFR BLD CALC: 26.3 % (ref 36–48)
HEMOGLOBIN: 8.7 G/DL (ref 12–16)
MCH RBC QN AUTO: 30.4 PG (ref 26–34)
MCHC RBC AUTO-ENTMCNC: 33.2 G/DL (ref 31–36)
MCV RBC AUTO: 91.7 FL (ref 80–100)
PDW BLD-RTO: 14.7 % (ref 12.4–15.4)
PLATELET # BLD: 339 K/UL (ref 135–450)
PMV BLD AUTO: 7.4 FL (ref 5–10.5)
POTASSIUM SERPL-SCNC: 3.9 MMOL/L (ref 3.5–5.1)
RBC # BLD: 2.87 M/UL (ref 4–5.2)
SODIUM BLD-SCNC: 136 MMOL/L (ref 136–145)
WBC # BLD: 6 K/UL (ref 4–11)

## 2018-12-01 PROCEDURE — G8988 SELF CARE GOAL STATUS: HCPCS

## 2018-12-01 PROCEDURE — 97530 THERAPEUTIC ACTIVITIES: CPT

## 2018-12-01 PROCEDURE — G8987 SELF CARE CURRENT STATUS: HCPCS

## 2018-12-01 PROCEDURE — 97535 SELF CARE MNGMENT TRAINING: CPT

## 2018-12-01 PROCEDURE — G8979 MOBILITY GOAL STATUS: HCPCS

## 2018-12-01 PROCEDURE — 85027 COMPLETE CBC AUTOMATED: CPT

## 2018-12-01 PROCEDURE — 97167 OT EVAL HIGH COMPLEX 60 MIN: CPT

## 2018-12-01 PROCEDURE — 1200000000 HC SEMI PRIVATE

## 2018-12-01 PROCEDURE — G8978 MOBILITY CURRENT STATUS: HCPCS

## 2018-12-01 PROCEDURE — 6360000002 HC RX W HCPCS: Performed by: PHYSICIAN ASSISTANT

## 2018-12-01 PROCEDURE — 6370000000 HC RX 637 (ALT 250 FOR IP): Performed by: PHYSICIAN ASSISTANT

## 2018-12-01 PROCEDURE — 6360000002 HC RX W HCPCS: Performed by: ORTHOPAEDIC SURGERY

## 2018-12-01 PROCEDURE — 2700000000 HC OXYGEN THERAPY PER DAY

## 2018-12-01 PROCEDURE — 97162 PT EVAL MOD COMPLEX 30 MIN: CPT

## 2018-12-01 PROCEDURE — 36415 COLL VENOUS BLD VENIPUNCTURE: CPT

## 2018-12-01 PROCEDURE — APPSS30 APP SPLIT SHARED TIME 16-30 MINUTES: Performed by: PHYSICIAN ASSISTANT

## 2018-12-01 PROCEDURE — 94761 N-INVAS EAR/PLS OXIMETRY MLT: CPT

## 2018-12-01 PROCEDURE — 97110 THERAPEUTIC EXERCISES: CPT

## 2018-12-01 PROCEDURE — 80048 BASIC METABOLIC PNL TOTAL CA: CPT

## 2018-12-01 PROCEDURE — 2580000003 HC RX 258: Performed by: PHYSICIAN ASSISTANT

## 2018-12-01 RX ORDER — ATORVASTATIN CALCIUM 10 MG/1
10 TABLET, FILM COATED ORAL NIGHTLY
Status: DISCONTINUED | OUTPATIENT
Start: 2018-12-01 | End: 2018-12-03 | Stop reason: HOSPADM

## 2018-12-01 RX ORDER — FLECAINIDE ACETATE 100 MG/1
50 TABLET ORAL EVERY 12 HOURS SCHEDULED
Status: DISCONTINUED | OUTPATIENT
Start: 2018-12-01 | End: 2018-12-03 | Stop reason: HOSPADM

## 2018-12-01 RX ORDER — CARVEDILOL 6.25 MG/1
6.25 TABLET ORAL 2 TIMES DAILY WITH MEALS
Status: DISCONTINUED | OUTPATIENT
Start: 2018-12-01 | End: 2018-12-03 | Stop reason: HOSPADM

## 2018-12-01 RX ORDER — ASPIRIN 81 MG/1
81 TABLET, CHEWABLE ORAL DAILY
Status: DISCONTINUED | OUTPATIENT
Start: 2018-12-01 | End: 2018-12-03 | Stop reason: HOSPADM

## 2018-12-01 RX ORDER — CHLORDIAZEPOXIDE HYDROCHLORIDE 5 MG/1
5 CAPSULE, GELATIN COATED ORAL 2 TIMES DAILY
Status: DISCONTINUED | OUTPATIENT
Start: 2018-12-01 | End: 2018-12-03 | Stop reason: HOSPADM

## 2018-12-01 RX ADMIN — Medication 2 G: at 15:39

## 2018-12-01 RX ADMIN — CARVEDILOL 6.25 MG: 6.25 TABLET, FILM COATED ORAL at 09:54

## 2018-12-01 RX ADMIN — CHLORDIAZEPOXIDE HYDROCHLORIDE 5 MG: 5 CAPSULE ORAL at 21:39

## 2018-12-01 RX ADMIN — TIMOLOL MALEATE 1 DROP: 5 SOLUTION OPHTHALMIC at 21:40

## 2018-12-01 RX ADMIN — FAMOTIDINE 20 MG: 20 TABLET ORAL at 08:19

## 2018-12-01 RX ADMIN — SODIUM CHLORIDE: 9 INJECTION, SOLUTION INTRAVENOUS at 00:41

## 2018-12-01 RX ADMIN — DOCUSATE SODIUM 100 MG: 100 CAPSULE, LIQUID FILLED ORAL at 08:19

## 2018-12-01 RX ADMIN — OXYCODONE AND ACETAMINOPHEN 1 TABLET: 5; 325 TABLET ORAL at 04:31

## 2018-12-01 RX ADMIN — CARVEDILOL 6.25 MG: 6.25 TABLET, FILM COATED ORAL at 18:21

## 2018-12-01 RX ADMIN — FLECAINIDE ACETATE 50 MG: 100 TABLET ORAL at 09:54

## 2018-12-01 RX ADMIN — CHLORDIAZEPOXIDE HYDROCHLORIDE 5 MG: 5 CAPSULE ORAL at 09:54

## 2018-12-01 RX ADMIN — SODIUM CHLORIDE, PRESERVATIVE FREE 10 ML: 5 INJECTION INTRAVENOUS at 21:39

## 2018-12-01 RX ADMIN — DOCUSATE SODIUM 100 MG: 100 CAPSULE, LIQUID FILLED ORAL at 21:39

## 2018-12-01 RX ADMIN — OXYCODONE AND ACETAMINOPHEN 1 TABLET: 5; 325 TABLET ORAL at 09:54

## 2018-12-01 RX ADMIN — OXYCODONE AND ACETAMINOPHEN 1 TABLET: 5; 325 TABLET ORAL at 14:13

## 2018-12-01 RX ADMIN — ASPIRIN 81 MG 81 MG: 81 TABLET ORAL at 09:54

## 2018-12-01 RX ADMIN — ENOXAPARIN SODIUM 30 MG: 30 INJECTION SUBCUTANEOUS at 08:19

## 2018-12-01 RX ADMIN — FLECAINIDE ACETATE 50 MG: 100 TABLET ORAL at 21:38

## 2018-12-01 RX ADMIN — ATORVASTATIN CALCIUM 10 MG: 10 TABLET, FILM COATED ORAL at 21:38

## 2018-12-01 RX ADMIN — TIMOLOL MALEATE 1 DROP: 5 SOLUTION OPHTHALMIC at 08:20

## 2018-12-01 RX ADMIN — Medication 2 G: at 08:19

## 2018-12-01 RX ADMIN — OXYCODONE AND ACETAMINOPHEN 2 TABLET: 5; 325 TABLET ORAL at 22:30

## 2018-12-01 RX ADMIN — OXYCODONE AND ACETAMINOPHEN 2 TABLET: 5; 325 TABLET ORAL at 18:21

## 2018-12-01 RX ADMIN — Medication 2 G: at 21:39

## 2018-12-01 RX ADMIN — LATANOPROST 1 DROP: 50 SOLUTION OPHTHALMIC at 21:40

## 2018-12-01 ASSESSMENT — PAIN DESCRIPTION - PAIN TYPE
TYPE: SURGICAL PAIN
TYPE: SURGICAL PAIN

## 2018-12-01 ASSESSMENT — PAIN SCALES - GENERAL
PAINLEVEL_OUTOF10: 6
PAINLEVEL_OUTOF10: 5
PAINLEVEL_OUTOF10: 7
PAINLEVEL_OUTOF10: 5
PAINLEVEL_OUTOF10: 4
PAINLEVEL_OUTOF10: 6
PAINLEVEL_OUTOF10: 7
PAINLEVEL_OUTOF10: 5
PAINLEVEL_OUTOF10: 1
PAINLEVEL_OUTOF10: 5

## 2018-12-01 ASSESSMENT — PAIN DESCRIPTION - LOCATION
LOCATION: KNEE
LOCATION: KNEE

## 2018-12-01 ASSESSMENT — PAIN DESCRIPTION - DESCRIPTORS: DESCRIPTORS: ACHING

## 2018-12-01 ASSESSMENT — PAIN DESCRIPTION - ORIENTATION
ORIENTATION: RIGHT
ORIENTATION: RIGHT

## 2018-12-01 ASSESSMENT — PAIN DESCRIPTION - FREQUENCY: FREQUENCY: CONTINUOUS

## 2018-12-02 LAB
ANION GAP SERPL CALCULATED.3IONS-SCNC: 9 MMOL/L (ref 3–16)
BUN BLDV-MCNC: 17 MG/DL (ref 7–20)
CALCIUM SERPL-MCNC: 8.4 MG/DL (ref 8.3–10.6)
CHLORIDE BLD-SCNC: 102 MMOL/L (ref 99–110)
CO2: 27 MMOL/L (ref 21–32)
CREAT SERPL-MCNC: <0.5 MG/DL (ref 0.6–1.2)
GFR AFRICAN AMERICAN: >60
GFR NON-AFRICAN AMERICAN: >60
GLUCOSE BLD-MCNC: 98 MG/DL (ref 70–99)
HCT VFR BLD CALC: 25.2 % (ref 36–48)
HEMOGLOBIN: 8.5 G/DL (ref 12–16)
MCH RBC QN AUTO: 30.6 PG (ref 26–34)
MCHC RBC AUTO-ENTMCNC: 33.8 G/DL (ref 31–36)
MCV RBC AUTO: 90.6 FL (ref 80–100)
PDW BLD-RTO: 14.3 % (ref 12.4–15.4)
PLATELET # BLD: 335 K/UL (ref 135–450)
PMV BLD AUTO: 7.5 FL (ref 5–10.5)
POTASSIUM SERPL-SCNC: 4 MMOL/L (ref 3.5–5.1)
RBC # BLD: 2.78 M/UL (ref 4–5.2)
SODIUM BLD-SCNC: 138 MMOL/L (ref 136–145)
WBC # BLD: 6.7 K/UL (ref 4–11)

## 2018-12-02 PROCEDURE — 6360000002 HC RX W HCPCS: Performed by: ORTHOPAEDIC SURGERY

## 2018-12-02 PROCEDURE — 80048 BASIC METABOLIC PNL TOTAL CA: CPT

## 2018-12-02 PROCEDURE — APPSS30 APP SPLIT SHARED TIME 16-30 MINUTES: Performed by: PHYSICIAN ASSISTANT

## 2018-12-02 PROCEDURE — 6360000002 HC RX W HCPCS: Performed by: PHYSICIAN ASSISTANT

## 2018-12-02 PROCEDURE — 85027 COMPLETE CBC AUTOMATED: CPT

## 2018-12-02 PROCEDURE — 6370000000 HC RX 637 (ALT 250 FOR IP): Performed by: PHYSICIAN ASSISTANT

## 2018-12-02 PROCEDURE — G8988 SELF CARE GOAL STATUS: HCPCS

## 2018-12-02 PROCEDURE — 0Y9F30Z DRAINAGE OF RIGHT KNEE REGION WITH DRAINAGE DEVICE, PERCUTANEOUS APPROACH: ICD-10-PCS | Performed by: ORTHOPAEDIC SURGERY

## 2018-12-02 PROCEDURE — 2700000000 HC OXYGEN THERAPY PER DAY

## 2018-12-02 PROCEDURE — 97110 THERAPEUTIC EXERCISES: CPT

## 2018-12-02 PROCEDURE — 1200000000 HC SEMI PRIVATE

## 2018-12-02 PROCEDURE — 36415 COLL VENOUS BLD VENIPUNCTURE: CPT

## 2018-12-02 PROCEDURE — 94761 N-INVAS EAR/PLS OXIMETRY MLT: CPT

## 2018-12-02 PROCEDURE — 2580000003 HC RX 258: Performed by: PHYSICIAN ASSISTANT

## 2018-12-02 PROCEDURE — G8987 SELF CARE CURRENT STATUS: HCPCS

## 2018-12-02 RX ADMIN — OXYCODONE AND ACETAMINOPHEN 2 TABLET: 5; 325 TABLET ORAL at 16:01

## 2018-12-02 RX ADMIN — CARVEDILOL 6.25 MG: 6.25 TABLET, FILM COATED ORAL at 16:22

## 2018-12-02 RX ADMIN — TIMOLOL MALEATE 1 DROP: 5 SOLUTION OPHTHALMIC at 07:50

## 2018-12-02 RX ADMIN — TIMOLOL MALEATE 1 DROP: 5 SOLUTION OPHTHALMIC at 16:02

## 2018-12-02 RX ADMIN — FLECAINIDE ACETATE 50 MG: 100 TABLET ORAL at 07:47

## 2018-12-02 RX ADMIN — FAMOTIDINE 20 MG: 20 TABLET ORAL at 07:46

## 2018-12-02 RX ADMIN — OXYCODONE AND ACETAMINOPHEN 1 TABLET: 5; 325 TABLET ORAL at 11:49

## 2018-12-02 RX ADMIN — LATANOPROST 1 DROP: 50 SOLUTION OPHTHALMIC at 20:12

## 2018-12-02 RX ADMIN — CHLORDIAZEPOXIDE HYDROCHLORIDE 5 MG: 5 CAPSULE ORAL at 07:46

## 2018-12-02 RX ADMIN — SODIUM CHLORIDE, PRESERVATIVE FREE 10 ML: 5 INJECTION INTRAVENOUS at 20:12

## 2018-12-02 RX ADMIN — FLECAINIDE ACETATE 50 MG: 100 TABLET ORAL at 20:10

## 2018-12-02 RX ADMIN — Medication 2 G: at 07:39

## 2018-12-02 RX ADMIN — ENOXAPARIN SODIUM 30 MG: 30 INJECTION SUBCUTANEOUS at 07:46

## 2018-12-02 RX ADMIN — ATORVASTATIN CALCIUM 10 MG: 10 TABLET, FILM COATED ORAL at 20:09

## 2018-12-02 RX ADMIN — Medication 2 G: at 16:21

## 2018-12-02 RX ADMIN — OXYCODONE AND ACETAMINOPHEN 2 TABLET: 5; 325 TABLET ORAL at 04:21

## 2018-12-02 RX ADMIN — ASPIRIN 81 MG 81 MG: 81 TABLET ORAL at 07:46

## 2018-12-02 RX ADMIN — CHLORDIAZEPOXIDE HYDROCHLORIDE 5 MG: 5 CAPSULE ORAL at 20:09

## 2018-12-02 RX ADMIN — CARVEDILOL 6.25 MG: 6.25 TABLET, FILM COATED ORAL at 07:46

## 2018-12-02 RX ADMIN — SODIUM CHLORIDE, PRESERVATIVE FREE 10 ML: 5 INJECTION INTRAVENOUS at 07:50

## 2018-12-02 RX ADMIN — DOCUSATE SODIUM 100 MG: 100 CAPSULE, LIQUID FILLED ORAL at 20:09

## 2018-12-02 RX ADMIN — DOCUSATE SODIUM 100 MG: 100 CAPSULE, LIQUID FILLED ORAL at 07:46

## 2018-12-02 RX ADMIN — OXYCODONE AND ACETAMINOPHEN 2 TABLET: 5; 325 TABLET ORAL at 20:09

## 2018-12-02 ASSESSMENT — PAIN DESCRIPTION - LOCATION: LOCATION: KNEE

## 2018-12-02 ASSESSMENT — PAIN SCALES - GENERAL
PAINLEVEL_OUTOF10: 4
PAINLEVEL_OUTOF10: 7
PAINLEVEL_OUTOF10: 7
PAINLEVEL_OUTOF10: 4
PAINLEVEL_OUTOF10: 7
PAINLEVEL_OUTOF10: 5
PAINLEVEL_OUTOF10: 7

## 2018-12-02 ASSESSMENT — PAIN DESCRIPTION - ORIENTATION: ORIENTATION: RIGHT

## 2018-12-02 ASSESSMENT — PAIN DESCRIPTION - PAIN TYPE: TYPE: ACUTE PAIN;SURGICAL PAIN

## 2018-12-03 VITALS
HEIGHT: 60 IN | HEART RATE: 60 BPM | SYSTOLIC BLOOD PRESSURE: 130 MMHG | RESPIRATION RATE: 14 BRPM | OXYGEN SATURATION: 94 % | WEIGHT: 101 LBS | DIASTOLIC BLOOD PRESSURE: 75 MMHG | TEMPERATURE: 98.5 F | BODY MASS INDEX: 19.83 KG/M2

## 2018-12-03 LAB
ANION GAP SERPL CALCULATED.3IONS-SCNC: 9 MMOL/L (ref 3–16)
BUN BLDV-MCNC: 16 MG/DL (ref 7–20)
CALCIUM SERPL-MCNC: 8.5 MG/DL (ref 8.3–10.6)
CHLORIDE BLD-SCNC: 102 MMOL/L (ref 99–110)
CO2: 29 MMOL/L (ref 21–32)
CREAT SERPL-MCNC: <0.5 MG/DL (ref 0.6–1.2)
GFR AFRICAN AMERICAN: >60
GFR NON-AFRICAN AMERICAN: >60
GLUCOSE BLD-MCNC: 100 MG/DL (ref 70–99)
HCT VFR BLD CALC: 24.4 % (ref 36–48)
HEMOGLOBIN: 8.1 G/DL (ref 12–16)
MCH RBC QN AUTO: 30.2 PG (ref 26–34)
MCHC RBC AUTO-ENTMCNC: 33.3 G/DL (ref 31–36)
MCV RBC AUTO: 90.6 FL (ref 80–100)
PDW BLD-RTO: 14.5 % (ref 12.4–15.4)
PLATELET # BLD: 331 K/UL (ref 135–450)
PMV BLD AUTO: 7.4 FL (ref 5–10.5)
POTASSIUM SERPL-SCNC: 4.2 MMOL/L (ref 3.5–5.1)
RBC # BLD: 2.69 M/UL (ref 4–5.2)
SODIUM BLD-SCNC: 140 MMOL/L (ref 136–145)
WBC # BLD: 5.7 K/UL (ref 4–11)

## 2018-12-03 PROCEDURE — 6360000002 HC RX W HCPCS: Performed by: ORTHOPAEDIC SURGERY

## 2018-12-03 PROCEDURE — 6360000002 HC RX W HCPCS: Performed by: PHYSICIAN ASSISTANT

## 2018-12-03 PROCEDURE — 36415 COLL VENOUS BLD VENIPUNCTURE: CPT

## 2018-12-03 PROCEDURE — 80048 BASIC METABOLIC PNL TOTAL CA: CPT

## 2018-12-03 PROCEDURE — 2580000003 HC RX 258: Performed by: PHYSICIAN ASSISTANT

## 2018-12-03 PROCEDURE — 85027 COMPLETE CBC AUTOMATED: CPT

## 2018-12-03 PROCEDURE — 6370000000 HC RX 637 (ALT 250 FOR IP): Performed by: PHYSICIAN ASSISTANT

## 2018-12-03 PROCEDURE — APPSS45 APP SPLIT SHARED TIME 31-45 MINUTES: Performed by: PHYSICIAN ASSISTANT

## 2018-12-03 RX ORDER — OXYCODONE HYDROCHLORIDE AND ACETAMINOPHEN 5; 325 MG/1; MG/1
1-2 TABLET ORAL EVERY 4 HOURS PRN
Qty: 20 TABLET | Refills: 0 | Status: SHIPPED | OUTPATIENT
Start: 2018-12-03 | End: 2018-12-10

## 2018-12-03 RX ADMIN — OXYCODONE AND ACETAMINOPHEN 2 TABLET: 5; 325 TABLET ORAL at 07:56

## 2018-12-03 RX ADMIN — CHLORDIAZEPOXIDE HYDROCHLORIDE 5 MG: 5 CAPSULE ORAL at 09:10

## 2018-12-03 RX ADMIN — DOCUSATE SODIUM 100 MG: 100 CAPSULE, LIQUID FILLED ORAL at 09:10

## 2018-12-03 RX ADMIN — ASPIRIN 81 MG 81 MG: 81 TABLET ORAL at 09:10

## 2018-12-03 RX ADMIN — SODIUM CHLORIDE, PRESERVATIVE FREE 10 ML: 5 INJECTION INTRAVENOUS at 01:44

## 2018-12-03 RX ADMIN — TIMOLOL MALEATE 1 DROP: 5 SOLUTION OPHTHALMIC at 09:12

## 2018-12-03 RX ADMIN — OXYCODONE AND ACETAMINOPHEN 2 TABLET: 5; 325 TABLET ORAL at 01:45

## 2018-12-03 RX ADMIN — Medication 2 G: at 08:00

## 2018-12-03 RX ADMIN — FLECAINIDE ACETATE 50 MG: 100 TABLET ORAL at 09:14

## 2018-12-03 RX ADMIN — Medication 2 G: at 01:44

## 2018-12-03 RX ADMIN — OXYCODONE AND ACETAMINOPHEN 2 TABLET: 5; 325 TABLET ORAL at 12:00

## 2018-12-03 RX ADMIN — CARVEDILOL 6.25 MG: 6.25 TABLET, FILM COATED ORAL at 07:57

## 2018-12-03 RX ADMIN — ENOXAPARIN SODIUM 30 MG: 30 INJECTION SUBCUTANEOUS at 09:09

## 2018-12-03 RX ADMIN — FAMOTIDINE 20 MG: 20 TABLET ORAL at 09:10

## 2018-12-03 RX ADMIN — SODIUM CHLORIDE, PRESERVATIVE FREE 10 ML: 5 INJECTION INTRAVENOUS at 08:00

## 2018-12-03 RX ADMIN — ONDANSETRON HYDROCHLORIDE 4 MG: 2 INJECTION, SOLUTION INTRAVENOUS at 11:09

## 2018-12-03 ASSESSMENT — PAIN DESCRIPTION - LOCATION: LOCATION: KNEE

## 2018-12-03 ASSESSMENT — PAIN SCALES - GENERAL
PAINLEVEL_OUTOF10: 7
PAINLEVEL_OUTOF10: 6
PAINLEVEL_OUTOF10: 7
PAINLEVEL_OUTOF10: 7

## 2018-12-03 ASSESSMENT — PAIN DESCRIPTION - PAIN TYPE: TYPE: ACUTE PAIN;SURGICAL PAIN

## 2018-12-03 ASSESSMENT — PAIN DESCRIPTION - ORIENTATION: ORIENTATION: RIGHT

## 2018-12-24 LAB
CULTURE, JOINT AEROBIC: NORMAL
CULTURE, JOINT ANAEROBIC: NORMAL
GRAM STAIN RESULT: NORMAL

## 2018-12-31 LAB
FUNGUS (MYCOLOGY) CULTURE: NORMAL
FUNGUS STAIN: NORMAL

## 2019-01-15 LAB
AFB CULTURE (MYCOBACTERIA): NORMAL
AFB SMEAR: NORMAL

## 2019-02-13 ENCOUNTER — HOSPITAL ENCOUNTER (INPATIENT)
Age: 84
LOS: 2 days | Discharge: NURSING, REHAB OR LONG-TERM CARE | DRG: 378 | End: 2019-02-15
Attending: EMERGENCY MEDICINE | Admitting: INTERNAL MEDICINE
Payer: MEDICARE

## 2019-02-13 PROBLEM — K92.2 GI BLEEDING: Status: ACTIVE | Noted: 2019-02-13

## 2019-02-13 LAB
A/G RATIO: 1.3 (ref 1.1–2.2)
ABO/RH: NORMAL
ALBUMIN SERPL-MCNC: 3.7 G/DL (ref 3.4–5)
ALP BLD-CCNC: 89 U/L (ref 40–129)
ALT SERPL-CCNC: 13 U/L (ref 10–40)
ANION GAP SERPL CALCULATED.3IONS-SCNC: 10 MMOL/L (ref 3–16)
ANTIBODY SCREEN: NORMAL
APTT: 27.8 SEC (ref 26–36)
AST SERPL-CCNC: 17 U/L (ref 15–37)
BASOPHILS ABSOLUTE: 0 K/UL (ref 0–0.2)
BASOPHILS RELATIVE PERCENT: 0.5 %
BILIRUB SERPL-MCNC: 0.4 MG/DL (ref 0–1)
BUN BLDV-MCNC: 13 MG/DL (ref 7–20)
CALCIUM SERPL-MCNC: 9.1 MG/DL (ref 8.3–10.6)
CHLORIDE BLD-SCNC: 94 MMOL/L (ref 99–110)
CO2: 25 MMOL/L (ref 21–32)
CREAT SERPL-MCNC: 0.6 MG/DL (ref 0.6–1.2)
EOSINOPHILS ABSOLUTE: 0.3 K/UL (ref 0–0.6)
EOSINOPHILS RELATIVE PERCENT: 3.2 %
GFR AFRICAN AMERICAN: >60
GFR NON-AFRICAN AMERICAN: >60
GLOBULIN: 2.9 G/DL
GLUCOSE BLD-MCNC: 158 MG/DL (ref 70–99)
HCT VFR BLD CALC: 35.6 % (ref 36–48)
HEMOGLOBIN: 11.9 G/DL (ref 12–16)
INR BLD: 1.17 (ref 0.86–1.14)
LYMPHOCYTES ABSOLUTE: 1.1 K/UL (ref 1–5.1)
LYMPHOCYTES RELATIVE PERCENT: 11 %
MCH RBC QN AUTO: 30.2 PG (ref 26–34)
MCHC RBC AUTO-ENTMCNC: 33.3 G/DL (ref 31–36)
MCV RBC AUTO: 90.5 FL (ref 80–100)
MONOCYTES ABSOLUTE: 0.9 K/UL (ref 0–1.3)
MONOCYTES RELATIVE PERCENT: 8.7 %
NEUTROPHILS ABSOLUTE: 7.6 K/UL (ref 1.7–7.7)
NEUTROPHILS RELATIVE PERCENT: 76.6 %
OCCULT BLOOD DIAGNOSTIC: ABNORMAL
PDW BLD-RTO: 15 % (ref 12.4–15.4)
PLATELET # BLD: 342 K/UL (ref 135–450)
PMV BLD AUTO: 7.7 FL (ref 5–10.5)
POTASSIUM SERPL-SCNC: 4.2 MMOL/L (ref 3.5–5.1)
PROTHROMBIN TIME: 13.3 SEC (ref 9.8–13)
RBC # BLD: 3.93 M/UL (ref 4–5.2)
SODIUM BLD-SCNC: 129 MMOL/L (ref 136–145)
TOTAL PROTEIN: 6.6 G/DL (ref 6.4–8.2)
WBC # BLD: 10 K/UL (ref 4–11)

## 2019-02-13 PROCEDURE — 99285 EMERGENCY DEPT VISIT HI MDM: CPT

## 2019-02-13 PROCEDURE — 85025 COMPLETE CBC W/AUTO DIFF WBC: CPT

## 2019-02-13 PROCEDURE — 85610 PROTHROMBIN TIME: CPT

## 2019-02-13 PROCEDURE — 2060000000 HC ICU INTERMEDIATE R&B

## 2019-02-13 PROCEDURE — 86850 RBC ANTIBODY SCREEN: CPT

## 2019-02-13 PROCEDURE — 85730 THROMBOPLASTIN TIME PARTIAL: CPT

## 2019-02-13 PROCEDURE — 80053 COMPREHEN METABOLIC PANEL: CPT

## 2019-02-13 PROCEDURE — 86900 BLOOD TYPING SEROLOGIC ABO: CPT

## 2019-02-13 PROCEDURE — G0328 FECAL BLOOD SCRN IMMUNOASSAY: HCPCS

## 2019-02-13 PROCEDURE — 86901 BLOOD TYPING SEROLOGIC RH(D): CPT

## 2019-02-13 PROCEDURE — 6370000000 HC RX 637 (ALT 250 FOR IP): Performed by: INTERNAL MEDICINE

## 2019-02-13 PROCEDURE — 2580000003 HC RX 258: Performed by: INTERNAL MEDICINE

## 2019-02-13 RX ORDER — TIMOLOL MALEATE 5 MG/ML
1 SOLUTION/ DROPS OPHTHALMIC 2 TIMES DAILY
Status: DISCONTINUED | OUTPATIENT
Start: 2019-02-13 | End: 2019-02-14

## 2019-02-13 RX ORDER — FLECAINIDE ACETATE 100 MG/1
50 TABLET ORAL 2 TIMES DAILY
Status: DISCONTINUED | OUTPATIENT
Start: 2019-02-13 | End: 2019-02-16 | Stop reason: HOSPADM

## 2019-02-13 RX ORDER — LATANOPROST 50 UG/ML
1 SOLUTION/ DROPS OPHTHALMIC NIGHTLY
Status: DISCONTINUED | OUTPATIENT
Start: 2019-02-13 | End: 2019-02-16 | Stop reason: HOSPADM

## 2019-02-13 RX ORDER — ACETAMINOPHEN 325 MG/1
650 TABLET ORAL EVERY 6 HOURS PRN
COMMUNITY

## 2019-02-13 RX ORDER — OXYCODONE HYDROCHLORIDE AND ACETAMINOPHEN 5; 325 MG/1; MG/1
TABLET ORAL EVERY 4 HOURS PRN
Status: ON HOLD | COMMUNITY
End: 2021-03-24 | Stop reason: SDUPTHER

## 2019-02-13 RX ORDER — PANTOPRAZOLE SODIUM 40 MG/10ML
40 INJECTION, POWDER, LYOPHILIZED, FOR SOLUTION INTRAVENOUS EVERY 12 HOURS
Status: DISCONTINUED | OUTPATIENT
Start: 2019-02-13 | End: 2019-02-16 | Stop reason: HOSPADM

## 2019-02-13 RX ORDER — PANTOPRAZOLE SODIUM 40 MG/1
40 TABLET, DELAYED RELEASE ORAL
Status: DISCONTINUED | OUTPATIENT
Start: 2019-02-14 | End: 2019-02-14

## 2019-02-13 RX ORDER — CHOLECALCIFEROL (VITAMIN D3) 125 MCG
5 CAPSULE ORAL NIGHTLY PRN
Status: DISCONTINUED | OUTPATIENT
Start: 2019-02-14 | End: 2019-02-16 | Stop reason: HOSPADM

## 2019-02-13 RX ORDER — CARVEDILOL 3.12 MG/1
3.12 TABLET ORAL 2 TIMES DAILY WITH MEALS
Status: DISCONTINUED | OUTPATIENT
Start: 2019-02-14 | End: 2019-02-16 | Stop reason: HOSPADM

## 2019-02-13 RX ORDER — OMEPRAZOLE 20 MG/1
40 CAPSULE, DELAYED RELEASE ORAL DAILY
COMMUNITY

## 2019-02-13 RX ORDER — 0.9 % SODIUM CHLORIDE 0.9 %
10 VIAL (ML) INJECTION EVERY 12 HOURS
Status: DISCONTINUED | OUTPATIENT
Start: 2019-02-13 | End: 2019-02-16 | Stop reason: HOSPADM

## 2019-02-13 RX ORDER — SODIUM CHLORIDE 0.9 % (FLUSH) 0.9 %
10 SYRINGE (ML) INJECTION EVERY 12 HOURS SCHEDULED
Status: DISCONTINUED | OUTPATIENT
Start: 2019-02-13 | End: 2019-02-16 | Stop reason: HOSPADM

## 2019-02-13 RX ORDER — TAMSULOSIN HYDROCHLORIDE 0.4 MG/1
0.4 CAPSULE ORAL DAILY
COMMUNITY

## 2019-02-13 RX ORDER — ONDANSETRON 2 MG/ML
4 INJECTION INTRAMUSCULAR; INTRAVENOUS EVERY 6 HOURS PRN
Status: DISCONTINUED | OUTPATIENT
Start: 2019-02-13 | End: 2019-02-16 | Stop reason: HOSPADM

## 2019-02-13 RX ORDER — SODIUM CHLORIDE 9 MG/ML
INJECTION, SOLUTION INTRAVENOUS CONTINUOUS
Status: DISCONTINUED | OUTPATIENT
Start: 2019-02-13 | End: 2019-02-16 | Stop reason: HOSPADM

## 2019-02-13 RX ORDER — DULOXETIN HYDROCHLORIDE 30 MG/1
30 CAPSULE, DELAYED RELEASE ORAL DAILY
COMMUNITY

## 2019-02-13 RX ORDER — DULOXETIN HYDROCHLORIDE 30 MG/1
30 CAPSULE, DELAYED RELEASE ORAL DAILY
Status: DISCONTINUED | OUTPATIENT
Start: 2019-02-14 | End: 2019-02-16 | Stop reason: HOSPADM

## 2019-02-13 RX ORDER — ACETAMINOPHEN 325 MG/1
650 TABLET ORAL EVERY 6 HOURS PRN
Status: DISCONTINUED | OUTPATIENT
Start: 2019-02-13 | End: 2019-02-16 | Stop reason: HOSPADM

## 2019-02-13 RX ORDER — TAMSULOSIN HYDROCHLORIDE 0.4 MG/1
0.4 CAPSULE ORAL DAILY
Status: DISCONTINUED | OUTPATIENT
Start: 2019-02-14 | End: 2019-02-16 | Stop reason: HOSPADM

## 2019-02-13 RX ORDER — ACETAMINOPHEN 325 MG/1
650 TABLET ORAL EVERY 4 HOURS PRN
Status: DISCONTINUED | OUTPATIENT
Start: 2019-02-13 | End: 2019-02-16 | Stop reason: HOSPADM

## 2019-02-13 RX ORDER — LOPERAMIDE HYDROCHLORIDE 2 MG/1
2 CAPSULE ORAL PRN
COMMUNITY

## 2019-02-13 RX ORDER — ATORVASTATIN CALCIUM 10 MG/1
10 TABLET, FILM COATED ORAL NIGHTLY
Status: DISCONTINUED | OUTPATIENT
Start: 2019-02-13 | End: 2019-02-16 | Stop reason: HOSPADM

## 2019-02-13 RX ORDER — SODIUM CHLORIDE 0.9 % (FLUSH) 0.9 %
10 SYRINGE (ML) INJECTION PRN
Status: DISCONTINUED | OUTPATIENT
Start: 2019-02-13 | End: 2019-02-16 | Stop reason: HOSPADM

## 2019-02-13 RX ADMIN — Medication 10 ML: at 23:56

## 2019-02-13 RX ADMIN — ATORVASTATIN CALCIUM 10 MG: 10 TABLET, FILM COATED ORAL at 23:56

## 2019-02-13 RX ADMIN — SODIUM CHLORIDE: 9 INJECTION, SOLUTION INTRAVENOUS at 23:57

## 2019-02-13 RX ADMIN — FLECAINIDE ACETATE 50 MG: 100 TABLET ORAL at 23:56

## 2019-02-13 RX ADMIN — LATANOPROST 1 DROP: 50 SOLUTION OPHTHALMIC at 23:56

## 2019-02-13 ASSESSMENT — PAIN SCALES - GENERAL: PAINLEVEL_OUTOF10: 0

## 2019-02-14 PROBLEM — E87.1 HYPONATREMIA: Status: ACTIVE | Noted: 2019-02-14

## 2019-02-14 LAB
A/G RATIO: 1.1 (ref 1.1–2.2)
ALBUMIN SERPL-MCNC: 3.4 G/DL (ref 3.4–5)
ALP BLD-CCNC: 81 U/L (ref 40–129)
ALT SERPL-CCNC: 12 U/L (ref 10–40)
ANION GAP SERPL CALCULATED.3IONS-SCNC: 11 MMOL/L (ref 3–16)
AST SERPL-CCNC: 15 U/L (ref 15–37)
BILIRUB SERPL-MCNC: 0.6 MG/DL (ref 0–1)
BUN BLDV-MCNC: 11 MG/DL (ref 7–20)
CALCIUM SERPL-MCNC: 8.9 MG/DL (ref 8.3–10.6)
CHLORIDE BLD-SCNC: 99 MMOL/L (ref 99–110)
CO2: 24 MMOL/L (ref 21–32)
CREAT SERPL-MCNC: 0.6 MG/DL (ref 0.6–1.2)
GFR AFRICAN AMERICAN: >60
GFR NON-AFRICAN AMERICAN: >60
GLOBULIN: 3 G/DL
GLUCOSE BLD-MCNC: 108 MG/DL (ref 70–99)
HEMOGLOBIN: 10.4 G/DL (ref 12–16)
HEMOGLOBIN: 10.9 G/DL (ref 12–16)
HEMOGLOBIN: 11.2 G/DL (ref 12–16)
HEMOGLOBIN: 11.8 G/DL (ref 12–16)
POTASSIUM REFLEX MAGNESIUM: 4.3 MMOL/L (ref 3.5–5.1)
SODIUM BLD-SCNC: 134 MMOL/L (ref 136–145)
TOTAL PROTEIN: 6.4 G/DL (ref 6.4–8.2)

## 2019-02-14 PROCEDURE — 85018 HEMOGLOBIN: CPT

## 2019-02-14 PROCEDURE — 2580000003 HC RX 258: Performed by: INTERNAL MEDICINE

## 2019-02-14 PROCEDURE — 6370000000 HC RX 637 (ALT 250 FOR IP): Performed by: INTERNAL MEDICINE

## 2019-02-14 PROCEDURE — G0009 ADMIN PNEUMOCOCCAL VACCINE: HCPCS | Performed by: INTERNAL MEDICINE

## 2019-02-14 PROCEDURE — 6360000002 HC RX W HCPCS: Performed by: INTERNAL MEDICINE

## 2019-02-14 PROCEDURE — C9113 INJ PANTOPRAZOLE SODIUM, VIA: HCPCS | Performed by: INTERNAL MEDICINE

## 2019-02-14 PROCEDURE — 36415 COLL VENOUS BLD VENIPUNCTURE: CPT

## 2019-02-14 PROCEDURE — APPNB30 APP NON BILLABLE TIME 0-30 MINS: Performed by: PHYSICIAN ASSISTANT

## 2019-02-14 PROCEDURE — 99232 SBSQ HOSP IP/OBS MODERATE 35: CPT | Performed by: INTERNAL MEDICINE

## 2019-02-14 PROCEDURE — 90670 PCV13 VACCINE IM: CPT | Performed by: INTERNAL MEDICINE

## 2019-02-14 PROCEDURE — 2060000000 HC ICU INTERMEDIATE R&B

## 2019-02-14 PROCEDURE — 80053 COMPREHEN METABOLIC PANEL: CPT

## 2019-02-14 RX ORDER — POLYETHYLENE GLYCOL 3350 17 G/17G
119 POWDER, FOR SOLUTION ORAL ONCE
Status: COMPLETED | OUTPATIENT
Start: 2019-02-14 | End: 2019-02-14

## 2019-02-14 RX ORDER — TIMOLOL MALEATE 5 MG/ML
1 SOLUTION/ DROPS OPHTHALMIC 2 TIMES DAILY
Status: DISCONTINUED | OUTPATIENT
Start: 2019-02-15 | End: 2019-02-16 | Stop reason: HOSPADM

## 2019-02-14 RX ORDER — POLYETHYLENE GLYCOL 3350 17 G/17G
119 POWDER, FOR SOLUTION ORAL ONCE
Status: COMPLETED | OUTPATIENT
Start: 2019-02-15 | End: 2019-02-15

## 2019-02-14 RX ADMIN — ACETAMINOPHEN 650 MG: 325 TABLET ORAL at 06:30

## 2019-02-14 RX ADMIN — Medication 10 ML: at 23:05

## 2019-02-14 RX ADMIN — FLECAINIDE ACETATE 50 MG: 100 TABLET ORAL at 21:49

## 2019-02-14 RX ADMIN — LATANOPROST 1 DROP: 50 SOLUTION OPHTHALMIC at 21:50

## 2019-02-14 RX ADMIN — ACETAMINOPHEN 650 MG: 325 TABLET ORAL at 21:49

## 2019-02-14 RX ADMIN — VITAMIN D, TAB 1000IU (100/BT) 2000 UNITS: 25 TAB at 09:24

## 2019-02-14 RX ADMIN — FLECAINIDE ACETATE 50 MG: 100 TABLET ORAL at 09:24

## 2019-02-14 RX ADMIN — CARVEDILOL 3.12 MG: 3.12 TABLET, FILM COATED ORAL at 17:50

## 2019-02-14 RX ADMIN — BISACODYL 20 MG: 5 TABLET, COATED ORAL at 15:22

## 2019-02-14 RX ADMIN — PANTOPRAZOLE SODIUM 40 MG: 40 INJECTION, POWDER, FOR SOLUTION INTRAVENOUS at 12:44

## 2019-02-14 RX ADMIN — TAMSULOSIN HYDROCHLORIDE 0.4 MG: 0.4 CAPSULE ORAL at 09:24

## 2019-02-14 RX ADMIN — PANTOPRAZOLE SODIUM 40 MG: 40 INJECTION, POWDER, FOR SOLUTION INTRAVENOUS at 00:29

## 2019-02-14 RX ADMIN — ATORVASTATIN CALCIUM 10 MG: 10 TABLET, FILM COATED ORAL at 21:49

## 2019-02-14 RX ADMIN — DULOXETINE HYDROCHLORIDE 30 MG: 30 CAPSULE, DELAYED RELEASE ORAL at 09:24

## 2019-02-14 RX ADMIN — PANTOPRAZOLE SODIUM 40 MG: 40 TABLET, DELAYED RELEASE ORAL at 06:30

## 2019-02-14 RX ADMIN — TIMOLOL MALEATE 1 DROP: 5 SOLUTION OPHTHALMIC at 15:24

## 2019-02-14 RX ADMIN — SODIUM CHLORIDE: 9 INJECTION, SOLUTION INTRAVENOUS at 21:45

## 2019-02-14 RX ADMIN — Medication 10 ML: at 09:26

## 2019-02-14 RX ADMIN — PANTOPRAZOLE SODIUM 40 MG: 40 INJECTION, POWDER, FOR SOLUTION INTRAVENOUS at 23:04

## 2019-02-14 RX ADMIN — SODIUM CHLORIDE: 9 INJECTION, SOLUTION INTRAVENOUS at 21:47

## 2019-02-14 RX ADMIN — POLYETHYLENE GLYCOL 3350 119 G: 17 POWDER, FOR SOLUTION ORAL at 19:10

## 2019-02-14 RX ADMIN — TIMOLOL MALEATE 1 DROP: 5 SOLUTION OPHTHALMIC at 09:30

## 2019-02-14 RX ADMIN — CARVEDILOL 3.12 MG: 3.12 TABLET, FILM COATED ORAL at 09:24

## 2019-02-14 RX ADMIN — SODIUM CHLORIDE: 9 INJECTION, SOLUTION INTRAVENOUS at 15:22

## 2019-02-14 RX ADMIN — PNEUMOCOCCAL 13-VALENT CONJUGATE VACCINE 0.5 ML: 2.2; 2.2; 2.2; 2.2; 2.2; 4.4; 2.2; 2.2; 2.2; 2.2; 2.2; 2.2; 2.2 INJECTION, SUSPENSION INTRAMUSCULAR at 09:28

## 2019-02-14 RX ADMIN — Medication 10 ML: at 09:25

## 2019-02-14 ASSESSMENT — PAIN DESCRIPTION - PAIN TYPE: TYPE: ACUTE PAIN

## 2019-02-14 ASSESSMENT — PAIN SCALES - GENERAL
PAINLEVEL_OUTOF10: 3
PAINLEVEL_OUTOF10: 3

## 2019-02-14 ASSESSMENT — PAIN DESCRIPTION - ONSET: ONSET: AWAKENED FROM SLEEP

## 2019-02-14 ASSESSMENT — PAIN DESCRIPTION - ORIENTATION: ORIENTATION: LEFT

## 2019-02-14 ASSESSMENT — PAIN DESCRIPTION - DESCRIPTORS: DESCRIPTORS: ACHING

## 2019-02-14 ASSESSMENT — PAIN - FUNCTIONAL ASSESSMENT: PAIN_FUNCTIONAL_ASSESSMENT: ACTIVITIES ARE NOT PREVENTED

## 2019-02-14 ASSESSMENT — PAIN DESCRIPTION - FREQUENCY: FREQUENCY: INTERMITTENT

## 2019-02-14 ASSESSMENT — PAIN DESCRIPTION - PROGRESSION: CLINICAL_PROGRESSION: NOT CHANGED

## 2019-02-14 ASSESSMENT — PAIN DESCRIPTION - LOCATION: LOCATION: LEG

## 2019-02-14 NOTE — PROGRESS NOTES
GI consult called to Dr. Rama Babin on call. Spoke with Tutu Lau @[phone].     Vernal Eleazar PCA/MT  02/14/2019

## 2019-02-14 NOTE — ED PROVIDER NOTES
Triage Chief Complaint:   Rectal Bleeding (dark bloody stool after taking anti-inflammatory. denies c/o, [facility]. [facility] received from [facility].)    NORMA:  Jessica Henderson is a 80 y.o. female that presents to the emergency Department with complaints of having blood in her stool. The patient has a history of having problems with gastrointestinal bleeding, and has had low hemoglobin in the past.  The patient states that this seemed to be precipitated by the fact that she was taking anti-inflammatory medications in the past.  The patient apparently took an anti-inflammatory yesterday, and today developed dark bloody stool. The patient states that she is taking iron pills at her nursing home as prescribed by her doctor. The patient denies fevers or chills. Patient denies shortness breath. Patient denies abdominal pain, however she has had some slight cramping at times. Patient had a normal appetite. .    ROS:  At least 10 systems reviewed and otherwise acutely negative except as in the [address]     Past Medical History:   Diagnosis Date    Anemia     Anxiety     Arthritis     Atrial fibrillation ([address]     CAD (coronary artery disease)     Chronic GERD     Depression     Diverticulosis     Gall stone     Glaucoma     Hypercholesteremia     Hypertension     Irregular heart beat     Rectal prolapse     Uterine cancer ([address] 2008     Past Surgical History:   Procedure Laterality Date    APPENDECTOMY      COLONOSCOPY  2005    polyp    COLONOSCOPY  1010    diverticulosis    COLONOSCOPY  10/4/2014    diverticulosis, radiation colitis sigmoid    FRACTURE SURGERY      HIP SURGERY Right 8/4/15    hip pinning    HYSTERECTOMY  2008    RT in 2009   [address]      OTHER SURGICAL HISTORY Right 10/29/2018    PATELLA OPEN REDUCTION INTERNAL FIXATION     PACEMAKER INSERTION      PACEMAKER PLACEMENT  2008    [place] CPTR-ASST SURGICAL NAVIGATION IMAGE-LESS Right 9/5/2018    RIGHT TOTAL KNEE REPLACEMENT COMPUTER NAVIGATION WITH ADDUCTOR CANAL BLOCK FOR PAIN CONTROL                     SONAL performed by Carey Brennan MD at [address] [facility] OFFICE/OUTPT [facility] Right 10/29/2018    Right knee inferior pole patellectomy, primary repair of patellar tendon. performed by Carey Brennan MD at [address] <100SQCM Right 11/30/2018    RIGHT KNEE GASTROC FLAP WITH SKIN GRAFT, WOUND VAC PLACEMENT performed by Pete Olson MD at [place] [address]       Family History   Problem Relation Age of Onset    Mental Retardation Mother     Cancer Father         lung     Social History     Social History    Marital status:      Spouse name: N/A    Number of children: N/A    Years of education: N/A     Occupational History    Not on file. Social History Main Topics    Smoking status: Former Smoker    Smokeless tobacco: Never Used      Comment: only smoked from age [age] yrs old    Alcohol use No    Drug use: No    Sexual activity: Not Currently     Other Topics Concern    Not on file     Social History Narrative    No narrative on file     No current facility-administered medications for this encounter. Allergies   Allergen Reactions    Lisinopril Swelling     angioedema  angioedema    Levofloxacin     Levofloxacin Other (See Comments)     unsure    Vancomycin Rash     Rash and hives. CARLY'S SYNDROME    Amlodipine     Avelox [Moxifloxacin Hcl In Nacl]     Buspirone Itching    Hydrochlorothiazide Swelling    Lidocaine Other (See Comments)     Not sure    Moxifloxacin     Moxifloxacin Hcl In Nacl Other (See Comments)     Not sure    Mupirocin     Nsaids      bleeding    Paroxetine Other (See Comments)    Phenergan [Promethazine Hcl] Swelling    Phenothiazines     Promethazine Hcl Swelling    Sulindac      Gi bleed    Tolmetin      bleeding    Ciprofloxacin Rash and Swelling       Nursing Notes Reviewed    Physical Exam:  ED Triage Vitals [02/13/19 1930]   BP Temp Temp Source Pulse Resp SpO2 Height Weight   133/69 97.9 °F (36.6 °C) Oral 65 18 97 % 5' (1.524 m) 110 lb (49.9 kg)     GENERAL APPEARANCE: Awake and alert. Cooperative. No acute distress. HEAD:Normocephalic. Atraumatic. EYES: EOM's grossly intact. Sclera anicteric. ENT: Mucous membranes are moist. Tolerates saliva. No trismus. NECK: Supple. No meningismus. Trachea midline. HEART: RRR. LUNGS: Respirations unlabored. CTAB  ABDOMEN: Soft. Non-tender. No guarding or rebound. Stool is bright red colored, and heme positive. EXTREMITIES: No acute deformities. SKIN: Warm and dry. NEUROLOGICAL: No gross facial drooping. Moves all 4 extremities spontaneously.     Physical Exam    I have reviewed and interpreted all of the currently availablelab results from this visit (if applicable):  Results for orders placed or performed during the hospital encounter of 02/13/19   CBC auto differential   Result Value Ref Range    WBC 10.0 4.0 - 11.0 K/uL    RBC 3.93 (L) 4.00 - 5.20 M/uL    Hemoglobin 11.9 (L) 12.0 - 16.0 g/dL    Hematocrit 35.6 (L) 36.0 - 48.0 %    MCV 90.5 80.0 - 100.0 fL    MCH 30.2 26.0 - 34.0 pg    MCHC 33.3 31.0 - 36.0 g/dL    RDW 15.0 12.4 - 15.4 %    Platelets 747 292 - 123 K/uL    MPV 7.7 5.0 - 10.5 fL    Neutrophils % 76.6 %    Lymphocytes % 11.0 %    Monocytes % 8.7 %    Eosinophils % 3.2 %    Basophils % 0.5 %    Neutrophils # 7.6 1.7 - 7.7 K/uL    Lymphocytes # 1.1 1.0 - 5.1 K/uL    Monocytes # 0.9 0.0 - 1.3 K/uL    Eosinophils # 0.3 0.0 - 0.6 K/uL    Basophils # 0.0 0.0 - 0.2 K/uL   Comprehensive metabolic panel   Result Value Ref Range    Sodium 129 (L) 136 - 145 mmol/L    Potassium 4.2 3.5 - 5.1 mmol/L    Chloride 94 (L) 99 - 110 mmol/L    CO2 25 21 - 32 mmol/L    Anion Gap 10 3 - 16    Glucose 158 (H) 70 - 99 mg/dL    BUN 13 7 - 20 mg/dL    CREATININE 0.6 0.6 - 1.2 mg/dL    GFR Non-African American >60 >60    GFR  American >60 >60    Calcium 9.1 8.3 - 10.6 mg/dL    Total Protein 6.6 6.4 - 8.2 g/dL    Alb 3.7 3.4 - 5.0 g/dL    Albumin/Globulin Ratio 1.3 1.1 - 2.2    Total Bilirubin 0.4 0.0 - 1.0 mg/dL    Alkaline Phosphatase 89 40 - 129 U/L    ALT 13 10 - 40 U/L    AST 17 15 - 37 U/L    Globulin 2.9 g/dL   Protime-INR   Result Value Ref Range    Protime 13.3 (H) 9.8 - 13.0 sec    INR 1.17 (H) 0.86 - 1.14   APTT   Result Value Ref Range    aPTT 27.8 26.0 - 36.0 sec   TYPE AND SCREEN   Result Value Ref Range    ABO/Rh A POS     Antibody Screen NEG         Procedures    MDM:  After initial evaluation, I did do a rectal examination the patient which shows old and is obviously bloody, so I do feel she is mostly a require admission to the hospital.  The patient did have blood work done, and her hemoglobin is actually much better than it has been in our records the past.  Patient is not hemodynamically unstable, so I do feel she is stable for the floor. I did speak with the hospitalist, and he is willing to come down and see the patient. I did obtain a stool sample, nasal pain the present time. The patient's stool several however was obviously bloody, so I do not feel she is going to require transfused the present time, and I am going to order at least a type and screen in anticipation of a possible transfusion. Clinical Impression:  1.  Gastrointestinal hemorrhage, unspecified gastrointestinal hemorrhage type      (Please note that portions of this note Nonda Alfred been completed with a voice recognition program. Efforts were made to edit the dictations but occasionally words are mis-transcribed.)    MD Marisol Archibald MD  02/13/19 7739

## 2019-02-14 NOTE — PROGRESS NOTES
Progress Note    Admit Date:  2/13/2019  Admitted for GIB and abdominal pain     Subjective:  Ms. Alessio Lynch was admitted for GI bleed. She does in the nursing home. They noticed bloody bowel movements. Apparently there has been use of non-steroidal anti-inflammatory drugs at the nursing home but I'm not sure as I don't see on the list. She had a colonoscopy in 2014. She also been taking iron tablets. She's had multiple issues with her knee and has been in and out of the hospital over the last few months. Objective:   Vitals:    02/14/19 0915   BP: (!) 152/74   Pulse: 60   Resp: 16   Temp: 97.7 °F (36.5 °C)   SpO2: 95%            Intake/Output Summary (Last 24 hours) at 02/14/19 1433  Last data filed at 02/14/19 1230   Gross per 24 hour   Intake            457.5 ml   Output             1200 ml   Net           -742.5 ml       Physical Exam:    Gen: No distress. Alert. Eyes: PERRL. No sclera icterus. No conjunctival injection. ENT: No discharge. Pharynx clear. Neck: No JVD. Trachea midline. Resp: No accessory muscle use. No crackles. No wheezes. No rhonchi. CV: Regular rate. Regular rhythm. No murmur. No rub. No edema. Paced rhythm  Capillary Refill: Brisk,< 3 seconds   Peripheral Pulses: +2 palpable, equal bilaterally   GI: Non-tender. Non-distended. Normal bowel sounds. Skin: Warm and dry. No nodule on exposed extremities. No rash on exposed extremities. M/S: No cyanosis. No joint deformity. No clubbing. Neuro: Awake. Grossly nonfocal    Psych: Oriented x 3. No anxiety or agitation.        Scheduled Meds:   bisacodyl  20 mg Oral Once    [START ON 2/15/2019] polyethylene glycol  119 g Oral Once    polyethylene glycol  119 g Oral Once    atorvastatin  10 mg Oral Nightly    carvedilol  3.125 mg Oral BID WC    DULoxetine  30 mg Oral Daily    flecainide  50 mg Oral BID    latanoprost  1 drop Both Eyes Nightly    pantoprazole  40 mg Oral QAM AC    tamsulosin  0.4 mg Oral Daily    timolol  1 drop Left Eye BID    vitamin D  2,000 Units Oral Daily    sodium chloride flush  10 mL Intravenous 2 times per day    pantoprazole  40 mg Intravenous Q12H    And    sodium chloride (PF)  10 mL Intravenous Q12H       Continuous Infusions:   sodium chloride 75 mL/hr at 02/13/19 2357       PRN Meds:  melatonin, acetaminophen, sodium chloride flush, acetaminophen, ondansetron      Data:  CBC:   Recent Labs      02/1934 02/14/19   0028  02/14/19   0515  02/14/19   1149   WBC  10.0   --    --    --    HGB  11.9*  11.8*  11.2*  10.9*   HCT  35.6*   --    --    --    MCV  90.5   --    --    --    PLT  342   --    --    --      BMP:   Recent Labs      02/1934 02/14/19   0515   NA  129*  134*   K  4.2  4.3   CL  94*  99   CO2  25  24   BUN  13  11   CREATININE  0.6  0.6     LIVER PROFILE:   Recent Labs      02/1934 02/14/19   0515   AST  17  15   ALT  13  12   BILITOT  0.4  0.6   ALKPHOS  89  81     PT/INR:   Recent Labs      02/1934   PROTIME  13.3*   INR  1.17*       CULTURES  none     RADIOLOGY  No orders to display     Assessment/Plan:  GI bleeding, likely lower GI bleed  - GI consult--colonoscopy tomorrow   - Protonix  IV BID  - NPO   - transfuse PRN for hgb <7.0     Hyponatremia  - improved with IVF   - Continue to monitor     Abdominal pain  - Symptomatic control   - No acute peritoneal findings     History of atrial fibrillation not on anticoagulation  - In NSR on the monitor   - Continue home flecainide     Hypertension  - BP controlled   - Continue BB    Dyslipidemia  -  Continue Lipitor     DVT Prophylaxis: SCDs  Diet: Diet NPO Time Specified Exceptions are: Ice Chips  DIET CLEAR LIQUID;  Code Status: DNR-CC    [facility] 2/14/2019 3:38 PM

## 2019-02-14 NOTE — H&P
Hospital Medicine History & Physical      PCP: Jovon Chow MD    Date of Admission: 2/13/2019    Date of Service: Pt seen/examined on 2/13/19    Chief Complaint:  Bloody stool    History Of Present Illness:    80 y.o. female who presented to the hospital with a chief complaint of GI bleeding. The patient is a nursing home resident who presented to the emergency room with her daughter this evening. The patient stated that she is noticing bloody bowel movements. There are reports that the patient was using anti-inflammatory medications at the nursing home she denies this. She endorses taking iron tablets and her regular prescribed medications. She describes her stool as black. She has had a colonoscopy in the past, last was in 2014. She denies any hematemesis, chest pain, nausea, vomiting or diarrhea. She does endorse some slight pressure over her suprapubic area but not particularly any pain. She will be admitted for GI evaluation and treatment.        Past Medical History:        Diagnosis Date    Anemia     Anxiety     Arthritis     Atrial fibrillation ([address]     CAD (coronary artery disease)     Chronic GERD     Depression     Diverticulosis     Gall stone     Glaucoma     Hypercholesteremia     Hypertension     Irregular heart beat     Rectal prolapse     Uterine cancer ([address] 2008       Past Surgical History:        Procedure Laterality Date    APPENDECTOMY      COLONOSCOPY  2005    polyp    COLONOSCOPY  1010    diverticulosis    COLONOSCOPY  10/4/2014    diverticulosis, radiation colitis sigmoid    FRACTURE SURGERY      HIP SURGERY Right 8/4/15    hip pinning    HYSTERECTOMY  2008    RT in [address]   [address]      OTHER SURGICAL HISTORY Right 10/29/2018    PATELLA OPEN REDUCTION INTERNAL FIXATION     PACEMAKER INSERTION      PACEMAKER PLACEMENT  2008    [place] CPTR-ASST SURGICAL NAVIGATION IMAGE-LESS Right 9/5/2018    RIGHT TOTAL KNEE REPLACEMENT COMPUTER NAVIGATION WITH ADDUCTOR CANAL BLOCK FOR PAIN CONTROL                     SONAL performed by Andrew Purdy MD at [address] OFFICE/OUTPT [address] [facility] [address] Right 10/29/2018    Right knee inferior pole patellectomy, primary repair of patellar tendon. performed by Andrew Purdy MD at [address] <100SQCM Right 11/30/2018    RIGHT KNEE GASTROC FLAP WITH SKIN GRAFT, WOUND VAC PLACEMENT performed by Gloria Tompkins MD at [place] [address].         Medications Prior to Admission:      Prior to Admission medications    Medication Sig Start Date End Date Taking? Authorizing Provider   acetaminophen (TYLENOL) 325 MG tablet Take 650 mg by mouth every 6 hours as needed for Pain   Yes Historical Provider, MD   DULoxetine (CYMBALTA) 30 MG extended release capsule Take 30 mg by mouth daily   Yes Historical Provider, MD   Melatonin 5 MG CAPS Take 5 mg by mouth nightly as needed   Yes Historical Provider, MD   omeprazole (PRILOSEC) 20 MG delayed release capsule Take 20 mg by mouth daily   Yes Historical Provider, MD   oxyCODONE-acetaminophen (PERCOCET) 5-325 MG per tablet Take by mouth every 4 hours as needed for Pain (1 tab for moderate pain, 2 tabs for severe pain). .   Yes Historical Provider, MD   loperamide (IMODIUM) 2 MG capsule Take 2 mg by mouth as needed for Diarrhea Two tabs after initial diarrhea episode, 1 tab after each additional episode, NTE   Yes Historical Provider, MD   tamsulosin (FLOMAX) 0.4 MG capsule Take 0.4 mg by mouth daily   Yes Historical Provider, MD   ferrous sulfate 325 (65 Fe) MG tablet Take 1 tablet by mouth three times a week 11/7/18  Yes Yamel Harrington MD   sennosides-docusate sodium (SENOKOT-S) 8.6-50 MG tablet Take 1 tablet by mouth daily 11/2/18  Yes Charity Pérez MD   flecainide (TAMBOCOR) 50 MG tablet Take 50 mg by mouth 2 times daily   Yes Historical Provider, MD   aspirin 81 MG tablet Take 81 mg by mouth daily   Yes Historical Provider, MD   timolol (BETIMOL) 0.5 % ophthalmic solution Place 1 drop into the left eye 2 times daily   Yes Historical Provider, MD   carvedilol (COREG) 3.125 MG tablet Take 1 tablet by mouth 2 times daily (with meals). 10/7/14  Yes Lacey Beltran MD   atorvastatin (LIPITOR) 10 MG tablet   Take 10 mg by mouth nightly    Yes Historical Provider, MD   Multiple Vitamins-Minerals (CENTRUM PO) Take 1 tablet by mouth daily. Indications: Centrum Silver 12/24/10  Yes Historical Provider, MD   vitamin D (CHOLECALCIFEROL) 400 UNIT TABS tablet   Take 2,000 Units by mouth daily    Yes Historical Provider, MD   latanoprost (XALATAN) 0.005 % ophthalmic solution   Place 1 drop into both eyes nightly    Yes Historical Provider, MD       Allergies:  Lisinopril; Levofloxacin; Levofloxacin; Vancomycin; Amlodipine; Avelox [moxifloxacin hcl in nacl]; Buspirone; Hydrochlorothiazide; Lidocaine; Moxifloxacin; Moxifloxacin hcl in nacl; Mupirocin; Nsaids; Paroxetine; Phenergan [promethazine hcl]; Phenothiazines; Promethazine hcl; Sulindac; Tolmetin; and Ciprofloxacin    Social History:      TOBACCO:   reports that she has quit smoking. She has never used smokeless tobacco.  ETOH:   reports that she does not drink alcohol. Family History:      Family History   Problem Relation Age of Onset    Mental Retardation Mother     Cancer Father         lung       REVIEW OF SYSTEMS:   Constitutional:  Negative for fever,chills or night sweats  ENT:  Negative for rhinorrhea, epistaxis, hoarseness, sore throat. Respiratory: Negative for SOB or wheezing   Cardiovascular:   Negative for  chest pain, palpitations   Gastrointestinal:  Endorses GI bleeding and abdominal pain  Genitourinary:  Negative for polyuria, dysuria   Hematologic/Lymphatic:  Negative for  bleeding tendency, easy bruising  Musculoskeletal:  Negative for myalgias and arthralgias  Neurologic:  Negative for  confusion,dysarthria.   Skin:  Negative for itching,rash  Psychiatric: Negative for depression,anxiety, agitation. Endocrine:  Negative for polydipsia,polyuria,heat /cold intolerance. PHYSICAL EXAM:    /78   Pulse 63   Temp 97.9 °F (36.6 °C) (Oral)   Resp 18   Ht 5' (1.524 m)   Wt 111 lb (50.3 kg)   SpO2 97%   BMI 21.68 kg/m²     General appearance:  No apparent distress, appears stated age and cooperative. HEENT:  Normal cephalic, atraumatic without obvious deformity. Pupils equal, round, and reactive to light. Extra ocular muscles intact. Conjunctivae/corneas clear. Neck: Supple, with full range of motion. No jugular venous distention. Trachea midline. Respiratory:  Normal respiratory effort. Clear to auscultation, bilaterally without Rales/Wheezes/Rhonchi. Cardiovascular:  Regular rate and rhythm with normal S1/S2 without murmurs, rubs or gallops. Abdomen: Soft, tender to deep palpation in the suprapubic and left lower quadrant area  Musculoskeletal:  No clubbing, cyanosis or edema bilaterally. Full range of motion without deformity. Skin: Skin color, texture, turgor normal.  No rashes or lesions. Neurologic:  Neurovascularly intact without any focal sensory/motor deficits. Cranial nerves: II-XII intact, grossly non-focal.  Psychiatric:  Alert and oriented, thought content appropriate, normal insight  Capillary Refill: Brisk,< 3 seconds   Peripheral Pulses: +2 palpable, equal bilaterally       Labs:   Recent Labs      02/1934   WBC  10.0   HGB  11.9*   HCT  35.6*   PLT  342     Recent Labs      02/1934   NA  129*   K  4.2   CL  94*   CO2  25   BUN  13   CREATININE  0.6   CALCIUM  9.1     Recent Labs      02/1934   AST  17   ALT  13   BILITOT  0.4   ALKPHOS  89     Recent Labs      02/1934   INR  1.17*     No results for input(s): Fritz Torres in the last 72 hours.     Urinalysis:      Lab Results   Component Value Date    NITRU Negative 11/10/2018    WBCUA 10-20 11/10/2018    BACTERIA 2+ 11/10/2018    RBCUA 0-2 11/10/2018    BLOODU Negative 11/10/2018    SPECGRAV 1.010 11/10/2018    GLUCOSEU Negative 11/10/2018    GLUCOSEU Neg 04/25/2010         ASSESSMENT:  GI bleeding, likely lower GI bleed  Hyponatremia  Abdominal pain  History of atrial fibrillation not on anticoagulation  Hypertension  Dyslipidemia    PLAN:  N.p.o. at midnight, start IV fluids, type and screen  GI consult  Hemoglobin q.6 hours, Protonix IV b.i.d. Will transfuse as needed  Resume rest of home medications  Patient and daughter wanted me to note that the patient said do not resuscitate comfort care    DVT Prophylaxis: SCDs  Diet: Diet NPO Effective Now Exceptions are: Ice Chips  Code Status: DNR-CC    Dispo - admit      Thank you for the opportunity to be involved in this patient's care.       (Please note that portions of this note were completed with a voice recognition program. Efforts were made to edit the dictations but occasionally words are mis-transcribed.)

## 2019-02-14 NOTE — PROGRESS NOTES
Right upper thigh skin graft donor site cleansed and xeroform placed. Small circular areas red, no drainage.

## 2019-02-14 NOTE — ED NOTES
Chief Complaint   Patient presents with    Rectal Bleeding     dark bloody stool after taking anti-inflammatory. denies c/o, resident of [facility]. dnr-cc paper received from sn[facility]. First contact w/pt. For this rn. Pt. Received from [facility] stating she began nsaids approx 2 weeks ago for pain and states today after a bowel movement noticed she had bloody stool. Pt. Is alert and oriented, mmm and pink. Vss. Piv placed and pt. Is aware of plan of care at this time. Call light w/in reach.      Alia Hutchinson RN  02/13/19 1939
Pt. Alert and oriented upon transfer to floor. [facility] report leo.      Meenu Blanchard RN  02/13/19 4593
vomiting, Cramping

## 2019-02-14 NOTE — CARE COORDINATION
Case Management Assessment  Initial Evaluation    Date/Time of Evaluation: 2/14/2019 3:00 PM  Assessment Completed by: Mana Hdz    Patient Name: William Coronel  YOB: 1932  Diagnosis: GI bleeding [K92.2]  Date / Time: 2/13/2019  7:24 PM  Admission status/Date:2/14/19 1924 inpt  Chart Reviewed: Yes      Patient Interviewed: Yes   Family Interviewed:  No      Hospitalization in the last 30 days:  No    Contacts  :     Relationship to Patient:   Phone Number:    Alternate Contact:     Relationship to Patient:     Phone Number:    Met with:    Current PCP Dr. Brown Ramos required for SNF :N        3 night stay required - Y    [address]: Children  Transportation: EMS transportation    [facility]  [facility]ps: 0  Plans to Return to Present Housing: Yes  Other Identified Issues:     Treverlcement Esquivelilene Sean  Currently active with [address] : No     Passport/Waiver : No  :                      Phone Number:    Passport/Waiver Services:           [facility] Provider:   Equipment: noneWalker__Cane__RTS__ BSC__Shower Chair__  02__ HHN__ CPAP__  BiPap__  Hospital Bed__ W/C___ Other__________      Has Home O2 in place on admit:  No  Informed of need to bring portable home O2 tank on day of discharge for nursing to connect prior to leaving:   Not Indicated  Verbalized agreement/Understanding:   Not Indicated    Community Service Affiliation  Dialysis:  No    Outpatient PT/OT: No    Cancer Center: No     CHF Clinic: No     Pulmonary Rehab: No  Pain Clinic: No  Community Mental Health: No    Wound Clinic: No     Other:     DISCHARGE PLAN: Reviewed chart. Role of discharge planner explained and patient verbalized understanding. Pt states that she is from [place] [address] and plans to return.  CM spoke with Aron Gresham with [facility] and she stated that pt can return and +bed hold. No HENS or precert is needed. Explained Case Management role/services.

## 2019-02-14 NOTE — PROGRESS NOTES
D: Greeted patient and daughter in ER room 18, placed tele monitor onto patient according to policy, via stretcher, took patient to room 322 bed 2, patient moved to hospital bed, vital signs obtained and recorded, admission assessment completed and documented, oriented patient to room, unit, use of call light, and plan of care. Patient has an ace wrap and a brace in place to the right leg. 4 Eyes Skin Assessment     The patient is being assess for   Admission    I agree that 2 RN's have performed a thorough Head to Toe Skin Assessment on the patient. ALL assessment sites listed below have been assessed. Areas assessed by both nurses:   [x]   Head, Face, and Ears   [x]   Shoulders, Back, and Chest, Abdomen  [x]   Arms, Elbows, and Hands   [x]   Coccyx, Sacrum, and Ischium  [x]   Legs, Feet, and Heels        Patient has no skin issues at the time of admission    **SHARE this note so that the co-signing nurse is able to place an eSignature**    Co-signer eSignature: {Esignature:[id number]}    Does the Patient have Skin Breakdown?   {Blank single:32469::\"No\",\"Yes LDA WOUND CARE was Initiated documentation include the Mary-wound, Wound Assessment, Measurements, Dressing Treatment, Drainage, and Color\",\"}          Tyler Prevention initiated:  {YES/NO:19732}   Wound Care Orders initiated:  {YES/NO:19732}      [facility] nurse consulted for Pressure Injury (Stage 3,4, Unstageable, DTI, NWPT, Complex wounds)and New or Established Ostomies:  {YES/NO:19732}      Primary Nurse eSignature: Electronically signed by Mariah Wu RN on 2/14/19 at 1:47 AM

## 2019-02-14 NOTE — FLOWSHEET NOTE
02/14/19 0915   Vital Signs   Temp 97.7 °F (36.5 °C)   Temp Source Oral   Pulse 60   Heart Rate Source Monitor   Resp 16   BP (!) 152/74   BP Location Right upper arm   Patient Position Semi fowlers   Level of Consciousness 0   MEWS Score 1   Patient Currently in Pain Denies   Oxygen Therapy   SpO2 95 %   O2 Device None (Room air)   Assessment completed and documented VSS, A paced on monitor and remains on RA. Will need to follow up with [facility] regarding treatment to right knee. NPO for GI consult.

## 2019-02-14 NOTE — PROGRESS NOTES
[facility] was called regarding apptmnt patient had today with them and they spoke with Katie Chacon. 2-14-19 @ 1883.  Jewels Lloyd

## 2019-02-14 NOTE — DISCHARGE INSTR - COC
Continuity of Care Form    Patient Name: Griselda Heading   :  1932  MRN:  [id number]    Admit date:  2019  Discharge date:  ***    Code Status Order: [facility]   Advance Directives:   [facility] Documentation     Date/Time Healthcare Directive Type of Healthcare Directive Copy in [facility] [address] Agent's Name Healthcare Agent's Phone Number    19 1800  Yes, patient has an advance directive for healthcare treatment  Living will;Durable power of  for health care  No, copy requested from medical records  --  --  --          Admitting Physician:  Maria A Rice MD  PCP: Lesa Emmanuel MD    Discharging Nurse: [facility] Unit/Room#: /4432-24  Discharging Unit Phone Number: ***    Emergency Contact:   Extended Emergency Contact Information  Primary Emergency Contact: [address] [place] [address] Phone: [phone]  Mobile Phone: [phone]  Relation: Child    Past Surgical History:  Past Surgical History:   Procedure Laterality Date    APPENDECTOMY      COLONOSCOPY  2005    polyp    COLONOSCOPY  1010    diverticulosis    COLONOSCOPY  10/4/2014    diverticulosis, radiation colitis sigmoid    FRACTURE SURGERY      HIP SURGERY Right 8/4/15    hip pinning    HYSTERECTOMY  2008    RT in    [address] OTHER SURGICAL HISTORY Right 10/29/2018    PATELLA OPEN REDUCTION INTERNAL FIXATION     PACEMAKER INSERTION      PACEMAKER PLACEMENT  2008    [place] CPTR-ASST SURGICAL NAVIGATION [facility] Right 2018    RIGHT TOTAL KNEE REPLACEMENT COMPUTER NAVIGATION WITH ADDUCTOR CANAL BLOCK FOR PAIN CONTROL                     SONAL performed by Farheen Farnsworth MD at [address] [facility] OFFICE/OUTPT [facility] Right 10/29/2018    Right knee inferior pole patellectomy, primary repair of patellar tendon.  performed by Farheen Farnsworth MD at [address] <100SQCM {CHP DME BJBJ:645042005}  Bathing  {CHP DME AIGF:192120677}  Dressing  {CHP DME LLD}  Toileting  {CHP DME FMHO:597528354}  Feeding  {CHP DME BREP:358138614}  Med Admin  {CHP DME XBTJ:876218956}  Med Delivery   { MONIKA MED Delivery:678233164}    Wound Care Documentation and Therapy:  Incision 10/29/18 Knee Right (Active)   Number of days: 107       Incision 18 Leg Right (Active)   Number of days: 75        Elimination:  Continence:   · Bowel: {YES / XP:69946}  · Bladder: {YES / JF:87221}  Urinary Catheter: {Urinary Catheter:150287455}   Colostomy/Ileostomy/Ileal Conduit: {YES / FX:49317}       Date of Last BM: ***    Intake/Output Summary (Last 24 hours) at 19 1505  Last data filed at 19 1230   Gross per 24 hour   Intake            457.5 ml   Output             1200 ml   Net           -742.5 ml     I/O last 3 completed shifts:   In: 457.5 [I.V.:457.5]  Out: 1200 [Urine:1200]    Safety Concerns:     [address] Safety Concerns:184627676}    Impairments/Disabilities:      [address] [employer] Impairments/Disabilities:586394156}    Nutrition Therapy:  Current Nutrition Therapy:   [address] [employer] Diet List:189151705}    Routes of Feeding: {P DME Other Feedings:778533159}  Liquids: {Slp liquid thickness:75838}  Daily Fluid Restriction: {CHP DME Yes amt example:805339808}  Last Modified Barium Swallow with Video (Video Swallowing Test): {Done Not Done ORSE:950904519}    Treatments at the Time of Hospital Discharge:   Respiratory Treatments: ***  Oxygen Therapy:  {Therapy; copd oxygen:46778}  Ventilator:    { EZEQUIEL Vent BYSE:765869840}    Rehab Therapies: {THERAPEUTIC INTERVENTION:8559129038}  Weight Bearing Status/Restrictions: [facility] Weight Bearin}  Other Medical Equipment (for information only, NOT a DME order):  {EQUIPMENT:601486496}  Other Treatments: ***    Patient's personal belongings (please select all that are sent with patient):  {CAREY DME Belongings:204746652}    RN SIGNATURE: {Esignature:644425538}    CASE MANAGEMENT/SOCIAL WORK SECTION    Inpatient Status Date: 2/13/19    Readmission Risk Assessment Score:  Readmission Risk              Risk of Unplanned Readmission:        21           Discharging to Facility/ Agency     Name: PATIENTS' [facility]  Address: Luan Sarabia, [address] [place] [address], [address]  Phone:[phone]  NJE:[phone]      / signature: Electronically signed by Nickolas Kimbrough RN on 2/15/19 at 5:28 PM    PHYSICIAN SECTION    Prognosis: Fair    Condition at Discharge: Stable    Rehab Potential (if transferring to Rehab): Good    Recommended Labs or Other Treatments After Discharge: PT and OT/CBCD on Monday    Physician Certification: I certify the above information and transfer of Penelope Jordan  is necessary for the continuing treatment of the diagnosis listed and that she requires [place] for greater 30 days.      Update Admission H&P: No change in H&P    PHYSICIAN SIGNATURE:  Electronically signed by Ca Aleman MD on 2/15/19 at 6:28 PM

## 2019-02-14 NOTE — PROGRESS NOTES
Spoke with nurse regarding treatment to Right knee graft site, states Xeroform with gauze and ace wrap. Consult placed to Jourdan Phan.

## 2019-02-14 NOTE — PLAN OF CARE
Problem: Falls - Risk of:  Goal: Will remain free from falls  Will remain free from falls   Outcome: Ongoing    Goal: Absence of physical injury  Absence of physical injury   Outcome: Ongoing      Problem: Risk for Impaired Skin Integrity  Goal: Tissue integrity - skin and mucous membranes  Structural intactness and normal physiological function of skin and  mucous membranes. Outcome: Ongoing      Problem: SAFETY  Goal: Free from accidental physical injury  Outcome: Ongoing    Goal: Free from intentional harm  Outcome: Ongoing      Problem: DAILY CARE  Goal: Daily care needs are met  Outcome: Ongoing      Problem: PAIN  Goal: Patient's pain/discomfort is manageable  Outcome: Ongoing      Problem: SKIN INTEGRITY  Goal: Skin integrity is maintained or improved  Outcome: Ongoing      Problem: KNOWLEDGE DEFICIT  Goal: Patient/S.O. demonstrates understanding of disease process, treatment plan, medications, and discharge instructions.   Outcome: Ongoing      Problem: DISCHARGE BARRIERS  Goal: Patient's continuum of care needs are met  Outcome: Ongoing

## 2019-02-15 VITALS
DIASTOLIC BLOOD PRESSURE: 80 MMHG | WEIGHT: 111 LBS | HEIGHT: 60 IN | OXYGEN SATURATION: 96 % | BODY MASS INDEX: 21.79 KG/M2 | SYSTOLIC BLOOD PRESSURE: 150 MMHG | RESPIRATION RATE: 16 BRPM | TEMPERATURE: 96.8 F | HEART RATE: 63 BPM

## 2019-02-15 PROCEDURE — 7100000010 HC PHASE II RECOVERY - FIRST 15 MIN: Performed by: INTERNAL MEDICINE

## 2019-02-15 PROCEDURE — 99239 HOSP IP/OBS DSCHRG MGMT >30: CPT | Performed by: INTERNAL MEDICINE

## 2019-02-15 PROCEDURE — 6370000000 HC RX 637 (ALT 250 FOR IP): Performed by: INTERNAL MEDICINE

## 2019-02-15 PROCEDURE — 2580000003 HC RX 258: Performed by: INTERNAL MEDICINE

## 2019-02-15 PROCEDURE — 99152 MOD SED SAME PHYS/QHP 5/>YRS: CPT | Performed by: INTERNAL MEDICINE

## 2019-02-15 PROCEDURE — 3609027000 HC COLONOSCOPY: Performed by: INTERNAL MEDICINE

## 2019-02-15 PROCEDURE — 2709999900 HC NON-CHARGEABLE SUPPLY: Performed by: INTERNAL MEDICINE

## 2019-02-15 PROCEDURE — 6360000002 HC RX W HCPCS: Performed by: INTERNAL MEDICINE

## 2019-02-15 PROCEDURE — 7100000011 HC PHASE II RECOVERY - ADDTL 15 MIN: Performed by: INTERNAL MEDICINE

## 2019-02-15 PROCEDURE — 99153 MOD SED SAME PHYS/QHP EA: CPT | Performed by: INTERNAL MEDICINE

## 2019-02-15 PROCEDURE — 0DJD8ZZ INSPECTION OF LOWER INTESTINAL TRACT, VIA NATURAL OR ARTIFICIAL OPENING ENDOSCOPIC: ICD-10-PCS | Performed by: INTERNAL MEDICINE

## 2019-02-15 PROCEDURE — C9113 INJ PANTOPRAZOLE SODIUM, VIA: HCPCS | Performed by: INTERNAL MEDICINE

## 2019-02-15 RX ORDER — MIDAZOLAM HYDROCHLORIDE 5 MG/ML
INJECTION INTRAMUSCULAR; INTRAVENOUS PRN
Status: DISCONTINUED | OUTPATIENT
Start: 2019-02-15 | End: 2019-02-15 | Stop reason: ALTCHOICE

## 2019-02-15 RX ORDER — SODIUM CHLORIDE, SODIUM LACTATE, POTASSIUM CHLORIDE, CALCIUM CHLORIDE 600; 310; 30; 20 MG/100ML; MG/100ML; MG/100ML; MG/100ML
INJECTION, SOLUTION INTRAVENOUS CONTINUOUS PRN
Status: DISCONTINUED | OUTPATIENT
Start: 2019-02-15 | End: 2019-02-15 | Stop reason: ALTCHOICE

## 2019-02-15 RX ORDER — FENTANYL CITRATE 50 UG/ML
INJECTION, SOLUTION INTRAMUSCULAR; INTRAVENOUS PRN
Status: DISCONTINUED | OUTPATIENT
Start: 2019-02-15 | End: 2019-02-15 | Stop reason: ALTCHOICE

## 2019-02-15 RX ORDER — DIPHENHYDRAMINE HCL 25 MG
25 TABLET ORAL EVERY 6 HOURS PRN
Status: DISCONTINUED | OUTPATIENT
Start: 2019-02-15 | End: 2019-02-16 | Stop reason: HOSPADM

## 2019-02-15 RX ADMIN — LATANOPROST 1 DROP: 50 SOLUTION OPHTHALMIC at 19:59

## 2019-02-15 RX ADMIN — ACETAMINOPHEN 650 MG: 325 TABLET ORAL at 08:48

## 2019-02-15 RX ADMIN — FLECAINIDE ACETATE 50 MG: 100 TABLET ORAL at 19:58

## 2019-02-15 RX ADMIN — Medication 10 ML: at 11:02

## 2019-02-15 RX ADMIN — FLECAINIDE ACETATE 50 MG: 100 TABLET ORAL at 08:48

## 2019-02-15 RX ADMIN — TIMOLOL MALEATE 1 DROP: 5 SOLUTION/ DROPS OPHTHALMIC at 08:49

## 2019-02-15 RX ADMIN — ACETAMINOPHEN 650 MG: 325 TABLET ORAL at 03:46

## 2019-02-15 RX ADMIN — DULOXETINE HYDROCHLORIDE 30 MG: 30 CAPSULE, DELAYED RELEASE ORAL at 08:48

## 2019-02-15 RX ADMIN — DIPHENHYDRAMINE HCL 25 MG: 25 TABLET ORAL at 00:16

## 2019-02-15 RX ADMIN — PANTOPRAZOLE SODIUM 40 MG: 40 INJECTION, POWDER, FOR SOLUTION INTRAVENOUS at 11:02

## 2019-02-15 RX ADMIN — VITAMIN D, TAB 1000IU (100/BT) 2000 UNITS: 25 TAB at 08:48

## 2019-02-15 RX ADMIN — TIMOLOL MALEATE 1 DROP: 5 SOLUTION/ DROPS OPHTHALMIC at 17:41

## 2019-02-15 RX ADMIN — ATORVASTATIN CALCIUM 10 MG: 10 TABLET, FILM COATED ORAL at 19:58

## 2019-02-15 RX ADMIN — CARVEDILOL 3.12 MG: 3.12 TABLET, FILM COATED ORAL at 08:48

## 2019-02-15 RX ADMIN — ACETAMINOPHEN 650 MG: 325 TABLET ORAL at 19:59

## 2019-02-15 RX ADMIN — Medication 10 ML: at 19:59

## 2019-02-15 RX ADMIN — TAMSULOSIN HYDROCHLORIDE 0.4 MG: 0.4 CAPSULE ORAL at 08:48

## 2019-02-15 RX ADMIN — POLYETHYLENE GLYCOL 3350 119 G: 17 POWDER, FOR SOLUTION ORAL at 03:19

## 2019-02-15 ASSESSMENT — PAIN DESCRIPTION - DESCRIPTORS
DESCRIPTORS: ACHING

## 2019-02-15 ASSESSMENT — PAIN DESCRIPTION - FREQUENCY
FREQUENCY: INTERMITTENT

## 2019-02-15 ASSESSMENT — PAIN DESCRIPTION - PAIN TYPE
TYPE: ACUTE PAIN
TYPE: CHRONIC PAIN
TYPE: CHRONIC PAIN

## 2019-02-15 ASSESSMENT — PAIN SCALES - GENERAL
PAINLEVEL_OUTOF10: 3
PAINLEVEL_OUTOF10: 4
PAINLEVEL_OUTOF10: 2

## 2019-02-15 ASSESSMENT — PAIN - FUNCTIONAL ASSESSMENT: PAIN_FUNCTIONAL_ASSESSMENT: ACTIVITIES ARE NOT PREVENTED

## 2019-02-15 ASSESSMENT — PAIN DESCRIPTION - ONSET: ONSET: ON-GOING

## 2019-02-15 ASSESSMENT — PAIN DESCRIPTION - ORIENTATION
ORIENTATION: RIGHT
ORIENTATION: RIGHT;LEFT
ORIENTATION: RIGHT

## 2019-02-15 ASSESSMENT — PAIN DESCRIPTION - LOCATION
LOCATION: KNEE
LOCATION: KNEE
LOCATION: KNEE;ELBOW

## 2019-02-15 ASSESSMENT — PAIN DESCRIPTION - PROGRESSION: CLINICAL_PROGRESSION: NOT CHANGED

## 2019-02-15 NOTE — PROGRESS NOTES
Shift assessment complete. See flow sheet. Evening medications administered. See MAR. Vital signs stable. Pt finished first round of bowel prep. Patient is alert and oriented X 4. Pt denies any present needs/concerns. Call light explained and in reach. Bed check on. Will continue to monitor.

## 2019-02-15 NOTE — PLAN OF CARE
Problem: Falls - Risk of:  Goal: Will remain free from falls  Will remain free from falls   Outcome: Ongoing      Problem: Risk for Impaired Skin Integrity  Goal: Tissue integrity - skin and mucous membranes  Structural intactness and normal physiological function of skin and  mucous membranes.    Outcome: Ongoing      Problem: DAILY CARE  Goal: Daily care needs are met  Outcome: Ongoing

## 2019-02-15 NOTE — OP NOTE
Penelope Jordan   2/15/2019  Colonoscopy  A pre-procedure re-evaluation was performed immediately prior to the procedure.   Preprocedure Dx: rectal bleeding  Postprocedure Dx: severe diverticulosis, no active bleeding  Medications: Procedural sedation with Versed & Fentanyl  Complications: None  Estimated Blood Loss: <5cc  Specimens: were not obtained  Recommendations:   Return to floor  Resume diet  High fiber diet  Monitor Hgb  Observe for signs of bleeding  OK to d/c if no further bleeding  Recommend iron supplements    Orysia Lemon

## 2019-02-15 NOTE — H&P
History and Physical / Pre-Sedation Assessment    Patient:  Ivana Hamlin   :   1932     Intended Procedure:  Colonoscopy    HPI: 80year old female with history of HTN, HLD, Depression, CAD, A fib, anxiety, Uterine ca and GERD admitted with rectal bleeding. She is strict [employer] and will not accept blood transfusion       Past Medical History:   Diagnosis Date    Anemia     Anxiety     Arthritis     Atrial fibrillation ([facility]     CAD (coronary artery disease)     Chronic GERD     Depression     Diverticulosis     Gall stone     Glaucoma     Hypercholesteremia     Hypertension     Irregular heart beat     Rectal prolapse     Uterine cancer ([facility]      Past Surgical History:   Procedure Laterality Date    APPENDECTOMY      COLONOSCOPY  2005    polyp    COLONOSCOPY  1010    diverticulosis    COLONOSCOPY  10/4/2014    diverticulosis, radiation colitis sigmoid    FRACTURE SURGERY      HIP SURGERY Right 8/4/15    hip pinning    HYSTERECTOMY      RT in    [address] [facility] OTHER SURGICAL HISTORY Right 10/29/2018    PATELLA OPEN REDUCTION INTERNAL FIXATION     PACEMAKER INSERTION      PACEMAKER PLACEMENT  2008    [place] CPTR-ASST SURGICAL NAVIGATION [facility] Right 2018    RIGHT TOTAL KNEE REPLACEMENT COMPUTER NAVIGATION WITH ADDUCTOR CANAL BLOCK FOR PAIN CONTROL                     SONAL performed by Neena Villagomez MD at [place] [address] OFFICE/OUTPT [facility] Right 10/29/2018    Right knee inferior pole patellectomy, primary repair of patellar tendon. performed by Neena Villagomez MD at [facility] <100SQCM Right 2018    RIGHT KNEE GASTROC FLAP WITH SKIN GRAFT, WOUND VAC PLACEMENT performed by Aravind Santiago MD at [place] [address].         Medications reviewed  Prior to Admission medications    Medication Sig Start Date End Date Taking?  Authorizing Provider   acetaminophen (TYLENOL) 325 MG tablet Take 650 mg by mouth every 6 hours as needed for Pain   Yes Historical Provider, MD   DULoxetine (CYMBALTA) 30 MG extended release capsule Take 30 mg by mouth daily   Yes Historical Provider, MD   Melatonin 5 MG CAPS Take 5 mg by mouth nightly as needed   Yes Historical Provider, MD   omeprazole (PRILOSEC) 20 MG delayed release capsule Take 20 mg by mouth daily   Yes Historical Provider, MD   oxyCODONE-acetaminophen (PERCOCET) 5-325 MG per tablet Take by mouth every 4 hours as needed for Pain (1 tab for moderate pain, 2 tabs for severe pain). .   Yes Historical Provider, MD   loperamide (IMODIUM) 2 MG capsule Take 2 mg by mouth as needed for Diarrhea Two tabs after initial diarrhea episode, 1 tab after each additional episode, NTE   Yes Historical Provider, MD   tamsulosin (FLOMAX) 0.4 MG capsule Take 0.4 mg by mouth daily   Yes Historical Provider, MD   ferrous sulfate 325 (65 Fe) MG tablet Take 1 tablet by mouth three times a week 11/7/18  Yes Mckay Vinson MD   sennosides-docusate sodium (SENOKOT-S) 8.6-50 MG tablet Take 1 tablet by mouth daily 11/2/18  Yes Jeff Khan MD   flecainide (TAMBOCOR) 50 MG tablet Take 50 mg by mouth 2 times daily   Yes Historical Provider, MD   aspirin 81 MG tablet Take 81 mg by mouth daily   Yes Historical Provider, MD   timolol (BETIMOL) 0.5 % ophthalmic solution Place 1 drop into the left eye 2 times daily Dosing times are 09:00 and 15:00. Yes Historical Provider, MD   carvedilol (COREG) 3.125 MG tablet Take 1 tablet by mouth 2 times daily (with meals). 10/7/14  Yes Lacey Beltran MD   atorvastatin (LIPITOR) 10 MG tablet   Take 10 mg by mouth nightly    Yes Historical Provider, MD   Multiple Vitamins-Minerals (CENTRUM PO) Take 1 tablet by mouth daily.  Indications: Centrum Silver 12/24/10  Yes Historical Provider, MD   vitamin D (CHOLECALCIFEROL) 400 UNIT TABS tablet   Take 2,000 Units by mouth daily    Yes Historical Provider, MD   latanoprost (XALATAN) 0.005 % ophthalmic solution   Place 1 drop into both eyes nightly    Yes Historical Provider, MD        Allergies: Allergies   Allergen Reactions    Lisinopril Swelling     angioedema  angioedema    Levofloxacin     Levofloxacin Other (See Comments)     unsure    Vancomycin Rash     Rash and hives. CARLY'S SYNDROME    Amlodipine     Avelox [Moxifloxacin Hcl In Nacl]     Buspirone Itching    Hydrochlorothiazide Swelling    Lidocaine Other (See Comments)     Not sure    Moxifloxacin     Moxifloxacin Hcl In Nacl Other (See Comments)     Not sure    Mupirocin     Nsaids      bleeding    Paroxetine Other (See Comments)    Phenergan [Promethazine Hcl] Swelling    Phenothiazines     Promethazine Hcl Swelling    Sulindac      Gi bleed    Tolmetin      bleeding    Ciprofloxacin Rash and Swelling       Nurses notes reviewed and agreed. Physical Exam:  Vital Signs: BP (!) 120/90   Pulse 64   Temp 98.9 °F (37.2 °C) (Temporal)   Resp 14   Ht 5' (1.524 m)   Wt 111 lb (50.3 kg)   SpO2 99%   BMI 21.68 kg/m²    Airway: Mallampati: II (soft palate, uvula, fauces visible)  Pulmonary:Normal  Cardiac:Normal  Abdomen:Normal    Pre-Procedure Assessment / Plan:  ASA: Class 3 - A patient with severe systemic disease that limits activity but is not incapacitating  Level of Sedation Plan: Moderate sedation  Post Procedure plan: Return to same level of care    I assessed the patient and find that the patient is in satisfactory condition to proceed with the planned procedure and sedation plan. I have explained the risk, benefits, and alternatives to the procedure; the patient understands and agrees to proceed.        Long Thompson  2/15/2019

## 2019-02-15 NOTE — CARE COORDINATION
INTERDISCIPLINARY PLAN OF CARE CONFERENCE and Discharge Note    Date/Time: 2/15/2019 4:25 PM  Completed by: Rosemary Lopez, Case Management      Patient Name:  Junita Dancer  YOB: 1932  Admitting Diagnosis: GI bleeding [K92.2]     Admit Date/Time:  2/13/2019  7:24 PM    Chart reviewed. Interdisciplinary team met to discuss patient progress and discharge plans. Disciplines included Case Management, Nursing, and Dietitian. Current Status: Stable    PT/OT recommendation:TBD    Anticipated Discharge Date: TBD  Expected D/C Disposition:  Nursing Home  Confirmed plan with patient and/or family Yes  Discharge Plan Comments:  Reviewed chart and noted pt in EGD at this time. Spoke with MD who states plan for dc today. Sp[facility] with [facility] who cannot transport til 11:00 pm and [facility] cannot transport until 10:00 pm Will call MD and inform unable to obtain reasonable  for ambulance and will dc in AM. Spoke to [place] at [facility] re: dc in AM and [facility] can re accept at dc. Will cont to follow. Tabby KILPATRICK 62. aware of 10:00 PM pickup time with [facility]. EDUARDO Mendenhall spoke with [place] from [facility] and she can accept the pt back at d/c today and Duncan Snell aware of pickup time. TAMARA faxed to [facility]. Bedside nurse and family aware of pickup time. No other d/c needs identified.     Home O2 in place on admit: per facility  Pt informed of need to bring portable home O2 tank on day of discharge for nursing to connect prior to leaving:  Not Indicated  Verbalized agreement/Understanding:  Not Indicated

## 2019-02-15 NOTE — CONSULTS
2/14/19 1343 called [facility] today for consult for Dr. Veronique Gregg, Dr Erwin Noriega is on call for group and the PA will be calling us back.  Gaston Martinez
Full consult dictated    80year old female with history of HTN, HLD, Depression, CAD, A fib, anxiety, Uterine ca and GERD admitted with rectal bleeding. She is strict [employer] and will not accept blood transfusion    Recommendation  1. Continue supportive care  2. Monitor Hgb  3. Colonoscopy tomorrow  4. Observe for signs of bleeding  5. If Colonoscopy negative, would check CT  6.  Will follow
appendectomy. CURRENT MEDICATIONS AT HOME:  Tylenol, Cymbalta, melatonin, Prilosec,  Percocet, Imodium, Flomax, iron sulfate, Senokot, Tambocor, aspirin,  multivitamin, Lipitor, Coreg, cholecalciferol. ALLERGIES:  LISINOPRIL, LEVOFLOXACIN, VANCOMYCIN, AMLODIPINE, AVELOX,  BUSPIRONE, HYDROCHLOROTHIAZIDE, LIDOCAINE, MOXIFLOXACIN. SOCIAL HISTORY:  Denies tobacco use, alcohol use and drug use. FAMILY HISTORY:  Negative for GI malignancy. PHYSICAL EXAMINATION:  VITAL SIGNS:  Temperature 97.1, pulse 60, respirations 16, blood  pressure 135/78, sats are 94% on room air, weight is 111 pounds. GENERAL:  She is alert, awake and oriented x3, well-developed,  well-nourished female, who appears to be in no apparent distress. HEENT:  NC/AT, PERRL, dry mucous membranes. NECK:  Supple. No thyromegaly. No cervical lymphadenopathy. No JVD. HEART:  Regular rate and rhythm without any murmurs, gallops, and rubs. LUNGS:  Clear to auscultation with bibasilar rales. ABDOMEN:  Soft, nondistended, nontender. Bowel sounds are present. EXTREMITIES:  No clubbing, cyanosis, or edema. LABORATORY DATA:  White blood cell count _____      DICTATION ENDS. ..       Alexandra Schlatter, MD    D: 02/14/2019 20:16:48       T: 02/14/2019 23:15:47     [email or web address]  Job#: [id number]     Doc#: [id number]    CC:

## 2019-02-15 NOTE — DISCHARGE SUMMARY
Name:  Alexanedr Baron  Room:  ENDO/NONE  MRN:    [id number]    Discharge Summary      This discharge summary is in conjunction with a complete physical exam done on the day of discharge. Discharging Physician: Henri Newberryer: 2/13/2019  Discharge:   2/15/2019    HPI taken from admission H&P:    80 y.o. female who presented to the hospital with a chief complaint of GI bleeding. The patient is a nursing home resident who presented to the emergency room with her daughter this evening. The patient stated that she is noticing bloody bowel movements. There are reports that the patient was using anti-inflammatory medications at the nursing home she denies this. She endorses taking iron tablets and her regular prescribed medications. She describes her stool as black. She has had a colonoscopy in the past, last was in 2014. She denies any hematemesis, chest pain, nausea, vomiting or diarrhea. She does endorse some slight pressure over her suprapubic area but not particularly any pain. She will be admitted for GI evaluation and treatment.      Diagnoses this Admission and Hospital Course   GI bleeding, likely lower GI bleed  - GI consult--colonoscopy shows no active bleeding.  - Protonix  IV BID at time of admission.  - NPO -tolerating diet at discharge  - transfuse PRN for hgb <7.0      Hyponatremia  - improved with IVF   - Continue to monitor      Abdominal pain  - Symptomatic control   - No acute peritoneal findings   -  resolved     History of atrial fibrillation not on anticoagulation due to bleeding  - In NSR on the monitor   - Continue home flecainide      Hypertension  - BP controlled   - Continue BB     Dyslipidemia  -  Continue Lipitor     S/p skin graft/gastroc flap R knee  - missed her follow up appointment   - ortho consult  - Continue wound care     Procedures (Please Review Full Report for Details)  Colonoscopy 2/15/19    Consults    GI  Ortho      Physical Exam at Discharge:    BP (!) 138/58   Pulse 62   Temp 98.9 °F (37.2 °C) (Temporal)   Resp 16   Ht 5' (1.524 m)   Wt 111 lb (50.3 kg)   SpO2 95%   BMI 21.68 kg/m²     Gen: No distress. Alert. Eyes: PERRL. No sclera icterus. No conjunctival injection. ENT: No discharge. Pharynx clear. Neck: No JVD. Trachea midline. Resp: No accessory muscle use. No crackles. No wheezes. No rhonchi. CV: Regular rate. Regular rhythm. No murmur. No rub. No edema. Paced rhythm  Capillary Refill: Brisk,< 3 seconds   Peripheral Pulses: +2 palpable, equal bilaterally   GI: Non-tender. Non-distended. Normal bowel sounds. Skin: Warm and dry. No nodule on exposed extremities. No rash on exposed extremities. M/S: No cyanosis. No joint deformity. No clubbing. Neuro: Awake. Grossly nonfocal    Psych: Oriented x 3. No anxiety or agitation. CBC:   Recent Labs      02/1934 02/14/19 0515 02/14/19   1149  02/14/19   1806   WBC  10.0   --    --    --    --    HGB  11.9*   < >  11.2*  10.9*  10.4*   HCT  35.6*   --    --    --    --    MCV  90.5   --    --    --    --    PLT  342   --    --    --    --     < > = values in this interval not displayed.      BMP:   Recent Labs      02/1934 02/14/19 0515   NA  129*  134*   K  4.2  4.3   CL  94*  99   CO2  25  24   BUN  13  11   CREATININE  0.6  0.6     LIVER PROFILE:   Recent Labs      02/1934 02/14/19 0515   AST  17  15   ALT  13  12   BILITOT  0.4  0.6   ALKPHOS  89  81     PT/INR:   Recent Labs      02/1934   PROTIME  13.3*   INR  1.17*     APTT:   Recent Labs      02/1934   APTT  27.8     Blood Occult Stool: +     CULTURES  None    RADIOLOGY  No orders to display       Discharge Medications     Medication List      ASK your doctor about these medications    acetaminophen 325 MG tablet  Commonly known as:  TYLENOL     aspirin 81 MG tablet     atorvastatin 10 MG tablet  Commonly known as:  LIPITOR     carvedilol 3.125 MG tablet  Commonly known as:  COREG  Take 1 tablet by mouth 2 times daily (with meals). CENTRUM PO     DULoxetine 30 MG extended release capsule  Commonly known as:  CYMBALTA     ferrous sulfate 325 (65 Fe) MG tablet  Take 1 tablet by mouth three times a week     flecainide 50 MG tablet  Commonly known as:  TAMBOCOR     latanoprost 0.005 % ophthalmic solution  Commonly known as:  XALATAN     loperamide 2 MG capsule  Commonly known as:  IMODIUM     Melatonin 5 MG Caps     omeprazole 20 MG delayed release capsule  Commonly known as:  PRILOSEC     oxyCODONE-acetaminophen 5-325 MG per tablet  Commonly known as:  PERCOCET     sennosides-docusate sodium 8.6-50 MG tablet  Commonly known as:  SENOKOT-S  Take 1 tablet by mouth daily     tamsulosin 0.4 MG capsule  Commonly known as:  FLOMAX     timolol 0.5 % ophthalmic solution  Commonly known as:  BETIMOL     vitamin D 400 units Tabs tablet  Commonly known as:  CHOLECALCIFEROL              Discharged in stable condition to LTC    Follow Up:   Follow up with physician at [facility]      More than 30 mts spent      [address] 2/21/2019 7:47 AM

## 2019-02-16 NOTE — PROGRESS NOTES
Shift assessment completed. Patient in bed awake,alert and oriented. Complaining of slight pain in right leg/ Prn tylenol given per MAR. IV removed no complications ( being D/C)  Vitals stable. Will continue to monitor. Call light within reach.

## 2019-02-16 NOTE — PROGRESS NOTES
Transport here for patient. Patient stable. Information given to transport. Report called by day shift RN. Patient transferred off the floor.

## 2019-02-16 NOTE — OP NOTE
[address]                 [address] [facility]                                OPERATIVE REPORT    PATIENT NAME: Katya Canchola                   :        1932  MED REC NO:   [id number]                          ROOM:         ACCOUNT NO:   [de-identified]                           ADMIT DATE: 2019  PROVIDER:     Tacho Dillard MD    DATE OF PROCEDURE:  02/15/2019    PRE-PROCEDURE DIAGNOSIS:  Rectal bleeding. POST-PROCEDURE DIAGNOSES:  1. Extensive diverticulosis. 2.  Internal hemorrhoids. 3.  No active bleeding. PROCEDURE PERFORMED:  Colonoscopy to the cecum. SURGEON:  Tacho Dillard MD    PROCEDURE INDICATION:  An 25-year-old female with a history of  hypertension, hyperlipidemia, depression, coronary artery disease,  atrial fibrillation, anxiety, uterine cancer and GERD, admitted with  rectal bleeding. Her last colonoscopy in 2017 for similar symptoms  showed diverticulosis. She is strict Bahai and will not  accept any blood transfusions. Given the description of large  hematochezia, colonoscopy is being performed to rule out high-risk  bleeding lesions. MEDICATIONS:  Versed 5 mg, fentanyl 50 mcg. PROCEDURE DETAILS:  Informed consent was obtained after discussing the  risks, benefits and alternatives with the patient's family. Full  history and physical was performed. The patient was classified as ASA  class III. Medications were sequentially given to achieve adequate  sedation. Cardiopulmonary status was continuously monitored throughout  the procedure. The patient was placed in the left lateral decubitus  position. Once the patient was deemed to be adequately sedated, a  standard pediatric colonoscope was inserted in the anus and advanced to  the cecum, identified by landmarks of appendiceal orifice and IC valve. The terminal ileum was not intubated.   Entire mucosa of the cecum,  ascending colon, transverse colon, descending colon, sigmoid colon, and  rectum were examined carefully during withdrawal.  The patient tolerated  the procedure well without any difficulties. Colon prep was good. FINDINGS:  1. RECTUM:  Digital rectal exam was normal.  No masses were felt. 2.  COLON:  The entire visualized colon mucosa appeared normal and  healthy without any evidence of inflammation, ulcers, pseudomembranes,  polyps, or masses. Retroflexed view of the rectum showed internal  hemorrhoids. There was evidence of extensive severe diverticulosis of  sigmoid colon without any signs of active bleeding. SUMMARY:  1. Severe diverticulosis of sigmoid colitis. 2.  Rectal bleeding. 3. Internal hemorrhoids. RECOMMENDATIONS:  1. Discharge the patient to home when the standard parameters are met. 2.  Increase fiber in the diet. 3.  Repeat colonoscopy is not necessary given the patient's age and  comorbidities. 4.  The patient may benefit from iron supplements.         Abdulaziz Butt MD    D: 02/15/2019 15:44:53       T: 02/15/2019 23:30:38     [id number]  Job#: [id number]     Doc#: [id number]    CC:  MD Carlos Arredondo MD

## 2019-04-04 ENCOUNTER — OFFICE VISIT (OUTPATIENT)
Dept: ORTHOPEDIC SURGERY | Age: 84
End: 2019-04-04
Payer: MEDICARE

## 2019-04-04 DIAGNOSIS — Z96.651 STATUS POST TOTAL RIGHT KNEE REPLACEMENT: Primary | ICD-10-CM

## 2019-04-04 PROCEDURE — 4040F PNEUMOC VAC/ADMIN/RCVD: CPT | Performed by: ORTHOPAEDIC SURGERY

## 2019-04-04 PROCEDURE — L1812 KO ELASTIC W/JOINTS PRE OTS: HCPCS | Performed by: ORTHOPAEDIC SURGERY

## 2019-04-04 PROCEDURE — 1123F ACP DISCUSS/DSCN MKR DOCD: CPT | Performed by: ORTHOPAEDIC SURGERY

## 2019-04-04 PROCEDURE — G8427 DOCREV CUR MEDS BY ELIG CLIN: HCPCS | Performed by: ORTHOPAEDIC SURGERY

## 2019-04-04 PROCEDURE — 1090F PRES/ABSN URINE INCON ASSESS: CPT | Performed by: ORTHOPAEDIC SURGERY

## 2019-04-04 PROCEDURE — 99213 OFFICE O/P EST LOW 20 MIN: CPT | Performed by: ORTHOPAEDIC SURGERY

## 2019-04-04 PROCEDURE — G8420 CALC BMI NORM PARAMETERS: HCPCS | Performed by: ORTHOPAEDIC SURGERY

## 2019-04-04 PROCEDURE — 1036F TOBACCO NON-USER: CPT | Performed by: ORTHOPAEDIC SURGERY

## 2019-04-05 ENCOUNTER — TELEPHONE (OUTPATIENT)
Dept: ORTHOPEDIC SURGERY | Age: 84
End: 2019-04-05

## 2020-01-01 NOTE — ANESTHESIA PRE PROCEDURE
disease)     Depression     Diverticulosis     Gall stone     Glaucoma     Hypercholesteremia     Hypertension     Irregular heart beat     Uterine cancer ([facility] [address] 2008       Past Surgical History:        Procedure Laterality Date    APPENDECTOMY      COLONOSCOPY  2005    polyp    COLONOSCOPY  1010    diverticulosis    COLONOSCOPY  10/4/2014    diverticulosis, radiation colitis sigmoid    FRACTURE SURGERY      HIP SURGERY Right 8/4/15    hip pinning    HYSTERECTOMY  2008    RT in 2009   [facility]  2008    TONSILLECTOMY         Social History:    Social History   Substance Use Topics    Smoking status: Former Smoker    Smokeless tobacco: Never Used      Comment: only smoked from age [age] yrs old    Alcohol use No                                Counseling given: Not Answered      Vital Signs (Current):   Vitals:    08/29/18 1629 08/29/18 1635 08/30/18 1030   Weight:  113 lb (51.3 kg) 113 lb (51.3 kg)   Height: 5' (1.524 m)  5' (1.524 m)                                              BP Readings from Last 3 Encounters:   07/19/18 (!) 160/90   08/23/17 130/86   01/23/17 (!) 155/67       NPO Status: Time of last liquid consumption: 1900                        Time of last solid consumption: 1300                        Date of last liquid consumption: 09/04/18                        Date of last solid food consumption: 09/04/18    BMI:   Wt Readings from Last 3 Encounters:   08/30/18 113 lb (51.3 kg)   08/15/18 113 lb (51.3 kg)   07/19/18 113 lb (51.3 kg)     Body mass index is 22.07 kg/m².     CBC:   Lab Results   Component Value Date    WBC 6.7 08/20/2018    RBC 3.71 08/20/2018    HGB 11.6 08/20/2018    HCT 35.0 08/20/2018    MCV 94.2 08/20/2018    RDW 12.3 08/20/2018     08/20/2018       CMP:   Lab Results   Component Value Date     08/20/2018    K 4.0 08/20/2018     08/20/2018    CO2 24 08/20/2018    BUN 11 08/20/2018    CREATININE 0.6 08/20/2018 GFRAA >60 08/20/2018    GFRAA >60 06/09/2012    AGRATIO 1.3 01/11/2017    LABGLOM >60 08/20/2018    GLUCOSE 152 08/20/2018    PROT 6.8 01/11/2017    PROT 6.9 06/09/2012    CALCIUM 9.4 08/20/2018    BILITOT 0.3 01/11/2017    ALKPHOS 88 01/11/2017    AST 19 01/11/2017    ALT 20 01/11/2017       POC Tests: No results for input(s): POCGLU, POCNA, POCK, POCCL, POCBUN, POCHEMO, POCHCT in the last 72 hours. Coags:   Lab Results   Component Value Date    PROTIME 13.9 08/20/2018    INR 1.22 08/20/2018    APTT 34.5 08/20/2018       HCG (If Applicable): No results found for: PREGTESTUR, PREGSERUM, HCG, HCGQUANT     ABGs: No results found for: PHART, PO2ART, JDV4CRL, FYC8CCW, BEART, U8UZICYJ     Type & Screen (If Applicable):  No results found for: LABABO, [address]    Anesthesia Evaluation  Patient summary reviewed no history of anesthetic complications:   Airway: Mallampati: II  TM distance: >3 FB   Neck ROM: full  Mouth opening: > = 3 FB Dental:    (+) edentulous, upper dentures and lower dentures      Pulmonary:Negative Pulmonary ROS                              Cardiovascular:    (+) hypertension:, pacemaker: pacemaker, CAD:, dysrhythmias:,                   Neuro/Psych:   (+) psychiatric history:            GI/Hepatic/Renal: Neg GI/Hepatic/Renal ROS       (-) GERD, liver disease and no renal disease       Endo/Other: Negative Endo/Other ROS       (-) diabetes mellitus               Abdominal:           Vascular: negative vascular ROS. Anesthesia Plan      general and regional     ASA 3     (Adductor canal block for post op pain.)  Induction: intravenous. MIPS: Postoperative opioids intended and Prophylactic antiemetics administered. Anesthetic plan and risks discussed with patient. Plan discussed with CRNA. All questions answered and agrees with plan.         Leighton Cruz MD   9/5/2018 9

## 2021-03-17 NOTE — CARE COORDINATION
INTERDISCIPLINARY PLAN OF CARE CONFERENCE    Date/Time: 10/31/2018 2:27 PM  Completed by: Amador Philip, Case Management      Patient Name:  Jacob Schwab  YOB: 1932  Admitting Diagnosis: Displaced comminuted fracture of right patella, initial encounter for closed fracture [S82.041A]     Admit Date/Time:  10/25/2018  5:06 PM    Chart reviewed. Interdisciplinary team met to discuss patient progress and discharge plans. Disciplines included Case Management, Nursing, and Dietitian. Current Status: Inpatient     Anticipated Discharge Date: 11/1/2018  Expected D/C Disposition:  SNF  Confirmed plan with patient and/or family: yes  Discharge Plan Comments: Plan: DC to [facility]. +bed, +HENS (to be done by facility per Jory Pantoja, [address] [place] [address] liaison. +CM following.      Home O2 in place on admit: No  Pt informed of need to bring portable home O2 tank on day of discharge for nursing to connect prior to leaving:  No  Verbalized agreement/Understanding:  Not Indicated verbal instruction

## 2021-03-18 ENCOUNTER — APPOINTMENT (OUTPATIENT)
Dept: GENERAL RADIOLOGY | Age: 86
End: 2021-03-18
Payer: MEDICARE

## 2021-03-18 ENCOUNTER — HOSPITAL ENCOUNTER (EMERGENCY)
Age: 86
Discharge: ANOTHER HOSPITAL | End: 2021-03-19
Attending: EMERGENCY MEDICINE
Payer: MEDICARE

## 2021-03-18 DIAGNOSIS — Z45.018 PACEMAKER END OF LIFE: ICD-10-CM

## 2021-03-18 DIAGNOSIS — S72.401A CLOSED FRACTURE OF DISTAL END OF RIGHT FEMUR, UNSPECIFIED FRACTURE MORPHOLOGY, INITIAL ENCOUNTER (HCC): Primary | ICD-10-CM

## 2021-03-18 PROBLEM — S72.8X1A OTHER FRACTURE OF RIGHT FEMUR, INITIAL ENCOUNTER FOR CLOSED FRACTURE (HCC): Status: ACTIVE | Noted: 2021-03-18

## 2021-03-18 LAB
A/G RATIO: 1 (ref 1.1–2.2)
ALBUMIN SERPL-MCNC: 3.1 G/DL (ref 3.4–5)
ALP BLD-CCNC: 145 U/L (ref 40–129)
ALT SERPL-CCNC: <5 U/L (ref 10–40)
ANION GAP SERPL CALCULATED.3IONS-SCNC: 9 MMOL/L (ref 3–16)
AST SERPL-CCNC: 17 U/L (ref 15–37)
BASOPHILS ABSOLUTE: 0 K/UL (ref 0–0.2)
BASOPHILS RELATIVE PERCENT: 0.4 %
BILIRUB SERPL-MCNC: 0.7 MG/DL (ref 0–1)
BUN BLDV-MCNC: 9 MG/DL (ref 7–20)
CALCIUM SERPL-MCNC: 8.5 MG/DL (ref 8.3–10.6)
CHLORIDE BLD-SCNC: 102 MMOL/L (ref 99–110)
CO2: 27 MMOL/L (ref 21–32)
CREAT SERPL-MCNC: 0.7 MG/DL (ref 0.6–1.2)
EOSINOPHILS ABSOLUTE: 0.3 K/UL (ref 0–0.6)
EOSINOPHILS RELATIVE PERCENT: 3.9 %
GFR AFRICAN AMERICAN: >60
GFR NON-AFRICAN AMERICAN: >60
GLOBULIN: 3.2 G/DL
GLUCOSE BLD-MCNC: 119 MG/DL (ref 70–99)
HCT VFR BLD CALC: 34.4 % (ref 36–48)
HEMOGLOBIN: 11.5 G/DL (ref 12–16)
INR BLD: 1.37 (ref 0.86–1.14)
LYMPHOCYTES ABSOLUTE: 1.1 K/UL (ref 1–5.1)
LYMPHOCYTES RELATIVE PERCENT: 15 %
MAGNESIUM: 2.1 MG/DL (ref 1.8–2.4)
MCH RBC QN AUTO: 31.9 PG (ref 26–34)
MCHC RBC AUTO-ENTMCNC: 33.3 G/DL (ref 31–36)
MCV RBC AUTO: 95.8 FL (ref 80–100)
MONOCYTES ABSOLUTE: 0.6 K/UL (ref 0–1.3)
MONOCYTES RELATIVE PERCENT: 7.9 %
NEUTROPHILS ABSOLUTE: 5.2 K/UL (ref 1.7–7.7)
NEUTROPHILS RELATIVE PERCENT: 72.8 %
PDW BLD-RTO: 14 % (ref 12.4–15.4)
PLATELET # BLD: 332 K/UL (ref 135–450)
PMV BLD AUTO: 8 FL (ref 5–10.5)
POTASSIUM REFLEX MAGNESIUM: 3.5 MMOL/L (ref 3.5–5.1)
PROTHROMBIN TIME: 15.9 SEC (ref 10–13.2)
RBC # BLD: 3.59 M/UL (ref 4–5.2)
SODIUM BLD-SCNC: 138 MMOL/L (ref 136–145)
TOTAL PROTEIN: 6.3 G/DL (ref 6.4–8.2)
TROPONIN: <0.01 NG/ML
WBC # BLD: 7.1 K/UL (ref 4–11)

## 2021-03-18 PROCEDURE — 83735 ASSAY OF MAGNESIUM: CPT

## 2021-03-18 PROCEDURE — 99285 EMERGENCY DEPT VISIT HI MDM: CPT

## 2021-03-18 PROCEDURE — 73080 X-RAY EXAM OF ELBOW: CPT

## 2021-03-18 PROCEDURE — 84484 ASSAY OF TROPONIN QUANT: CPT

## 2021-03-18 PROCEDURE — 73552 X-RAY EXAM OF FEMUR 2/>: CPT

## 2021-03-18 PROCEDURE — 85610 PROTHROMBIN TIME: CPT

## 2021-03-18 PROCEDURE — 80053 COMPREHEN METABOLIC PANEL: CPT

## 2021-03-18 PROCEDURE — 73502 X-RAY EXAM HIP UNI 2-3 VIEWS: CPT

## 2021-03-18 PROCEDURE — 73070 X-RAY EXAM OF ELBOW: CPT

## 2021-03-18 PROCEDURE — 85025 COMPLETE CBC W/AUTO DIFF WBC: CPT

## 2021-03-18 PROCEDURE — 93005 ELECTROCARDIOGRAM TRACING: CPT | Performed by: NURSE PRACTITIONER

## 2021-03-18 PROCEDURE — 73562 X-RAY EXAM OF KNEE 3: CPT

## 2021-03-18 RX ORDER — FUROSEMIDE 40 MG/1
40 TABLET ORAL 2 TIMES DAILY
Status: ON HOLD | COMMUNITY
End: 2021-03-24 | Stop reason: HOSPADM

## 2021-03-18 RX ORDER — DIAPER,BRIEF,INFANT-TODD,DISP
EACH MISCELLANEOUS
COMMUNITY

## 2021-03-18 RX ORDER — GABAPENTIN 100 MG/1
100 CAPSULE ORAL 3 TIMES DAILY
COMMUNITY

## 2021-03-18 RX ORDER — NYSTATIN 10B UNIT
POWDER (EA) MISCELLANEOUS 2 TIMES DAILY
COMMUNITY

## 2021-03-18 RX ORDER — SERTRALINE HYDROCHLORIDE 25 MG/1
12.5 TABLET, FILM COATED ORAL DAILY
COMMUNITY

## 2021-03-18 RX ORDER — DIPHENHYDRAMINE HCL 25 MG
25 CAPSULE ORAL EVERY 6 HOURS PRN
COMMUNITY

## 2021-03-18 ASSESSMENT — ENCOUNTER SYMPTOMS
RHINORRHEA: 0
COLOR CHANGE: 0
SHORTNESS OF BREATH: 0
ABDOMINAL PAIN: 0
SORE THROAT: 0

## 2021-03-18 NOTE — ED NOTES
1847 [facility] was perfect served regarding admission   1755- Dr. Haskel Hashimoto returned call   Gustavo Morejon  03/18/21 [facility]  03/18/21 1758

## 2021-03-18 NOTE — CONSULTS
[facility] Orthopedic Surgery  Consult Note          Orthopedic Consult, full note to follow. Laila Mccall 80 y.o. admitted for right distal femur maritza-prosthetic fracture from fall today. Xray reviewed, and showed possible loose femoral component, constrained implant. Poor bone stock for ORIF. Plan:  - will require distal femoral replacement  - known to Dr Ilda Hernandez, who also performed a gastroc flap/STSG in Dec of 201  - I have spoken to him on the phone and he is happy to assume her care, with plan to fix surgically likely on Sat 3/20/2021. Thank you very much for the kind consultation and allowing me to participate in this patient's care. I will continue to keep you apprised of her progress.            Suzette Malone MD 3/18/2021 6:13 PM

## 2021-03-18 NOTE — ED NOTES
[address] [facility] was perfect served regarding Fx femur    1727- Ortho Dr. Molli Barthel call and spoke with Jose G Dials, NP  Malachi Bravo  03/18/21 24 [facility]  03/18/21 1189

## 2021-03-18 NOTE — ED TRIAGE NOTES
Chief Complaint   Patient presents with    Fall     EMS states pt fell yesterday at nursing home, xray showed distal femur fx

## 2021-03-18 NOTE — ED PROVIDER NOTES
PAST MEDICAL HISTORY     Past Medical History:   Diagnosis Date    Anemia     Anxiety     Arthritis     Atrial fibrillation ([facility] [address]     CAD (coronary artery disease)     Chronic GERD     Depression     Diverticulosis     Gall stone     Glaucoma     Hypercholesteremia     Hypertension     Irregular heart beat     Rectal prolapse     Uterine cancer ([facility] [address] 2008         SURGICAL HISTORY       Past Surgical History:   Procedure Laterality Date    APPENDECTOMY      COLONOSCOPY  2005    polyp    COLONOSCOPY  1010    diverticulosis    COLONOSCOPY  10/4/2014    diverticulosis, radiation colitis sigmoid    COLONOSCOPY N/A 2/15/2019    COLON performed by Etienne Elise MD at [place] [address] Right 8/4/15    hip pinning    HYSTERECTOMY  2008    RT in 2009   [facility]      OTHER SURGICAL HISTORY Right 10/29/2018    PATELLA OPEN REDUCTION INTERNAL FIXATION     PACEMAKER INSERTION      PACEMAKER PLACEMENT  2008    [place] CPTR-ASST SURGICAL NAVIGATION IMAGE-LESS Right 9/5/2018    RIGHT TOTAL KNEE REPLACEMENT COMPUTER NAVIGATION WITH ADDUCTOR CANAL BLOCK FOR PAIN CONTROL                     SONAL performed by Elen Ramos MD at [address] [facility] OFFICE/OUTPT [facility] Right 10/29/2018    Right knee inferior pole patellectomy, primary repair of patellar tendon.  performed by Elen Ramos MD at [address] [facility] <100SQCM Right 11/30/2018    RIGHT KNEE GASTROC FLAP WITH SKIN GRAFT, WOUND VAC PLACEMENT performed by Susan Flores MD at [address]       Previous Medications    ACETAMINOPHEN (TYLENOL) 325 MG TABLET    Take 650 mg by mouth every 6 hours as needed for Pain    ASPIRIN 81 MG TABLET    Take 81 mg by mouth daily    ATORVASTATIN (LIPITOR) 10 MG TABLET      Take 10 mg by mouth nightly     CARVEDILOL (COREG) 3.125 MG TABLET    Take 1 tablet by mouth 2 times daily (with meals). DIPHENHYDRAMINE (BENADRYL) 25 MG CAPSULE    Take 25 mg by mouth every 6 hours as needed for Itching    DULOXETINE (CYMBALTA) 30 MG EXTENDED RELEASE CAPSULE    Take 30 mg by mouth daily    FERROUS SULFATE 325 (65 FE) MG TABLET    Take 1 tablet by mouth three times a week    FLECAINIDE (TAMBOCOR) 50 MG TABLET    Take 50 mg by mouth 2 times daily    FUROSEMIDE (LASIX) 40 MG TABLET    Take 40 mg by mouth 2 times daily    GABAPENTIN (NEURONTIN) 100 MG CAPSULE    Take 100 mg by mouth 3 times daily. HYDROCORTISONE 1 % CREAM    Apply topically every 6-8 hours as needed As needed for discomfort    LATANOPROST (XALATAN) 0.005 % OPHTHALMIC SOLUTION      Place 1 drop into both eyes nightly     LOPERAMIDE (IMODIUM) 2 MG CAPSULE    Take 2 mg by mouth as needed for Diarrhea Two tabs after initial diarrhea episode, 1 tab after each additional episode, NTE    MELATONIN 5 MG CAPS    Take 5 mg by mouth nightly as needed    MULTIPLE VITAMINS-MINERALS (CENTRUM PO)    Take 1 tablet by mouth daily. Indications: Centrum Silver    NYSTATIN (MYCOSTATIN) POWD POWDER    Apply topically 2 times daily As needed for candidiasis    OMEPRAZOLE (PRILOSEC) 20 MG DELAYED RELEASE CAPSULE    Take 40 mg by mouth daily     OXYCODONE-ACETAMINOPHEN (PERCOCET) 5-325 MG PER TABLET    Take by mouth every 4 hours as needed for Pain (1 tab for moderate pain, 2 tabs for severe pain). Vee Severance POTASSIUM CHLORIDE ER PO    Take 10 mEq by mouth    PSYLLIUM (KONSYL) 28.3 % PACK    Take 1 packet by mouth daily    SENNOSIDES-DOCUSATE SODIUM (SENOKOT-S) 8.6-50 MG TABLET    Take 1 tablet by mouth daily    SERTRALINE (ZOLOFT) 25 MG TABLET    Take 12.5 mg by mouth daily    TAMSULOSIN (FLOMAX) 0.4 MG CAPSULE    Take 0.4 mg by mouth daily    TIMOLOL (BETIMOL) 0.5 % OPHTHALMIC SOLUTION    Place 1 drop into the left eye 2 times daily Dosing times are 09:00 and 15:00.     VITAMIN D (CHOLECALCIFEROL) 400 UNIT TABS TABLET      Take 2,000 Units by mouth daily          ALLERGIES     Lisinopril, Levofloxacin, Levofloxacin, Vancomycin, Amlodipine, Avelox [moxifloxacin hcl in nacl], Buspirone, Hydrochlorothiazide, Lidocaine, Moxifloxacin, Moxifloxacin hcl in nacl, Mupirocin, Nsaids, Paroxetine, Phenergan [promethazine hcl], Phenothiazines, Promethazine hcl, Sulindac, Tolmetin, and Ciprofloxacin    FAMILY HISTORY       Family History   Problem Relation Age of Onset    Mental Retardation Mother     Cancer Father         lung         SOCIAL HISTORY       Social History     Socioeconomic History    Marital status:       Spouse name: None    Number of children: None    Years of education: None    Highest education level: None   Occupational History    None   Social Needs    Financial resource strain: None    Food insecurity     Worry: None     Inability: None    Transportation needs     Medical: None     Non-medical: None   Tobacco Use    Smoking status: Former Smoker    Smokeless tobacco: Never Used    Tobacco comment: only smoked from age [age] yrs old   Substance and Sexual Activity    Alcohol use: No    Drug use: No    Sexual activity: Not Currently   Lifestyle    Physical activity     Days per week: None     Minutes per session: None    Stress: None   Relationships    Social connections     Talks on phone: None     Gets together: None     Attends Congregation service: None     Active member of club or organization: None     Attends meetings of clubs or organizations: None     Relationship status: None    Intimate partner violence     Fear of current or ex partner: None     Emotionally abused: None     Physically abused: None     Forced sexual activity: None   Other Topics Concern    None   Social History Narrative    None       SCREENINGS             PHYSICAL EXAM    (up to 7 for level 4, 8 ormore for level 5)     ED Triage Vitals [03/18/21 1535]   BP Temp Temp Source Pulse Resp SpO2 Height Weight   115/71 98.9 °F (37.2 °C) Oral 65 16 93 % 5' 3\" (1.6 m) --       Physical Exam  Constitutional:       Appearance: She is well-developed. HENT:      Head: Normocephalic and atraumatic. Neck:      Musculoskeletal: Normal range of motion. Cardiovascular:      Rate and Rhythm: Normal rate. Pulmonary:      Effort: Pulmonary effort is normal. No respiratory distress. Abdominal:      General: There is no distension. Palpations: Abdomen is soft. Tenderness: There is no abdominal tenderness. Musculoskeletal: Normal range of motion. Comments: Decreased range of motion to right leg due to pain elicited with movement of the right knee. Patient complains of pain to the right knee. Sensation and pulses intact via Doppler to the right foot. Decreased range of motion of right elbow due to pain elicited with movement. Sensation strength and pulse intact to right hand. No ecchymosis edema or erythema into the right elbow. Skin:     General: Skin is warm and dry. Neurological:      Mental Status: She is alert and oriented to person, place, and time.          DIAGNOSTIC RESULTS   LABS:    Labs Reviewed   CBC WITH AUTO DIFFERENTIAL - Abnormal; Notable for the following components:       Result Value    RBC 3.59 (*)     Hemoglobin 11.5 (*)     Hematocrit 34.4 (*)     All other components within normal limits    Narrative:     Performed at:  [facility] [address],  [place], [facility]   Phone [phone]   COMPREHENSIVE METABOLIC PANEL W/ REFLEX TO MG FOR LOW K - Abnormal; Notable for the following components:    Glucose 119 (*)     Total Protein 6.3 (*)     Albumin 3.1 (*)     Albumin/Globulin Ratio 1.0 (*)     Alkaline Phosphatase 145 (*)     ALT <5 (*)     All other components within normal limits    Narrative:     Performed at:  [facility],  [place] [facility]   Phone [phone]   PROTIME-INR - Abnormal; Notable for the following components: converted over to a single pacer in January due to battery issues. Patient has had episodes of V. tach on her tracings. The patient tolerated their visit well. They were seen and evaluated by the attending physician, Sonal Taylor MD who agreed with the assessment and plan. The patient and / or the family were informed of the results of any tests, a time was given to answer questions, a plan was proposed and they agreed with plan. FINAL IMPRESSION      1. Closed fracture of distal end of right femur, unspecified fracture morphology, initial encounter ([facility] [address]    2. Pacemaker end of life          DISPOSITION/PLAN   DISPOSITION Decision To Transfer 03/18/2021 06:09:15 PM      PATIENT REFERRED TO:  No follow-up provider specified.     DISCHARGE MEDICATIONS:  New Prescriptions    No medications on file       DISCONTINUED MEDICATIONS:  Discontinued Medications    No medications on file              (Please note that portions of this note were completed with a voice recognition program.  Efforts were made to edit the dictations but occasionally words are mis-transcribed.)    JAMILA Banuelos CNP (electronically signed)       JAMILA Banuelos CNP  03/18/21 6440 JAMILA Gonzalez CNP  03/19/21 1001

## 2021-03-18 NOTE — ED NOTES
65- [facility] was called to casi Payal [facility] returned call with Dr. Jasiel Beth on the line to speak with JENNIFER Joseph  03/18/21 Lovely Shen  03/18/21 0355

## 2021-03-18 NOTE — ED PROVIDER NOTES
ED Attending Attestation Note    This patient was seen by the advance practice provider. I have seen and examined the patient. I agree with the workup, evaluation, management, and diagnosis. The care plan has been discussed. My assessment reveals 80 y.o. female who presents for evaluation of right distal femur fracture after fall at nursing facility today. RLE: 2+ DP/PT pulses, wiggles toes; SILT. KI placed. EKG interpreted by myself. Rate: normal  Rhythm: electronic ventric pacemaker  Axis: -67  Intervals:  QTc 505  ST Segments: no acute abnormality  T waves: no acute abnormality  Comparison: Compared to 10/25/18, rhythm is ventric paced from atrial paced  Impression: electronic ventric pacemaker    Interrogate pacemaker. Ortho consult. For further details of the patient's emergency department visit, please see the advanced practice provider's documentation. Melinda Chavarria MD     This report has been produced using speech recognition software and may contain errors related to that system including errors in grammar, punctuation, and spelling, as well as words and phrases that may be inappropriate. If there are any questions or concerns please feel free to contact the dictating provider for clarification.       Melinda Chavarria MD  03/18/21 4307

## 2021-03-19 ENCOUNTER — APPOINTMENT (OUTPATIENT)
Dept: GENERAL RADIOLOGY | Age: 86
DRG: 466 | End: 2021-03-19
Attending: HOSPITALIST
Payer: MEDICARE

## 2021-03-19 ENCOUNTER — HOSPITAL ENCOUNTER (INPATIENT)
Age: 86
LOS: 5 days | Discharge: NURSING, REHAB OR LONG-TERM CARE | DRG: 466 | End: 2021-03-24
Attending: HOSPITALIST | Admitting: HOSPITALIST
Payer: MEDICARE

## 2021-03-19 VITALS
HEART RATE: 65 BPM | OXYGEN SATURATION: 94 % | TEMPERATURE: 98.9 F | RESPIRATION RATE: 15 BRPM | DIASTOLIC BLOOD PRESSURE: 78 MMHG | HEIGHT: 63 IN | SYSTOLIC BLOOD PRESSURE: 121 MMHG | BODY MASS INDEX: 19.66 KG/M2

## 2021-03-19 DIAGNOSIS — S72.401A CLOSED FRACTURE OF DISTAL END OF RIGHT FEMUR, UNSPECIFIED FRACTURE MORPHOLOGY, INITIAL ENCOUNTER (HCC): Primary | ICD-10-CM

## 2021-03-19 PROBLEM — E88.09 HYPOALBUMINEMIA: Status: ACTIVE | Noted: 2021-03-19

## 2021-03-19 PROBLEM — K21.9 GERD (GASTROESOPHAGEAL REFLUX DISEASE): Status: ACTIVE | Noted: 2021-03-19

## 2021-03-19 PROBLEM — R00.0 WIDE-COMPLEX TACHYCARDIA: Status: ACTIVE | Noted: 2021-03-19

## 2021-03-19 PROBLEM — I25.10 CAD (CORONARY ARTERY DISEASE): Status: ACTIVE | Noted: 2021-03-19

## 2021-03-19 LAB
A/G RATIO: 0.9 (ref 1.1–2.2)
ABO/RH: NORMAL
ALBUMIN SERPL-MCNC: 2.9 G/DL (ref 3.4–5)
ALP BLD-CCNC: 141 U/L (ref 40–129)
ALT SERPL-CCNC: 7 U/L (ref 10–40)
ANION GAP SERPL CALCULATED.3IONS-SCNC: 9 MMOL/L (ref 3–16)
ANTIBODY SCREEN: NORMAL
AST SERPL-CCNC: 18 U/L (ref 15–37)
BASOPHILS ABSOLUTE: 0 K/UL (ref 0–0.2)
BASOPHILS RELATIVE PERCENT: 0.2 %
BILIRUB SERPL-MCNC: 1.4 MG/DL (ref 0–1)
BUN BLDV-MCNC: 9 MG/DL (ref 7–20)
CALCIUM SERPL-MCNC: 8.3 MG/DL (ref 8.3–10.6)
CHLORIDE BLD-SCNC: 97 MMOL/L (ref 99–110)
CO2: 28 MMOL/L (ref 21–32)
CREAT SERPL-MCNC: 0.6 MG/DL (ref 0.6–1.2)
EKG ATRIAL RATE: 159 BPM
EKG ATRIAL RATE: 71 BPM
EKG ATRIAL RATE: 71 BPM
EKG DIAGNOSIS: NORMAL
EKG P-R INTERVAL: 136 MS
EKG Q-T INTERVAL: 282 MS
EKG Q-T INTERVAL: 482 MS
EKG Q-T INTERVAL: 488 MS
EKG QRS DURATION: 148 MS
EKG QRS DURATION: 164 MS
EKG QRS DURATION: 166 MS
EKG QTC CALCULATION (BAZETT): 462 MS
EKG QTC CALCULATION (BAZETT): 505 MS
EKG QTC CALCULATION (BAZETT): 530 MS
EKG R AXIS: -56 DEGREES
EKG R AXIS: -64 DEGREES
EKG R AXIS: -67 DEGREES
EKG T AXIS: 110 DEGREES
EKG T AXIS: 117 DEGREES
EKG T AXIS: 206 DEGREES
EKG VENTRICULAR RATE: 162 BPM
EKG VENTRICULAR RATE: 66 BPM
EKG VENTRICULAR RATE: 71 BPM
EOSINOPHILS ABSOLUTE: 0 K/UL (ref 0–0.6)
EOSINOPHILS RELATIVE PERCENT: 0.5 %
GFR AFRICAN AMERICAN: >60
GFR NON-AFRICAN AMERICAN: >60
GLOBULIN: 3.3 G/DL
GLUCOSE BLD-MCNC: 113 MG/DL (ref 70–99)
HCT VFR BLD CALC: 31.3 % (ref 36–48)
HEMOGLOBIN: 10.5 G/DL (ref 12–16)
LYMPHOCYTES ABSOLUTE: 1.1 K/UL (ref 1–5.1)
LYMPHOCYTES RELATIVE PERCENT: 11.6 %
MAGNESIUM: 2.2 MG/DL (ref 1.8–2.4)
MCH RBC QN AUTO: 31.9 PG (ref 26–34)
MCHC RBC AUTO-ENTMCNC: 33.7 G/DL (ref 31–36)
MCV RBC AUTO: 94.7 FL (ref 80–100)
MONOCYTES ABSOLUTE: 0.8 K/UL (ref 0–1.3)
MONOCYTES RELATIVE PERCENT: 9 %
NEUTROPHILS ABSOLUTE: 7.1 K/UL (ref 1.7–7.7)
NEUTROPHILS RELATIVE PERCENT: 78.7 %
PDW BLD-RTO: 14 % (ref 12.4–15.4)
PLATELET # BLD: 334 K/UL (ref 135–450)
PMV BLD AUTO: 8.1 FL (ref 5–10.5)
POTASSIUM REFLEX MAGNESIUM: 3.3 MMOL/L (ref 3.5–5.1)
PREALBUMIN: 11 MG/DL (ref 20–40)
PRO-BNP: 2909 PG/ML (ref 0–449)
RBC # BLD: 3.3 M/UL (ref 4–5.2)
SODIUM BLD-SCNC: 134 MMOL/L (ref 136–145)
TOTAL PROTEIN: 6.2 G/DL (ref 6.4–8.2)
TROPONIN: 0.02 NG/ML
WBC # BLD: 9.1 K/UL (ref 4–11)

## 2021-03-19 PROCEDURE — 93010 ELECTROCARDIOGRAM REPORT: CPT | Performed by: INTERNAL MEDICINE

## 2021-03-19 PROCEDURE — 93005 ELECTROCARDIOGRAM TRACING: CPT | Performed by: INTERNAL MEDICINE

## 2021-03-19 PROCEDURE — 2580000003 HC RX 258: Performed by: HOSPITALIST

## 2021-03-19 PROCEDURE — 6370000000 HC RX 637 (ALT 250 FOR IP): Performed by: HOSPITALIST

## 2021-03-19 PROCEDURE — 2060000000 HC ICU INTERMEDIATE R&B

## 2021-03-19 PROCEDURE — 6360000002 HC RX W HCPCS: Performed by: HOSPITALIST

## 2021-03-19 PROCEDURE — 86901 BLOOD TYPING SEROLOGIC RH(D): CPT

## 2021-03-19 PROCEDURE — 83880 ASSAY OF NATRIURETIC PEPTIDE: CPT

## 2021-03-19 PROCEDURE — 36415 COLL VENOUS BLD VENIPUNCTURE: CPT

## 2021-03-19 PROCEDURE — 86900 BLOOD TYPING SEROLOGIC ABO: CPT

## 2021-03-19 PROCEDURE — 85025 COMPLETE CBC W/AUTO DIFF WBC: CPT

## 2021-03-19 PROCEDURE — 80053 COMPREHEN METABOLIC PANEL: CPT

## 2021-03-19 PROCEDURE — 84439 ASSAY OF FREE THYROXINE: CPT

## 2021-03-19 PROCEDURE — 99223 1ST HOSP IP/OBS HIGH 75: CPT | Performed by: INTERNAL MEDICINE

## 2021-03-19 PROCEDURE — 86850 RBC ANTIBODY SCREEN: CPT

## 2021-03-19 PROCEDURE — 84484 ASSAY OF TROPONIN QUANT: CPT

## 2021-03-19 PROCEDURE — 71045 X-RAY EXAM CHEST 1 VIEW: CPT

## 2021-03-19 PROCEDURE — 6370000000 HC RX 637 (ALT 250 FOR IP): Performed by: STUDENT IN AN ORGANIZED HEALTH CARE EDUCATION/TRAINING PROGRAM

## 2021-03-19 PROCEDURE — 83735 ASSAY OF MAGNESIUM: CPT

## 2021-03-19 PROCEDURE — 84134 ASSAY OF PREALBUMIN: CPT

## 2021-03-19 PROCEDURE — 93005 ELECTROCARDIOGRAM TRACING: CPT | Performed by: HOSPITALIST

## 2021-03-19 PROCEDURE — 84443 ASSAY THYROID STIM HORMONE: CPT

## 2021-03-19 PROCEDURE — 93005 ELECTROCARDIOGRAM TRACING: CPT | Performed by: NURSE PRACTITIONER

## 2021-03-19 RX ORDER — TAMSULOSIN HYDROCHLORIDE 0.4 MG/1
0.4 CAPSULE ORAL DAILY
Status: DISCONTINUED | OUTPATIENT
Start: 2021-03-19 | End: 2021-03-24 | Stop reason: HOSPADM

## 2021-03-19 RX ORDER — CARVEDILOL 3.12 MG/1
3.12 TABLET ORAL 2 TIMES DAILY WITH MEALS
Status: DISCONTINUED | OUTPATIENT
Start: 2021-03-19 | End: 2021-03-24 | Stop reason: HOSPADM

## 2021-03-19 RX ORDER — ACETAMINOPHEN 325 MG/1
650 TABLET ORAL EVERY 6 HOURS PRN
Status: DISCONTINUED | OUTPATIENT
Start: 2021-03-19 | End: 2021-03-24 | Stop reason: HOSPADM

## 2021-03-19 RX ORDER — POTASSIUM CHLORIDE 7.45 MG/ML
10 INJECTION INTRAVENOUS ONCE
Status: COMPLETED | OUTPATIENT
Start: 2021-03-19 | End: 2021-03-19

## 2021-03-19 RX ORDER — MAGNESIUM SULFATE IN WATER 40 MG/ML
2000 INJECTION, SOLUTION INTRAVENOUS PRN
Status: DISCONTINUED | OUTPATIENT
Start: 2021-03-19 | End: 2021-03-24 | Stop reason: HOSPADM

## 2021-03-19 RX ORDER — SENNA PLUS 8.6 MG/1
1 TABLET ORAL DAILY PRN
Status: DISCONTINUED | OUTPATIENT
Start: 2021-03-19 | End: 2021-03-24 | Stop reason: HOSPADM

## 2021-03-19 RX ORDER — HEPARIN SODIUM 5000 [USP'U]/ML
5000 INJECTION, SOLUTION INTRAVENOUS; SUBCUTANEOUS 2 TIMES DAILY
Status: DISCONTINUED | OUTPATIENT
Start: 2021-03-19 | End: 2021-03-24

## 2021-03-19 RX ORDER — MORPHINE SULFATE 2 MG/ML
2 INJECTION, SOLUTION INTRAMUSCULAR; INTRAVENOUS EVERY 4 HOURS PRN
Status: DISCONTINUED | OUTPATIENT
Start: 2021-03-19 | End: 2021-03-24 | Stop reason: HOSPADM

## 2021-03-19 RX ORDER — LATANOPROST 50 UG/ML
1 SOLUTION/ DROPS OPHTHALMIC NIGHTLY
Status: DISCONTINUED | OUTPATIENT
Start: 2021-03-19 | End: 2021-03-24 | Stop reason: HOSPADM

## 2021-03-19 RX ORDER — SODIUM CHLORIDE 9 MG/ML
INJECTION, SOLUTION INTRAVENOUS CONTINUOUS
Status: ACTIVE | OUTPATIENT
Start: 2021-03-20 | End: 2021-03-20

## 2021-03-19 RX ORDER — ATORVASTATIN CALCIUM 10 MG/1
10 TABLET, FILM COATED ORAL ONCE
Status: COMPLETED | OUTPATIENT
Start: 2021-03-19 | End: 2021-03-19

## 2021-03-19 RX ORDER — POTASSIUM CHLORIDE 20 MEQ/1
40 TABLET, EXTENDED RELEASE ORAL PRN
Status: DISCONTINUED | OUTPATIENT
Start: 2021-03-19 | End: 2021-03-24 | Stop reason: HOSPADM

## 2021-03-19 RX ORDER — SODIUM CHLORIDE 9 MG/ML
INJECTION, SOLUTION INTRAVENOUS
Status: DISPENSED
Start: 2021-03-19 | End: 2021-03-20

## 2021-03-19 RX ORDER — PANTOPRAZOLE SODIUM 40 MG/1
40 TABLET, DELAYED RELEASE ORAL
Status: DISCONTINUED | OUTPATIENT
Start: 2021-03-20 | End: 2021-03-24 | Stop reason: HOSPADM

## 2021-03-19 RX ORDER — GABAPENTIN 100 MG/1
100 CAPSULE ORAL 3 TIMES DAILY
Status: DISCONTINUED | OUTPATIENT
Start: 2021-03-19 | End: 2021-03-24 | Stop reason: HOSPADM

## 2021-03-19 RX ORDER — ACETAMINOPHEN 500 MG
1000 TABLET ORAL EVERY 6 HOURS PRN
Status: DISCONTINUED | OUTPATIENT
Start: 2021-03-19 | End: 2021-03-24 | Stop reason: HOSPADM

## 2021-03-19 RX ORDER — SENNA AND DOCUSATE SODIUM 50; 8.6 MG/1; MG/1
1 TABLET, FILM COATED ORAL DAILY
Status: DISCONTINUED | OUTPATIENT
Start: 2021-03-19 | End: 2021-03-24 | Stop reason: HOSPADM

## 2021-03-19 RX ORDER — POTASSIUM CHLORIDE 7.45 MG/ML
10 INJECTION INTRAVENOUS ONCE
Status: COMPLETED | OUTPATIENT
Start: 2021-03-19 | End: 2021-03-20

## 2021-03-19 RX ORDER — POTASSIUM CHLORIDE 7.45 MG/ML
10 INJECTION INTRAVENOUS PRN
Status: DISCONTINUED | OUTPATIENT
Start: 2021-03-19 | End: 2021-03-24 | Stop reason: HOSPADM

## 2021-03-19 RX ORDER — ATORVASTATIN CALCIUM 10 MG/1
10 TABLET, FILM COATED ORAL NIGHTLY
Status: DISCONTINUED | OUTPATIENT
Start: 2021-03-19 | End: 2021-03-24 | Stop reason: HOSPADM

## 2021-03-19 RX ORDER — SODIUM CHLORIDE 9 MG/ML
INJECTION, SOLUTION INTRAVENOUS CONTINUOUS
Status: DISCONTINUED | OUTPATIENT
Start: 2021-03-19 | End: 2021-03-22

## 2021-03-19 RX ORDER — SODIUM CHLORIDE 0.9 % (FLUSH) 0.9 %
10 SYRINGE (ML) INJECTION PRN
Status: DISCONTINUED | OUTPATIENT
Start: 2021-03-19 | End: 2021-03-24 | Stop reason: HOSPADM

## 2021-03-19 RX ORDER — CARVEDILOL 3.12 MG/1
3.12 TABLET ORAL ONCE
Status: COMPLETED | OUTPATIENT
Start: 2021-03-19 | End: 2021-03-19

## 2021-03-19 RX ORDER — FERROUS SULFATE 325(65) MG
325 TABLET ORAL
Status: DISCONTINUED | OUTPATIENT
Start: 2021-03-19 | End: 2021-03-24 | Stop reason: HOSPADM

## 2021-03-19 RX ORDER — ACETAMINOPHEN 650 MG/1
650 SUPPOSITORY RECTAL EVERY 6 HOURS PRN
Status: DISCONTINUED | OUTPATIENT
Start: 2021-03-19 | End: 2021-03-24 | Stop reason: HOSPADM

## 2021-03-19 RX ORDER — ONDANSETRON 2 MG/ML
4 INJECTION INTRAMUSCULAR; INTRAVENOUS EVERY 6 HOURS PRN
Status: DISCONTINUED | OUTPATIENT
Start: 2021-03-19 | End: 2021-03-19

## 2021-03-19 RX ORDER — VITAMIN B COMPLEX
2000 TABLET ORAL DAILY
Status: DISCONTINUED | OUTPATIENT
Start: 2021-03-19 | End: 2021-03-24 | Stop reason: HOSPADM

## 2021-03-19 RX ORDER — FLECAINIDE ACETATE 100 MG/1
50 TABLET ORAL 2 TIMES DAILY
Status: DISCONTINUED | OUTPATIENT
Start: 2021-03-19 | End: 2021-03-21

## 2021-03-19 RX ORDER — AMIODARONE HYDROCHLORIDE 50 MG/ML
INJECTION, SOLUTION INTRAVENOUS
Status: COMPLETED | OUTPATIENT
Start: 2021-03-19 | End: 2021-03-19

## 2021-03-19 RX ORDER — DULOXETIN HYDROCHLORIDE 30 MG/1
30 CAPSULE, DELAYED RELEASE ORAL DAILY
Status: DISCONTINUED | OUTPATIENT
Start: 2021-03-19 | End: 2021-03-24 | Stop reason: HOSPADM

## 2021-03-19 RX ORDER — FUROSEMIDE 40 MG/1
40 TABLET ORAL 2 TIMES DAILY
Status: DISCONTINUED | OUTPATIENT
Start: 2021-03-19 | End: 2021-03-22

## 2021-03-19 RX ORDER — MECOBALAMIN 5000 MCG
5 TABLET,DISINTEGRATING ORAL NIGHTLY PRN
Status: DISCONTINUED | OUTPATIENT
Start: 2021-03-19 | End: 2021-03-24 | Stop reason: HOSPADM

## 2021-03-19 RX ORDER — SODIUM CHLORIDE 0.9 % (FLUSH) 0.9 %
10 SYRINGE (ML) INJECTION EVERY 12 HOURS SCHEDULED
Status: DISCONTINUED | OUTPATIENT
Start: 2021-03-19 | End: 2021-03-24 | Stop reason: HOSPADM

## 2021-03-19 RX ORDER — M-VIT,TX,IRON,MINS/CALC/FOLIC 27MG-0.4MG
1 TABLET ORAL DAILY
Status: DISCONTINUED | OUTPATIENT
Start: 2021-03-19 | End: 2021-03-24 | Stop reason: HOSPADM

## 2021-03-19 RX ORDER — FUROSEMIDE 40 MG/1
40 TABLET ORAL ONCE
Status: COMPLETED | OUTPATIENT
Start: 2021-03-19 | End: 2021-03-19

## 2021-03-19 RX ORDER — GABAPENTIN 100 MG/1
100 CAPSULE ORAL ONCE
Status: COMPLETED | OUTPATIENT
Start: 2021-03-19 | End: 2021-03-19

## 2021-03-19 RX ORDER — OXYCODONE HYDROCHLORIDE 5 MG/1
5 TABLET ORAL EVERY 6 HOURS PRN
Status: DISCONTINUED | OUTPATIENT
Start: 2021-03-19 | End: 2021-03-24 | Stop reason: HOSPADM

## 2021-03-19 RX ORDER — ONDANSETRON 4 MG/1
4 TABLET, ORALLY DISINTEGRATING ORAL EVERY 8 HOURS PRN
Status: DISCONTINUED | OUTPATIENT
Start: 2021-03-19 | End: 2021-03-19

## 2021-03-19 RX ORDER — TIMOLOL MALEATE 5 MG/ML
1 SOLUTION/ DROPS OPHTHALMIC 2 TIMES DAILY
Status: DISCONTINUED | OUTPATIENT
Start: 2021-03-19 | End: 2021-03-24 | Stop reason: HOSPADM

## 2021-03-19 RX ORDER — FLECAINIDE ACETATE 100 MG/1
50 TABLET ORAL ONCE
Status: COMPLETED | OUTPATIENT
Start: 2021-03-19 | End: 2021-03-19

## 2021-03-19 RX ADMIN — DOCUSATE SODIUM 50MG AND SENNOSIDES 8.6MG 1 TABLET: 8.6; 5 TABLET, FILM COATED ORAL at 18:17

## 2021-03-19 RX ADMIN — POTASSIUM CHLORIDE 10 MEQ: 7.46 INJECTION, SOLUTION INTRAVENOUS at 19:42

## 2021-03-19 RX ADMIN — AMIODARONE HYDROCHLORIDE 150 MG: 50 INJECTION, SOLUTION INTRAVENOUS at 19:27

## 2021-03-19 RX ADMIN — PSYLLIUM HUSK 1 PACKET: 3.4 POWDER ORAL at 18:31

## 2021-03-19 RX ADMIN — CARVEDILOL 3.12 MG: 3.12 TABLET, FILM COATED ORAL at 18:17

## 2021-03-19 RX ADMIN — CARVEDILOL 3.12 MG: 3.12 TABLET, FILM COATED ORAL at 01:03

## 2021-03-19 RX ADMIN — FERROUS SULFATE TAB 325 MG (65 MG ELEMENTAL FE) 325 MG: 325 (65 FE) TAB at 18:17

## 2021-03-19 RX ADMIN — SODIUM CHLORIDE: 9 INJECTION, SOLUTION INTRAVENOUS at 23:04

## 2021-03-19 RX ADMIN — AMIODARONE HYDROCHLORIDE 1 MG/MIN: 50 INJECTION, SOLUTION INTRAVENOUS at 23:01

## 2021-03-19 RX ADMIN — SODIUM CHLORIDE: 9 INJECTION, SOLUTION INTRAVENOUS at 19:43

## 2021-03-19 RX ADMIN — ATORVASTATIN CALCIUM 10 MG: 10 TABLET, FILM COATED ORAL at 23:00

## 2021-03-19 RX ADMIN — Medication 2000 UNITS: at 18:17

## 2021-03-19 RX ADMIN — AMIODARONE HYDROCHLORIDE 150 MG: 50 INJECTION, SOLUTION INTRAVENOUS at 18:59

## 2021-03-19 RX ADMIN — GABAPENTIN 100 MG: 100 CAPSULE ORAL at 23:00

## 2021-03-19 RX ADMIN — TAMSULOSIN HYDROCHLORIDE 0.4 MG: 0.4 CAPSULE ORAL at 18:17

## 2021-03-19 RX ADMIN — FUROSEMIDE 40 MG: 40 TABLET ORAL at 18:17

## 2021-03-19 RX ADMIN — GABAPENTIN 100 MG: 100 CAPSULE ORAL at 01:03

## 2021-03-19 RX ADMIN — FLECAINIDE ACETATE 50 MG: 100 TABLET ORAL at 01:33

## 2021-03-19 RX ADMIN — GABAPENTIN 100 MG: 100 CAPSULE ORAL at 18:17

## 2021-03-19 RX ADMIN — ATORVASTATIN CALCIUM 10 MG: 10 TABLET, FILM COATED ORAL at 01:03

## 2021-03-19 RX ADMIN — DULOXETINE HYDROCHLORIDE 30 MG: 30 CAPSULE, DELAYED RELEASE ORAL at 23:26

## 2021-03-19 RX ADMIN — Medication 1 TABLET: at 18:17

## 2021-03-19 RX ADMIN — FUROSEMIDE 40 MG: 40 TABLET ORAL at 01:03

## 2021-03-19 NOTE — H&P
HOSPITALISTS HISTORY AND PHYSICAL    3/19/2021 3:09 PM    Patient Information:  Miguelito Medley is a 80 y.o. female [id number]  PCP:  Coral Morrow MD (Tel: [phone] )    Chief complaint:  No chief complaint on file. History of Present Illness:  Mario Stroud is a 80 y.o. female who presented to [address] [place] [address] ED on 3/18/2021 to be evaluated for mechanical fall with resultant right leg injury. She reports that she was in bed having a bad dream when she tried to jump out of bed and sustained the injury. Upon arrival to the ED imaging was obtained revealing an acute displaced fracture of the right distal femur just proximal to indwelling knee prosthesis. Orthopedic consultation was placed with Dr. Dipika Driver requesting transfer to [facility] for planned procedural intervention on 3/20/2021. Of note, patient has cardiac pacer in place with interrogation revealing an episode of wide-complex ventricular tachycardia in the ED which spontaneously resolved. The patient has a history of dual pacer converted to single pacer in January 2021 due to battery issues. Cardiology will need to be consulted for preoperative clearance and treatment recommendations prior to intervention. History obtained from patient and review of [facility] chart    Old medical records show patient has history of HTN, HLD, remote GIB, GERD, and PAF. REVIEW OF SYSTEMS:   Constitutional: Negative for fever,chills,weight loss, and generalized weakness  ENT: Negative for rhinorrhea, epistaxis, hoarseness, and sore throat.   Respiratory: Negative for shortness of breath, wheezing, and cough  Cardiovascular: Negative for chest pain, palpitations, and peripheral edema; no orthopnea or PND  Gastrointestinal: Negative for N/V/D; no hematemesis, hematochezia, or melena; no anorexia  Genitourinary: Negative for dysuria, frequency, hesitancy, and urgency; no incontinence  Hematologic/Lymphatic: Negative for bleeding tendency, excessive bruising, and enlarged LN  Musculoskeletal: Positive for myalgias and arthalgias status post fall; unable to ambulate without difficulty  Neurologic: Negative for seizure activity, dysarthria, vertigo  Skin: Negative for itching,rash  Psychiatric: Positive confusion and short-term memory loss  Endocrine: Negative for polyuria/polydipsia/polyphagia; no heat/cold intolerance    Past Medical History:   has a past medical history of Anemia, Anxiety, Arthritis, Atrial fibrillation ([address] CAD (coronary artery disease), Chronic GERD, Depression, Diverticulosis, Gall stone, GERD (gastroesophageal reflux disease), Glaucoma, Hypercholesteremia, Hypertension, Irregular heart beat, Rectal prolapse, and Uterine cancer ([address] Past Surgical History:   has a past surgical history that includes Hysterectomy (2008); Appendectomy; Pacemaker insertion; Tonsillectomy; Colonoscopy (2005); Colonoscopy (1010); Colonoscopy (10/4/2014); hip surgery (Right, 8/4/15); pacemaker placement (2008); fracture surgery; pr cptr-asst surgical navigation image-less (Right, 9/5/2018); joint replacement; knee surgery; other surgical history (Right, 10/29/2018); pr office/outpt visit,procedure only (Right, 10/29/2018); pr split grft trunk,arm,leg <100sqcm (Right, 11/30/2018); and Colonoscopy (N/A, 2/15/2019). Medications:  No current facility-administered medications on file prior to encounter. Current Outpatient Medications on File Prior to Encounter   Medication Sig Dispense Refill    diphenhydrAMINE (BENADRYL) 25 MG capsule Take 25 mg by mouth every 6 hours as needed for Itching      furosemide (LASIX) 40 MG tablet Take 40 mg by mouth 2 times daily      gabapentin (NEURONTIN) 100 MG capsule Take 100 mg by mouth 3 times daily.       psyllium (KONSYL) 28.3 % PACK Take 1 packet by mouth daily      nystatin (MYCOSTATIN) POWD powder Apply topically 2 times daily As needed for candidiasis      POTASSIUM CHLORIDE ER PO Take 10 mEq by mouth      sertraline (ZOLOFT) 25 MG tablet Take 12.5 mg by mouth daily      hydrocortisone 1 % cream Apply topically every 6-8 hours as needed As needed for discomfort      acetaminophen (TYLENOL) 325 MG tablet Take 650 mg by mouth every 6 hours as needed for Pain      DULoxetine (CYMBALTA) 30 MG extended release capsule Take 30 mg by mouth daily      Melatonin 5 MG CAPS Take 5 mg by mouth nightly as needed      omeprazole (PRILOSEC) 20 MG delayed release capsule Take 40 mg by mouth daily       oxyCODONE-acetaminophen (PERCOCET) 5-325 MG per tablet Take by mouth every 4 hours as needed for Pain (1 tab for moderate pain, 2 tabs for severe pain). [facility] loperamide (IMODIUM) 2 MG capsule Take 2 mg by mouth as needed for Diarrhea Two tabs after initial diarrhea episode, 1 tab after each additional episode, NTE      tamsulosin (FLOMAX) 0.4 MG capsule Take 0.4 mg by mouth daily      ferrous sulfate 325 (65 Fe) MG tablet Take 1 tablet by mouth three times a week 30 tablet 0    sennosides-docusate sodium (SENOKOT-S) 8.6-50 MG tablet Take 1 tablet by mouth daily 30 tablet 1    flecainide (TAMBOCOR) 50 MG tablet Take 50 mg by mouth 2 times daily      aspirin 81 MG tablet Take 81 mg by mouth daily      timolol (BETIMOL) 0.5 % ophthalmic solution Place 1 drop into the left eye 2 times daily Dosing times are 09:00 and 15:00.  carvedilol (COREG) 3.125 MG tablet Take 1 tablet by mouth 2 times daily (with meals). 60 tablet 1    atorvastatin (LIPITOR) 10 MG tablet   Take 10 mg by mouth nightly       Multiple Vitamins-Minerals (CENTRUM PO) Take 1 tablet by mouth daily. Indications: Centrum Silver      vitamin D (CHOLECALCIFEROL) 400 UNIT TABS tablet   Take 2,000 Units by mouth daily       latanoprost (XALATAN) 0.005 % ophthalmic solution   Place 1 drop into both eyes nightly          Allergies:   Allergies   Allergen Reactions  Lisinopril Swelling     angioedema  angioedema    Levofloxacin     Levofloxacin Other (See Comments)     unsure    Vancomycin Rash     Rash and hives. CARLY'S SYNDROME    Amlodipine     Avelox [Moxifloxacin Hcl In Nacl]     Buspirone Itching    Hydrochlorothiazide Swelling    Lidocaine Other (See Comments)     Not sure    Moxifloxacin     Moxifloxacin Hcl In Nacl Other (See Comments)     Not sure    Mupirocin     Nsaids      bleeding    Paroxetine Other (See Comments)    Phenergan [Promethazine Hcl] Swelling    Phenothiazines     Promethazine Hcl Swelling    Sulindac      Gi bleed    Tolmetin      bleeding    Ciprofloxacin Rash and Swelling        Social History:   reports that she has quit smoking. She has never used smokeless tobacco. She reports that she does not drink alcohol or use drugs. Family History:  family history includes Cancer in her father; Mental Retardation in her mother.      Physical Exam:  /69   Pulse 80   Temp 99 °F (37.2 °C) (Oral)   Resp 16     General appearance: Elderly female resting comfortably in bed despite wide-complex tachycardia on monitor; specifically denies chest pain and shortness of breath  Eyes: Sclera clear without conjunctival injection; PERRLA; EOMI  ENT: Mucous membranes moist without thrush  Neck: Supple without meningismus; no goiter; no carotid bruit bilaterally  Cardiovascular: Regular tachyarrhythmia without ectopy; normal S1-S2 with no murmurs; trace peripheral edema  Respiratory: No tachypnea; CTAB with adequate air exchange, no wheeze, rhonchi or rales; I:E intact  Gastrointestinal: Abdomen soft, non-tender, not distended; bowel sounds normal x4 quadrants; no masses/organomegaly appreciated  Musculoskeletal: Decreased ROM right knee secondary to pain; pain also elicited with passive ROM of right elbow  Neurology: A&O x3; cranial nerves 2-12 grossly intact; motor 5/5  BUE/BLE; finger-to-nose/heel-to-shin intact; no pronator drift; no seizure activity  Psychiatry: Pleasantly confused with tangential speech  Skin: Warm, dry, normal turgor, no rash  PV: 2/4 radial and dorsalis pedis bilaterally; brisk capillary refill    Labs:  CBC:   Lab Results   Component Value Date    WBC 7.1 03/18/2021    RBC 3.59 03/18/2021    HGB 11.5 03/18/2021    HCT 34.4 03/18/2021    MCV 95.8 03/18/2021    MCH 31.9 03/18/2021    MCHC 33.3 03/18/2021    RDW 14.0 03/18/2021     03/18/2021    MPV 8.0 03/18/2021     BMP:    Lab Results   Component Value Date     03/18/2021    K 3.5 03/18/2021     03/18/2021    CO2 27 03/18/2021    BUN 9 03/18/2021    CREATININE 0.7 03/18/2021    CALCIUM 8.5 03/18/2021    GFRAA >60 03/18/2021    GFRAA >60 06/09/2012    LABGLOM >60 03/18/2021    GLUCOSE 119 03/18/2021     No orders to display         EKG:    Ventricular Rate 66 BPM QTc Calculation (Bazett) 505 ms   Atrial Rate 71 BPM R Axis -67 degrees   QRS Duration 166 ms T Axis 110 degrees   Q-T Interval 482 ms Diagnosis Electronic ventricular pacemakerNo previous ECGs availableConfirmed          I visualized CXR images and EKG strips personally and agree with documented interpretation    Discussed case  with ED provider    Problem List  Principal Problem:    Closed fracture of right distal femur (HCC)  Active Problems:    Wide-complex tachycardia (HCC)    Paroxysmal atrial fibrillation (HCC)    Pacemaker    CAD (coronary artery disease)    Hypertension    Hypoalbuminemia    GERD (gastroesophageal reflux disease)  Resolved Problems:    * No resolved hospital problems.  *        Assessment/Plan:     Right comminuted distal femoral fracture  -Patient transferred from [facility] to [facility] for surgical repair by orthopedist   -Preoperative labs, UA, EKG, and CXR obtained  -Stat consult placed to cardiology for further recommendations and surgical clearance in lieu of acute tachyarrhythmia  -Oxycodone PRN moderate pain; IV morphine PRN severe pain  -Patient on bedrest with Simon to be placed  -N.p.o. after midnight    Rapid response called for wide-complex tachycardia  -Patient resting comfortably in bed denying chest pain, SOB, LOC  -Cardioversion not indicated due to clinical stability  -Amiodarone bolus x2 resulted in termination of arrhythmia and pacer capture  -Stat labs notable for mild hypokalemia which will be repleted parenterally  -Case discussed with cardiologist Dr. Penelope Rodriguez who recommends continuation of amiodarone drip and transfer to appropriate unit  -Troponin and BNP levels pending at the time of dictation    Hx PAF and diastolic CHF  -Flecainide held per cardiology recommendations following initiation of amiodarone drip  -Patient is not currently anticoagulated due to remote history of significant GIB as well as planned surgical intervention in a.m.  -Continue Coreg and Lasix per home dosage, as BP permits    GERD  -Initiate Protonix 40 mg daily  -FOBT during stay    Hypoalbuminemia  -Consultation placed to nutrition for protein supplementation to assist with postoperative healing process  -Prealbumin level pending      DVT prophylaxis-SC heparin 5000 units every 12 hours to be held just prior to surgical intervention  Code status-full code  Diet-cardiac 2 g sodium now then n.p.o. after midnight  IV access-PIV established in ED      Admit as inpatient. I anticipate hospitalization spanning more than two midnights for investigation and treatment of the above medically necessary diagnoses. Please note that some part of this chart was generated using Dragon dictation software. Although every effort was made to ensure the accuracy of this automated transcription, some errors in transcription may have occurred inadvertently. If you may need any clarification, please do not hesitate to contact me through [facility]        Shonna Alvarez MD    3/19/2021 3:09 PM

## 2021-03-19 NOTE — PROGRESS NOTES
Call out to on call cardiologist notified of SVT: Rapid Response called. Message left with call center.

## 2021-03-19 NOTE — ED NOTES
Per paperwork from SNF, pt is DNR, copy attached in paperwork. Pt diet: Mechanical soft texture, thin consistency. 2L fluid restriction.      Kavitha Pang RN  03/18/21 2100

## 2021-03-19 NOTE — ED NOTES
Pt stable for transfer to [facility]. Report given to transfer team [facility]; report called to [facility], who will assume care when pt transfer is complete. Pt exited unit via stretcher under no duress.       Mirtha Cabrales RN  03/19/21 7075

## 2021-03-19 NOTE — ED NOTES
Transfer center called back pt has a bed at [facility] room 533 bed 1 rport number is [facility]      Issac Akers  03/19/21 103

## 2021-03-19 NOTE — PROGRESS NOTES
4 Eyes Skin Assessment     The patient is being assess for   Admission    I agree that 2 RN's have performed a thorough Head to Toe Skin Assessment on the patient. ALL assessment sites listed below have been assessed. Areas assessed by both nurses:   [x]   Head, Face, and Ears   [x]   Shoulders, Back, and Chest, Abdomen  [x]   Arms, Elbows, and Hands   [x]   Coccyx, Sacrum, and Ischium  [x]   Legs, Feet, and Heels        None*    **SHARE this note so that the co-signing nurse is able to place an eSignature**    Co-signer eSignature: {Esignature:[id number]}    Does the Patient have Skin Breakdown?   No          Tyler Prevention initiated:  No   Wound Care Orders initiated:  No      WOC nurse consulted for Pressure Injury (Stage 3,4, Unstageable, DTI, NWPT, Complex wounds)and New or Established Ostomies:  No      Primary Nurse eSignature: Electronically signed by Park Aguilar RN on 3/19/21 at 2:21 PM EDT

## 2021-03-19 NOTE — ED NOTES
Home medications updated per [facility] from SNF, dated 3/18/2021.      Kranthi Diaz, RN  03/18/21 6401

## 2021-03-19 NOTE — ED NOTES
Med rec complete. Per Dr. Geneva Manzanares, meds reviewed and verbal order given to enter pt's nighttime home meds.       Emanuel Lara RN  03/19/21 1651

## 2021-03-19 NOTE — ED PROVIDER NOTES
EKG interpretation:  At 0 951 patient tachycardic, EKG demonstrates an apparent wide-complex tachycardia with a rate of 162, left axis deviation, QTC mildly prolonged.   EKG repeated at 0 956 and patient was in a ventricular paced rhythm with a rate of 2425 David Celeste MD  03/19/21 1114

## 2021-03-19 NOTE — PROGRESS NOTES
Patient admitted to room 533 from [place] [address]. Patient oriented to room, call light, bed rails, phone, lights and bathroom. Patient instructed about the schedule of the day including: vital sign frequency, lab draws, possible tests, frequency of MD and staff rounds, including RN/MD rounding together at bedside, daily weights, and I &O's. Patient instructed about prescribed diet, how to use 8MENU, and television. [employer] camera in use d/t AMS. Telemetry box in place, patient aware of placement and reason. Bed locked, in lowest position, side rails up 2/4, call light within reach. Will continue to monitor.

## 2021-03-19 NOTE — ED NOTES
RN took report from [facility] for continuity of care.       Alex Jacksno RN  03/19/21 9681

## 2021-03-20 ENCOUNTER — ANESTHESIA EVENT (OUTPATIENT)
Dept: OPERATING ROOM | Age: 86
DRG: 466 | End: 2021-03-20
Payer: MEDICARE

## 2021-03-20 ENCOUNTER — ANESTHESIA (OUTPATIENT)
Dept: OPERATING ROOM | Age: 86
DRG: 466 | End: 2021-03-20
Payer: MEDICARE

## 2021-03-20 ENCOUNTER — APPOINTMENT (OUTPATIENT)
Dept: GENERAL RADIOLOGY | Age: 86
DRG: 466 | End: 2021-03-20
Attending: HOSPITALIST
Payer: MEDICARE

## 2021-03-20 VITALS
RESPIRATION RATE: 7 BRPM | SYSTOLIC BLOOD PRESSURE: 95 MMHG | OXYGEN SATURATION: 97 % | DIASTOLIC BLOOD PRESSURE: 61 MMHG

## 2021-03-20 LAB
EKG ATRIAL RATE: 163 BPM
EKG ATRIAL RATE: 163 BPM
EKG ATRIAL RATE: 65 BPM
EKG ATRIAL RATE: 86 BPM
EKG DIAGNOSIS: NORMAL
EKG P AXIS: 100 DEGREES
EKG P AXIS: 101 DEGREES
EKG P AXIS: 103 DEGREES
EKG P-R INTERVAL: 110 MS
EKG P-R INTERVAL: 132 MS
EKG P-R INTERVAL: 132 MS
EKG Q-T INTERVAL: 274 MS
EKG Q-T INTERVAL: 302 MS
EKG Q-T INTERVAL: 404 MS
EKG Q-T INTERVAL: 482 MS
EKG QRS DURATION: 112 MS
EKG QRS DURATION: 168 MS
EKG QRS DURATION: 94 MS
EKG QRS DURATION: 98 MS
EKG QTC CALCULATION (BAZETT): 451 MS
EKG QTC CALCULATION (BAZETT): 491 MS
EKG QTC CALCULATION (BAZETT): 497 MS
EKG QTC CALCULATION (BAZETT): 501 MS
EKG R AXIS: -66 DEGREES
EKG R AXIS: 126 DEGREES
EKG R AXIS: 128 DEGREES
EKG R AXIS: 77 DEGREES
EKG T AXIS: -68 DEGREES
EKG T AXIS: 104 DEGREES
EKG T AXIS: 190 DEGREES
EKG T AXIS: 199 DEGREES
EKG VENTRICULAR RATE: 163 BPM
EKG VENTRICULAR RATE: 163 BPM
EKG VENTRICULAR RATE: 65 BPM
EKG VENTRICULAR RATE: 89 BPM
GLUCOSE BLD-MCNC: 150 MG/DL (ref 70–99)
PERFORMED ON: ABNORMAL

## 2021-03-20 PROCEDURE — 3209999900 FLUORO FOR SURGICAL PROCEDURES

## 2021-03-20 PROCEDURE — 6360000002 HC RX W HCPCS: Performed by: HOSPITALIST

## 2021-03-20 PROCEDURE — 7100000001 HC PACU RECOVERY - ADDTL 15 MIN: Performed by: ORTHOPAEDIC SURGERY

## 2021-03-20 PROCEDURE — 6360000002 HC RX W HCPCS: Performed by: ORTHOPAEDIC SURGERY

## 2021-03-20 PROCEDURE — 73560 X-RAY EXAM OF KNEE 1 OR 2: CPT

## 2021-03-20 PROCEDURE — 88311 DECALCIFY TISSUE: CPT

## 2021-03-20 PROCEDURE — 3600000005 HC SURGERY LEVEL 5 BASE: Performed by: ORTHOPAEDIC SURGERY

## 2021-03-20 PROCEDURE — 6360000002 HC RX W HCPCS: Performed by: ANESTHESIOLOGY

## 2021-03-20 PROCEDURE — 93010 ELECTROCARDIOGRAM REPORT: CPT | Performed by: INTERNAL MEDICINE

## 2021-03-20 PROCEDURE — 2580000003 HC RX 258: Performed by: ORTHOPAEDIC SURGERY

## 2021-03-20 PROCEDURE — C1776 JOINT DEVICE (IMPLANTABLE): HCPCS | Performed by: ORTHOPAEDIC SURGERY

## 2021-03-20 PROCEDURE — C1713 ANCHOR/SCREW BN/BN,TIS/BN: HCPCS | Performed by: ORTHOPAEDIC SURGERY

## 2021-03-20 PROCEDURE — 0SRC0J9 REPLACEMENT OF RIGHT KNEE JOINT WITH SYNTHETIC SUBSTITUTE, CEMENTED, OPEN APPROACH: ICD-10-PCS | Performed by: ORTHOPAEDIC SURGERY

## 2021-03-20 PROCEDURE — 99232 SBSQ HOSP IP/OBS MODERATE 35: CPT | Performed by: NURSE PRACTITIONER

## 2021-03-20 PROCEDURE — 2720000010 HC SURG SUPPLY STERILE: Performed by: ORTHOPAEDIC SURGERY

## 2021-03-20 PROCEDURE — 2580000003 HC RX 258: Performed by: ANESTHESIOLOGY

## 2021-03-20 PROCEDURE — 6370000000 HC RX 637 (ALT 250 FOR IP): Performed by: HOSPITALIST

## 2021-03-20 PROCEDURE — 3700000000 HC ANESTHESIA ATTENDED CARE: Performed by: ORTHOPAEDIC SURGERY

## 2021-03-20 PROCEDURE — 2709999900 HC NON-CHARGEABLE SUPPLY: Performed by: ORTHOPAEDIC SURGERY

## 2021-03-20 PROCEDURE — 0SPC0JZ REMOVAL OF SYNTHETIC SUBSTITUTE FROM RIGHT KNEE JOINT, OPEN APPROACH: ICD-10-PCS | Performed by: ORTHOPAEDIC SURGERY

## 2021-03-20 PROCEDURE — 2580000003 HC RX 258: Performed by: HOSPITALIST

## 2021-03-20 PROCEDURE — 3700000001 HC ADD 15 MINUTES (ANESTHESIA): Performed by: ORTHOPAEDIC SURGERY

## 2021-03-20 PROCEDURE — 2500000003 HC RX 250 WO HCPCS: Performed by: ANESTHESIOLOGY

## 2021-03-20 PROCEDURE — 7100000000 HC PACU RECOVERY - FIRST 15 MIN: Performed by: ORTHOPAEDIC SURGERY

## 2021-03-20 PROCEDURE — 88305 TISSUE EXAM BY PATHOLOGIST: CPT

## 2021-03-20 PROCEDURE — 94761 N-INVAS EAR/PLS OXIMETRY MLT: CPT

## 2021-03-20 PROCEDURE — 6370000000 HC RX 637 (ALT 250 FOR IP): Performed by: NURSE PRACTITIONER

## 2021-03-20 PROCEDURE — 2700000000 HC OXYGEN THERAPY PER DAY

## 2021-03-20 PROCEDURE — 2060000000 HC ICU INTERMEDIATE R&B

## 2021-03-20 PROCEDURE — 3600000015 HC SURGERY LEVEL 5 ADDTL 15MIN: Performed by: ORTHOPAEDIC SURGERY

## 2021-03-20 DEVICE — SLEEVE TIB STD UNIV POLYETH MOD CEM STEM ROT HNG REV: Type: IMPLANTABLE DEVICE | Site: KNEE | Status: FUNCTIONAL

## 2021-03-20 DEVICE — CEMENT BNE 40GM FULL DOSE PMMA W/O ANTIBIO M VISC RADPQ: Type: IMPLANTABLE DEVICE | Site: KNEE | Status: FUNCTIONAL

## 2021-03-20 DEVICE — BASEPLATE TIB SM SZ 1 MOD ROT HNG MONOGRAM: Type: IMPLANTABLE DEVICE | Site: KNEE | Status: FUNCTIONAL

## 2021-03-20 DEVICE — IMPLANTABLE DEVICE: Type: IMPLANTABLE DEVICE | Site: KNEE | Status: FUNCTIONAL

## 2021-03-20 DEVICE — COMPONENT TIB XSM-XL KNEE POLYETH MOD CEM STEM ROT HNG REV: Type: IMPLANTABLE DEVICE | Site: KNEE | Status: FUNCTIONAL

## 2021-03-20 DEVICE — INSERT TIB KNEE BUMPER MOD ROT HNG NEUT MONOGRAM: Type: IMPLANTABLE DEVICE | Site: KNEE | Status: FUNCTIONAL

## 2021-03-20 DEVICE — AXLE TIB FOR SM DST FEM GMRS: Type: IMPLANTABLE DEVICE | Site: KNEE | Status: FUNCTIONAL

## 2021-03-20 DEVICE — BUSHING FEM SM FOR DST FEM GMRS: Type: IMPLANTABLE DEVICE | Site: KNEE | Status: FUNCTIONAL

## 2021-03-20 RX ORDER — DIPHENHYDRAMINE HYDROCHLORIDE 50 MG/ML
12.5 INJECTION INTRAMUSCULAR; INTRAVENOUS
Status: DISCONTINUED | OUTPATIENT
Start: 2021-03-20 | End: 2021-03-20 | Stop reason: HOSPADM

## 2021-03-20 RX ORDER — LABETALOL HYDROCHLORIDE 5 MG/ML
5 INJECTION, SOLUTION INTRAVENOUS EVERY 10 MIN PRN
Status: DISCONTINUED | OUTPATIENT
Start: 2021-03-20 | End: 2021-03-20 | Stop reason: HOSPADM

## 2021-03-20 RX ORDER — PROPOFOL 10 MG/ML
INJECTION, EMULSION INTRAVENOUS PRN
Status: DISCONTINUED | OUTPATIENT
Start: 2021-03-20 | End: 2021-03-20 | Stop reason: SDUPTHER

## 2021-03-20 RX ORDER — ONDANSETRON 2 MG/ML
INJECTION INTRAMUSCULAR; INTRAVENOUS PRN
Status: DISCONTINUED | OUTPATIENT
Start: 2021-03-20 | End: 2021-03-20 | Stop reason: SDUPTHER

## 2021-03-20 RX ORDER — ROCURONIUM BROMIDE 10 MG/ML
INJECTION, SOLUTION INTRAVENOUS PRN
Status: DISCONTINUED | OUTPATIENT
Start: 2021-03-20 | End: 2021-03-20 | Stop reason: SDUPTHER

## 2021-03-20 RX ORDER — SODIUM CHLORIDE 9 MG/ML
INJECTION, SOLUTION INTRAVENOUS CONTINUOUS PRN
Status: DISCONTINUED | OUTPATIENT
Start: 2021-03-20 | End: 2021-03-20 | Stop reason: SDUPTHER

## 2021-03-20 RX ORDER — PHENYLEPHRINE HCL IN 0.9% NACL 1 MG/10 ML
SYRINGE (ML) INTRAVENOUS PRN
Status: DISCONTINUED | OUTPATIENT
Start: 2021-03-20 | End: 2021-03-20 | Stop reason: SDUPTHER

## 2021-03-20 RX ORDER — AMIODARONE HYDROCHLORIDE 200 MG/1
400 TABLET ORAL 3 TIMES DAILY
Status: DISCONTINUED | OUTPATIENT
Start: 2021-03-20 | End: 2021-03-22

## 2021-03-20 RX ORDER — CEFAZOLIN SODIUM 1 G/3ML
INJECTION, POWDER, FOR SOLUTION INTRAMUSCULAR; INTRAVENOUS PRN
Status: DISCONTINUED | OUTPATIENT
Start: 2021-03-20 | End: 2021-03-20 | Stop reason: SDUPTHER

## 2021-03-20 RX ORDER — MORPHINE SULFATE 2 MG/ML
1 INJECTION, SOLUTION INTRAMUSCULAR; INTRAVENOUS EVERY 5 MIN PRN
Status: DISCONTINUED | OUTPATIENT
Start: 2021-03-20 | End: 2021-03-20 | Stop reason: HOSPADM

## 2021-03-20 RX ORDER — MORPHINE SULFATE 2 MG/ML
2 INJECTION, SOLUTION INTRAMUSCULAR; INTRAVENOUS EVERY 5 MIN PRN
Status: DISCONTINUED | OUTPATIENT
Start: 2021-03-20 | End: 2021-03-20 | Stop reason: HOSPADM

## 2021-03-20 RX ORDER — OXYCODONE HYDROCHLORIDE AND ACETAMINOPHEN 5; 325 MG/1; MG/1
1 TABLET ORAL PRN
Status: DISCONTINUED | OUTPATIENT
Start: 2021-03-20 | End: 2021-03-20 | Stop reason: HOSPADM

## 2021-03-20 RX ORDER — OXYCODONE HYDROCHLORIDE AND ACETAMINOPHEN 5; 325 MG/1; MG/1
2 TABLET ORAL PRN
Status: DISCONTINUED | OUTPATIENT
Start: 2021-03-20 | End: 2021-03-20 | Stop reason: HOSPADM

## 2021-03-20 RX ORDER — MEPERIDINE HYDROCHLORIDE 50 MG/ML
12.5 INJECTION INTRAMUSCULAR; INTRAVENOUS; SUBCUTANEOUS EVERY 5 MIN PRN
Status: DISCONTINUED | OUTPATIENT
Start: 2021-03-20 | End: 2021-03-20 | Stop reason: HOSPADM

## 2021-03-20 RX ORDER — HYDRALAZINE HYDROCHLORIDE 20 MG/ML
5 INJECTION INTRAMUSCULAR; INTRAVENOUS EVERY 10 MIN PRN
Status: DISCONTINUED | OUTPATIENT
Start: 2021-03-20 | End: 2021-03-20 | Stop reason: HOSPADM

## 2021-03-20 RX ORDER — ONDANSETRON 2 MG/ML
4 INJECTION INTRAMUSCULAR; INTRAVENOUS
Status: DISCONTINUED | OUTPATIENT
Start: 2021-03-20 | End: 2021-03-20 | Stop reason: HOSPADM

## 2021-03-20 RX ADMIN — Medication 100 MCG: at 08:34

## 2021-03-20 RX ADMIN — DULOXETINE HYDROCHLORIDE 30 MG: 30 CAPSULE, DELAYED RELEASE ORAL at 11:33

## 2021-03-20 RX ADMIN — ROCURONIUM BROMIDE 30 MG: 10 SOLUTION INTRAVENOUS at 08:20

## 2021-03-20 RX ADMIN — Medication 2000 UNITS: at 11:32

## 2021-03-20 RX ADMIN — SUGAMMADEX 50 MG: 100 INJECTION, SOLUTION INTRAVENOUS at 09:46

## 2021-03-20 RX ADMIN — Medication 100 MCG: at 08:32

## 2021-03-20 RX ADMIN — PROPOFOL 50 MG: 10 INJECTION, EMULSION INTRAVENOUS at 08:20

## 2021-03-20 RX ADMIN — AMIODARONE HYDROCHLORIDE 1 MG/MIN: 50 INJECTION, SOLUTION INTRAVENOUS at 05:57

## 2021-03-20 RX ADMIN — CEFAZOLIN 2000 MG: 10 INJECTION, POWDER, FOR SOLUTION INTRAVENOUS at 05:52

## 2021-03-20 RX ADMIN — Medication 100 MCG: at 09:13

## 2021-03-20 RX ADMIN — FUROSEMIDE 40 MG: 40 TABLET ORAL at 11:32

## 2021-03-20 RX ADMIN — Medication 10 ML: at 21:44

## 2021-03-20 RX ADMIN — TIMOLOL MALEATE 1 DROP: 5 SOLUTION OPHTHALMIC at 01:25

## 2021-03-20 RX ADMIN — LATANOPROST 1 DROP: 50 SOLUTION OPHTHALMIC at 01:25

## 2021-03-20 RX ADMIN — PANTOPRAZOLE SODIUM 40 MG: 40 TABLET, DELAYED RELEASE ORAL at 05:52

## 2021-03-20 RX ADMIN — Medication 100 MCG: at 08:30

## 2021-03-20 RX ADMIN — Medication 1 TABLET: at 11:44

## 2021-03-20 RX ADMIN — AMIODARONE HYDROCHLORIDE 400 MG: 200 TABLET ORAL at 15:45

## 2021-03-20 RX ADMIN — LATANOPROST 1 DROP: 50 SOLUTION OPHTHALMIC at 21:53

## 2021-03-20 RX ADMIN — Medication 10 ML: at 11:37

## 2021-03-20 RX ADMIN — TIMOLOL MALEATE 1 DROP: 5 SOLUTION OPHTHALMIC at 21:53

## 2021-03-20 RX ADMIN — Medication 100 MCG: at 09:12

## 2021-03-20 RX ADMIN — Medication 100 MCG: at 09:17

## 2021-03-20 RX ADMIN — TIMOLOL MALEATE 1 DROP: 5 SOLUTION OPHTHALMIC at 11:34

## 2021-03-20 RX ADMIN — HEPARIN SODIUM 5000 UNITS: 5000 INJECTION INTRAVENOUS; SUBCUTANEOUS at 21:43

## 2021-03-20 RX ADMIN — DOCUSATE SODIUM 50MG AND SENNOSIDES 8.6MG 1 TABLET: 8.6; 5 TABLET, FILM COATED ORAL at 11:32

## 2021-03-20 RX ADMIN — ONDANSETRON 4 MG: 2 INJECTION INTRAMUSCULAR; INTRAVENOUS at 09:14

## 2021-03-20 RX ADMIN — OXYCODONE 5 MG: 5 TABLET ORAL at 11:44

## 2021-03-20 RX ADMIN — CEFAZOLIN 1000 MG: 1 INJECTION, POWDER, FOR SOLUTION INTRAMUSCULAR; INTRAVENOUS at 08:29

## 2021-03-20 RX ADMIN — Medication 10 ML: at 05:52

## 2021-03-20 RX ADMIN — OXYCODONE 5 MG: 5 TABLET ORAL at 18:27

## 2021-03-20 RX ADMIN — TAMSULOSIN HYDROCHLORIDE 0.4 MG: 0.4 CAPSULE ORAL at 11:32

## 2021-03-20 RX ADMIN — SODIUM CHLORIDE: 9 INJECTION, SOLUTION INTRAVENOUS at 08:38

## 2021-03-20 RX ADMIN — POTASSIUM CHLORIDE 10 MEQ: 7.46 INJECTION, SOLUTION INTRAVENOUS at 01:33

## 2021-03-20 RX ADMIN — FUROSEMIDE 40 MG: 40 TABLET ORAL at 18:27

## 2021-03-20 RX ADMIN — SODIUM CHLORIDE: 9 INJECTION, SOLUTION INTRAVENOUS at 08:15

## 2021-03-20 RX ADMIN — PSYLLIUM HUSK 1 PACKET: 3.4 POWDER ORAL at 11:32

## 2021-03-20 ASSESSMENT — PULMONARY FUNCTION TESTS
PIF_VALUE: 19
PIF_VALUE: 2
PIF_VALUE: 19
PIF_VALUE: 19
PIF_VALUE: 22
PIF_VALUE: 18
PIF_VALUE: 19
PIF_VALUE: 18
PIF_VALUE: 19
PIF_VALUE: 20
PIF_VALUE: 1
PIF_VALUE: 19
PIF_VALUE: 19
PIF_VALUE: 18
PIF_VALUE: 18
PIF_VALUE: 20
PIF_VALUE: 18
PIF_VALUE: 18
PIF_VALUE: 19
PIF_VALUE: 19
PIF_VALUE: 18
PIF_VALUE: 19
PIF_VALUE: 19
PIF_VALUE: 18
PIF_VALUE: 3
PIF_VALUE: 18
PIF_VALUE: 18
PIF_VALUE: 4
PIF_VALUE: 19
PIF_VALUE: 18
PIF_VALUE: 19
PIF_VALUE: 18
PIF_VALUE: 20
PIF_VALUE: 18
PIF_VALUE: 2
PIF_VALUE: 18
PIF_VALUE: 20
PIF_VALUE: 19
PIF_VALUE: 18
PIF_VALUE: 19
PIF_VALUE: 1
PIF_VALUE: 1

## 2021-03-20 ASSESSMENT — PAIN SCALES - GENERAL
PAINLEVEL_OUTOF10: 0
PAINLEVEL_OUTOF10: 3
PAINLEVEL_OUTOF10: 0

## 2021-03-20 NOTE — PROGRESS NOTES
Physical Therapy/Occupational Therapy  Orders received and chart reviewed, per chart pt with plan for sx d/t R femur fx today, also noted rapid response called 3/19. Will sign off at this time pending ortho sx. Please re-order post operatively as indicated. Thanks.   Belgica Ocasio PT, DPT  Kishore Jacobo OTR/L

## 2021-03-20 NOTE — PROGRESS NOTES
Verified with Rubi Caldwell RN, floor nurse the DNR status must be reviewed on floor, to reflect accuracy of patient/family wishes.   Rubi Caldwell stated will call hospitalist now

## 2021-03-20 NOTE — PROGRESS NOTES
Call received of rapid regular WCT. BP's soft, 23'Y systolic. Otherwise, no distress per my discussion with hospitalist. Chart reviewed. Most recent interrogation in care everywhere noted:    Medtronic Revo dual chamber pacemaker remote. The pacing mode is AAIR<=>DDDR 60/130bpm -> VVI 65bpm.  The estimated battery of 2.79V= RRT as of 1/15/21. Normal lead function with no significant abnormalities noted. Atrial pacing 28.1% Ventricular pacing 63.9%  The presenting rhythm is AS/ @ 60's bpm.   The heart rate histogram shows moderate HR variability. Atrial burden <0.1%, 4 AT/AF episodes recorded. EGMs appear consistent with Afib-flutter with morderately controlled ventricular rates. The longest episode of 39min on 2/27/21.  22 VT episodes and 7 Fast A&V episodes recorded. EGMs appear consistent with AF with RVR @ 100's-170's bpm lasting up to 1hr 24min on 2/27/21. Patient seen with Dr. Arelis Langford earlier today. Prior EKG from 3/19 9:51 shows WCT concerning for VT. Per my discussion with EP, she has both VT and SVT. AF per OSH notes. Normal EF by echo 2018. At time of WCT this evening, she'd been bolused amio x 2 with response following second dose. Stable with soft BP's. Moved to A2. Recs:  1. Amiodarone gtt initiated  2. Hold flecainide  3. Continue carvedilol with appropriate hold orders placed   4. Noted not on [facility] presently due to hip fracture and plans for OR  5. Repeat EKG on transfer to A2  6. Replete potassium to >4  7. Given instability with rhythms, recommend re-evaluation early AM prior to OR    Please call if any further concerns overnight.      Everett Hernandez MD, [address] [facility]  [phone] [facility]  [phone] [facility]

## 2021-03-20 NOTE — PROGRESS NOTES
Hospitalist Progress Note      PCP: Myla Levine MD    Date of Admission: 3/19/2021    Chief Complaint: Fall and right leg injury    Hospital Course: This is a 77-year-old female initially presented to [facility] ED on 3/18/2021 evaluated for mechanical fall with resultant right leg injury upon arrival to the emergency room imaging show acute displaced fracture of right distal femur orthopedic consulted patient was transferred to [facility] for procedure on 3/20/2021. Subjective: Patient is lying in the bed comfortable return from surgery denies any chest pain or shortness of breath no nausea vomiting.       Medications:  Reviewed    Infusion Medications    sodium chloride Stopped (03/19/21 2303)    amiodarone 450mg/250ml D5W infusion 1 mg/min (03/20/21 0815)     Scheduled Medications    atorvastatin  10 mg Oral Nightly    carvedilol  3.125 mg Oral BID WC    DULoxetine  30 mg Oral Daily    ferrous sulfate  325 mg Oral Once per day on Mon Wed Fri    [Held by provider] flecainide  50 mg Oral BID    gabapentin  100 mg Oral TID    latanoprost  1 drop Both Eyes Nightly    therapeutic multivitamin-minerals  1 tablet Oral Daily    pantoprazole  40 mg Oral QAM AC    sennosides-docusate sodium  1 tablet Oral Daily    psyllium  1 packet Oral Daily    tamsulosin  0.4 mg Oral Daily    timolol  1 drop Left Eye BID    vitamin D  2,000 Units Oral Daily    furosemide  40 mg Oral BID    sodium chloride flush  10 mL Intravenous 2 times per day    heparin (porcine)  5,000 Units Subcutaneous BID    mupirocin   Each Nostril BID     PRN Meds: meperidine, HYDROmorphone, HYDROmorphone, morphine, morphine, oxyCODONE-acetaminophen **OR** oxyCODONE-acetaminophen, ondansetron, diphenhydrAMINE, labetalol, hydrALAZINE, melatonin, sodium chloride flush, potassium chloride **OR** potassium alternative oral replacement **OR** potassium chloride, magnesium sulfate, senna, acetaminophen **OR** acetaminophen, acetaminophen, oxyCODONE, morphine      Intake/Output Summary (Last 24 hours) at 3/20/2021 0930  Last data filed at 3/20/2021 0837  Gross per 24 hour   Intake 440 ml   Output --   Net 440 ml       Physical Exam Performed:    BP (!) 93/57   Pulse 65   Temp 98.8 °F (37.1 °C) (Oral)   Resp 16   Ht 5' 3\" (1.6 m)   Wt 145 lb 4.5 oz (65.9 kg)   SpO2 93%   BMI 25.74 kg/m²     General appearance: No apparent distress, appears stated age and cooperative. HEENT: Pupils equal, round, and reactive to light. Conjunctivae/corneas clear. Neck: Supple, with full range of motion. No jugular venous distention. Trachea midline. Respiratory:  Normal respiratory effort. Clear to auscultation, bilaterally without Rales/Wheezes/Rhonchi. Cardiovascular: Regular rate and rhythm with normal S1/S2 without murmurs, rubs or gallops. Abdomen: Soft, non-tender, non-distended with normal bowel sounds. Musculoskeletal: No clubbing, cyanosis or edema bilaterally. Full range of motion without deformity. Skin: Skin color, texture, turgor normal.  No rashes or lesions. Neurologic:  Neurovascularly intact without any focal sensory/motor deficits.  Cranial nerves: II-XII intact, grossly non-focal.  Psychiatric: Alert and oriented, thought content appropriate, normal insight  Capillary Refill: Brisk,< 3 seconds   Peripheral Pulses: +2 palpable, equal bilaterally       Labs:   Recent Labs     03/18/21 1810 03/19/21  1656   WBC 7.1 9.1   HGB 11.5* 10.5*   HCT 34.4* 31.3*    334     Recent Labs     03/18/21 1810 03/19/21  1655    134*   K 3.5 3.3*    97*   CO2 27 28   BUN 9 9   CREATININE 0.7 0.6   CALCIUM 8.5 8.3     Recent Labs     03/18/21 1810 03/19/21  1655   AST 17 18   ALT <5* 7*   BILITOT 0.7 1.4*   ALKPHOS 145* 141*     Recent Labs     03/18/21 1810   INR 1.37*     Recent Labs     03/18/21 1810 03/19/21  1926   TROPONINI <0.01 0.02*       Urinalysis:      Lab Results   Component Value Date

## 2021-03-20 NOTE — PROGRESS NOTES
[facility]   Daily Progress Note    Admit Date:  3/19/2021  HPI:  No chief complaint on file. Interval history: Issac Alexander is being followed for tachycardia. Seen by EP 3/19/21    Subjective:  Ms. Aleks Harvey refusing care. Telemetry reviewed shows afib rvr until 2200 converted to NSR.  Remains paced this am.   Seen after surgery     Objective:   BP (!) 114/58   Pulse 65   Temp 98.1 °F (36.7 °C) (Oral)   Resp 16   Ht 5' 3\" (1.6 m)   Wt 145 lb 4.5 oz (65.9 kg)   SpO2 94%   BMI 25.74 kg/m²       Intake/Output Summary (Last 24 hours) at 3/20/2021 1441  Last data filed at 3/20/2021 0951  Gross per 24 hour   Intake 440 ml   Output 100 ml   Net 340 ml       NYHA: III    Physical Exam:  Refused exam   Telemetry: Paced 60's    Medications:    atorvastatin  10 mg Oral Nightly    carvedilol  3.125 mg Oral BID WC    DULoxetine  30 mg Oral Daily    ferrous sulfate  325 mg Oral Once per day on Mon Wed Fri    [Held by provider] flecainide  50 mg Oral BID    gabapentin  100 mg Oral TID    latanoprost  1 drop Both Eyes Nightly    therapeutic multivitamin-minerals  1 tablet Oral Daily    pantoprazole  40 mg Oral QAM AC    sennosides-docusate sodium  1 tablet Oral Daily    psyllium  1 packet Oral Daily    tamsulosin  0.4 mg Oral Daily    timolol  1 drop Left Eye BID    vitamin D  2,000 Units Oral Daily    furosemide  40 mg Oral BID    sodium chloride flush  10 mL Intravenous 2 times per day    heparin (porcine)  5,000 Units Subcutaneous BID      sodium chloride Stopped (03/19/21 2303)    amiodarone 450mg/250ml D5W infusion 1 mg/min (03/20/21 0815)       Lab Data:  CBC:   Recent Labs     03/18/21 1810 03/19/21  1656   WBC 7.1 9.1   HGB 11.5* 10.5*    334     BMP:    Recent Labs     03/18/21 1810 03/19/21  1655    134*   K 3.5 3.3*   CO2 27 28   BUN 9 9   CREATININE 0.7 0.6     INR:    Recent Labs     03/18/21 1810   INR 1.37*     BNP:    Recent Labs     03/19/21  1921 PROBNP 2,909*     No results found for: LVEF, LVEFMODE    Testing:      Principal Problem:    Closed fracture of right distal femur (HCC)  Active Problems:    Hypertension    Paroxysmal atrial fibrillation (HCC)    Pacemaker    CAD (coronary artery disease)    Hypoalbuminemia    GERD (gastroesophageal reflux disease)    Wide-complex tachycardia (HCC)  Resolved Problems:    * No resolved hospital problems. *      Assessment:  NSVT, SVT, PAF  Right femur fracture, s/p ORIF 3/20/21  SSS s/p pacer    Plan:  Patient seen post-op; HR controlled.    D/c IV amiodarone and start oral per EP recommendations: oral amiodarone 400 mg TID for 4 days, then 400 mg BID for 4 days, then 400 mg for 4 days and then 200 mg daily after to load to ~10 g  No anticoagulation due to GI bleed, falls, dementia         Taylor May CNP, 3/20/2021, 2:41 PM

## 2021-03-20 NOTE — BRIEF OP NOTE
Brief Postoperative Note      Patient: Mey Jimenez  YOB: 1932  MRN: [id number]    Date of Procedure: 3/20/2021    Pre-Op Diagnosis: -    Post-Op Diagnosis: Same       Procedure(s):  REVISION RIGHT TOTAL KNEE ARTHROPLASTY       SONAL    Surgeon(s):  Gerri Paris MD    Assistant:  Surgical Assistant: Pradip Luong    Anesthesia: General    Estimated Blood Loss (mL): Minimal    Complications: None    Specimens:   ID Type Source Tests Collected by Time Destination   A : RIGHT TOTAL KNEE BONE WITH EXPLANT Specimen Joint, Knee SURGICAL 52 Bunny Gr MD 3/20/2021 1877        Implants:  Implant Name Type Inv.  Item Serial No.  Lot No. LRB No. Used Action   INSERT TIB SM SZ 1/2 [id number] MOD ROT HNG MONOGRAM  INSERT TIB SM SZ 1/2 [id number] MOD ROT HNG MONOGRAM  [employer] [facility] [id number] Right 1 Implanted   STEM FEM L127MM [id number] PROX HIP STR CLARA W/O POR BODY GMRS  STEM FEM L127MM [id number] PROX HIP STR CLARA W/O POR BODY GMRS  [employer] [facility] [id number] Right 1 Implanted   EXTENSION STEM L80MM [id number] DST TI FLUT TRIATHLON  EXTENSION STEM L80MM [id number] DST TI FLUT TRIATHLON  [employer] [facility] [id number] Right 1 Implanted   BASEPLATE TIB SM SZ 1 MOD ROT HNG MONOGRAM  BASEPLATE TIB SM SZ 1 MOD ROT HNG MONOGRAM  [employer] [facility] [id number] Right 1 Implanted   COMPONENT FEM SM L65MM R DST KNEE ROT HNG GMRS  COMPONENT FEM SM L65MM R DST KNEE ROT HNG GMRS  [employer] [facility] [id number] Right 1 Implanted   COMPONENT TIB XSM-XL KNEE POLYETH MOD CLARA STEM ROT HNG REV  COMPONENT TIB XSM-XL KNEE POLYETH MOD CLARA STEM ROT HNG REV  [employer] [facility] [id number] Right 1 Implanted   AXLE TIB FOR SM DST FEM GMRS  AXLE TIB FOR SM DST FEM GMRS  [employer] [facility] [id number] Right 1 Implanted   BUSHING FEM SM FOR DST FEM GMRS  BUSHING FEM SM FOR DST FEM GMRS  [employer] [facility] [id number] Right 1 Implanted   SLEEVE TIB STD UNIV POLYETH MOD CLARA STEM ROT HNG REV  SLEEVE TIB STD UNIV POLYETH MOD CLARA STEM ROT HNG REV  [employer] [facility] [id number] Right 1 Implanted   BUSHING FEM SM FOR DST FEM GMRS  BUSHING FEM SM FOR DST FEM GMRS  [employer] [facility] [id number] Right 1 Implanted   INSERT TIB KNEE BUMPER MOD ROT HNG NEUT MONOGRAM  INSERT TIB KNEE BUMPER MOD ROT HNG NEUT MONOGRAM  [employer] [facility] [id number] Right 1 Implanted   CEMENT BNE 40GM FULL DOSE PMMA W/O ANTIBIO M VISC RADPQ  CEMENT BNE 40GM FULL DOSE PMMA W/O ANTIBIO M VISC RADPQ  [employer] [facility] [id number] Right 1 Implanted   CEMENT BNE 40GM FULL DOSE PMMA W/O ANTIBIO M VISC RADPQ  CEMENT BNE 40GM FULL DOSE PMMA W/O ANTIBIO M VISC RADPQ  [employer] [facility] [id number] Right 2 Implanted         Drains:   External Urinary Catheter (Active)   Catheter changed  Yes 03/20/21 0630   Urine Color Yellow 03/20/21 0011   Suction- Female Only 40 mmgHg continuous 03/20/21 0011   Placement Initiated 03/20/21 0011   Skin Assessment No Injury 03/20/21 0011       Findings: none    Electronically signed by Kaylynn Ramos MD on 3/20/2021 at 9:13 AM

## 2021-03-20 NOTE — PROGRESS NOTES
Secure message sent to Dr. Cira Tian via perfect serve pt has returned from PACU. Also code status was changed to FULL code during surgery.   MD was unable to change back to [facility] per pacu RN  thank you

## 2021-03-20 NOTE — ANESTHESIA PRE PROCEDURE
Diarrhea Two tabs after initial diarrhea episode, 1 tab after each additional episode, NTE    Historical Provider, MD   tamsulosin (FLOMAX) 0.4 MG capsule Take 0.4 mg by mouth daily    Historical Provider, MD   ferrous sulfate 325 (65 Fe) MG tablet Take 1 tablet by mouth three times a week 11/7/18   Nita Chambers MD   sennosides-docusate sodium (SENOKOT-S) 8.6-50 MG tablet Take 1 tablet by mouth daily 11/2/18   Chayo Oropeza MD   flecainide (TAMBOCOR) 50 MG tablet Take 50 mg by mouth 2 times daily    Historical Provider, MD   aspirin 81 MG tablet Take 81 mg by mouth daily    Historical Provider, MD   timolol (BETIMOL) 0.5 % ophthalmic solution Place 1 drop into the left eye 2 times daily Dosing times are 09:00 and 15:00. Historical Provider, MD   carvedilol (COREG) 3.125 MG tablet Take 1 tablet by mouth 2 times daily (with meals). 10/7/14   Derek Ocampo MD   atorvastatin (LIPITOR) 10 MG tablet   Take 10 mg by mouth nightly     Historical Provider, MD   Multiple Vitamins-Minerals (CENTRUM PO) Take 1 tablet by mouth daily.  Indications: Centrum Silver 12/24/10   Historical Provider, MD   vitamin D (CHOLECALCIFEROL) 400 UNIT TABS tablet   Take 2,000 Units by mouth daily     Historical Provider, MD   latanoprost (XALATAN) 0.005 % ophthalmic solution   Place 1 drop into both eyes nightly     Historical Provider, MD       Current medications:    Current Facility-Administered Medications   Medication Dose Route Frequency Provider Last Rate Last Admin    atorvastatin (LIPITOR) tablet 10 mg  10 mg Oral Nightly Isatu Sexton MD   10 mg at 03/19/21 2300    carvedilol (COREG) tablet 3.125 mg  3.125 mg Oral BID  Asher Bishop MD   3.125 mg at 03/19/21 1817    DULoxetine (CYMBALTA) extended release capsule 30 mg  30 mg Oral Daily Isatu Sexton MD   30 mg at 03/19/21 2326    ferrous sulfate (IRON 325) tablet 325 mg  325 mg Oral Once per day on Mon Wed Fri Isatu Sexton MD   325 mg at 03/19/21 1817    [Held by provider] flecainide (TAMBOCOR) tablet 50 mg  50 mg Oral BID Alondra Hurd MD        gabapentin (NEURONTIN) capsule 100 mg  100 mg Oral TID Alondra Hurd MD   100 mg at 03/19/21 2300    latanoprost (XALATAN) 0.005 % ophthalmic solution 1 drop  1 drop Both Eyes Nightly Alondra Hurd MD   1 drop at 03/20/21 0125    melatonin disintegrating tablet 5 mg  5 mg Oral Nightly PRN Alondra Hurd MD        therapeutic multivitamin-minerals 1 tablet  1 tablet Oral Daily Alondra Hurd MD   1 tablet at 03/19/21 1817    pantoprazole (PROTONIX) tablet 40 mg  40 mg Oral QAM AC Alondra Hurd MD   40 mg at 03/20/21 0552    sennosides-docusate sodium (SENOKOT-S) 8.6-50 MG tablet 1 tablet  1 tablet Oral Daily Alondra Hurd MD   1 tablet at 03/19/21 1817    psyllium (METAMUCIL) 58.12 % packet 1 packet  1 packet Oral Daily Alondra Hurd MD   1 packet at 03/19/21 1831    tamsulosin (FLOMAX) capsule 0.4 mg  0.4 mg Oral Daily Alondra Hurd MD   0.4 mg at 03/19/21 1817    timolol (TIMOPTIC) 0.5 % ophthalmic solution 1 drop  1 drop Left Eye BID Alondra Hurd MD   1 drop at 03/20/21 0125    Vitamin D (CHOLECALCIFEROL) tablet 2,000 Units  2,000 Units Oral Daily Alondra Hurd MD   2,000 Units at 03/19/21 1817    furosemide (LASIX) tablet 40 mg  40 mg Oral BID Alondra Hurd MD   40 mg at 03/19/21 1817    sodium chloride flush 0.9 % injection 10 mL  10 mL Intravenous 2 times per day Alondra Hurd MD        sodium chloride flush 0.9 % injection 10 mL  10 mL Intravenous PRN Alondra Hurd MD   10 mL at 03/20/21 0552    potassium chloride (KLOR-CON M) extended release tablet 40 mEq  40 mEq Oral PRN Alondra Hurd MD        Or    potassium bicarb-citric acid (EFFER-K) effervescent tablet 40 mEq  40 mEq Oral PRN Alondra Hurd MD        Or    potassium chloride 10 mEq/100 mL IVPB (Peripheral Line)  10 mEq Intravenous PRN Alondra Hurd MD        magnesium sulfate 2000 mg in 50 mL JOINT REPLACEMENT      KNEE SURGERY      OTHER SURGICAL HISTORY Right 10/29/2018    PATELLA OPEN REDUCTION INTERNAL FIXATION     PACEMAKER INSERTION      PACEMAKER PLACEMENT  2008    [place] [facility] SURGICAL NAVIGATION [address] Right 9/5/2018    RIGHT TOTAL KNEE REPLACEMENT COMPUTER NAVIGATION WITH ADDUCTOR CANAL BLOCK FOR PAIN CONTROL                     [facility] performed [facility] Fly Gibson MD [facility] [address] [facility] OFF[facility]PT [address] Right 10/29/2018    Right knee inferior pole patellectomy, primary repair of patellar tendon. performed by Fly Gibson MD at [address] <100SQCM Right 11/30/2018    RIGHT KNEE GASTROC FLAP WITH SKIN GRAFT, WOUND VAC PLACEMENT performed by Jac Ji MD at [place] [address]         Social History:    Social History     Tobacco Use    Smoking status: Former Smoker    Smokeless tobacco: Never Used    Tobacco comment: only smoked from age [age] yrs old   Substance Use Topics    Alcohol use: No                                Counseling given: Not Answered  Comment: only smoked from age [age] yrs old      Vital Signs (Current):   Vitals:    03/19/21 1941 03/19/21 2022 03/20/21 0011 03/20/21 0407   BP: 104/62 (!) 100/55 104/69 (!) 93/57   Pulse: 104 88 71 65   Resp:  18 16 16   Temp:  99 °F (37.2 °C) 98.5 °F (36.9 °C) 98.8 °F (37.1 °C)   TempSrc:  Axillary Oral Oral   SpO2:  95% 95% 93%   Weight:       Height:                                                  BP Readings from Last 3 Encounters:   03/20/21 (!) 93/57   03/19/21 121/78   02/15/19 (!) 150/80       NPO Status:                                                                                 BMI:   Wt Readings from Last 3 Encounters:   03/19/21 145 lb 4.5 oz (65.9 kg)   02/14/19 111 lb (50.3 kg)   11/29/18 101 lb (45.8 kg)     Body mass index is 25.74 kg/m².     CBC:   Lab Results   Component Value Date    WBC 9.1 03/19/2021    RBC 3.30 03/19/2021    HGB 10.5 03/19/2021    HCT 31.3 03/19/2021    MCV 94.7 03/19/2021    RDW 14.0 03/19/2021     03/19/2021       CMP:   Lab Results   Component Value Date     03/19/2021    K 3.3 03/19/2021    CL 97 03/19/2021    CO2 28 03/19/2021    BUN 9 03/19/2021    CREATININE 0.6 03/19/2021    GFRAA >60 03/19/2021    GFRAA >60 06/09/2012    AGRATIO 0.9 03/19/2021    LABGLOM >60 03/19/2021    GLUCOSE 113 03/19/2021    PROT 6.2 03/19/2021    PROT 6.9 06/09/2012    CALCIUM 8.3 03/19/2021    BILITOT 1.4 03/19/2021    ALKPHOS 141 03/19/2021    AST 18 03/19/2021    ALT 7 03/19/2021       POC Tests: No results for input(s): POCGLU, POCNA, POCK, POCCL, POCBUN, POCHEMO, POCHCT in the last 72 hours. Coags:   Lab Results   Component Value Date    PROTIME 15.9 03/18/2021    INR 1.37 03/18/2021    APTT 27.8 02/13/2019       HCG (If Applicable): No results found for: PREGTESTUR, PREGSERUM, HCG, HCGQUANT     ABGs: No results found for: PHART, PO2ART, VLV9VUM, IEK1JRA, BEART, V5MLSHTE     Type & Screen (If Applicable):  No results found for: LABABO, LABRH    Drug/Infectious Status (If Applicable):  No results found for: HIV, HEPCAB    COVID-19 Screening (If Applicable): No results found for: COVID19        Anesthesia Evaluation   no history of anesthetic complications:   Airway: Mallampati: II  TM distance: <3 FB   Neck ROM: limited  Mouth opening: > = 3 FB Dental:    (+) upper dentures and lower dentures      Pulmonary:                             ROS comment: H/O TOB   Cardiovascular:    (+) hypertension:, pacemaker: pacemaker, CAD:, dysrhythmias (started on amio. Cleared by EP doc to proceed with ORIF): SVT, ventricular tachycardia and atrial fibrillation,                   Neuro/Psych:   (+) psychiatric history:depression/anxiety             GI/Hepatic/Renal:   (+) GERD:,          ROS comment: H/o GI bleed.    Endo/Other:    (+) : arthritis: OA., .                 Abdominal:           Vascular:                                      Anesthesia Plan general     ASA 4     (Pt agrees to risks, benefits and alternatives of GETA. Unable to perform SAB due to elevated coags. Discussed limited resuscitation under my direction. Questions answered. Willing to proceed with plan.)  Induction: intravenous. Anesthetic plan and risks discussed with patient and child/children. Use of blood products discussed with legal guardian, child/children and patient whom did not consent to blood products.                  Cecy Booker MD   3/20/2021

## 2021-03-20 NOTE — PROGRESS NOTES
Comprehensive Nutrition Assessment    Type and Reason for Visit:  Initial, Consult(General nutrition management)    Nutrition Recommendations/Plan:   1. Consider liberalizing cardiac diet restrictions if po intake less than 50%  2. Offer Boost pudding bid  3. Will monitor nutritional adequacy, nutrition-related labs, weights, BMs, and clinical progress     Nutrition Assessment:  Patient admitted with closed fracture of right distal femur status post revision of right knee athroplasty this am.  History of Diet advanced post op to cardiac, no added salt with fluid restriction. Dietitian consult ordered to increase protein with nutrition supplements post ortho surgery. Will provide Boost Pudding twice per day and monitor tolerance. Malnutrition Assessment:  Malnutrition Status: At risk for malnutrition (Comment)      Estimated Daily Nutrient Needs:  Energy (kcal):  7100-2780; Weight Used for Energy Requirements:  Current(65.9 kg)     Protein (g):  85-99; Weight Used for Protein Requirements:  Current(65.9 kg; 1.3-1.5)        Fluid (ml/day):   ; Method Used for Fluid Requirements:  1 ml/kcal      Nutrition Related Findings:  no edema;  no BM Noted yet this admission; upper and lower dentures in place; wears glasses      Wounds:  (right knee incision)       Current Nutrition Therapies:    DIET CARDIAC; No Added Salt (3-4 GM); Daily Fluid Restriction: 2000 ml  Dietary Nutrition Supplements: Other Oral Supplement (see comment)    Anthropometric Measures:  · Height: 5' 3\" (160 cm)  · Current Body Weight: 145 lb 4 oz (65.9 kg)   · Admission Body Weight: 145 lb 4 oz (65.9 kg)    · Ideal Body Weight: 115 lbs; % Ideal Body Weight     · BMI: 25.7  · BMI Categories: Overweight (BMI 25.0-29. 9)       Nutrition Diagnosis:   · Increased nutrient needs related to increase demand for energy/nutrients as evidenced by (ortho surgery and advanced age)    Nutrition Interventions:   Food and/or Nutrient Delivery:  Continue Current Diet, Start Oral Nutrition Supplement  Nutrition Education/Counseling:  No recommendation at this time   Coordination of Nutrition Care:  Continue to monitor while inpatient    Goals:  Patient will eat 50% or greater of meals and supplements.        Nutrition Monitoring and Evaluation:   Behavioral-Environmental Outcomes:      Food/Nutrient Intake Outcomes:  Food and Nutrient Intake, Supplement Intake  Physical Signs/Symptoms Outcomes:  Biochemical Data, Nutrition Focused Physical Findings     Discharge Planning:    Continue Oral Nutrition Supplement, Continue current diet     Electronically signed by Maria Esther Lopez RD, LD on 3/20/21 at 1:57 PM EDT    Contact: Office: [phone]; [address] [place] [address] Road: [id number]

## 2021-03-20 NOTE — CONSULTS
Dr. Bandar Mc  Attending   Consult Note        Reason for Consult:  Right distal femur fracture  Requesting Physician: Adriane Manning MD  Date of Service: 3/20/2021 8:03 AM    CHIEF COMPLAINT:  As Above    History Obtained From:  patient    HISTORY OF PRESENT ILLNESS:                The patient is a 80 y.o. female who presents with above chief complaint. Right distal femur fracture    Past Medical History:        Diagnosis Date    Anemia     Anxiety     Arthritis     [facility]rial [facility] ([facility]     CAD (coronary artery disease)     Chronic GERD     Depression     Diverticulosis     Gall stone     GERD (gastroesophageal reflux disease) 3/19/2021    Glaucoma     Hypercholesteremia     Hypertension     Irregular heart beat     Rectal prolapse     Uterine cancer ([facility] 2008     Past Surgical History:        Procedure Laterality Date    APPENDECTOMY      COLONOSCOPY  2005    polyp    COLONOSCOPY  1010    diverticulosis    COLONOSCOPY  10/4/2014    diverticulosis, radiation colitis sigmoid    COLONOSCOPY N/A 2/15/2019    COLON performed by Osmani Ferrer MD at [place] [address] Right 8/4/15    hip pinning    HYSTERECTOMY  2008    RT in 2009   [address] [facility]      OTHER SURGICAL HISTORY Right 10/29/2018    PATELLA OPEN REDUCTION INTERNAL FIXATION     PACEMAKER INSERTION      PACEMAKER PLACEMENT  2008    MS CPTR-ASST SURGICAL NAVIGATION IMAGE-LESS Right 9/5/2018    RIGHT TOTAL KNEE REPLACEMENT COMPUTER NAVIGATION WITH ADDUCTOR CANAL BLOCK FOR PAIN CONTROL                     SONAL performed by Rey Silva MD at [address] [facility] OFFICE/OUTPT [address] [facility] Right 10/29/2018    Right knee inferior pole patellectomy, primary repair of patellar tendon.  performed by Rey Silva MD at [address] <100SQCM Right 11/30/2018    RIGHT KNEE GASTROC FLAP WITH SKIN GRAFT, WOUND VAC PLACEMENT performed by Amy Moya MD at [address]           Medications Prior to Admission:   Prior to Admission medications    Medication Sig Start Date End Date Taking? Authorizing Provider   diphenhydrAMINE (BENADRYL) 25 MG capsule Take 25 mg by mouth every 6 hours as needed for Itching    Historical Provider, MD   furosemide (LASIX) 40 MG tablet Take 40 mg by mouth 2 times daily    Historical Provider, MD   gabapentin (NEURONTIN) 100 MG capsule Take 100 mg by mouth 3 times daily. Historical Provider, MD   psyllium (KONSYL) 28.3 % PACK Take 1 packet by mouth daily    Historical Provider, MD   nystatin (MYCOSTATIN) POWD powder Apply topically 2 times daily As needed for candidiasis    Historical Provider, MD   POTASSIUM CHLORIDE ER PO Take 10 mEq by mouth    Historical Provider, MD   sertraline (ZOLOFT) 25 MG tablet Take 12.5 mg by mouth daily    Historical Provider, MD   hydrocortisone 1 % cream Apply topically every 6-8 hours as needed As needed for discomfort    Historical Provider, MD   acetaminophen (TYLENOL) 325 MG tablet Take 650 mg by mouth every 6 hours as needed for Pain    Historical Provider, MD   DULoxetine (CYMBALTA) 30 MG extended release capsule Take 30 mg by mouth daily    Historical Provider, MD   Melatonin 5 MG CAPS Take 5 mg by mouth nightly as needed    Historical Provider, MD   omeprazole (PRILOSEC) 20 MG delayed release capsule Take 40 mg by mouth daily     Historical Provider, MD   oxyCODONE-acetaminophen (PERCOCET) 5-325 MG per tablet Take by mouth every 4 hours as needed for Pain (1 tab for moderate pain, 2 tabs for severe pain). .    Historical Provider, MD   loperamide (IMODIUM) 2 MG capsule Take 2 mg by mouth as needed for Diarrhea Two tabs after initial diarrhea episode, 1 tab after each additional episode, NTE    Historical Provider, MD   tamsulosin (FLOMAX) 0.4 MG capsule Take 0.4 mg by mouth daily    Historical Provider, MD   ferrous sulfate 325 (65 Fe) MG tablet Take 1 tablet by mouth three times a week 11/7/18   Little Mclean MD   sennosides-docusate sodium (SENOKOT-S) 8.6-50 MG tablet Take 1 tablet by mouth daily 11/2/18   Di Reeves MD   flecainide (TAMBOCOR) 50 MG tablet Take 50 mg by mouth 2 times daily    Historical Provider, MD   aspirin 81 MG tablet Take 81 mg by mouth daily    Historical Provider, MD   timolol (BETIMOL) 0.5 % ophthalmic solution Place 1 drop into the left eye 2 times daily Dosing times are 09:00 and 15:00. Historical Provider, MD   carvedilol (COREG) 3.125 MG tablet Take 1 tablet by mouth 2 times daily (with meals). 10/7/14   Kenia Mccoy MD   atorvastatin (LIPITOR) 10 MG tablet   Take 10 mg by mouth nightly     Historical Provider, MD   Multiple Vitamins-Minerals (CENTRUM PO) Take 1 tablet by mouth daily. Indications: Centrum Silver 12/24/10   Historical Provider, MD   vitamin D (CHOLECALCIFEROL) 400 UNIT TABS tablet   Take 2,000 Units by mouth daily     Historical Provider, MD   latanoprost (XALATAN) 0.005 % ophthalmic solution   Place 1 drop into both eyes nightly     Historical Provider, MD       Allergies:  Lisinopril, Levofloxacin, Levofloxacin, Vancomycin, Amlodipine, Avelox [moxifloxacin hcl in nacl], Buspirone, Hydrochlorothiazide, Lidocaine, Moxifloxacin, Moxifloxacin hcl in nacl, Mupirocin, Nsaids, Paroxetine, Phenergan [promethazine hcl], Phenothiazines, Promethazine hcl, Sulindac, Tolmetin, and Ciprofloxacin    Social History:    Tobacco:  reports that she has quit smoking. She has never used smokeless tobacco.   Alcohol:  reports no history of alcohol use.    Illicit Drug: No  Family History:       Problem Relation Age of Onset    Mental Retardation Mother     Cancer Father         lung       REVIEW OF SYSTEMS:    CONSTITUTIONAL:  negative  MUSCULOSKELETAL:  positive for  pain  All other systems reviewed and negative    PHYSICAL EXAM:    awake, alert, cooperative, no apparent distress, and appears stated age  MUSCULOSKELETAL:  there is no redness, warmth, or swelling of the joints  full range of motion noted  motor strength is 5 out of 5 all extremities bilaterally  tone is normal  with exception of  RIGHT KNEE:  redness absent  warmth absent  swelling present  tenderness present  incision site clean, dry and intact    DATA:    CBC:   Recent Labs     03/18/21  1810 03/19/21  1656   WBC 7.1 9.1   HGB 11.5* 10.5*    334     BMP:    Recent Labs     03/18/21 1810 03/19/21  1655    134*   K 3.5 3.3*    97*   CO2 27 28   BUN 9 9   CREATININE 0.7 0.6   GLUCOSE 119* 113*     INR:   Recent Labs     03/18/21 1810   INR 1.37*       Radiology:   XR CHEST PORTABLE   Final Result   No acute process. XR KNEE RIGHT (1-2 VIEWS)    (Results Pending)   FLUORO FOR SURGICAL PROCEDURES    (Results Pending)          IMPRESSION/RECOMMENDATIONS:    Assessment: right distal femur fracture    Plan:  1) revision right total knee        Thank you for the opportunity to consult on this patient.     Kaylynn Ramos

## 2021-03-20 NOTE — CONSULTS
Electrophysiology Consultation   Date: 3/19/2021  Admit Date:  3/19/2021  Reason for Consultation: Tachycardia  Consult Requesting Physician: Richard Roe MD     No chief complaint on file. HPI:   Mrs. Ehsan Fraire is a pleasantly demented 80year old female with a medical history significant for paroxysmal atrial fibrillation, ?coronary artery disease?, sick sinus syndrome status post DC pacemaker (Medtronic), GERD, and GI bleeding who presents from home after a fall with a right distal femur fracture. Patient unable to give good history however states that she was in her usual state of health until she woke suddenly and tried to get to her chair however fell and her her leg. Patient denies fevers, chest pain, orthopnea, PND, lower extremity edema, abdominal swelling, shortness of breath, dyspnea on exertion, chills, visual changes, headaches, sore throat, cough, abdominal pain, nausea, vomiting, bleeding, bruising, dysuria, muscle/joint pain, confusion, depression, anxiety, skin lesions, etc.    Emergency Room/Hospital Course:  Patient was evaluated in the ER at [facility] yesterday. She underwent a leg radiograph which showed acute displaced fracture through her distal femur just above level of prior knee prosthesis. She was transferred to [facility] for orthopedic intervention. During her stay patient was found to have several salvos of wide complex tachycardia. Electrophysiology was consulted to evaluate patient given wide complex tachycardia noted on telemetry and her device.     Past Medical History:   Diagnosis Date    Anemia     Anxiety     Arthritis     Atrial fibrillation ([address]     CAD (coronary artery disease)     Chronic GERD     Depression     Diverticulosis     Gall stone     GERD (gastroesophageal reflux disease) 3/19/2021    Glaucoma     Hypercholesteremia     Hypertension     Irregular heart beat     Rectal prolapse     Uterine cancer ([address] 2008        Past Surgical History:   Procedure Laterality Date    APPENDECTOMY      COLONOSCOPY  2005    polyp    COLONOSCOPY  1010    diverticulosis    COLONOSCOPY  10/4/2014    diverticulosis, radiation colitis sigmoid    COLONOSCOPY N/A 2/15/2019    COLON performed by Osmani Ferrer MD at [place] [address] Right 8/4/15    hip pinning    HYSTERECTOMY  2008    RT in 2009   [address] [facility] [address]      OTHER SURGICAL HISTORY Right 10/29/2018    PATELLA OPEN REDUCTION INTERNAL FIXATION     PACEMAKER INSERTION      PACEMAKER PLACEMENT  2008    [place] CPTR-ASST SURGICAL NAVIGATION IMAGE-LESS Right 9/5/2018    RIGHT TOTAL KNEE REPLACEMENT COMPUTER NAVIGATION WITH ADDUCTOR CANAL BLOCK FOR PAIN CONTROL                     SONAL performed by Rey Silva MD at [address] OFFICE/OUTPT [address] [facility] [address] Right 10/29/2018    Right knee inferior pole patellectomy, primary repair of patellar tendon. performed by Rey Silva MD at [address] <100SQCM Right 11/30/2018    RIGHT KNEE GASTROC FLAP WITH SKIN GRAFT, WOUND VAC PLACEMENT performed by Sharda Purcell MD at [place] [address].         Allergies   Allergen Reactions    Lisinopril Swelling     angioedema  angioedema    Levofloxacin     Levofloxacin Other (See Comments)     unsure    Vancomycin Rash     Rash and hives. CARLY'S SYNDROME    Amlodipine     Avelox [Moxifloxacin Hcl In Nacl]     Buspirone Itching    Hydrochlorothiazide Swelling    Lidocaine Other (See Comments)     Not sure    Moxifloxacin     Moxifloxacin Hcl In Nacl Other (See Comments)     Not sure    Mupirocin     Nsaids      bleeding    Paroxetine Other (See Comments)    Phenergan [Promethazine Hcl] Swelling    Phenothiazines     Promethazine Hcl Swelling    Sulindac      Gi bleed    Tolmetin      bleeding    Ciprofloxacin Rash and Swelling       Social History:  Reviewed.   reports that she has quit smoking. She has never used smokeless tobacco. She reports that she does not drink alcohol or use drugs. Family History:  Reviewed. family history includes Cancer in her father; Mental Retardation in her mother. No premature CAD. Review of System:  All other systems reviewed except for that noted above. Pertinent negatives and positives are:     · General: negative for fever, chills   · Ophthalmic ROS: negative for - eye pain or loss of vision  · ENT ROS: negative for - headaches, sore throat   · Respiratory: negative for - cough, sputum  · Cardiovascular: Reviewed in HPI  · Gastrointestinal: negative for - abdominal pain, diarrhea, N/V  · Hematology: negative for - bleeding, blood clots, bruising or jaundice  · Genito-Urinary:  negative for - Dysuria or incontinence  · Musculoskeletal: As per above. · Neurological: negative for - confusion, dizziness, headaches   · Psychiatric: No anxiety, no depression. · Dermatological: negative for - rash    Physical Examination:  Vitals:    21   BP: (!) 100/55   Pulse: 88   Resp: 18   Temp: 99 °F (37.2 °C)   SpO2: 95%      No intake or output data in the 24 hours ending 21  No intake/output data recorded. Wt Readings from Last 3 Encounters:   21 145 lb 4.5 oz (65.9 kg)   19 111 lb (50.3 kg)   18 101 lb (45.8 kg)     Temp  Av °F (37.2 °C)  Min: 99 °F (37.2 °C)  Max: 99 °F (37.2 °C)  Pulse  Av.2  Min: 60  Max: 172  BP  Min: 92/68  Max: 125/108  SpO2  Av.7 %  Min: 91 %  Max: 100 %    · Telemetry: Atrial fibrillation with RV pacing. Frequent PVCs. · Constitutional: Alert. Oriented to person but not place or time. No distress. · Head: Normocephalic and atraumatic. · Mouth/Throat: Lips appear moist. Oropharynx is clear and moist.  · Eyes: Conjunctivae normal. EOM are normal.   · Neck: Neck supple. No lymphadenopathy. No rigidity.    · Cardiovascular: Regular rate and rhythm without murmurs, There is no mitral  regurgitation noted. There is no mitral valve stenosis. Aortic Valve: The aortic valve is normal in structure and function. No aortic regurgitation is present. No hemodynamically significant  valvular aortic stenosis. Aortic Root: The aortic root is normal size. Tricuspid Valve: The tricuspid valve leaflets are normal in  appearance. Mild   - moderate tricuspid regurgitation is present. There  is no tricuspid stenosis. The right ventricular systolic pressure is  96.5 mmHg. The estimated right ventricular systolic pressure is  consistent with mild pulmonary hypertension. Diastolic Function: The diastolic function is impaired and classified  as Grade 1 (impaired relaxation). Pulmonic Valve/Pulmonary Artery: The pulmonic valve is not well  visualized. Pericardium: There is no pericardial effusion. ECG/Heart Rate: Normal sinus rhythm, with a rate of 85 beats per  minute. Stress Test: 2018  Interpetation Summary:  The LV EF is 84%  Normal global and regional wall motion in all territories. There is a small sized, fixed defect in the apical wall. This defect is consistent with soft tissue attenuation. I independently reviewed the ECG and telemetry.     Scheduled Meds:   atorvastatin  10 mg Oral Nightly    carvedilol  3.125 mg Oral BID WC    DULoxetine  30 mg Oral Daily    ferrous sulfate  325 mg Oral Once per day on Mon Wed Fri    [Held by provider] flecainide  50 mg Oral BID    gabapentin  100 mg Oral TID    latanoprost  1 drop Both Eyes Nightly    therapeutic multivitamin-minerals  1 tablet Oral Daily    [START ON 3/20/2021] pantoprazole  40 mg Oral QAM AC    sennosides-docusate sodium  1 tablet Oral Daily    psyllium  1 packet Oral Daily    tamsulosin  0.4 mg Oral Daily    timolol  1 drop Left Eye BID    vitamin D  2,000 Units Oral Daily    furosemide  40 mg Oral BID    sodium chloride flush  10 mL Intravenous 2 times per day    heparin (porcine)  5,000 Units Subcutaneous BID    [START ON 3/20/2021] ceFAZolin  2,000 mg Intravenous Once    mupirocin   Each Nostril BID    potassium chloride  10 mEq Intravenous Once     Continuous Infusions:   [START ON 3/20/2021] sodium chloride      sodium chloride      sodium chloride 75 mL/hr at 03/19/21 1943    amiodarone 450mg/250ml D5W infusion       PRN Meds:.melatonin, sodium chloride flush, potassium chloride **OR** potassium alternative oral replacement **OR** potassium chloride, magnesium sulfate, senna, acetaminophen **OR** acetaminophen, acetaminophen, oxyCODONE, morphine     Assessment:   Patient Active Problem List    Diagnosis Date Noted    CAD (coronary artery disease) 03/19/2021    Closed fracture of right distal femur ([address] 03/19/2021    Hypoalbuminemia 03/19/2021    GERD (gastroesophageal reflux disease) 03/19/2021    Wide-complex tachycardia ([address] 03/19/2021    Hyponatremia 02/14/2019    H/O skin graft     GI bleeding 02/13/2019    Open knee wound, right, sequela 11/29/2018    Unstable gait 11/09/2018    Anemia     Severe protein-calorie malnutrition ([address] 10/30/2018    Displaced transverse fracture of right patella, initial encounter for closed fracture     Hyperlipidemia     Displaced comminuted fracture of right patella, initial encounter for closed fracture 10/25/2018    Paroxysmal atrial fibrillation ([address] 09/06/2018    Pacemaker 09/06/2018    Pain due to total right knee replacement ([address] 09/05/2018    Chronic knee pain after total replacement of right knee joint 09/05/2018    Status post total right knee replacement 09/05/2018    Primary osteoarthritis of right knee 08/15/2018    Hip fracture ([address] RIGHT 08/04/2015    Hypertension     Hypercholesteremia     Osteoarthritis of hip 07/08/2015    Rectal bleed 10/04/2014    Localized osteoarthrosis, lower leg 02/17/2014      Active Hospital Problems    Diagnosis Date Noted    CAD (coronary artery disease) [I25.10] 03/19/2021  Closed fracture of right distal femur ([facility] [S72.401A] 03/19/2021    Hypoalbuminemia [E88.09] 03/19/2021    GERD (gastroesophageal reflux disease) [K21.9] 03/19/2021    Wide-complex tachycardia (HCC) [I47.2] 03/19/2021    Paroxysmal atrial fibrillation (HCC) [I48.0] 09/06/2018    Pacemaker [Z95.0] 09/06/2018    Hypertension [I10]        Mrs. Georgia Aquino is a pleasantly demented 80year old female with a medical history significant for paroxysmal atrial fibrillation, ?coronary artery disease?, sick sinus syndrome status post DC pacemaker (Medtronic), GERD, and GI bleeding who presents from home after a fall with a right distal femur fracture. Problem List:  1. Non-sustained ventricular tachycardia. 2. Supraventricular tachycardia. 3. Paroxysmal atrial fibrillation. 4. Right femur fracture. 5. Sick sinus syndrome status post DC pacemaker (Medtronic). Assessment and Plan:  1. Non-sustained ventricular tachycardia. Patient is a pleasant 57-year-old demented female with a medical history significant for paroxysmal atrial fibrillation, hypertension, anxiety, sick sinus syndrome status post dual-chamber pacemaker, and history of GI bleeding who presents from her nursing facility after a fall that resulted in a right femur fracture. During hospitalization patient was found to have salvos wide-complex tachycardia. Device interrogation today shows PSVT and nonsustained ventricular tachycardia. Her stress test from 2018 was reassuring. She is currently on flecainide.  - Agree with stopping flecainide.  - Continue on amiodarone drip. Post surgery we would recommend starting patient on oral amiodarone 400 mg TID for 4 days, then 400 mg BID for 4 days, then 400 mg for 4 days and then 200 mg daily after to load to ~10 g.  - Continue carvedilol. Increase as tolerated. 2. Supraventricular tachycardia. Patient with history of paroxysmal atrial fibrillation.   Her device shows PSVT with longest duration approximately 58 minutes. No atrial fibrillation lasting longer than 5 minutes. - Plan as per above. 3. Paroxysmal atrial fibrillation. Patient with a history of atrial fibrillation. Her [id number] score is 4 for age, sex, and hypertension. Dr. Leandra Camacho note doesn't address anticoagulation however suspect that she isn't on anticoagulation because of history of GI bleeding, dementia and falls. Her risk for catastrophic event while on anticoagulation outweighs her risk for stroke as her HASBLED score is 3 however I do not believe this adequately reflects her risk. Her duration of atrial fibrillation is less than 5 minutes which is also reassuring from stroke standpoint.    - Plan as per above. - No anticoagulation. 4. Right femur fracture. Patient with right femoral fracture that requires intervention. She is at low to intermediate risk for MACE during an intermediate risk procedure. No cardiac indication not to pursue surgical intervention for her right femur fracture. - From cardiac standpoint, no indication for further testing prior to femoral repair. 5. Sick sinus syndrome status post DC pacemaker (Medtronic). Patient with a history of SSS. Her device has reached CHLOE and has converted to VVI. She requires a generator change out as she is paced atrially 13% of the time and ventricularly 87% (increased since switch to VVI). She will need device change out but this may be arranged as an outpatient. This may be done by our team or with her primary cardiologist and their team.  - Outpatient device change out. May be done after she has had some time to heal from her right femur repair. We will arrange after discussion with her family as to when and where her device should be changed out. Thank you for allowing me to participate in the care of Mario Stroud . If you have any questions/comments, please do not hesitate to contact us.     Alvaro Carrillo MD  Cardiac Electrophysiology  [address] [facility] [address] [facility] [place]  [phone] [address] [facility]

## 2021-03-20 NOTE — PROGRESS NOTES
Patient denies pain or nausea. VSS. Chronic dry cough, taking ice chips. Ready for discharge to floor. Waiting for floor nurse for report.

## 2021-03-20 NOTE — ANESTHESIA POSTPROCEDURE EVALUATION
Department of Anesthesiology  Postprocedure Note    Patient: Doreen Ma  MRN: [id number]  YOB: 1932  Date of evaluation: 3/20/2021  Time:  11:14 AM     Procedure Summary     Date: 03/20/21 Room / Location: [facility]    Anesthesia Start: Jordy Alcala Anesthesia Stop: [id number]    Procedure: REVISION RIGHT TOTAL KNEE ARTHROPLASTY       SONAL (Right Knee) Diagnosis: (-)    Surgeons: Arslan Alejandre MD Responsible Provider: Sunni Boone MD    Anesthesia Type: general ASA Status: 4          Anesthesia Type: general    Fay Phase I: Fay Score: 8    Fay Phase II:      Last vitals: Reviewed and per EMR flowsheets. Anesthesia Post Evaluation    Patient location during evaluation: PACU  Patient participation: complete - patient participated  Level of consciousness: awake and alert  Airway patency: patent  Nausea & Vomiting: no nausea and no vomiting  Complications: no  Cardiovascular status: blood pressure returned to baseline  Respiratory status: acceptable  Hydration status: euvolemic  Comments: VSS on transfer to phase 2 recovery. No anesthetic complications.

## 2021-03-21 LAB
ANION GAP SERPL CALCULATED.3IONS-SCNC: 9 MMOL/L (ref 3–16)
BUN BLDV-MCNC: 10 MG/DL (ref 7–20)
CALCIUM SERPL-MCNC: 8.2 MG/DL (ref 8.3–10.6)
CHLORIDE BLD-SCNC: 102 MMOL/L (ref 99–110)
CO2: 25 MMOL/L (ref 21–32)
CREAT SERPL-MCNC: 0.7 MG/DL (ref 0.6–1.2)
GFR AFRICAN AMERICAN: >60
GFR NON-AFRICAN AMERICAN: >60
GLUCOSE BLD-MCNC: 118 MG/DL (ref 70–99)
HCT VFR BLD CALC: 26.3 % (ref 36–48)
HEMOGLOBIN: 8.7 G/DL (ref 12–16)
MCH RBC QN AUTO: 31.7 PG (ref 26–34)
MCHC RBC AUTO-ENTMCNC: 33.2 G/DL (ref 31–36)
MCV RBC AUTO: 95.4 FL (ref 80–100)
PDW BLD-RTO: 14.4 % (ref 12.4–15.4)
PLATELET # BLD: 277 K/UL (ref 135–450)
PMV BLD AUTO: 8.5 FL (ref 5–10.5)
POTASSIUM SERPL-SCNC: 3.5 MMOL/L (ref 3.5–5.1)
RBC # BLD: 2.76 M/UL (ref 4–5.2)
SODIUM BLD-SCNC: 136 MMOL/L (ref 136–145)
WBC # BLD: 10.5 K/UL (ref 4–11)

## 2021-03-21 PROCEDURE — 85027 COMPLETE CBC AUTOMATED: CPT

## 2021-03-21 PROCEDURE — 6360000002 HC RX W HCPCS: Performed by: HOSPITALIST

## 2021-03-21 PROCEDURE — 6370000000 HC RX 637 (ALT 250 FOR IP): Performed by: HOSPITALIST

## 2021-03-21 PROCEDURE — 99232 SBSQ HOSP IP/OBS MODERATE 35: CPT | Performed by: NURSE PRACTITIONER

## 2021-03-21 PROCEDURE — 2580000003 HC RX 258: Performed by: HOSPITALIST

## 2021-03-21 PROCEDURE — 2060000000 HC ICU INTERMEDIATE R&B

## 2021-03-21 PROCEDURE — 6370000000 HC RX 637 (ALT 250 FOR IP): Performed by: NURSE PRACTITIONER

## 2021-03-21 PROCEDURE — 36415 COLL VENOUS BLD VENIPUNCTURE: CPT

## 2021-03-21 PROCEDURE — 80048 BASIC METABOLIC PNL TOTAL CA: CPT

## 2021-03-21 PROCEDURE — 6370000000 HC RX 637 (ALT 250 FOR IP): Performed by: INTERNAL MEDICINE

## 2021-03-21 PROCEDURE — 94761 N-INVAS EAR/PLS OXIMETRY MLT: CPT

## 2021-03-21 PROCEDURE — 2700000000 HC OXYGEN THERAPY PER DAY

## 2021-03-21 RX ORDER — POTASSIUM CHLORIDE 20 MEQ/1
40 TABLET, EXTENDED RELEASE ORAL ONCE
Status: COMPLETED | OUTPATIENT
Start: 2021-03-21 | End: 2021-03-21

## 2021-03-21 RX ADMIN — HEPARIN SODIUM 5000 UNITS: 5000 INJECTION INTRAVENOUS; SUBCUTANEOUS at 10:30

## 2021-03-21 RX ADMIN — IRON SUCROSE 100 MG: 20 INJECTION, SOLUTION INTRAVENOUS at 12:31

## 2021-03-21 RX ADMIN — GABAPENTIN 100 MG: 100 CAPSULE ORAL at 15:11

## 2021-03-21 RX ADMIN — Medication 1 TABLET: at 10:28

## 2021-03-21 RX ADMIN — ATORVASTATIN CALCIUM 10 MG: 10 TABLET, FILM COATED ORAL at 20:22

## 2021-03-21 RX ADMIN — DOCUSATE SODIUM 50MG AND SENNOSIDES 8.6MG 1 TABLET: 8.6; 5 TABLET, FILM COATED ORAL at 10:37

## 2021-03-21 RX ADMIN — HEPARIN SODIUM 5000 UNITS: 5000 INJECTION INTRAVENOUS; SUBCUTANEOUS at 20:22

## 2021-03-21 RX ADMIN — AMIODARONE HYDROCHLORIDE 400 MG: 200 TABLET ORAL at 20:22

## 2021-03-21 RX ADMIN — FUROSEMIDE 40 MG: 40 TABLET ORAL at 17:47

## 2021-03-21 RX ADMIN — AMIODARONE HYDROCHLORIDE 400 MG: 200 TABLET ORAL at 15:14

## 2021-03-21 RX ADMIN — POTASSIUM CHLORIDE 40 MEQ: 1500 TABLET, EXTENDED RELEASE ORAL at 12:31

## 2021-03-21 RX ADMIN — DULOXETINE HYDROCHLORIDE 30 MG: 30 CAPSULE, DELAYED RELEASE ORAL at 10:28

## 2021-03-21 RX ADMIN — Medication 10 ML: at 20:21

## 2021-03-21 RX ADMIN — TIMOLOL MALEATE 1 DROP: 5 SOLUTION OPHTHALMIC at 20:35

## 2021-03-21 RX ADMIN — OXYCODONE 5 MG: 5 TABLET ORAL at 11:00

## 2021-03-21 RX ADMIN — TAMSULOSIN HYDROCHLORIDE 0.4 MG: 0.4 CAPSULE ORAL at 10:28

## 2021-03-21 RX ADMIN — GABAPENTIN 100 MG: 100 CAPSULE ORAL at 10:28

## 2021-03-21 RX ADMIN — TIMOLOL MALEATE 1 DROP: 5 SOLUTION OPHTHALMIC at 10:30

## 2021-03-21 RX ADMIN — AMIODARONE HYDROCHLORIDE 400 MG: 200 TABLET ORAL at 10:37

## 2021-03-21 RX ADMIN — GABAPENTIN 100 MG: 100 CAPSULE ORAL at 20:22

## 2021-03-21 RX ADMIN — CARVEDILOL 3.12 MG: 3.12 TABLET, FILM COATED ORAL at 10:28

## 2021-03-21 RX ADMIN — Medication 10 ML: at 10:58

## 2021-03-21 RX ADMIN — Medication 2000 UNITS: at 10:28

## 2021-03-21 RX ADMIN — FUROSEMIDE 40 MG: 40 TABLET ORAL at 10:29

## 2021-03-21 RX ADMIN — LATANOPROST 1 DROP: 50 SOLUTION OPHTHALMIC at 20:35

## 2021-03-21 ASSESSMENT — PAIN SCALES - GENERAL
PAINLEVEL_OUTOF10: 0

## 2021-03-21 NOTE — PLAN OF CARE
Problem: Pain:  Goal: Control of acute pain  Description: Control of acute pain  Outcome: Ongoing    Pt to maintain control of acute pain. Pt has leg in immobilizer. Pt resting comfortably in bed asleep.   Pt has prn pain medication that is given as needed

## 2021-03-21 NOTE — PROGRESS NOTES
Hospitalist Progress Note      PCP: Maykel Anguiano MD    Date of Admission: 3/19/2021    Chief Complaint: Fall and right leg injury    Hospital Course: This is a 60-year-old female initially presented to [facility] ED on 3/18/2021 evaluated for mechanical fall with resultant right leg injury upon arrival to the emergency room imaging show acute displaced fracture of right distal femur orthopedic consulted patient was transferred to [facility] for procedure on 3/20/2021. Status post ORIF on 3/20/2021    Subjective: Patient is lying in the bed trying to eat breakfast stated her gums are swollen unable to eat solid food would like to have soft food, denies any chest pain or shortness of breath no nausea vomiting pain is controlled.       Medications:  Reviewed    Infusion Medications    sodium chloride Stopped (03/19/21 2303)     Scheduled Medications    amiodarone  400 mg Oral TID    atorvastatin  10 mg Oral Nightly    carvedilol  3.125 mg Oral BID WC    DULoxetine  30 mg Oral Daily    ferrous sulfate  325 mg Oral Once per day on Mon Wed Fri    [Held by provider] flecainide  50 mg Oral BID    gabapentin  100 mg Oral TID    latanoprost  1 drop Both Eyes Nightly    therapeutic multivitamin-minerals  1 tablet Oral Daily    pantoprazole  40 mg Oral QAM AC    sennosides-docusate sodium  1 tablet Oral Daily    psyllium  1 packet Oral Daily    tamsulosin  0.4 mg Oral Daily    timolol  1 drop Left Eye BID    vitamin D  2,000 Units Oral Daily    furosemide  40 mg Oral BID    sodium chloride flush  10 mL Intravenous 2 times per day    heparin (porcine)  5,000 Units Subcutaneous BID     PRN Meds: melatonin, sodium chloride flush, potassium chloride **OR** potassium alternative oral replacement **OR** potassium chloride, magnesium sulfate, senna, acetaminophen **OR** acetaminophen, acetaminophen, oxyCODONE, morphine      Intake/Output Summary (Last 24 hours) at 3/21/2021 7890  Last data filed at 3/20/2021 2010  Gross per 24 hour   Intake 0 ml   Output 550 ml   Net -550 ml       Physical Exam Performed:    BP (!) 101/58   Pulse 65   Temp 99 °F (37.2 °C) (Axillary)   Resp 16   Ht 5' 3\" (1.6 m)   Wt 132 lb 4.4 oz (60 kg)   SpO2 98%   BMI 23.43 kg/m²     General appearance: No apparent distress, appears stated age and cooperative. HEENT: Pupils equal, round, and reactive to light. Conjunctivae/corneas clear. Neck: Supple, with full range of motion. No jugular venous distention. Trachea midline. Respiratory:  Normal respiratory effort. Clear to auscultation, bilaterally without Rales/Wheezes/Rhonchi. Cardiovascular: Regular rate and rhythm with normal S1/S2 without murmurs, rubs or gallops. Abdomen: Soft, non-tender, non-distended with normal bowel sounds. Musculoskeletal: No clubbing, cyanosis or edema bilaterally. Full range of motion without deformity. Skin: Skin color, texture, turgor normal.  No rashes or lesions. Neurologic:  Neurovascularly intact without any focal sensory/motor deficits.  Cranial nerves: II-XII intact, grossly non-focal.  Psychiatric: Alert and oriented, thought content appropriate, normal insight  Capillary Refill: Brisk,< 3 seconds   Peripheral Pulses: +2 palpable, equal bilaterally       Labs:   Recent Labs     03/18/21 1810 03/19/21  1656 03/21/21  0621   WBC 7.1 9.1 10.5   HGB 11.5* 10.5* 8.7*   HCT 34.4* 31.3* 26.3*    334 277     Recent Labs     03/18/21 1810 03/19/21  1655 03/21/21  0621    134* 136   K 3.5 3.3* 3.5    97* 102   CO2 27 28 25   BUN 9 9 10   CREATININE 0.7 0.6 0.7   CALCIUM 8.5 8.3 8.2*     Recent Labs     03/18/21 1810 03/19/21  1655   AST 17 18   ALT <5* 7*   BILITOT 0.7 1.4*   ALKPHOS 145* 141*     Recent Labs     03/18/21 1810   INR 1.37*     Recent Labs     03/18/21  1810 03/19/21  1926   TROPONINI <0.01 0.02*       Urinalysis:      Lab Results   Component Value Date    NITRU Negative 11/10/2018    WBCUA 10-20 11/10/2018    BACTERIA 2+ 11/10/2018    RBCUA 0-2 11/10/2018    BLOODU Negative 11/10/2018    SPECGRAV 1.010 11/10/2018    GLUCOSEU Negative 11/10/2018    GLUCOSEU Neg 04/25/2010       Radiology:  XR KNEE RIGHT (1-2 VIEWS)   Final Result   Intraoperative view of right knee revision arthroplasty         XR CHEST PORTABLE   Final Result   No acute process. FLUORO FOR SURGICAL PROCEDURES    (Results Pending)           Assessment/Plan:    Active Hospital Problems    Diagnosis    CAD (coronary artery disease) [I25.10]    Closed fracture of right distal femur ([address] [S72.401A]    Hypoalbuminemia [E88.09]    GERD (gastroesophageal reflux disease) [K21.9]    Wide-complex tachycardia (HCC) [I47.2]    Paroxysmal atrial fibrillation ([address] [I48.0]    Pacemaker [Z95.0]    Hypertension [I10]     1. This is a 51-year-old female status post mechanical fall with right femur fracture status post repair on 3/20/21 , postop day #1 postop care per orthopedic. 2.  Rapid response on 3/19/21 for wide-complex tachycardia amiodarone bolus x2 resulted in termination of arrhythmia and  cardiology was consulted recommended discontinuation of amiodarone drip started on  p.o. amiodarone per EP cardiology recommendation-  oral amiodarone 400 mg TID for 4 days, then 400 mg BID for 4 days, then 400 mg for 4 days and then 200 mg daily after to load to ~10 g  COreg 3.125mg BID with hold parameters   No anticoagulation due to GI bleed, falls, dementia   3. History of paroxysmal atrial fibrillation diastolic CHF currently flecainide on hold per cardiology recommendation continue with amiodarone as recommended and Coreg with holding parameters no anticoagulation due to history of GI bleed fall, fall, dementia. 4.  GERD continue with the PPI. 5.  Hypokalemia supplemented the p.o. potassium. 6.  Acute blood loss anemia start her on IV Venofer. 7.  Change diet to dental soft, add Ensure.     DVT Prophylaxis: Per orthopedic  Diet: DIET CARDIAC; No Added Salt (3-4 GM);  Daily Fluid Restriction: 2000 ml  Dietary Nutrition Supplements: Other Oral Supplement (see comment)  Code Status: DNR-CCA    PT/OT Eval Status: Per orthopedic    Dispo - 1-2 days    Yamile Naik MD

## 2021-03-21 NOTE — PROGRESS NOTES
Department of Orthopedic Surgery     Progress Note    Subjective:       Systemic or Specific Complaints:No Complaints    Objective:     Patient Vitals for the past 24 hrs:   BP Temp Temp src Pulse Resp SpO2 Weight   03/21/21 1224 (!) 88/50 99.4 °F (37.4 °C) Oral 65 16 97 % --   03/21/21 0805 (!) 101/58 99 °F (37.2 °C) Axillary 65 16 98 % --   03/21/21 0453 123/63 97.8 °F (36.6 °C) Axillary 68 16 94 % 132 lb 4.4 oz (60 kg)   03/20/21 2346 108/60 98.4 °F (36.9 °C) Axillary 63 16 94 % --   03/20/21 1952 95/65 97.9 °F (36.6 °C) Axillary 65 16 92 % --   03/20/21 1816 (!) 90/54 99.4 °F (37.4 °C) Oral 66 16 95 % --       General: alert, appears stated age and cooperative   Wound: Wound clean and dry no evidence of infection.    Motion: Painful range of Motion in affected extremity   DVT Exam: No evidence of DVT seen on physical exam.         Data Review  CBC:   Lab Results   Component Value Date    WBC 10.5 03/21/2021    RBC 2.76 03/21/2021    HGB 8.7 03/21/2021    HCT 26.3 03/21/2021     03/21/2021       Renal:   Lab Results   Component Value Date     03/21/2021    K 3.5 03/21/2021    K 3.3 03/19/2021     03/21/2021    CO2 25 03/21/2021    BUN 10 03/21/2021    CREATININE 0.7 03/21/2021    GLUCOSE 118 03/21/2021    CALCIUM 8.2 03/21/2021            Assessment:      POD # right total knee revision     Plan:      1:  Doing well   2:  Continue Deep venous thrombosis prophylaxis  3:  Continue Pain Control  4:  Physical Therapy and Occupational Therapy    Maria Brown

## 2021-03-21 NOTE — PROGRESS NOTES
[address]   Daily Progress Note    Admit Date:  3/19/2021  HPI:  No chief complaint on file. Interval history: Janessa Reyes is being followed for tachycardia. Seen by EP 3/19/21    Subjective:  Ms. Ford Fernandez cooperative this am. Awake. Denies any shortness of breath or chest pain.      Objective:   BP (!) 101/58   Pulse 65   Temp 99 °F (37.2 °C) (Axillary)   Resp 16   Ht 5' 3\" (1.6 m)   Wt 132 lb 4.4 oz (60 kg)   SpO2 98%   BMI 23.43 kg/m²       Intake/Output Summary (Last 24 hours) at 3/21/2021 1001  Last data filed at 3/20/2021 2010  Gross per 24 hour   Intake 0 ml   Output 450 ml   Net -450 ml       NYHA: III    Physical Exam:  General:  Awake, alert, NAD; in bed, oriented to self   Skin:  Warm and dry  Chest:  clear  throughout to auscultation, no wheezes/rhonchi/rales  Telemetry: SR rate 60's, paced   Cardiovascular:  RRR S1S2, no m/r/g   Abdomen:  Soft, nontender, +bowel sounds  Extremities:  no lower extremity edema, right leg wrapped     Medications:    amiodarone  400 mg Oral TID    atorvastatin  10 mg Oral Nightly    carvedilol  3.125 mg Oral BID WC    DULoxetine  30 mg Oral Daily    ferrous sulfate  325 mg Oral Once per day on Mon Wed Fri    [Held by provider] flecainide  50 mg Oral BID    gabapentin  100 mg Oral TID    latanoprost  1 drop Both Eyes Nightly    therapeutic multivitamin-minerals  1 tablet Oral Daily    pantoprazole  40 mg Oral QAM AC    sennosides-docusate sodium  1 tablet Oral Daily    psyllium  1 packet Oral Daily    tamsulosin  0.4 mg Oral Daily    timolol  1 drop Left Eye BID    vitamin D  2,000 Units Oral Daily    furosemide  40 mg Oral BID    sodium chloride flush  10 mL Intravenous 2 times per day    heparin (porcine)  5,000 Units Subcutaneous BID      sodium chloride Stopped (03/19/21 2303)       Lab Data:  CBC:   Recent Labs     03/18/21  1810 03/19/21  1656 03/21/21  0621   WBC 7.1 9.1 10.5   HGB 11.5* 10.5* 8.7*    334 451 BMP:    Recent Labs     03/18/21  1810 03/19/21  1655 03/21/21  0621    134* 136   K 3.5 3.3* 3.5   CO2 27 28 25   BUN 9 9 10   CREATININE 0.7 0.6 0.7     INR:    Recent Labs     03/18/21  1810   INR 1.37*     BNP:    Recent Labs     03/19/21  1926   PROBNP 2,909*     No results found for: LVEF, LVEFMODE    Testing:      Principal Problem:    Closed fracture of right distal femur (HCC)  Active Problems:    Hypertension    Paroxysmal atrial fibrillation (HCC)    Pacemaker    CAD (coronary artery disease)    Hypoalbuminemia    GERD (gastroesophageal reflux disease)    Wide-complex tachycardia (HCC)  Resolved Problems:    * No resolved hospital problems.  *      Assessment:  NSVT, SVT, PAF  Right femur fracture, s/p ORIF 3/20/21  SSS s/p pacer- device at [facility]; needs outpatient gen change after recovers from fem fracture    Plan:  HR remains controlled   Oral amiodarone per EP recommendations: oral amiodarone 400 mg TID for 4 days, then 400 mg BID for 4 days, then 400 mg for 4 days and then 200 mg daily after to load to ~10 g  COreg 3.125mg BID with hold parameters   No anticoagulation due to GI bleed, falls, dementia       Janet Messina CNP, 3/21/2021, 10:46 AM

## 2021-03-22 ENCOUNTER — APPOINTMENT (OUTPATIENT)
Dept: GENERAL RADIOLOGY | Age: 86
DRG: 466 | End: 2021-03-22
Attending: HOSPITALIST
Payer: MEDICARE

## 2021-03-22 LAB
ANION GAP SERPL CALCULATED.3IONS-SCNC: 8 MMOL/L (ref 3–16)
BUN BLDV-MCNC: 14 MG/DL (ref 7–20)
CALCIUM SERPL-MCNC: 8.5 MG/DL (ref 8.3–10.6)
CHLORIDE BLD-SCNC: 95 MMOL/L (ref 99–110)
CO2: 25 MMOL/L (ref 21–32)
CREAT SERPL-MCNC: 0.6 MG/DL (ref 0.6–1.2)
GFR AFRICAN AMERICAN: >60
GFR NON-AFRICAN AMERICAN: >60
GLUCOSE BLD-MCNC: 109 MG/DL (ref 70–99)
HCT VFR BLD CALC: 26.5 % (ref 36–48)
HEMOGLOBIN: 8.7 G/DL (ref 12–16)
MCH RBC QN AUTO: 31.7 PG (ref 26–34)
MCHC RBC AUTO-ENTMCNC: 32.9 G/DL (ref 31–36)
MCV RBC AUTO: 96.4 FL (ref 80–100)
PDW BLD-RTO: 14.1 % (ref 12.4–15.4)
PLATELET # BLD: 259 K/UL (ref 135–450)
PMV BLD AUTO: 8.4 FL (ref 5–10.5)
POTASSIUM SERPL-SCNC: 4.3 MMOL/L (ref 3.5–5.1)
RBC # BLD: 2.75 M/UL (ref 4–5.2)
SODIUM BLD-SCNC: 128 MMOL/L (ref 136–145)
T4 FREE: 1 NG/DL (ref 0.9–1.8)
TSH SERPL DL<=0.05 MIU/L-ACNC: 1.05 UIU/ML (ref 0.27–4.2)
WBC # BLD: 9.7 K/UL (ref 4–11)

## 2021-03-22 PROCEDURE — 2700000000 HC OXYGEN THERAPY PER DAY

## 2021-03-22 PROCEDURE — 85027 COMPLETE CBC AUTOMATED: CPT

## 2021-03-22 PROCEDURE — 97535 SELF CARE MNGMENT TRAINING: CPT

## 2021-03-22 PROCEDURE — 2580000003 HC RX 258: Performed by: HOSPITALIST

## 2021-03-22 PROCEDURE — 94761 N-INVAS EAR/PLS OXIMETRY MLT: CPT

## 2021-03-22 PROCEDURE — 2580000003 HC RX 258: Performed by: REGISTERED NURSE

## 2021-03-22 PROCEDURE — 80048 BASIC METABOLIC PNL TOTAL CA: CPT

## 2021-03-22 PROCEDURE — 97162 PT EVAL MOD COMPLEX 30 MIN: CPT

## 2021-03-22 PROCEDURE — 2060000000 HC ICU INTERMEDIATE R&B

## 2021-03-22 PROCEDURE — 6360000002 HC RX W HCPCS: Performed by: HOSPITALIST

## 2021-03-22 PROCEDURE — 6370000000 HC RX 637 (ALT 250 FOR IP): Performed by: NURSE PRACTITIONER

## 2021-03-22 PROCEDURE — 6370000000 HC RX 637 (ALT 250 FOR IP): Performed by: HOSPITALIST

## 2021-03-22 PROCEDURE — 97166 OT EVAL MOD COMPLEX 45 MIN: CPT

## 2021-03-22 PROCEDURE — 99232 SBSQ HOSP IP/OBS MODERATE 35: CPT | Performed by: NURSE PRACTITIONER

## 2021-03-22 PROCEDURE — 71045 X-RAY EXAM CHEST 1 VIEW: CPT

## 2021-03-22 PROCEDURE — 97530 THERAPEUTIC ACTIVITIES: CPT

## 2021-03-22 RX ORDER — 0.9 % SODIUM CHLORIDE 0.9 %
500 INTRAVENOUS SOLUTION INTRAVENOUS ONCE
Status: COMPLETED | OUTPATIENT
Start: 2021-03-22 | End: 2021-03-22

## 2021-03-22 RX ORDER — AMIODARONE HYDROCHLORIDE 200 MG/1
400 TABLET ORAL 2 TIMES DAILY
Status: DISCONTINUED | OUTPATIENT
Start: 2021-03-22 | End: 2021-03-24 | Stop reason: HOSPADM

## 2021-03-22 RX ADMIN — AMIODARONE HYDROCHLORIDE 400 MG: 200 TABLET ORAL at 08:59

## 2021-03-22 RX ADMIN — FERROUS SULFATE TAB 325 MG (65 MG ELEMENTAL FE) 325 MG: 325 (65 FE) TAB at 08:52

## 2021-03-22 RX ADMIN — GABAPENTIN 100 MG: 100 CAPSULE ORAL at 22:21

## 2021-03-22 RX ADMIN — ATORVASTATIN CALCIUM 10 MG: 10 TABLET, FILM COATED ORAL at 22:21

## 2021-03-22 RX ADMIN — GABAPENTIN 100 MG: 100 CAPSULE ORAL at 13:21

## 2021-03-22 RX ADMIN — Medication 1 TABLET: at 08:53

## 2021-03-22 RX ADMIN — TIMOLOL MALEATE 1 DROP: 5 SOLUTION OPHTHALMIC at 09:06

## 2021-03-22 RX ADMIN — PSYLLIUM HUSK 1 PACKET: 3.4 POWDER ORAL at 08:54

## 2021-03-22 RX ADMIN — AMIODARONE HYDROCHLORIDE 400 MG: 200 TABLET ORAL at 13:21

## 2021-03-22 RX ADMIN — Medication 10 ML: at 22:22

## 2021-03-22 RX ADMIN — TAMSULOSIN HYDROCHLORIDE 0.4 MG: 0.4 CAPSULE ORAL at 09:05

## 2021-03-22 RX ADMIN — Medication 2000 UNITS: at 08:59

## 2021-03-22 RX ADMIN — AMIODARONE HYDROCHLORIDE 400 MG: 200 TABLET ORAL at 22:21

## 2021-03-22 RX ADMIN — PANTOPRAZOLE SODIUM 40 MG: 40 TABLET, DELAYED RELEASE ORAL at 07:07

## 2021-03-22 RX ADMIN — GABAPENTIN 100 MG: 100 CAPSULE ORAL at 08:53

## 2021-03-22 RX ADMIN — IRON SUCROSE 100 MG: 20 INJECTION, SOLUTION INTRAVENOUS at 13:21

## 2021-03-22 RX ADMIN — LATANOPROST 1 DROP: 50 SOLUTION OPHTHALMIC at 22:21

## 2021-03-22 RX ADMIN — SODIUM CHLORIDE 500 ML: 9 INJECTION, SOLUTION INTRAVENOUS at 17:01

## 2021-03-22 RX ADMIN — FUROSEMIDE 40 MG: 40 TABLET ORAL at 08:54

## 2021-03-22 RX ADMIN — Medication 10 ML: at 09:19

## 2021-03-22 RX ADMIN — DOCUSATE SODIUM 50MG AND SENNOSIDES 8.6MG 1 TABLET: 8.6; 5 TABLET, FILM COATED ORAL at 08:53

## 2021-03-22 RX ADMIN — DULOXETINE HYDROCHLORIDE 30 MG: 30 CAPSULE, DELAYED RELEASE ORAL at 08:53

## 2021-03-22 RX ADMIN — ACETAMINOPHEN 1000 MG: 500 TABLET ORAL at 13:27

## 2021-03-22 RX ADMIN — HEPARIN SODIUM 5000 UNITS: 5000 INJECTION INTRAVENOUS; SUBCUTANEOUS at 08:55

## 2021-03-22 RX ADMIN — HEPARIN SODIUM 5000 UNITS: 5000 INJECTION INTRAVENOUS; SUBCUTANEOUS at 22:21

## 2021-03-22 RX ADMIN — TIMOLOL MALEATE 1 DROP: 5 SOLUTION OPHTHALMIC at 22:21

## 2021-03-22 ASSESSMENT — PAIN SCALES - GENERAL
PAINLEVEL_OUTOF10: 4
PAINLEVEL_OUTOF10: 0
PAINLEVEL_OUTOF10: 0

## 2021-03-22 ASSESSMENT — PAIN DESCRIPTION - DESCRIPTORS: DESCRIPTORS: SORE

## 2021-03-22 ASSESSMENT — PAIN DESCRIPTION - LOCATION: LOCATION: MOUTH

## 2021-03-22 ASSESSMENT — PAIN DESCRIPTION - PAIN TYPE: TYPE: ACUTE PAIN

## 2021-03-22 NOTE — PROGRESS NOTES
End of shift report given to Giovana Villarreal RN at bedside. Patient alert and oriented. Bed in lowest position with wheels locked. Call light within reach. No further needs at this time.

## 2021-03-22 NOTE — OP NOTE
[facility]                                OPERATIVE REPORT    PATIENT NAME: Jolene Fernandez                   :        1932  MED REC NO:   [id number]                          ROOM:       0205  ACCOUNT NO:   [de-identified]                           ADMIT DATE: 2021  PROVIDER:     Akil Nelson MD    DATE OF PROCEDURE:  2021    PREOPERATIVE DIAGNOSIS:  Periprosthetic right distal femur fracture. PROCEDURE PERFORMED:  Revision, right total knee arthroplasty. SURGEON:  Akil Nelson MD    COMPLICATIONS:  None. DRAINS AND TUBES: None. ESTIMATED BLOOD LOSS:  100 mL. IMPLANT:  [place] modular oncologic system. INDICATIONS FOR THE SURGERY:  An 61-year-old female  total knee  implant on the right side, presents for the above procedure. The risks  and benefits were discussed with the patient and her daughter. We  talked about neurologic injury, vascular injury, infection, and DVT. Other potential complications were discussed and alternatives were  discussed and the patient consented and the daughter consented to the  procedure. OPERATIVE PROCEDURE:  The patient was taken to the operating room and  placed supine on the operating room table after successful general  anesthesia. The right leg was prepped and draped in the usual fashion. A sterile tourniquet was placed. The leg was elevated, exsanguinated,  and the tourniquet was inflated. The midline incision was opened up,  followed by a lateral parapatellar approach. This was done because of  an old skin graft. The knee was dislocated. The distal femoral  osteotomy was made with an oscillating saw, about 7-cm proximal to the  joint line. The distal femur and the distal femoral component were  removed. The tibial component was then removed. The patellar component  was left in place.   The tibia and femur were reamed for the [place]  modular system for the stem. Trial implants were placed. The knee was  reduced and ranged. There was no instability. Motion was good. Leg  lengths were good. Patella tracking was excellent. Next, we cemented  in the femoral stem, cemented the tibial tray, and press-fit the tibial  stem. Assembled the knee and ranged it. No instability. Patella  tracking was great. Implant position looked good. After the cement  hardened, the knee was irrigated and closed in layers. Sterile dressing  was applied. The patient was then taken from the operating room to  recovery, where she arrived in a stable condition.         Thao Higgins MD    D: 03/21/2021 14:39:48       T: 03/21/2021 20:23:54     [email or web address]  Job#: [id number]     Doc#: [id number]    CC:

## 2021-03-22 NOTE — PROGRESS NOTES
Physical Therapy    Facility/Department: [facility] A2 CARD TELEMETRY  Initial Assessment and Treatment    NAME: Melinda Stevens  : 1932  MRN: [id number]    Date of Service: 3/22/2021    Discharge Recommendations:  ECF with PT   PT Equipment Recommendations  Equipment Needed: No  Other: Defer to next level of care. Assessment   Body structures, Functions, Activity limitations: Decreased functional mobility ; Decreased strength;Decreased ROM; Decreased safe awareness;Decreased cognition;Decreased endurance;Decreased balance;Decreased coordination; Increased pain  Assessment: Pt is an 79 y/o female presenting to [facility] from fall at [facility]. Pt underwent a Right TKA revision on 3/20/21. Pt is a questionable historian, but reports she transfers and occasionally walks to the bathroom with RW and Ax1. Pt currently is Max A x2 for bed mobility and reports increased pain when sitting on the EOB without the right LE supported. Once right LE supported at the EOB, pt completed seated ADLs with OT while PT gave Max<>min A for trunk control at the EOB. Pt will benefit from continued therapy to address deficits. Of note, pt's BP low throughout entire session, but improved with moblity. Recommend ECF with PT at d/c. Continue POC. Treatment Diagnosis: Decreased functional mobility s/p right TKA revision  Prognosis: Fair  Decision Making: Medium Complexity  PT Education: Goals;PT Role;Plan of Care;Home Exercise Program;Functional Mobility Training  Patient Education: Pt will require reinforcement, limited by cognition and LBP. Barriers to Learning: cognition  REQUIRES PT FOLLOW UP: Yes  Activity Tolerance  Activity Tolerance: Patient limited by cognitive status; Patient limited by fatigue;Treatment limited secondary to medical complications (free text)  Activity Tolerance: BP=79/49 in supine, BP=86/49 in supine, BP=67/35 in sitting, BP=73/53 in sitting, BP=96/46 in supine. Pt denies dizziness.        Patient Diagnosis(es): There were no encounter diagnoses. has a past medical history of Anemia, Anxiety, Arthritis, Atrial fibrillation ([address] CAD (coronary artery disease), Chronic GERD, Depression, Diverticulosis, Gall stone, GERD (gastroesophageal reflux disease), Glaucoma, Hypercholesteremia, Hypertension, Irregular heart beat, Rectal prolapse, and Uterine cancer ([address] has a past surgical history that includes Hysterectomy (2008); Appendectomy; Pacemaker insertion; Tonsillectomy; Colonoscopy (2005); Colonoscopy (1010); Colonoscopy (10/4/2014); hip surgery (Right, 8/4/15); pacemaker placement (2008); fracture surgery; pr cptr-asst surgical navigation image-less (Right, 9/5/2018); joint replacement; knee surgery; other surgical history (Right, 10/29/2018); pr office/outpt visit,procedure only (Right, 10/29/2018); pr split grft trunk,arm,leg <100sqcm (Right, 11/30/2018); Colonoscopy (N/A, 2/15/2019); and Revision total knee arthroplasty (Right, 3/20/2021). Restrictions  Restrictions/Precautions  Restrictions/Precautions: General Precautions, Up as Tolerated  Position Activity Restriction  Other position/activity restrictions: Pt wearing KI, no WB status or joint precautions; left note for the MD.  Vision/Hearing  Vision: Impaired  Vision Exceptions: Wears glasses at all times  Hearing: Exceptions to [facility]  Hearing Exceptions: Bilateral hearing aid(Does not have in room)     Subjective  General  Chart Reviewed: Yes  Patient assessed for rehabilitation services?: Yes  Response To Previous Treatment: Not applicable  Family / Caregiver Present: No  Referring Practitioner: Neha Murphy MD  Referral Date : 03/22/21  Subjective  Subjective: Pt agreeable to therapy, pleasantly confused.   Pain Screening  Patient Currently in Pain: Denies(at rest)  Vital Signs  Patient Currently in Pain: Denies(at rest)       Orientation  Orientation  Overall Orientation Status: Impaired  Orientation Level: Oriented to person(Requires choice to complete correct questions)  Social/Functional History  Social/Functional History  Type of Home: Facility  Home Layout: One level  Home Equipment: Rolling walker  ADL Assistance: Needs assistance  Homemaking Assistance: (Does not complete)  Homemaking Responsibilities: No  Ambulation Assistance: Needs assistance(Pt reports Ax1 with transfers to w/c with walker; does mention walking to the bathroom with staff.)  Transfer Assistance: Needs assistance(Ax1)  Active : No  Additional Comments: 1 fall leading to admission. Questionable historian  Cognition   Cognition  Overall Cognitive Status: Exceptions  Following Commands: Follows one step commands with repetition; Follows one step commands with increased time  Memory: Decreased long term memory;Decreased short term memory  Safety Judgement: Decreased awareness of need for assistance;Decreased awareness of need for safety  Insights: Decreased awareness of deficits  Initiation: Requires cues for some  Sequencing: Requires cues for some    Objective          AROM RLE (degrees)  RLE AROM: Exceptions  RLE General AROM: Pt wearing KI and able to move ankle fully, impaired hip and knee d/t pain and weakness  AROM LLE (degrees)  LLE AROM : WFL  Strength RLE  Strength RLE: Exception  Comment: Ankle 4/5, hip and knee unknown d/t KI donned and pt in increased pain with all movements. Strength LLE  Strength LLE: WFL  Comment: Grossly 4/5  Tone RLE  RLE Tone: Normotonic  Tone LLE  LLE Tone: Normotonic     Bed mobility  Supine to Sit: Maximum assistance;2 Person assistance  Sit to Supine: Maximum assistance;2 Person assistance  Comment: Dependent for transfers d/t pain and weakness with draw sheet  Transfers  Sit to Stand: Unable to assess  Stand to sit: Unable to assess  Comment: No WB status in chart, pt in increased pain with sitting EOB, deferred standing this date.   Ambulation  Ambulation?: No(No WB status in the chart)     Balance  Posture: Fair  Sitting - Static: Fair;-  Sitting - Dynamic: Poor  Comments: Pt requires close CGA<>Min A with hand holding onto rail for support; Max A for sitting balance with posterior support d/t leaning posteriorly for all dynamic balance. Tolerated sitting at the EOB for 20 minutes with ADLs with OT. Exercises  Hip Flexion: x10 left LE  Knee Long Arc Quad: x10 left LE  Ankle Pumps: x10 left LE  Comments: Cues with sequencing and technique. Plan   Plan  Times per week: 7x  Current Treatment Recommendations: Strengthening, ROM, Balance Training, Functional Mobility Training, Transfer Training, Endurance Training, Gait Training, Pain Management, Cognitive Reorientation, Home Exercise Program, Safety Education & Training, Patient/Caregiver Education & Training, Equipment Evaluation, Education, & procurement  Safety Devices  Type of devices: All fall risk precautions in place, Bed alarm in place, Call light within reach, Left in bed, Patient at risk for falls, Gait belt, Nurse notified    AM-PAC Score  AM-PAC Inpatient Mobility Raw Score : 7 (03/22/21 1547)  AM-PAC Inpatient T-Scale Score : 26.42 (03/22/21 1547)  Mobility Inpatient CMS 0-100% Score: 92.36 (03/22/21 1547)  Mobility Inpatient CMS G-Code Modifier : CM (03/22/21 1547)        Goals  Short term goals  Time Frame for Short term goals: 5-7 days, unless otherwise stated, by 3/29/21  Short term goal 1: Pt will complete bed mobility with Mod A x1. Short term goal 2: Pt will sit at the EOB for 10 minutes for ADLs with SBA. Short term goal 3: Pt will complete transfers with appropriate WB status from bed<>chair with LRAD. Short term goal 4: Pt will complete 12-15 reps of BLE ther ex for strength and ROM by 3/25/21. Patient Goals   Patient goals : Unable to state, pt pleasantly confused.      Therapy Time   Individual Concurrent Group Co-treatment   Time In 1432         Time Out 1509         Minutes 37         Timed Code Treatment Minutes: 27 Minutes(10 minute eval)     If the pt is discharged prior to

## 2021-03-22 NOTE — PROGRESS NOTES
Jim   Daily Progress Note    Admit Date:  3/19/2021  HPI:  No chief complaint on file. Interval history: Brandy Johnson is being followed for tachycardia. Seen by EP 3/19/21    Subjective:  Ms. Mary Akers remains controlled, paced. No complaints other than sores in her mouth and right leg pain when they move her.    Denies chest pain or shortness of braeth      Objective:   BP (!) 79/45   Pulse 65   Temp 98.5 °F (36.9 °C) (Oral)   Resp 16   Ht 5' 3\" (1.6 m)   Wt 130 lb 15.3 oz (59.4 kg)   SpO2 99%   BMI 23.20 kg/m²       Intake/Output Summary (Last 24 hours) at 3/22/2021 1637  Last data filed at 3/22/2021 1456  Gross per 24 hour   Intake 910 ml   Output 250 ml   Net 660 ml       NYHA: III    Physical Exam:  General:  Awake, alert, NAD; in bed, pleasant   Skin:  Warm and dry  Chest:  clear  throughout to auscultation, no wheezes/rhonchi/rales  Telemetry: SR rate 60's, paced   Cardiovascular:  RRR S1S2, no m/r/g   Abdomen:  Soft, nontender, +bowel sounds  Extremities:  no lower extremity edema, right leg wrapped     Medications:    sodium chloride  500 mL Intravenous Once    iron sucrose (VENOFER) iv piggyback 100 mL (Admin over 60 minutes)  100 mg Intravenous Q24H    amiodarone  400 mg Oral TID    atorvastatin  10 mg Oral Nightly    [Held by provider] carvedilol  3.125 mg Oral BID WC    DULoxetine  30 mg Oral Daily    ferrous sulfate  325 mg Oral Once per day on Mon Wed Fri    gabapentin  100 mg Oral TID    latanoprost  1 drop Both Eyes Nightly    therapeutic multivitamin-minerals  1 tablet Oral Daily    pantoprazole  40 mg Oral QAM AC    sennosides-docusate sodium  1 tablet Oral Daily    psyllium  1 packet Oral Daily    tamsulosin  0.4 mg Oral Daily    timolol  1 drop Left Eye BID    vitamin D  2,000 Units Oral Daily    [Held by provider] furosemide  40 mg Oral BID    sodium chloride flush  10 mL Intravenous 2 times per day    heparin (porcine)  5,000 Units Subcutaneous BID         Lab Data:  CBC:   Recent Labs     03/19/21  1656 03/21/21  0621 03/22/21  0615   WBC 9.1 10.5 9.7   HGB 10.5* 8.7* 8.7*    277 259     BMP:    Recent Labs     03/19/21  1655 03/21/21  0621 03/22/21  0615   * 136 128*   K 3.3* 3.5 4.3   CO2 28 25 25   BUN 9 10 14   CREATININE 0.6 0.7 0.6     INR:    No results for input(s): INR in the last 72 hours. BNP:    Recent Labs     03/19/21  1926   PROBNP 2,909*     No results found for: LVEF, LVEFMODE    Testing:      Principal Problem:    Closed fracture of right distal femur ([address]  Active Problems:    Hypertension    Paroxysmal atrial fibrillation (HCC)    Pacemaker    CAD (coronary artery disease)    Hypoalbuminemia    GERD (gastroesophageal reflux disease)    Wide-complex tachycardia (HCC)  Resolved Problems:    * No resolved hospital problems.  *      Assessment:  NSVT, SVT, PAF-improved rate control  Right femur fracture, s/p ORIF 3/20/21  SSS s/p pacer- device at [facility]; needs outpatient gen change after recovers from fem fracture    Plan:  HR remains controlled   Oral amiodarone-we will go ahead and reduce to 400 mg BID for 4 days, then 400 mg for 4 days and then 200 mg daily after to load to ~10 g  COreg 3.125mg BID with hold parameters - hold tonight's dose and okay with gentle IVFs   No long-term anticoagulation due to GI bleed, falls, dementia; discussed with orthopedic surgery yesterday okay for short-term course of low-dose Eliquis for DVT prophylaxis if needed      Jessica Keller CNP, 3/22/2021, 4:37 PM

## 2021-03-22 NOTE — PROGRESS NOTES
Occupational Therapy   Occupational Therapy Initial Assessment and Treatment  Date: 3/22/2021   Patient Name: Melinda Stevens  MRN: [id number]     : 1932    Date of Service: 3/22/2021    Discharge Recommendations:  ECF with OT  OT Equipment Recommendations  Equipment Needed: No    Assessment   Performance deficits / Impairments: Decreased functional mobility ; Decreased ADL status; Decreased strength;Decreased safe awareness;Decreased cognition;Decreased endurance;Decreased balance;Decreased posture;Decreased coordination  After evaluation and treatment, pt found to be presenting with the above mentioned deficits. Pt would benefit from continued skilled occupational therapy to address these deficits, increasing safety and independence with ADL and functional mobility. Pt is a questionable historian, but she reports she is able to complete t/fs bed to w/c with A x1 with RW and that she occasionally goes to rest room without staff assist. She currently needs max A for dynamic balance EOB during feeding and therex and is fatigued with sitting EOB 20 mins. She needed mod A to self-feed. Limited by low BP, weakness, and lack of WB status. Will continue to assess for discharge needs. Prognosis: Fair  Decision Making: Medium Complexity  REQUIRES OT FOLLOW UP: Yes  Activity Tolerance  Activity Tolerance: Patient Tolerated treatment well  Activity Tolerance: BP=79/49 in supine, BP=86/49 in supine, BP=67/35 in sitting, BP=73/53 in sitting, BP=96/46 in supine. Pt denies dizziness. RN aware. Safety Devices  Safety Devices in place: Yes  Type of devices: Call light within reach; Left in bed;Bed alarm in place;Nurse notified         Patient Diagnosis(es): There were no encounter diagnoses.      has a past medical history of Anemia, Anxiety, Arthritis, Atrial fibrillation ([address] CAD (coronary artery disease), Chronic GERD, Depression, Diverticulosis, Gall stone, GERD (gastroesophageal reflux disease), Glaucoma, Hypercholesteremia, Hypertension, Irregular heart beat, Rectal prolapse, and Uterine cancer ([facility] [address] has a past surgical history that includes Hysterectomy (2008); Appendectomy; Pacemaker insertion; Tonsillectomy; Colonoscopy (2005); Colonoscopy (1010); Colonoscopy (10/4/2014); hip surgery (Right, 8/4/15); pacemaker placement (2008); fracture surgery; pr cptr-asst surgical navigation image-less (Right, 9/5/2018); joint replacement; knee surgery; other surgical history (Right, 10/29/2018); pr office/outpt visit,procedure only (Right, 10/29/2018); pr split grft trunk,arm,leg <100sqcm (Right, 11/30/2018); Colonoscopy (N/A, 2/15/2019); and Revision total knee arthroplasty (Right, 3/20/2021). Restrictions  Restrictions/Precautions  Restrictions/Precautions: General Precautions, Up as Tolerated  Position Activity Restriction  Other position/activity restrictions: Pt wearing KI, no WB status or joint precautions; left note for the MD after multiple attempts to contact physicans and ortho PA    Subjective   General  Chart Reviewed: Yes  Patient assessed for rehabilitation services?: Yes  Additional Pertinent Hx: HTN, depression, anxiety, A-fib, glaucoma  Family / Caregiver Present: No  Referring Practitioner: Maryagnes Boast, MD  Diagnosis: Fall with R distal femur fx s/p revision of R TKA  Subjective  Subjective: RN cleared pt for tx. Pt supine at session start. General Comment  Comments: Limited by lack of WB status and low BP.      Patient Currently in Pain: Denies(at rest)    Social/Functional History  Social/Functional History  Type of Home: Facility  Home Layout: One level  Home Equipment: Rolling walker  ADL Assistance: Needs assistance  Bath: Maximum assistance  Dressing: Maximum assistance  Grooming: Stand by assistance  Feeding: Stand by assistance  Toileting: (Pt reports she occasionally goes to the bathroom on her own, but often has Ax1)  Homemaking Assistance: (Does not complete)  Homemaking Responsibilities: No  Ambulation Assistance: Needs assistance(Pt reports Ax1 with transfers to w/c with walker; does mention walking to the bathroom with staff.)  Transfer Assistance: Needs assistance(Ax1 with RW)  Active : No  Additional Comments: 1 fall during sleep falling out of bed that caused admission. Questionable historian and no family present. Objective   Vision: Impaired  Vision Exceptions: Wears glasses at all times  Hearing: Exceptions to [facility]  Hearing Exceptions: Bilateral hearing aid      Orientation  Overall Orientation Status: Impaired  Orientation Level: Oriented to person(when given 2-3 choices, pt able to accurately choose month, year, location, and situation)        Balance  Sitting Balance: Maximum assistance(20 mins EOB, posterior lean, pt able to self-correct with min A and use of rail to upright posture. Pt able to hold static sitting balance at midline with rail for ~20s prior to fatigue. Max A during dynamic ax (feeding, grooming, therex))  Standing Balance: Unable to assess(comment)(2/2 weakness, low BP and lack of WB status)     ADL  Feeding: Dependent/Total(max A for sitting balance EOB (posterior lean) plus mod A to feed self 2/2 fatigue)  Grooming: Maximum assistance(to wipe mouth seated (set-up) plus max A for sitting balance)     Tone RUE  RUE Tone: Normotonic  Tone LUE  LUE Tone: Normotonic  Coordination  Movements Are Fluid And Coordinated: Yes        Bed mobility  Supine to Sit: Maximum assistance;2 Person assistance  Sit to Supine: Maximum assistance;2 Person assistance  Scooting: Dependent/Total(TA x2 to boost in bed or scoot to EOB)           Cognition  Overall Cognitive Status: Exceptions  Arousal/Alertness: Delayed responses to stimuli  Following Commands: Follows one step commands with repetition; Follows one step commands with increased time  Attention Span: Difficulty dividing attention  Memory: Decreased long term memory;Decreased short term memory  Safety Judgement: Decreased awareness of need for assistance;Decreased awareness of need for safety  Problem Solving: Decreased awareness of errors;Assistance required to generate solutions;Assistance required to implement solutions;Assistance required to identify errors made;Assistance required to correct errors made  Insights: Decreased awareness of deficits  Initiation: Requires cues for some  Sequencing: Requires cues for some                                RUE strength and AROM are WFL, 4/5  L shoulder AROM roughly 25% full range (baseline). L elbow and digit AROM are WFL    Education: Role of OT, safe t/f training, safe use of DME, awareness of deficits, discharge planning, ADL as therapeutic exercise, importance of OOB, cognitive orientation        Plan   Plan  Times per week: 3-5    AM-PAC Score        AM-[facility] Inpatient Daily Activity Raw Score: 8 (03/22/21 1659)  AM-PAC Inpatient ADL T-Scale Score : 22.86 (03/22/21 1659)  ADL Inpatient CMS 0-100% Score: 85.69 (03/22/21 1659)  ADL Inpatient CMS G-Code Modifier : CM (03/22/21 1659)    Goals  Short term goals  Time Frame for Short term goals: 1 week (3/29/21)  Short term goal 1: Sit to stand with max A x2 in prep for functional t/f  Short term goal 2: Bed mobility with mod A x2  Short term goal 3: 1 grooming task EOB with min A  Short term goal 4: Feed self in supported position with set-up by 3/27/21  Patient Goals   Patient goals : \"Get better and be able to transfer again. \"       Therapy Time   Individual Concurrent Group Co-treatment   Time In 1432         Time Out 1507         Minutes 35         Timed Code Treatment Minutes: 25 Minutes       If patient is discharged prior to next treatment session, this note will serve as the discharge summary.   Misael Lyon OTR/L #[id number]

## 2021-03-22 NOTE — PROGRESS NOTES
Patient is awake, alert and oriented x4. Assessment is complete, see flow sheet. Bed in lowest position, wheels locked, call light is within reach. Patient denies any further needs at the moment. Will continue to monitor.     BP (!) 103/56 Comment: rn hold talk to   Pulse 65   Temp 98.8 °F (37.1 °C) (Oral)   Resp 16   Ht 5' 3\" (1.6 m)   Wt 130 lb 15.3 oz (59.4 kg)   SpO2 99%   BMI 23.20 kg/m²     Denies pain, on 4L O2   Weaned to 2L

## 2021-03-22 NOTE — PROGRESS NOTES
Paged MAGDA Magdalenomagdalenawaldemar Mccoy;  3/22/21 9:26 AM   [phone] Hospital or Facility: [facility] From: Justino Rosado RE: Jose F Lynch 04/18/1932 RM: 205 FYI pt BP was low overnight and yesterday, 80/50 and 90/60. BP this morning is 103/56 at 0859 today. Pt also still has an active order for IVF, but nurse was given a verbal to stop on 3/19 and order was not discontinued. Should I still give coreg this morning? It was held yesterday evening, parameters say to hold if SBP is less than 90.  Please advise, thank you

## 2021-03-22 NOTE — PROGRESS NOTES
Hospitalist Progress Note      PCP: Gisela Estrada MD    Date of Admission: 3/19/2021    Chief Complaint: Right leg pain    Hospital Course:  79 yo female initially presented to [facility] ED on 3/18/21 evaluated for mechanical fall with resultant right leg injury. Imaging showed acute displaced fracture of right distal femur. Orthopedic surgery consulted. Pt was transferred to [facility]. Status post ORIF on 3/20/21. Subjective:   Pt is on 2 LPM. Afebrile. VSS. No complaints currently.     Medications:  Reviewed    Infusion Medications    sodium chloride Stopped (03/19/21 2303)     Scheduled Medications    iron sucrose (VENOFER) iv piggyback 100 mL (Admin over 60 minutes)  100 mg Intravenous Q24H    amiodarone  400 mg Oral TID    atorvastatin  10 mg Oral Nightly    carvedilol  3.125 mg Oral BID WC    DULoxetine  30 mg Oral Daily    ferrous sulfate  325 mg Oral Once per day on Mon Wed Fri    gabapentin  100 mg Oral TID    latanoprost  1 drop Both Eyes Nightly    therapeutic multivitamin-minerals  1 tablet Oral Daily    pantoprazole  40 mg Oral QAM AC    sennosides-docusate sodium  1 tablet Oral Daily    psyllium  1 packet Oral Daily    tamsulosin  0.4 mg Oral Daily    timolol  1 drop Left Eye BID    vitamin D  2,000 Units Oral Daily    furosemide  40 mg Oral BID    sodium chloride flush  10 mL Intravenous 2 times per day    heparin (porcine)  5,000 Units Subcutaneous BID     PRN Meds: melatonin, sodium chloride flush, potassium chloride **OR** potassium alternative oral replacement **OR** potassium chloride, magnesium sulfate, senna, acetaminophen **OR** acetaminophen, acetaminophen, oxyCODONE, morphine      Intake/Output Summary (Last 24 hours) at 3/22/2021 1054  Last data filed at 3/22/2021 0919  Gross per 24 hour   Intake 1030 ml   Output 600 ml   Net 430 ml       Physical Exam Performed:    BP (!) 103/56 Comment: rn hold talk to   Pulse 65   Temp 98.8 °F (37.1 °C) with PT/OT    Dispo - 1-2 days    [facility], APRN - CNP

## 2021-03-23 LAB
ANION GAP SERPL CALCULATED.3IONS-SCNC: 9 MMOL/L (ref 3–16)
BUN BLDV-MCNC: 12 MG/DL (ref 7–20)
CALCIUM SERPL-MCNC: 8.1 MG/DL (ref 8.3–10.6)
CHLORIDE BLD-SCNC: 100 MMOL/L (ref 99–110)
CO2: 25 MMOL/L (ref 21–32)
CREAT SERPL-MCNC: 0.6 MG/DL (ref 0.6–1.2)
GFR AFRICAN AMERICAN: >60
GFR NON-AFRICAN AMERICAN: >60
GLUCOSE BLD-MCNC: 103 MG/DL (ref 70–99)
HCT VFR BLD CALC: 25.4 % (ref 36–48)
HEMOGLOBIN: 8.5 G/DL (ref 12–16)
MCH RBC QN AUTO: 32 PG (ref 26–34)
MCHC RBC AUTO-ENTMCNC: 33.3 G/DL (ref 31–36)
MCV RBC AUTO: 96.1 FL (ref 80–100)
PDW BLD-RTO: 13.9 % (ref 12.4–15.4)
PLATELET # BLD: 259 K/UL (ref 135–450)
PMV BLD AUTO: 8.5 FL (ref 5–10.5)
POTASSIUM REFLEX MAGNESIUM: 4.1 MMOL/L (ref 3.5–5.1)
RBC # BLD: 2.65 M/UL (ref 4–5.2)
SODIUM BLD-SCNC: 134 MMOL/L (ref 136–145)
WBC # BLD: 7.7 K/UL (ref 4–11)

## 2021-03-23 PROCEDURE — 6370000000 HC RX 637 (ALT 250 FOR IP): Performed by: REGISTERED NURSE

## 2021-03-23 PROCEDURE — 80048 BASIC METABOLIC PNL TOTAL CA: CPT

## 2021-03-23 PROCEDURE — 2060000000 HC ICU INTERMEDIATE R&B

## 2021-03-23 PROCEDURE — 36415 COLL VENOUS BLD VENIPUNCTURE: CPT

## 2021-03-23 PROCEDURE — 2700000000 HC OXYGEN THERAPY PER DAY

## 2021-03-23 PROCEDURE — 6370000000 HC RX 637 (ALT 250 FOR IP): Performed by: NURSE PRACTITIONER

## 2021-03-23 PROCEDURE — 6360000002 HC RX W HCPCS: Performed by: HOSPITALIST

## 2021-03-23 PROCEDURE — 97110 THERAPEUTIC EXERCISES: CPT

## 2021-03-23 PROCEDURE — 85027 COMPLETE CBC AUTOMATED: CPT

## 2021-03-23 PROCEDURE — 97530 THERAPEUTIC ACTIVITIES: CPT

## 2021-03-23 PROCEDURE — 6370000000 HC RX 637 (ALT 250 FOR IP): Performed by: HOSPITALIST

## 2021-03-23 PROCEDURE — 2580000003 HC RX 258: Performed by: HOSPITALIST

## 2021-03-23 PROCEDURE — 97535 SELF CARE MNGMENT TRAINING: CPT

## 2021-03-23 PROCEDURE — 99232 SBSQ HOSP IP/OBS MODERATE 35: CPT | Performed by: NURSE PRACTITIONER

## 2021-03-23 PROCEDURE — 94761 N-INVAS EAR/PLS OXIMETRY MLT: CPT

## 2021-03-23 RX ORDER — BISACODYL 10 MG
10 SUPPOSITORY, RECTAL RECTAL ONCE
Status: COMPLETED | OUTPATIENT
Start: 2021-03-23 | End: 2021-03-23

## 2021-03-23 RX ADMIN — GABAPENTIN 100 MG: 100 CAPSULE ORAL at 22:16

## 2021-03-23 RX ADMIN — MINERAL OIL 330 ML: 1000 LIQUID ORAL at 18:48

## 2021-03-23 RX ADMIN — GABAPENTIN 100 MG: 100 CAPSULE ORAL at 09:43

## 2021-03-23 RX ADMIN — AMIODARONE HYDROCHLORIDE 400 MG: 200 TABLET ORAL at 09:43

## 2021-03-23 RX ADMIN — PSYLLIUM HUSK 1 PACKET: 3.4 POWDER ORAL at 09:44

## 2021-03-23 RX ADMIN — Medication 10 ML: at 09:44

## 2021-03-23 RX ADMIN — TIMOLOL MALEATE 1 DROP: 5 SOLUTION OPHTHALMIC at 09:44

## 2021-03-23 RX ADMIN — AMIODARONE HYDROCHLORIDE 400 MG: 200 TABLET ORAL at 22:15

## 2021-03-23 RX ADMIN — HEPARIN SODIUM 5000 UNITS: 5000 INJECTION INTRAVENOUS; SUBCUTANEOUS at 09:44

## 2021-03-23 RX ADMIN — HEPARIN SODIUM 5000 UNITS: 5000 INJECTION INTRAVENOUS; SUBCUTANEOUS at 22:17

## 2021-03-23 RX ADMIN — DOCUSATE SODIUM 50MG AND SENNOSIDES 8.6MG 1 TABLET: 8.6; 5 TABLET, FILM COATED ORAL at 09:44

## 2021-03-23 RX ADMIN — Medication 2000 UNITS: at 09:43

## 2021-03-23 RX ADMIN — ACETAMINOPHEN 1000 MG: 500 TABLET ORAL at 23:49

## 2021-03-23 RX ADMIN — TIMOLOL MALEATE 1 DROP: 5 SOLUTION OPHTHALMIC at 22:16

## 2021-03-23 RX ADMIN — GABAPENTIN 100 MG: 100 CAPSULE ORAL at 13:53

## 2021-03-23 RX ADMIN — BISACODYL 10 MG: 10 SUPPOSITORY RECTAL at 12:38

## 2021-03-23 RX ADMIN — Medication 1 TABLET: at 09:43

## 2021-03-23 RX ADMIN — TAMSULOSIN HYDROCHLORIDE 0.4 MG: 0.4 CAPSULE ORAL at 09:44

## 2021-03-23 RX ADMIN — ATORVASTATIN CALCIUM 10 MG: 10 TABLET, FILM COATED ORAL at 22:16

## 2021-03-23 RX ADMIN — DULOXETINE HYDROCHLORIDE 30 MG: 30 CAPSULE, DELAYED RELEASE ORAL at 09:43

## 2021-03-23 RX ADMIN — ACETAMINOPHEN 1000 MG: 500 TABLET ORAL at 09:47

## 2021-03-23 RX ADMIN — PANTOPRAZOLE SODIUM 40 MG: 40 TABLET, DELAYED RELEASE ORAL at 06:14

## 2021-03-23 RX ADMIN — LATANOPROST 1 DROP: 50 SOLUTION OPHTHALMIC at 22:16

## 2021-03-23 RX ADMIN — Medication 10 ML: at 22:16

## 2021-03-23 ASSESSMENT — PAIN SCALES - WONG BAKER: WONGBAKER_NUMERICALRESPONSE: 4

## 2021-03-23 ASSESSMENT — PAIN SCALES - GENERAL: PAINLEVEL_OUTOF10: 5

## 2021-03-23 ASSESSMENT — PAIN DESCRIPTION - PROGRESSION
CLINICAL_PROGRESSION: NOT CHANGED
CLINICAL_PROGRESSION: NOT CHANGED

## 2021-03-23 ASSESSMENT — PAIN DESCRIPTION - PAIN TYPE
TYPE: ACUTE PAIN
TYPE: SURGICAL PAIN;ACUTE PAIN

## 2021-03-23 ASSESSMENT — PAIN DESCRIPTION - DESCRIPTORS
DESCRIPTORS: DISCOMFORT
DESCRIPTORS: DISCOMFORT

## 2021-03-23 ASSESSMENT — PAIN DESCRIPTION - ONSET: ONSET: ON-GOING

## 2021-03-23 ASSESSMENT — PAIN DESCRIPTION - LOCATION: LOCATION: LEG

## 2021-03-23 NOTE — PROGRESS NOTES
placement (2008); fracture surgery; pr cptr-asst surgical navigation image-less (Right, 9/5/2018); joint replacement; knee surgery; other surgical history (Right, 10/29/2018); pr office/outpt visit,procedure only (Right, 10/29/2018); pr split grft trunk,arm,leg <100sqcm (Right, 11/30/2018); Colonoscopy (N/A, 2/15/2019); and Revision total knee arthroplasty (Right, 3/20/2021). Restrictions  Restrictions/Precautions  Restrictions/Precautions: General Precautions, Up as Tolerated  Position Activity Restriction  Other position/activity restrictions: Pt wearing KI, no WB status or joint precautions; left note for the MD after multiple attempts to contact physicans and ortho PA  Subjective   General  Chart Reviewed: Yes  Patient assessed for rehabilitation services?: Yes  Additional Pertinent Hx: HTN, depression, anxiety, A-fib, glaucoma  Family / Caregiver Present: No  Referring Practitioner: Elva Brewer MD  Diagnosis: Fall with R distal femur fx s/p revision of R TKA  Subjective  Subjective: Pt in bed on arrival, agreeable to treatment and sitting at EOB for breakfast  General Comment  Comments: Per RN okay to treat      Orientation  Orientation  Overall Orientation Status: Impaired  Orientation Level: Oriented to person;Disoriented to situation;Disoriented to time;Disoriented to place  Objective    ADL  Feeding: Setup;Stand by assistance; Increased time to complete;Dentures;Verbal cueing  Grooming: Setup;Verbal cueing; Increased time to complete;Minimal assistance(seated EOB)  LE Dressing: Dependent/Total  Toileting: Dependent/Total([place])        Balance  Sitting Balance: Stand by assistance(at EOB >20 minutes)  Standing Balance: Unable to assess(comment)(Pt requesting to return to supine d/t fatigue, no standing attempted this date)  Bed mobility  Supine to Sit: Maximum assistance;2 Person assistance  Sit to Supine: Maximum assistance;2 Person assistance     Coordination  Fine Motor: Appropriate for observed ADL, arthritic changes/deformity both hands     Cognition  Overall Cognitive Status: Exceptions  Arousal/Alertness: Delayed responses to stimuli  Following Commands: Follows one step commands with repetition; Follows one step commands with increased time  Attention Span: Difficulty dividing attention; Attends with cues to redirect  Memory: Decreased long term memory;Decreased short term memory;Decreased recall of recent events  Safety Judgement: Decreased awareness of need for assistance;Decreased awareness of need for safety  Problem Solving: Assistance required to generate solutions;Assistance required to implement solutions  Insights: Decreased awareness of deficits  Initiation: Requires cues for some  Sequencing: Requires cues for some        Type of ROM/Therapeutic Exercise  Type of ROM/Therapeutic Exercise: AROM;AAROM  Comment: BUE seated EOB  Exercises  Scapular Protraction: x10  Scapular Retraction: x10  Elbow Flexion: x10  Elbow Extension: x10  Grasp/Release: x5        Plan   Plan  Times per week: 3-5x/week  Current Treatment Recommendations: Strengthening, Functional Mobility Training, Balance Training, ROM, Endurance Training, Patient/Caregiver Education & Training, Safety Education & Training, Self-Care / ADL, Equipment Evaluation, Education, & procurement, Pain Management, Positioning    AM-PAC Score        [facility] Inpatient Daily Activity Raw Score: 11 (03/23/21 1234)  AM-PAC Inpatient ADL T-Scale Score : 29.04 (03/23/21 1234)  ADL Inpatient CMS 0-100% Score: 70.42 (03/23/21 1234)  ADL Inpatient CMS G-Code Modifier : CL (03/23/21 1234)    Goals  Short term goals  Time Frame for Short term goals: 1 week (3/29/21)  Short term goal 1: Sit to stand with max A x2 in prep for functional t/f  Short term goal 2: Bed mobility with mod A x2  Short term goal 3: 1 grooming task EOB with min A - GOAL MET 3/23  Short term goal 4: Feed self in supported position with set-up by 3/27/21 - GOAL MET in unsupported sitting at EOB with SBA/setup 3/23  Patient Goals   Patient goals : \"Get better and be able to transfer again. \"       Therapy Time   Individual Concurrent Group Co-treatment   Time In 0843         Time Out 0922         Minutes 39         Timed Code Treatment Minutes: 44 Minutes     This note to serve as discharge summary should pt discharge prior to next session.     Sung Vázquez, OTR/L

## 2021-03-23 NOTE — PROGRESS NOTES
End of shift report given to Micah young RN. Bed in lowest position with wheels locked. Call light within reach.  No further needs at this time. [facility]

## 2021-03-23 NOTE — PROGRESS NOTES
Physical Therapy  Facility/Department: [facility] A2 CARD TELEMETRY  Daily Treatment Note  NAME: Tariq Torres  : 1932  MRN: [id number]    Date of Service: 3/23/2021    Discharge Recommendations:  ECF with PT   PT Equipment Recommendations  Equipment Needed: No  If pt discharges prior to next PT session this note will serve as discharge summary. Assessment   Body structures, Functions, Activity limitations: Decreased functional mobility ; Decreased strength;Decreased ROM; Decreased safe awareness;Decreased cognition;Decreased endurance;Decreased balance;Decreased coordination; Increased pain  Assessment: pt is 81 yo female adm post fall with R distal femur fx and now s/p R TKR revision on 3/20. Specific wt bearing orders not in chart. Pt kept NWB, immobilizer in place, no ROM to knee. BPs running low, pt with decreased endurance/activity toleranc, Pt remains below baseline function and will benefit from skilled PT to address deficits. Recommend return to ECF with PT at discharge  Treatment Diagnosis: Decreased functional mobility s/p right TKA revision  Specific instructions for Next Treatment: ex, progress mobility  Prognosis: Fair  PT Education: Goals;PT Role;Plan of Care;Home Exercise Program;Functional Mobility Training  Patient Education: Pt educated in use of immobilizer, no ROM at knee. Pt voices understanding but recommend reinforcement  Barriers to Learning: cognition  REQUIRES PT FOLLOW UP: Yes  Activity Tolerance  Activity Tolerance: Patient limited by cognitive status; Patient limited by fatigue;Treatment limited secondary to medical complications (free text)  Activity Tolerance: BP semifowlers 98/63  HR 65  SpO2 97%, Sitting /67  HR 63     Patient Diagnosis(es): There were no encounter diagnoses.      has a past medical history of Anemia, Anxiety, Arthritis, Atrial fibrillation ([address] CAD (coronary artery disease), Chronic GERD, Depression, Diverticulosis, Gall stone, GERD (gastroesophageal reflux disease), Glaucoma, Hypercholesteremia, Hypertension, Irregular heart beat, Rectal prolapse, and Uterine cancer ([facility] [address] has a past surgical history that includes Hysterectomy (2008); Appendectomy; Pacemaker insertion; Tonsillectomy; Colonoscopy (2005); Colonoscopy (1010); Colonoscopy (10/4/2014); hip surgery (Right, 8/4/15); pacemaker placement (2008); fracture surgery; pr cptr-asst surgical navigation image-less (Right, 9/5/2018); joint replacement; knee surgery; other surgical history (Right, 10/29/2018); pr office/outpt visit,procedure only (Right, 10/29/2018); pr split grft trunk,arm,leg <100sqcm (Right, 11/30/2018); Colonoscopy (N/A, 2/15/2019); and Revision total knee arthroplasty (Right, 3/20/2021). Restrictions  Restrictions/Precautions: General Precautions, Up as Tolerated  Position Activity Restriction  Other position/activity restrictions: Pt wearing KI, no WB status or joint precautions; left note for the MD after multiple attempts to contact physicans and ortho PA  Subjective   General  Chart Reviewed: Yes  Response To Previous Treatment: Patient with no complaints from previous session. Family / Caregiver Present: No  Referring Practitioner: Sam Dalton MD  Subjective  Subjective: Pt agreeable to therapy, pleasantly confused.   General Comment  Comments: RN cleared pt for therapy, pt resting in bed on approach  Pain Screening  Patient Currently in Pain: Yes  Pain Assessment  Pain Assessment: Faces  Granados-Baker Pain Rating: Hurts little more  Pain Type: Surgical pain;Acute pain  Pain Location: Leg  Pain Orientation: Right  Pain Descriptors: Discomfort  Pain Frequency: Intermittent(no pain at rest)  Pain Onset: On-going  Clinical Progression: Not changed  Functional Pain Assessment: Prevents or interferes some active activities and ADLs  Non-Pharmaceutical Pain Intervention(s): Emotional support;Repositioned  Response to Pain Intervention: Patient Satisfied  Vital Signs  Pulse: 65  Heart Rate Source: Monitor  BP: 98/63  BP Location: Left upper arm  Patient Currently in Pain: Yes  Oxygen Therapy  SpO2: 97 %  O2 Device: Nasal cannula       Orientation: oriented to person only        Objective   Bed mobility  Supine to Sit: Maximum assistance;2 Person assistance  Sit to Supine: Maximum assistance;2 Person assistance   Pt able to sit EOB with SBA to CG x 12 minutes  Transfers  Sit to Stand: Unable to assess  Stand to sit: Unable to assess  Ambulation  Ambulation?: No(unable due to weakness, need WB orders (none found on chart))        Exercises  Gluteal Sets: 15 x B  Ankle Pumps: 20 x B minimal ROM R ankle             AM-PAC Score     AM-PAC Inpatient Mobility without Stair Climbing Raw Score : 7 (03/23/21 0918)  AM-PAC Inpatient without Stair Climbing T-Scale Score : 28.66 (03/23/21 0918)  Mobility Inpatient CMS 0-100% Score: 86.29 (03/23/21 5250)  Mobility Inpatient without Stair CMS G-Code Modifier : CM (03/23/21 0969)     Goals  Short term goals  Time Frame for Short term goals: 5-7 days, unless otherwise stated, by 3/29/21  Short term goal 1: Pt will complete bed mobility with Mod A x1.  3/23 max assist of 2  Short term goal 2: Pt will sit at the EOB for 10 minutes for ADLs with SBA.  3/23 Sat EOB 12 minutes SBA/CG  Short term goal 3: Pt will complete transfers with appropriate WB status from bed<>chair with LRAD. 3/23 Pt did not transfer due to decreased activity tolerance  Short term goal 4: Pt will complete 12-15 reps of BLE ther ex for strength and ROM by 3/25/21.  3/23 Progressing  Patient Goals   Patient goals : Unable to state, pt pleasantly confused.     Plan    Times per week: 7 days wk ORTHO  Specific instructions for Next Treatment: ex, progress mobility  Current Treatment Recommendations: Strengthening, ROM, Balance Training, Functional Mobility Training, Transfer Training, Endurance Training, Gait Training, Pain Management, Cognitive Reorientation, Home Exercise Program, Safety Education & Training, Patient/Caregiver Education & Training, Equipment Evaluation, Education, & procurement  Safety Devices  Type of devices:  All fall risk precautions in place, Bed alarm in place, Call light within reach, Left in bed, Patient at risk for falls, Gait belt, Nurse notified     Therapy Time   Individual Concurrent Group Co-treatment   Time In [phone]         Time Out 0915         Minutes [id number] [facility],

## 2021-03-23 NOTE — PROGRESS NOTES
Patient is awake, alert and oriented x2 - person and place. Assessment is complete, see flow sheet. Bed in lowest position, wheels locked, call light is within reach. Patient denies any further needs at the moment. Will continue to monitor.     Vitals:    03/23/21 0930   BP: 106/75   Pulse: 65   Resp: 18   Temp: 100 °F (37.8 °C)   SpO2: 96%     Pain 5/10 - \"head and bowels\"  On 2L O2 weaned to 1L

## 2021-03-23 NOTE — CARE COORDINATION
Call received from [facility] in admissions at [facility]. She states that pt will need negative rapid covid within 24 hours of hospital DC but no other barriers to return. Pt does have Medicaid bed hold. Order for rapid covid already placed. Primary RN aware and will collect.      Kathryn Pereyra RN

## 2021-03-23 NOTE — PROGRESS NOTES
[address]     Electrophysiology                                     Progress Note    Admission date:  3/19/2021    Reason for follow up visit: WCT    HPI/CC: Dago Trevino was evaluated in the ED at [facility] for fall and distal femur fracture. While awaiting transfer to [facility], noted to have a WCT on monitor. Device interrogation showed SVT and NSVT. IV Amiodarone was started on 3/19/2021 and transitioned to PO on 3/20/2021. Rhythm has been AS . Subjective: Complains of right leg pain. Denies chest pain, palpitations, shortness of breath, and dizziness. Vitals:  Blood pressure 100/68, pulse 67, temperature 98.4 °F (36.9 °C), temperature source Oral, resp. rate 18, height 5' 3\" (1.6 m), weight 127 lb 3.3 oz (57.7 kg), SpO2 94 %.   Temp  Av.6 °F (37 °C)  Min: 98 °F (36.7 °C)  Max: 100 °F (37.8 °C)  Pulse  Av  Min: 64  Max: 69  BP  Min: 91/53  Max: 131/92  SpO2  Av.3 %  Min: 94 %  Max: 97 %    24 hour I/O    Intake/Output Summary (Last 24 hours) at 3/23/2021 1530  Last data filed at 3/23/2021 1239  Gross per 24 hour   Intake 1090 ml   Output 650 ml   Net 440 ml     Current Facility-Administered Medications   Medication Dose Route Frequency Provider Last Rate Last Admin    SMOG Enema  330 mL Rectal Once ParadiseJAMILA Roy CNP        amiodarone (CORDARONE) tablet 400 mg  400 mg Oral BID JAMILA Frank CNP   400 mg at 21 7043    atorvastatin (LIPITOR) tablet 10 mg  10 mg Oral Nightly Junie Olszewski, MD   10 mg at 21 2221    [Held by provider] carvedilol (COREG) tablet 3.125 mg  3.125 mg Oral BID  Gary Fraser MD   Stopped at 21 0900    DULoxetine (CYMBALTA) extended release capsule 30 mg  30 mg Oral Daily Junie Olszewski, MD   30 mg at 21 3420    ferrous sulfate (IRON 325) tablet 325 mg  325 mg Oral Once per day on  Junie Olszewski, MD   325 mg at 21 1810    gabapentin (NEURONTIN) capsule 100 mg  100 mg Oral TID Asim Vernon MD   100 mg at 03/23/21 1353    latanoprost (XALATAN) 0.005 % ophthalmic solution 1 drop  1 drop Both Eyes Nightly Asim Vernon MD   1 drop at 03/22/21 2221    melatonin disintegrating tablet 5 mg  5 mg Oral Nightly PRN Asim Vernon MD        therapeutic multivitamin-minerals 1 tablet  1 tablet Oral Daily Asim Vernon MD   1 tablet at 03/23/21 0943    pantoprazole (PROTONIX) tablet 40 mg  40 mg Oral QAM AC Asim Vernon MD   40 mg at 03/23/21 4271    sennosides-docusate sodium (SENOKOT-S) 8.6-50 MG tablet 1 tablet  1 tablet Oral Daily Asim Vernon MD   1 tablet at 03/23/21 0944    psyllium (METAMUCIL) 58.12 % packet 1 packet  1 packet Oral Daily Asim Vernon MD   1 packet at 03/23/21 0944    tamsulosin (FLOMAX) capsule 0.4 mg  0.4 mg Oral Daily Asim Vernon MD   0.4 mg at 03/23/21 0944    timolol (TIMOPTIC) 0.5 % ophthalmic solution 1 drop  1 drop Left Eye BID Asim Vernon MD   1 drop at 03/23/21 0944    Vitamin D (CHOLECALCIFEROL) tablet 2,000 Units  2,000 Units Oral Daily Asim Vernon MD   2,000 Units at 03/23/21 8316    sodium chloride flush 0.9 % injection 10 mL  10 mL Intravenous 2 times per day Asim Vernon MD   10 mL at 03/23/21 0944    sodium chloride flush 0.9 % injection 10 mL  10 mL Intravenous PRN Asim Vernon MD   10 mL at 03/20/21 0552    potassium chloride (KLOR-CON M) extended release tablet 40 mEq  40 mEq Oral PRN Asim Vernon MD        Or   [employer] potassium bicarb-citric acid (EFFER-K) effervescent tablet 40 mEq  40 mEq Oral PRN Asim Vernon MD        Or   [employer] potassium chloride 10 mEq/100 mL IVPB (Peripheral Line)  10 mEq Intravenous PRN Asim Vernon MD        magnesium sulfate 2000 mg in 50 mL IVPB premix  2,000 mg Intravenous PRN Asim Vernon MD        senna (SENOKOT) tablet 8.6 mg  1 tablet Oral Daily PRN Asim Vernon MD        acetaminophen (TYLENOL) tablet 650 mg  650 mg Oral Q6H PRN Asim Vernon MD Or    acetaminophen (TYLENOL) suppository 650 mg  650 mg Rectal Q6H PRN Shana Hester MD        heparin (porcine) injection 5,000 Units  5,000 Units Subcutaneous BID Shana Hester MD   5,000 Units at 03/23/21 0944    acetaminophen (TYLENOL) tablet 1,000 mg  1,000 mg Oral Q6H PRN Shana Hester MD   1,000 mg at 03/23/21 0947    oxyCODONE (ROXICODONE) immediate release tablet 5 mg  5 mg Oral Q6H PRN Shana Hester MD   5 mg at 03/21/21 1100    morphine (PF) injection 2 mg  2 mg Intravenous Q4H PRN Shana Hester MD           Objective:     Telemetry monitor: AS      Physical Exam:  Constitutional and general appearance: alert, cooperative, no distress and appears stated age  HEENT: PERRL, no cervical lymphadenopathy. No masses palpable. Normal oral mucosa  Respiratory:  · Normal excursion and expansion without use of accessory muscles  · Resp auscultation: Normal breath sounds without wheezing, rhonchi, and rales  Cardiovascular:  · The apical impulse is not displaced  · Heart tones are crisp and normal. regular S1 and S2.  · Jugular venous pulsation Normal  · The carotid upstroke is normal in amplitude and contour without delay or bruit  · Peripheral pulses are symmetrical and full   Abdomen:  · No masses or tenderness  · Bowel sounds present  Extremities:  ·  No cyanosis or clubbing  ·  No lower extremity edema  ·  Skin: warm and dry  Neurological:  · Alert and oriented  · Moves all extremities well  · No abnormalities of mood, affect, memory, mentation, or behavior are noted    Data  EKG 3/19/2021:  SR with 1st degree AVB        Echo 8/31/2018:   Overall left ventricular ejection fraction is estimated to be 60-65%. There is borderline concentric left ventricular hypertrophy. The left ventricular function is normal.  The left ventricular wall motion is normal.  The diastolic function is impaired and classified as Grade 1  (impaired relaxation).   There is a pacemaker lead in the right ventricle. The left atrium is mildly dilated. Mild  - moderate tricuspid regurgitation is present. The estimated right ventricular systolic pressure is consistent with  mild pulmonary hypertension. Stress 9/4/2018: The LV EF is 84%  Normal global and regional wall motion in all territories. There is a small sized, fixed defect in the apical wall. This defect is consistent with soft tissue attenuation. 1.Non-diagnostic ECG for ischemia with pharmocologic stress. 2.Negative nuclear stress imaging for inducible ischemia. All labs and testing reviewed.   Lab Review     Renal Profile:   Lab Results   Component Value Date    CREATININE 0.6 03/23/2021    BUN 12 03/23/2021     03/23/2021    K 4.1 03/23/2021     03/23/2021    CO2 25 03/23/2021     CBC:    Lab Results   Component Value Date    WBC 7.7 03/23/2021    RBC 2.65 03/23/2021    HGB 8.5 03/23/2021    HCT 25.4 03/23/2021    MCV 96.1 03/23/2021    RDW 13.9 03/23/2021     03/23/2021     BNP:  No results found for: BNP  Fasting Lipid Panel:  No results found for: CHOL, HDL, TRIG  Cardiac Enzymes:  CK/MbTroponin  Lab Results   Component Value Date    TROPONINI 0.02 03/19/2021     PT/ INR   Lab Results   Component Value Date    INR 1.37 03/18/2021    INR 1.17 02/13/2019    INR 1.20 11/29/2018    PROTIME 15.9 03/18/2021    PROTIME 13.3 02/13/2019    PROTIME 13.7 11/29/2018     PTT No results found for: PTT   Lab Results   Component Value Date    MG 2.20 03/19/2021      Lab Results   Component Value Date    TSH 1.05 03/19/2021       Assessment:  SVT: stable  NSVT: stable   Paroxsymal atrial fibrillation: stable   -[id number] score 3 (age, gender)    -TSH normal 3/2021  Sick sinus syndrome: stable   -s/p dual chamber pacemaker implant 2011, at [facility], will need generator change after ortho recovery   Distal femur fracture s/p repair 3/20/2021  Hypokalemia  Anemia: Venofer this admission   Hyponatremia   Hypotension: BOP's have been soft this admission   Hypoxemia: requiring oxygen per NC      Plan:   Continue amiodarone 400 mg PO BID x 2 more days then 400 mg PO daily for 4 days then 200 mg PO daily to load to ~10 g   Continue Coreg and Lipitor  No anticoagulation at this time given dementia, history of falls, GIB and comorbid conditions per Dr. Julieta Cannon note    Maintain normal electrolytes     [address] [facility], APRN-CNP  [facility]  [phone]

## 2021-03-23 NOTE — PROGRESS NOTES
Hospitalist Progress Note      PCP: Veronique Kwong MD    Date of Admission: 3/19/2021    Chief Complaint: Right leg pain    Hospital Course:  79 yo female initially presented to [facility] ED on 3/18/21 evaluated for mechanical fall with resultant right leg injury. Imaging showed acute displaced fracture of right distal femur. Orthopedic surgery consulted. Pt was transferred to [facility]. Status post ORIF on 3/20/21. Subjective:   Pt is on 1 LPM. Tmax 100, currently afebrile. Pt complaining of constipation.     Medications:  Reviewed    Infusion Medications     Scheduled Medications    SMOG Enema  330 mL Rectal Once    amiodarone  400 mg Oral BID    iron sucrose (VENOFER) iv piggyback 100 mL (Admin over 60 minutes)  100 mg Intravenous Q24H    atorvastatin  10 mg Oral Nightly    [Held by provider] carvedilol  3.125 mg Oral BID WC    DULoxetine  30 mg Oral Daily    ferrous sulfate  325 mg Oral Once per day on Mon Wed Fri    gabapentin  100 mg Oral TID    latanoprost  1 drop Both Eyes Nightly    therapeutic multivitamin-minerals  1 tablet Oral Daily    pantoprazole  40 mg Oral QAM AC    sennosides-docusate sodium  1 tablet Oral Daily    psyllium  1 packet Oral Daily    tamsulosin  0.4 mg Oral Daily    timolol  1 drop Left Eye BID    vitamin D  2,000 Units Oral Daily    sodium chloride flush  10 mL Intravenous 2 times per day    heparin (porcine)  5,000 Units Subcutaneous BID     PRN Meds: melatonin, sodium chloride flush, potassium chloride **OR** potassium alternative oral replacement **OR** potassium chloride, magnesium sulfate, senna, acetaminophen **OR** acetaminophen, acetaminophen, oxyCODONE, morphine      Intake/Output Summary (Last 24 hours) at 3/23/2021 1517  Last data filed at 3/23/2021 1239  Gross per 24 hour   Intake 1090 ml   Output 650 ml   Net 440 ml       Physical Exam Performed:    /68   Pulse 67   Temp 98.4 °F (36.9 °C) (Oral)   Resp 18   Ht 5' 3\" (1.6 m)   Wt 127 lb 3.3 oz (57.7 kg)   SpO2 94%   BMI 22.53 kg/m²     General appearance: No apparent distress, appears stated age and cooperative. HEENT: Pupils equal, round, and reactive to light. Conjunctivae/corneas clear. Neck: Supple, with full range of motion. No jugular venous distention. Trachea midline. Respiratory:  Normal respiratory effort. Clear to auscultation, bilaterally without Rales/Wheezes/Rhonchi. Cardiovascular: Regular rate and rhythm with normal S1/S2 without murmurs, rubs or gallops. Abdomen: Soft, non-tender, non-distended with normal bowel sounds. Musculoskeletal: No clubbing, cyanosis. Right knee immobilizer in place. Skin: Skin color, texture, turgor normal.  No rashes or lesions. Neurologic:  Neurovascularly intact without any focal sensory/motor deficits. Cranial nerves: II-XII intact, grossly non-focal.  Psychiatric: Alert and oriented  Capillary Refill: Brisk,< 3 seconds   Peripheral Pulses: +2 palpable, equal bilaterally       Labs:   Recent Labs     03/21/21  0621 03/22/21  0615 03/23/21  0745   WBC 10.5 9.7 7.7   HGB 8.7* 8.7* 8.5*   HCT 26.3* 26.5* 25.4*    259 259     Recent Labs     03/21/21  0621 03/22/21  0615 03/23/21  0745    128* 134*   K 3.5 4.3 4.1    95* 100   CO2 25 25 25   BUN 10 14 12   CREATININE 0.7 0.6 0.6   CALCIUM 8.2* 8.5 8.1*     Urinalysis:      Lab Results   Component Value Date    NITRU Negative 11/10/2018    WBCUA 10-20 11/10/2018    BACTERIA 2+ 11/10/2018    RBCUA 0-2 11/10/2018    BLOODU Negative 11/10/2018    SPECGRAV 1.010 11/10/2018    GLUCOSEU Negative 11/10/2018    GLUCOSEU Neg 04/25/2010       Radiology:  XR CHEST PORTABLE   Final Result   Mild cardiomegaly and mild central pulmonary congestion which is more   prominent. Mild bibasilar atelectasis vs early infiltrates which is more prominent. Severe osteoarthritic changes in both shoulders which is unchanged.          XR KNEE RIGHT (1-2 VIEWS)   Final Result Intraoperative view of right knee revision arthroplasty         XR CHEST PORTABLE   Final Result   No acute process. FLUORO FOR SURGICAL PROCEDURES    (Results Pending)         Assessment/Plan:    Active Hospital Problems    Diagnosis    CAD (coronary artery disease) [I25.10]    Closed fracture of right distal femur ([facility] [address] [S72.401A]    Hypoalbuminemia [E88.09]    GERD (gastroesophageal reflux disease) [K21.9]    Wide-complex tachycardia (HCC) [I47.2]    Paroxysmal atrial fibrillation ([address] [I48.0]    Pacemaker [Z95.0]    Hypertension [I10]       1. Right distal femur fracture due to mechanical fall status post repair on 3/20. Continue prn analgesia, bowel regimen, IS, DVT prophylaxis and PT/OT. 2.  NSVT/SVT noted on 3/19, amiodarone bolus x2 resulted in termination of arrhythmia. Cardiology was consulted who recommended discontinuation of amiodarone drip, pt started on PO amiodarone. Per EP: oral amiodarone 400 mg TID for 4 days, then 400 mg BID for 4 days, then 400 mg daily for 4 days and then 200 mg daily after to load to ~10 g. Coreg 3.125mg BID with hold parameters No anticoagulation due to GI bleed, falls, dementia. Continue to monitor pt on telemetry. 3.  Paroxysmal atrial fibrillation/diastolic CHF. Currently flecainide on hold per cardiology recommendation, continue with amiodarone as recommended and Coreg with holding parameters, no anticoagulation due to history of GI bleed, fall, dementia. Holding lasix due to hyponatremia/hypotension. 4.  GERD, continue PPI. 5.  Hypokalemia, monitor and replete as needed. 6.  Acute blood loss anemia, suspect secondary to above. Pt given IV venofer. H&H is stable. 7.  Hyponatremia, continue to hold lasix, s/p IV fluids. Improved. Monitor. 8.  Hypoxemia, likely due to atelectasis, wean supplemental O2 as tolerated, encourage IS. 9.  Constipation, continue bowel regimen, give suppository, if no BM then enema.        DVT Prophylaxis: SQ heparin   Diet: Dietary Nutrition Supplements: Other Oral Supplement (see comment)  DIET DENTAL SOFT; No Added Salt (3-4 GM);  Daily Fluid Restriction: 2000 ml  Code Status: DNR-CCA    PT/OT Eval Status: ECF with PT/OT    Dispo - 1-2 days    [address] [facility], APRN - CNP

## 2021-03-24 VITALS
OXYGEN SATURATION: 98 % | WEIGHT: 128.97 LBS | RESPIRATION RATE: 16 BRPM | DIASTOLIC BLOOD PRESSURE: 58 MMHG | TEMPERATURE: 99.3 F | BODY MASS INDEX: 22.85 KG/M2 | HEIGHT: 63 IN | SYSTOLIC BLOOD PRESSURE: 106 MMHG | HEART RATE: 65 BPM

## 2021-03-24 LAB
ANION GAP SERPL CALCULATED.3IONS-SCNC: 8 MMOL/L (ref 3–16)
BUN BLDV-MCNC: 8 MG/DL (ref 7–20)
CALCIUM SERPL-MCNC: 8.5 MG/DL (ref 8.3–10.6)
CHLORIDE BLD-SCNC: 100 MMOL/L (ref 99–110)
CO2: 24 MMOL/L (ref 21–32)
CREAT SERPL-MCNC: <0.5 MG/DL (ref 0.6–1.2)
GFR AFRICAN AMERICAN: >60
GFR NON-AFRICAN AMERICAN: >60
GLUCOSE BLD-MCNC: 90 MG/DL (ref 70–99)
POTASSIUM REFLEX MAGNESIUM: 4 MMOL/L (ref 3.5–5.1)
SARS-COV-2, NAAT: NOT DETECTED
SODIUM BLD-SCNC: 132 MMOL/L (ref 136–145)

## 2021-03-24 PROCEDURE — 97535 SELF CARE MNGMENT TRAINING: CPT

## 2021-03-24 PROCEDURE — 97530 THERAPEUTIC ACTIVITIES: CPT

## 2021-03-24 PROCEDURE — 94761 N-INVAS EAR/PLS OXIMETRY MLT: CPT

## 2021-03-24 PROCEDURE — 6360000002 HC RX W HCPCS: Performed by: HOSPITALIST

## 2021-03-24 PROCEDURE — 2700000000 HC OXYGEN THERAPY PER DAY

## 2021-03-24 PROCEDURE — 6370000000 HC RX 637 (ALT 250 FOR IP): Performed by: HOSPITALIST

## 2021-03-24 PROCEDURE — 87635 SARS-COV-2 COVID-19 AMP PRB: CPT

## 2021-03-24 PROCEDURE — 36415 COLL VENOUS BLD VENIPUNCTURE: CPT

## 2021-03-24 PROCEDURE — 2580000003 HC RX 258: Performed by: HOSPITALIST

## 2021-03-24 PROCEDURE — 6370000000 HC RX 637 (ALT 250 FOR IP): Performed by: NURSE PRACTITIONER

## 2021-03-24 PROCEDURE — 80048 BASIC METABOLIC PNL TOTAL CA: CPT

## 2021-03-24 PROCEDURE — 99232 SBSQ HOSP IP/OBS MODERATE 35: CPT | Performed by: NURSE PRACTITIONER

## 2021-03-24 PROCEDURE — 97110 THERAPEUTIC EXERCISES: CPT

## 2021-03-24 RX ORDER — AMIODARONE HYDROCHLORIDE 400 MG/1
400 TABLET ORAL ONCE
Qty: 1 TABLET | Refills: 0 | Status: CANCELLED | OUTPATIENT
Start: 2021-03-24 | End: 2021-03-24

## 2021-03-24 RX ORDER — AMIODARONE HYDROCHLORIDE 400 MG/1
400 TABLET ORAL DAILY
Qty: 5 TABLET | Refills: 0 | Status: ON HOLD | OUTPATIENT
Start: 2021-03-24 | End: 2021-06-23 | Stop reason: HOSPADM

## 2021-03-24 RX ORDER — AMIODARONE HYDROCHLORIDE 200 MG/1
200 TABLET ORAL DAILY
Qty: 30 TABLET | Refills: 0 | Status: SHIPPED | OUTPATIENT
Start: 2021-03-29

## 2021-03-24 RX ORDER — OXYCODONE HYDROCHLORIDE AND ACETAMINOPHEN 5; 325 MG/1; MG/1
1 TABLET ORAL EVERY 4 HOURS PRN
Qty: 18 TABLET | Refills: 0 | Status: SHIPPED | OUTPATIENT
Start: 2021-03-24 | End: 2021-03-27

## 2021-03-24 RX ADMIN — Medication 2000 UNITS: at 08:48

## 2021-03-24 RX ADMIN — FERROUS SULFATE TAB 325 MG (65 MG ELEMENTAL FE) 325 MG: 325 (65 FE) TAB at 08:47

## 2021-03-24 RX ADMIN — DULOXETINE HYDROCHLORIDE 30 MG: 30 CAPSULE, DELAYED RELEASE ORAL at 08:48

## 2021-03-24 RX ADMIN — TIMOLOL MALEATE 1 DROP: 5 SOLUTION OPHTHALMIC at 11:31

## 2021-03-24 RX ADMIN — PANTOPRAZOLE SODIUM 40 MG: 40 TABLET, DELAYED RELEASE ORAL at 06:12

## 2021-03-24 RX ADMIN — Medication 10 ML: at 08:48

## 2021-03-24 RX ADMIN — GABAPENTIN 100 MG: 100 CAPSULE ORAL at 13:19

## 2021-03-24 RX ADMIN — Medication 1 TABLET: at 08:47

## 2021-03-24 RX ADMIN — AMIODARONE HYDROCHLORIDE 400 MG: 200 TABLET ORAL at 08:47

## 2021-03-24 RX ADMIN — HEPARIN SODIUM 5000 UNITS: 5000 INJECTION INTRAVENOUS; SUBCUTANEOUS at 08:48

## 2021-03-24 RX ADMIN — TAMSULOSIN HYDROCHLORIDE 0.4 MG: 0.4 CAPSULE ORAL at 08:47

## 2021-03-24 RX ADMIN — GABAPENTIN 100 MG: 100 CAPSULE ORAL at 08:48

## 2021-03-24 ASSESSMENT — PAIN SCALES - GENERAL
PAINLEVEL_OUTOF10: 0
PAINLEVEL_OUTOF10: 0

## 2021-03-24 NOTE — PROGRESS NOTES
Occupational Therapy  Facility/Department: [facility] A2 CARD TELEMETRY  Daily Treatment Note  NAME: Kaye Maya  : 1932  MRN: [id number]    Date of Service: 3/24/2021    Discharge Recommendations:  ECF with OT  OT Equipment Recommendations  Equipment Needed: No  Other: defer    Assessment   Performance deficits / Impairments: Decreased functional mobility ; Decreased ADL status; Decreased strength;Decreased safe awareness;Decreased cognition;Decreased endurance;Decreased balance;Decreased posture  Assessment: Pt continues to be agreeable to treatment, with much improve attention/arousal and cognition overall this date. Pt continues to require assist of two people for bed mobility and to complete partial stand up to walker. Pt also continues to require significant amount of assistance for LE ADL and toileting tasks. Continue OT tx.  OT Education: OT Role;Plan of Care;Precautions; ADL Adaptive Strategies;Transfer Training;Equipment;Orientation; Energy Conservation  Patient Education: importance of mobility  Barriers to Learning: Cognition  REQUIRES OT FOLLOW UP: Yes  Activity Tolerance  Activity Tolerance: Treatment limited secondary to decreased cognition;Patient limited by fatigue  Activity Tolerance: /65, HR 65, Spo2 99% on 1L O2 as taken by nursing  Safety Devices  Safety Devices in place: Yes  Type of devices: Call light within reach;Nurse notified; Left in bed;Bed alarm in place;Gait belt         Patient Diagnosis(es): The encounter diagnosis was Closed fracture of distal end of right femur, unspecified fracture morphology, initial encounter ([facility] [address] has a past medical history of Anemia, Anxiety, Arthritis, Atrial fibrillation ([address] CAD (coronary artery disease), Chronic GERD, Depression, Diverticulosis, Gall stone, GERD (gastroesophageal reflux disease), Glaucoma, Hypercholesteremia, Hypertension, Irregular heart beat, Rectal prolapse, and Uterine cancer ([address]    has a past surgical history that includes Hysterectomy (2008); Appendectomy; Pacemaker insertion; Tonsillectomy; Colonoscopy (2005); Colonoscopy (1010); Colonoscopy (10/4/2014); hip surgery (Right, 8/4/15); pacemaker placement (2008); fracture surgery; pr cptr-asst surgical navigation image-less (Right, 9/5/2018); joint replacement; knee surgery; other surgical history (Right, 10/29/2018); pr office/outpt visit,procedure only (Right, 10/29/2018); pr split grft trunk,arm,leg <100sqcm (Right, 11/30/2018); Colonoscopy (N/A, 2/15/2019); and Revision total knee arthroplasty (Right, 3/20/2021). Restrictions  Restrictions/Precautions  Restrictions/Precautions: General Precautions, Up as Tolerated  Position Activity Restriction  Other position/activity restrictions: Pt wearing KI, no WB status or joint precautions; left note for the MD after multiple attempts to contact physicans and ortho PA  Subjective   General  Chart Reviewed: Yes  Patient assessed for rehabilitation services?: Yes  Additional Pertinent Hx: HTN, depression, anxiety, A-fib, glaucoma  Family / Caregiver Present: No  Referring Practitioner: Angelique Adams MD  Diagnosis: Fall with R distal femur fx s/p revision of R TKA  Subjective  Subjective: Pt in bed on arrival, alert and seemingly less confused this date, pleasant and agreeable to treatment  General Comment  Comments: Per RN okay to treat  Vital Signs  Patient Currently in Pain: Denies   Orientation  Orientation  Overall Orientation Status: Impaired  Orientation Level: Disoriented to time;Oriented to situation;Oriented to place;Oriented to person  Objective    ADL  Feeding: Setup;Supervision;Dentures  Grooming: Setup;Verbal cueing; Increased time to complete;Minimal assistance(seated and in semi-ewing's)  LE Dressing: Dependent/Total  Toileting: Dependent/Total(pt incontinent of bowel 2x during session)     Balance  Sitting Balance: Stand by assistance  Standing Balance: Dependent/Total(Max A x2 but unable to achieve upright posture)  Standing Balance  Time: <10 seconds for partial stand  Functional Mobility  Functional Mobility Comments: No mobility attempted d/t unable to achieve upright stand to walker  Bed mobility  Rolling to Left: Moderate assistance  Rolling to Right: Moderate assistance  Supine to Sit: Moderate assistance;2 Person assistance(HOB elevated, cues for technique)  Sit to Supine: Moderate assistance;2 Person assistance  Scooting: Maximal assistance;Dependent/Total(Max A to EOB, Max A x2 toward HOB)  Transfers  Sit to stand: Dependent/Total(Max A x2 for partial stand up to RW)  Stand to sit: Dependent/Total(Max A x2)        Coordination  Fine Motor: Appropriate for observed ADL, arthritic changes/deformity both hands     Cognition  Overall Cognitive Status: Exceptions  Arousal/Alertness: Appropriate responses to stimuli  Following Commands: Follows one step commands with repetition; Follows one step commands with increased time  Attention Span: Attends with cues to redirect  Memory: Decreased recall of recent events;Decreased short term memory(improved overall this date)  Problem Solving: Assistance required to generate solutions;Assistance required to implement solutions  Insights: Decreased awareness of deficits  Initiation: Requires cues for some  Sequencing: Requires cues for some         Plan   Plan  Times per week: 3-5x/week  Current Treatment Recommendations: Strengthening, Functional Mobility Training, Balance Training, ROM, Endurance Training, Patient/Caregiver Education & Training, Safety Education & Training, Self-Care / ADL, Equipment Evaluation, Education, & procurement, Pain Management, Positioning    AM-PAC Score        AM-PAC Inpatient Daily Activity Raw Score: 11 (03/24/21 1332)  AM-PAC Inpatient ADL T-Scale Score : 29.04 (03/24/21 1332)  ADL Inpatient CMS 0-100% Score: 70.42 (03/24/21 1332)  ADL Inpatient CMS G-Code Modifier : CL (03/24/21 1332)    Goals  Short term goals  Time Frame for Short term

## 2021-03-24 NOTE — PROGRESS NOTES
[facility]     Electrophysiology                                     Progress Note    Admission date:  3/19/2021    Reason for follow up visit: WCT    HPI/CC: Amy Seen was evaluated in the ED at [facility] for fall and distal femur fracture. While awaiting transfer to [facility], noted to have a WCT on monitor. Device interrogation showed SVT and NSVT. IV Amiodarone was started on 3/19/2021 and transitioned to PO on 3/20/2021. Rhythm has been AS . Subjective: Complains of right leg pain. Denies chest pain, palpitations, shortness of breath, and dizziness. Vitals:  Blood pressure 105/73, pulse 65, temperature 98.1 °F (36.7 °C), temperature source Oral, resp. rate 14, height 5' 3\" (1.6 m), weight 128 lb 15.5 oz (58.5 kg), SpO2 98 %.   Temp  Av.1 °F (36.7 °C)  Min: 97.4 °F (36.3 °C)  Max: 98.9 °F (37.2 °C)  Pulse  Av  Min: 64  Max: 66  BP  Min: 91/60  Max: 109/65  SpO2  Av.3 %  Min: 93 %  Max: 99 %    24 hour I/O    Intake/Output Summary (Last 24 hours) at 3/24/2021 1321  Last data filed at 3/24/2021 0558  Gross per 24 hour   Intake 360 ml   Output 302 ml   Net 58 ml     Current Facility-Administered Medications   Medication Dose Route Frequency Provider Last Rate Last Admin    [START ON 3/25/2021] enoxaparin (LOVENOX) injection 40 mg  40 mg Subcutaneous Daily JAMILA Doll CNP        amiodarone (CORDARONE) tablet 400 mg  400 mg Oral BID JAMILA Means CNP   400 mg at 21 0847    atorvastatin (LIPITOR) tablet 10 mg  10 mg Oral Nightly Deborah Ta MD   10 mg at 21 2216    [Held by provider] carvedilol (COREG) tablet 3.125 mg  3.125 mg Oral BID  Henri Licea MD   Stopped at 21 0900    DULoxetine (CYMBALTA) extended release capsule 30 mg  30 mg Oral Daily Deborah Ta MD   30 mg at 21 0848    ferrous sulfate (IRON 325) tablet 325 mg  325 mg Oral Once per day on  Deborah Ta MD   325 mg at 21 0847    gabapentin (NEURONTIN) capsule 100 mg  100 mg Oral TID Junie Olszewski, MD   100 mg at 03/24/21 1319    latanoprost (XALATAN) 0.005 % ophthalmic solution 1 drop  1 drop Both Eyes Nightly Junie Olszewski, MD   1 drop at 03/23/21 2216    melatonin disintegrating tablet 5 mg  5 mg Oral Nightly PRN Junie Olszewski, MD        therapeutic multivitamin-minerals 1 tablet  1 tablet Oral Daily Junie Olszewski, MD   1 tablet at 03/24/21 0847    pantoprazole (PROTONIX) tablet 40 mg  40 mg Oral QAM AC Junie Olszewski, MD   40 mg at 03/24/21 0612    sennosides-docusate sodium (SENOKOT-S) 8.6-50 MG tablet 1 tablet  1 tablet Oral Daily Junie Olszewski, MD   Stopped at 03/24/21 0847    psyllium (METAMUCIL) 58.12 % packet 1 packet  1 packet Oral Daily Junie Olszewski, MD   Stopped at 03/24/21 0847    tamsulosin (FLOMAX) capsule 0.4 mg  0.4 mg Oral Daily Junie Olszewski, MD   0.4 mg at 03/24/21 0847    timolol (TIMOPTIC) 0.5 % ophthalmic solution 1 drop  1 drop Left Eye BID Junie Olszewski, MD   1 drop at 03/24/21 1131    Vitamin D (CHOLECALCIFEROL) tablet 2,000 Units  2,000 Units Oral Daily Junie Olszewski, MD   2,000 Units at 03/24/21 0848    sodium chloride flush 0.9 % injection 10 mL  10 mL Intravenous 2 times per day Junie Olszewski, MD   10 mL at 03/24/21 0848    sodium chloride flush 0.9 % injection 10 mL  10 mL Intravenous PRN Junie Olszewski, MD   10 mL at 03/20/21 0552    potassium chloride (KLOR-CON M) extended release tablet 40 mEq  40 mEq Oral PRN Junie Olszewski, MD        Or   [facility] potassium bicarb-citric acid (EFFER-K) effervescent tablet 40 mEq  40 mEq Oral PRN Junie Olszewski, MD        Or   [facility] potassium chloride 10 mEq/100 mL IVPB (Peripheral Line)  10 mEq Intravenous PRN Junie Olszewski, MD        magnesium sulfate 2000 mg in 50 mL IVPB premix  2,000 mg Intravenous PRN Junie Olszewski, MD        senna (SENOKOT) tablet 8.6 mg  1 tablet Oral Daily PRN Junie Olszewski, MD        acetaminophen (TYLENOL) tablet 650 mg  650 mg Oral Q6H PRN Ebony Curtis MD        Or    acetaminophen (TYLENOL) suppository 650 mg  650 mg Rectal Q6H PRN Ebony Curtis MD        acetaminophen (TYLENOL) tablet 1,000 mg  1,000 mg Oral Q6H PRN Ebony Curtis MD   1,000 mg at 03/23/21 2349    oxyCODONE (ROXICODONE) immediate release tablet 5 mg  5 mg Oral Q6H PRN Ebony Curtis MD   5 mg at 03/21/21 1100    morphine (PF) injection 2 mg  2 mg Intravenous Q4H PRN Ebony Curtis MD           Objective:     Telemetry monitor: AS      Physical Exam:  Constitutional and general appearance: alert, cooperative, no distress and appears stated age  [de-identified]: PERRL, no cervical lymphadenopathy. No masses palpable. Normal oral mucosa  Respiratory:  · Normal excursion and expansion without use of accessory muscles  · Resp auscultation: Normal breath sounds without wheezing, rhonchi, and rales  Cardiovascular:  · The apical impulse is not displaced  · Heart tones are crisp and normal. regular S1 and S2.  · Jugular venous pulsation Normal  · The carotid upstroke is normal in amplitude and contour without delay or bruit  · Peripheral pulses are symmetrical and full   Abdomen:  · No masses or tenderness  · Bowel sounds present  Extremities:  ·  No cyanosis or clubbing  ·  No lower extremity edema  ·  Skin: warm and dry  Neurological:  · Alert and oriented  · Moves all extremities well  · No abnormalities of mood, affect, memory, mentation, or behavior are noted    Data  EKG 3/19/2021:  SR with 1st degree AVB        Echo 8/31/2018:   Overall left ventricular ejection fraction is estimated to be 60-65%. There is borderline concentric left ventricular hypertrophy. The left ventricular function is normal.  The left ventricular wall motion is normal.  The diastolic function is impaired and classified as Grade 1  (impaired relaxation). There is a pacemaker lead in the right ventricle. The left atrium is mildly dilated.   Mild  - moderate tricuspid regurgitation is present. The estimated right ventricular systolic pressure is consistent with  mild pulmonary hypertension. Stress 9/4/2018: The LV EF is 84%  Normal global and regional wall motion in all territories. There is a small sized, fixed defect in the apical wall. This defect is consistent with soft tissue attenuation. 1.Non-diagnostic ECG for ischemia with pharmocologic stress. 2.Negative nuclear stress imaging for inducible ischemia. All labs and testing reviewed.   Lab Review     Renal Profile:   Lab Results   Component Value Date    CREATININE <0.5 03/24/2021    BUN 8 03/24/2021     03/24/2021    K 4.0 03/24/2021     03/24/2021    CO2 24 03/24/2021     CBC:    Lab Results   Component Value Date    WBC 7.7 03/23/2021    RBC 2.65 03/23/2021    HGB 8.5 03/23/2021    HCT 25.4 03/23/2021    MCV 96.1 03/23/2021    RDW 13.9 03/23/2021     03/23/2021     BNP:  No results found for: BNP  Fasting Lipid Panel:  No results found for: CHOL, HDL, TRIG  Cardiac Enzymes:  CK/MbTroponin  Lab Results   Component Value Date    TROPONINI 0.02 03/19/2021     PT/ INR   Lab Results   Component Value Date    INR 1.37 03/18/2021    INR 1.17 02/13/2019    INR 1.20 11/29/2018    PROTIME 15.9 03/18/2021    PROTIME 13.3 02/13/2019    PROTIME 13.7 11/29/2018     PTT No results found for: PTT   Lab Results   Component Value Date    MG 2.20 03/19/2021      Lab Results   Component Value Date    TSH 1.05 03/19/2021       Assessment:  SVT: stable  NSVT: stable   Paroxsymal atrial fibrillation: stable   -[id number] score 3 (age, gender)    -TSH normal 3/2021  Sick sinus syndrome: stable   -s/p dual chamber pacemaker implant 2011, at [facility], will need generator change after ortho recovery   Distal femur fracture s/p repair 3/20/2021  Hypokalemia  Anemia: Venofer this admission   Hyponatremia   Hypotension: BOP's have been soft this admission   Hypoxemia: requiring oxygen per NC      Plan:   Continue amiodarone 400 mg PO BID x 1 more days then 400 mg PO daily for 4 days then 200 mg PO daily to load to ~10 g   Continue Coreg and Lipitor  No anticoagulation at this time given dementia, history of falls, GIB and comorbid conditions per Dr. Daryle Po note    Will need close follow up with primary cardiologist given amiodarone use and device at [facility]. It appears [place] [facility] Okay to discharge from an EP standpoint.     Yolanda Sykes, APRN-CNP  [address]  [phone]

## 2021-03-24 NOTE — PROGRESS NOTES
Report given to Alyse FONTAINE at [facility] Electronically signed by Ihsan Fox RN on 3/24/2021 at 4:53 PM

## 2021-03-24 NOTE — DISCHARGE INSTR - COC
Continuity of Care Form    Patient Name: Charlie Montes   :  1932  MRN:  [id number]    Admit date:  3/19/2021  Discharge date:  2021    Code Status Order: DNR-CCA   Advance Directives:   Advance Care Flowsheet Documentation       Date/Time Healthcare Directive Type of Healthcare Directive Copy in [address] Agent's Name Healthcare Agent's Phone Number    [phone]  Yes, patient has an advance directive for healthcare treatment  Living will;Durable power of  for health care  Yes, copy in chart  --  --  --            Admitting Physician:  Vanesa Ordonez MD  PCP: Alejandro Coreas MD    Discharging Nurse: [facility] Unit/Room#: 0205/0205-01  Discharging Unit Phone Number: [phone]    Emergency Contact:   Extended Emergency Contact Information  Primary Emergency Contact: [address] Phone: [phone]  Mobile Phone: [phone]  Relation: Child    Past Surgical History:  Past Surgical History:   Procedure Laterality Date    APPENDECTOMY      COLONOSCOPY  2005    polyp    COLONOSCOPY  1010    diverticulosis    COLONOSCOPY  10/4/2014    diverticulosis, radiation colitis sigmoid    COLONOSCOPY N/A 2/15/2019    COLON performed by Rand Richardson MD at [place] [address] Right 8/4/15    hip pinning    HYSTERECTOMY  2008    RT in    [facility] OTHER SURGICAL HISTORY Right 10/29/2018    PATELLA OPEN REDUCTION INTERNAL FIXATION     PACEMAKER INSERTION      PACEMAKER PLACEMENT  2008    ND CPTR-ASST SURGICAL NAVIGATION Amber Orantes Right 2018    RIGHT TOTAL KNEE REPLACEMENT COMPUTER NAVIGATION WITH ADDUCTOR CANAL BLOCK FOR PAIN CONTROL                     SONAL performed by Miki Nolen MD at [facility] OFFICE/OUTPT [address] [place] [address] Right 10/29/2018    Right knee inferior pole patellectomy, primary repair of patellar tendon.  performed by Jaspreet Quijano MD at [address] <100SQCM Right 11/30/2018    RIGHT KNEE GASTROC FLAP WITH SKIN GRAFT, WOUND VAC PLACEMENT performed by Sagrario Perea MD at [address] Right 3/20/2021    REVISION RIGHT TOTAL KNEE ARTHROPLASTY       SONAL performed by Sagrario Perea MD at [place] [address].         Immunization History:   Immunization History   Administered Date(s) Administered    Influenza Virus Vaccine 09/10/2014, 10/31/2018    Pneumococcal Conjugate 13-valent ([id number]) 02/14/2019    Pneumococcal Conjugate 7-valent (Jessenia Balwinder) 10/06/2011       Active Problems:  Patient Active Problem List   Diagnosis Code    Localized osteoarthrosis, lower leg M17.10    Rectal bleed K62.5    Osteoarthritis of hip M16.9    Hip fracture (HCC) RIGHT S72.009A    Hypertension I10    Hypercholesteremia E78.00    Primary osteoarthritis of right knee M17.11    Pain due to total right knee replacement ([facility]) T84.84XA, Z96.651    Chronic knee pain after total replacement of right knee joint M25.561, G89.29, Z96.651    Status post total right knee replacement Z96.651    Paroxysmal atrial fibrillation (HCC) I48.0    Pacemaker Z95.0    Displaced comminuted fracture of right patella, initial encounter for closed fracture S82.041A    Severe protein-calorie malnutrition ([facility] [address] E43    Displaced transverse fracture of right patella, initial encounter for closed fracture S82.031A    Hyperlipidemia E78.5    Anemia D64.9    Unstable gait R26.81    Open knee wound, right, sequela S81.001S    GI bleeding K92.2    H/O skin graft Z94.5    Hyponatremia E87.1    CAD (coronary artery disease) I25.10    Closed fracture of right distal femur ([facility]) S72.401A    Hypoalbuminemia E88.09    GERD (gastroesophageal reflux disease) K21.9    Wide-complex tachycardia ([facility]) I47.2       Isolation/Infection:   Isolation No Isolation          Patient Infection Status       Infection Onset Added Last Indicated Last Indicated By Review Planned Expiration Resolved Resolved By    COVID-19 Rule Out 03/22/21 03/24/21 03/22/21 COVID-19 (Ordered) 03/31/21 04/05/21              Nurse Assessment:  Last Vital Signs: /73   Pulse 65   Temp 98.1 °F (36.7 °C) (Oral)   Resp 14   Ht 5' 3\" (1.6 m)   Wt 128 lb 15.5 oz (58.5 kg)   SpO2 98%   BMI 22.85 kg/m²     Last documented pain score (0-10 scale): Pain Level: 0  Last Weight:   Wt Readings from Last 1 Encounters:   03/24/21 128 lb 15.5 oz (58.5 kg)     Mental Status:  oriented, alert and Can be forgetful at times    IV Access:  - None    Nursing Mobility/ADLs:  Walking   Dependent  Transfer  Dependent  Bathing  Dependent  Dressing  Dependent  Toileting  Dependent  Feeding  Assisted  Med Admin  Assisted  Med Delivery   whole    Wound Care Documentation and Therapy:        Elimination:  Continence:   · Bowel: No  · Bladder: No  Urinary Catheter: None   Colostomy/Ileostomy/Ileal Conduit: No       Date of Last BM: 03/24/2021    Intake/Output Summary (Last 24 hours) at 3/24/2021 1237  Last data filed at 3/24/2021 0558  Gross per 24 hour   Intake 360 ml   Output 302 ml   Net 58 ml     I/O last 3 completed shifts: In: 2188 [P.O.:1080; I.V.:10]  Out: 0 [Urine:501; Stool:1]    Safety Concerns: At Risk for Falls    Impairments/Disabilities:      None    Nutrition Therapy:  Current Nutrition Therapy:   - Oral Diet:  Dysphagia 2 mechanically altered and Low Sodium (3-4gm)    Routes of Feeding: Oral  Liquids: Thin Liquids  Daily Fluid Restriction: yes - amount 2000  Last Modified Barium Swallow with Video (Video Swallowing Test): not done    Treatments at the Time of Hospital Discharge:   Respiratory Treatments:   Oxygen Therapy:  is on oxygen at 1 L/min per nasal cannula.   Ventilator:    - No ventilator support    Rehab Therapies: Physical Therapy and Occupational Therapy  Weight Bearing Status/Restrictions: No weight bearing restirctions  Other Medical Equipment (for information only, NOT a DME order):  braces (immobilizer to R leg) and has not been able to get up out of bed  Other Treatments:     Patient's personal belongings (please select all that are sent with patient):  Glasses, Dentures upper and lower    RN SIGNATURE:  Electronically signed by Fouzia Spivey RN on 3/24/21 at 3:15 PM EDT    CASE MANAGEMENT/SOCIAL WORK SECTION    Inpatient Status Date: 3/24/21    Readmission Risk Assessment Score:  Readmission Risk              Risk of Unplanned Readmission:        19           Discharging to Facility/ Agency   · Name: PATIENTS' [facility]   · Address:  · Phone: [phone]  · Fax: [phone]    Dialysis Facility (if applicable)   · Name:  · Address:  · Dialysis Schedule:  · Phone:  · Fax:    / signature: Electronically signed by Tabitha Stahl RN on 3/24/21 at 1:06 PM EDT    PHYSICIAN SECTION    Prognosis: Fair    Condition at Discharge: Stable    Rehab Potential (if transferring to Rehab): Fair    Recommended Labs or Other Treatments After Discharge: PT/OT, holding coreg (3.125 mg BID) and lasix (40 mg BID) due to marginal BPs -- resume if indicated. Physician Certification: I certify the above information and transfer of Liberty Mosqueda  is necessary for the continuing treatment of the diagnosis listed and that she requires ECF for greater 30 days.      Update Admission H&P: No change in H&P    PHYSICIAN SIGNATURE:  Electronically signed by JAMILA Lim CNP on 3/24/21 at 12:38 PM EDT

## 2021-03-24 NOTE — PROGRESS NOTES
Physical Therapy  Facility/Department: [facility] A2 CARD TELEMETRY  Daily Treatment Note  NAME: Brandy Johnson  : 1932  MRN: [id number]    Date of Service: 3/24/2021    Discharge Recommendations:  ECF with PT   PT Equipment Recommendations  Equipment Needed: No  Other: Defer to next level of care. Assessment   Body structures, Functions, Activity limitations: Decreased functional mobility ; Decreased strength;Decreased ROM; Decreased safe awareness;Decreased cognition;Decreased endurance;Decreased balance;Decreased coordination; Increased pain  Assessment: Pt demonstrating improvement this session with bed mobility and sitting balance at edge of bed. Pt continues to be limited by weight bearing status and assist levels. Will continue to progress mobility as pt tolerates. Recommend return to [facility] with PT at discharge. Treatment Diagnosis: Decreased functional mobility s/p right TKA revision  Prognosis: Fair  PT Education: Goals;PT Role;Plan of Care;Home Exercise Program;Functional Mobility Training  Patient Education: Pt verbalized understanding  REQUIRES PT FOLLOW UP: Yes  Activity Tolerance  Activity Tolerance: Patient Tolerated treatment well;Patient limited by fatigue     Patient Diagnosis(es): The encounter diagnosis was Closed fracture of distal end of right femur, unspecified fracture morphology, initial encounter ([address] has a past medical history of Anemia, Anxiety, Arthritis, Atrial fibrillation ([address] CAD (coronary artery disease), Chronic GERD, Depression, Diverticulosis, Gall stone, GERD (gastroesophageal reflux disease), Glaucoma, Hypercholesteremia, Hypertension, Irregular heart beat, Rectal prolapse, and Uterine cancer ([address] has a past surgical history that includes Hysterectomy (); Appendectomy; Pacemaker insertion; Tonsillectomy; Colonoscopy (); Colonoscopy ();  Colonoscopy (10/4/2014); hip surgery (Right, 8/4/15); pacemaker placement (); fracture surgery; pr cptr-asst surgical navigation image-less (Right, 9/5/2018); joint replacement; knee surgery; other surgical history (Right, 10/29/2018); pr office/outpt visit,procedure only (Right, 10/29/2018); pr split grft trunk,arm,leg <100sqcm (Right, 11/30/2018); Colonoscopy (N/A, 2/15/2019); and Revision total knee arthroplasty (Right, 3/20/2021). Restrictions  Restrictions/Precautions  Restrictions/Precautions: General Precautions, Up as Tolerated  Position Activity Restriction  Other position/activity restrictions: Pt wearing KI, no WB status or joint precautions; left note for the MD after multiple attempts to contact physicans and ortho PA  Subjective   General  Chart Reviewed: Yes  Family / Caregiver Present: No  Referring Practitioner: Neha Murphy MD  Subjective  Subjective: Pt agreeable to therapy. Less confused this session. General Comment  Comments: RN cleared pt for therapy. pt supine in bed upon arrival.  Pain Screening  Patient Currently in Pain: Denies  Vital Signs  Patient Currently in Pain: Denies       Objective   Bed mobility  Rolling to Left: Moderate assistance  Rolling to Right: Moderate assistance  Supine to Sit: Moderate assistance;2 Person assistance(cues for sequencing and use of bed rail)  Sit to Supine:  Moderate assistance;2 Person assistance  Comment: rolling in bed for pericare and changing depends x2  Transfers  Sit to Stand: Maximum Assistance;2 Person Assistance  Stand to sit: Maximum Assistance;2 Person Assistance  Comment: attempted sit-stand from EOB to walker with max A x2, only able to complete 25% of a stand this date  Ambulation  Ambulation?: No     Balance  Comments: Sitting balance EOB requires min A initially progressing to CGA/SBA x15 minutes EOB  Exercises  Gluteal Sets: x10  Ankle Pumps: x20 BLE     AM-PAC Score  AM-PAC Inpatient Mobility Raw Score : 8 (03/24/21 1259)  AM-PAC Inpatient T-Scale Score : 28.52 (03/24/21 1259)  Mobility Inpatient CMS 0-100% Score: 86.62 (03/24/21 1259)  Mobility Inpatient CMS G-Code Modifier : CM (03/24/21 1259)        Goals  Short term goals  Time Frame for Short term goals: 5-7 days, unless otherwise stated, by 3/29/21  Short term goal 1: Pt will complete bed mobility with Mod A x1.  3/23 max assist of 2  Short term goal 2: Pt will sit at the EOB for 10 minutes for ADLs with SBA.  3/23 Sat EOB 12 minutes SBA/CG  Short term goal 3: Pt will complete transfers with appropriate WB status from bed<>chair with LRAD. 3/23 Pt did not transfer due to decreased activity tolerance  Short term goal 4: Pt will complete 12-15 reps of BLE ther ex for strength and ROM by 3/25/21.  3/23 Progressing  Patient Goals   Patient goals : Unable to state, pt pleasantly confused. Plan    Plan  Times per week: 7 days wk ORTHO  Specific instructions for Next Treatment: ex, progress mobility  Current Treatment Recommendations: Strengthening, ROM, Balance Training, Functional Mobility Training, Transfer Training, Endurance Training, Gait Training, Pain Management, Cognitive Reorientation, Home Exercise Program, Safety Education & Training, Patient/Caregiver Education & Training, Equipment Evaluation, Education, & procurement  Safety Devices  Type of devices:  All fall risk precautions in place, Bed alarm in place, Call light within reach, Left in bed, Patient at risk for falls, Gait belt, Nurse notified     Therapy Time   Individual Concurrent Group Co-treatment   Time In [phone]         Time Out 1016         Minutes 39         Timed Code Treatment Minutes: [facility],   [facility]

## 2021-03-24 NOTE — CARE COORDINATION
CASE MANAGEMENT DISCHARGE SUMMARY      Discharge to: [address] completed: THE [facility] Exemption Notification (HENS) completed: NA - LTC at facility     [address] ordered/agency:     Transportation: [address]    Family/car:   Medical Transport explained to [employer]. Pt/family voice no agency preference. Agency used:   time: 1700   Ambulance form completed: Yes    Confirmed discharge plan with:     Patient: no     Family, name and contact number: Pt daughter and Tutu Oquendo [phone]     Facility/Agency, name:  [facility] faxed   Phone number for report to facility: [phone]     RN, name: Payal Crane RN     Note: Discharging nurse to complete [facility], reconcile [facility], and place final copy with patient's discharge packet. RN to ensure that written prescriptions for  Level II medications are sent with patient to the facility as per protocol.     Nestor Pineda RN

## 2021-03-24 NOTE — DISCHARGE SUMMARY
Hospital Medicine Discharge Summary    Patient ID: Chiqui Patino      Patient's PCP: Shyla Richardson MD    Admit Date: 3/19/2021     Discharge Date:   3/24/2021    Admitting Physician: Michael Mehta MD     Discharge Physician: JAMILA Vidal - CNP     Discharge Diagnoses: Active Hospital Problems    Diagnosis    CAD (coronary artery disease) [I25.10]    Closed fracture of right distal femur ([address] [S72.401A]    Hypoalbuminemia [E88.09]    GERD (gastroesophageal reflux disease) [K21.9]    Wide-complex tachycardia ([address] [I47.2]    Paroxysmal atrial fibrillation ([address] [I48.0]    Pacemaker [Z95.0]    Hypertension [I10]       The patient was seen and examined on day of discharge and this discharge summary is in conjunction with any daily progress note from day of discharge. Hospital Course:   Chiqui Patino is a 80 y.o. female who presented to [address] [place] [address] ED on 3/18/2021 to be evaluated for mechanical fall with resultant right leg injury. She reports that she was in bed having a bad dream when she tried to jump out of bed and sustained the injury. Upon arrival to the ED imaging was obtained revealing an acute displaced fracture of the right distal femur just proximal to indwelling knee prosthesis. Orthopedic consultation was placed with Dr. Mauricio Lee requesting transfer to [facility] for planned procedural intervention on 3/20/2021. Of note, patient has cardiac pacer in place with interrogation revealing an episode of wide-complex ventricular tachycardia in the ED which spontaneously resolved. The patient has a history of dual pacer converted to single pacer in January 2021 due to battery issues. 1.  Right distal femur fracture due to mechanical fall status post repair on 3/20. Continue prn analgesia, bowel regimen, IS, DVT prophylaxis and PT/OT. 2.  NSVT/SVT noted on 3/19, amiodarone bolus x2 resulted in termination of arrhythmia.  Cardiology was consulted who recommended discontinuation of amiodarone drip, pt started on PO amiodarone. Per EP: oral amiodarone 400 mg TID for 4 days, then 400 mg BID for 4 days, then 400 mg daily for 4 days and then 200 mg daily after to load to ~10 g. Holding coreg due to soft BPs. No anticoagulation due to GI bleed, falls, dementia. 3.  Paroxysmal atrial fibrillation/diastolic CHF. Currently flecainide on hold per cardiology recommendation, continue with amiodarone as recommended, no anticoagulation due to history of GI bleed, fall, dementia. Holding lasix and coreg due to soft BPs.    4.  GERD, continue PPI. 5.  Hypokalemia, resolved with repletion of supplemental K.    6.  Acute blood loss anemia, suspect secondary to above. No overt signs of bleeding. Pt given IV venofer. H&H is stable. 7.  Hyponatremia - mild, continue to hold lasix, s/p IV fluids. Improved. 8.  Hypoxemia, likely due to atelectasis, wean supplemental O2 as tolerated, encourage IS. Stable on 1 LPM.    9.  Constipation, continue bowel regimen. Pt is having BMs. Physical Exam Performed:     BP (!) 106/58   Pulse 65   Temp 99.3 °F (37.4 °C) (Oral)   Resp 16   Ht 5' 3\" (1.6 m)   Wt 128 lb 15.5 oz (58.5 kg)   SpO2 98%   BMI 22.85 kg/m²       General appearance:  No apparent distress, appears stated age and cooperative. HEENT:  Normal cephalic, atraumatic without obvious deformity. Pupils equal, round, and reactive to light. Extra ocular muscles intact. Conjunctivae/corneas clear. Neck: Supple, with full range of motion. No jugular venous distention. Trachea midline. Respiratory:  Normal respiratory effort. Clear to auscultation, bilaterally without Rales/Wheezes/Rhonchi. Cardiovascular:  Regular rate and rhythm with normal S1/S2 without murmurs, rubs or gallops. Abdomen: Soft, non-tender, non-distended with normal bowel sounds. Musculoskeletal:  No clubbing, cyanosis. Right LE in knee immobilizer.    Skin: Skin color, texture, turgor normal.  No rashes or lesions. Neurologic:  Neurovascularly intact without any focal sensory/motor deficits. Cranial nerves: II-XII intact, grossly non-focal.  Psychiatric:  Alert and oriented  Capillary Refill: Brisk,< 3 seconds   Peripheral Pulses: +2 palpable, equal bilaterally       Labs: For convenience and continuity at follow-up the following most recent labs are provided:      CBC:    Lab Results   Component Value Date    WBC 7.7 03/23/2021    HGB 8.5 03/23/2021    HCT 25.4 03/23/2021     03/23/2021       Renal:    Lab Results   Component Value Date     03/24/2021    K 4.0 03/24/2021     03/24/2021    CO2 24 03/24/2021    BUN 8 03/24/2021    CREATININE <0.5 03/24/2021    CALCIUM 8.5 03/24/2021         Significant Diagnostic Studies    Radiology:   XR CHEST PORTABLE   Final Result   Mild cardiomegaly and mild central pulmonary congestion which is more   prominent. Mild bibasilar atelectasis vs early infiltrates which is more prominent. Severe osteoarthritic changes in both shoulders which is unchanged. XR KNEE RIGHT (1-2 VIEWS)   Final Result   Intraoperative view of right knee revision arthroplasty         XR CHEST PORTABLE   Final Result   No acute process.          FLUORO FOR SURGICAL PROCEDURES    (Results Pending)          Consults:     IP CONSULT TO CARDIOLOGY  IP CONSULT TO DIETITIAN    Disposition:  [address] [place] [address]     Condition at Discharge: Stable     Discharge Instructions/Follow-up:  Follow-up with PCP, Cardiology and Ortho    Code Status:  DNR-CCA     Activity: activity as tolerated    Diet: Dental soft, no added salt, fluid restriction      Discharge Medications:     Current Discharge Medication List           Details   !! amiodarone (PACERONE) 400 MG tablet Take 1 tablet by mouth daily for 5 days  Qty: 5 tablet, Refills: 0      !! amiodarone (CORDARONE) 200 MG tablet Take 1 tablet by mouth daily  Qty: 30 tablet, Refills: 0      enoxaparin (LOVENOX) 40 MG/0.4ML injection Inject 0.4 mLs into the skin daily for 17 days  Qty: 17 Syringe, Refills: 0       !! - Potential duplicate medications found. Please discuss with provider. Details   oxyCODONE-acetaminophen (PERCOCET) 5-325 MG per tablet Take 1 tablet by mouth every 4 hours as needed for Pain for up to 3 days. Qty: 18 tablet, Refills: 0    Comments: Reduce doses taken as pain becomes manageable  Associated Diagnoses: Closed fracture of distal end of right femur, unspecified fracture morphology, initial encounter [facility]              Details   diphenhydrAMINE (BENADRYL) 25 MG capsule Take 25 mg by mouth every 6 hours as needed for Itching      gabapentin (NEURONTIN) 100 MG capsule Take 100 mg by mouth 3 times daily.       psyllium (KONSYL) 28.3 % PACK Take 1 packet by mouth daily      nystatin (MYCOSTATIN) POWD powder Apply topically 2 times daily As needed for candidiasis      sertraline (ZOLOFT) 25 MG tablet Take 12.5 mg by mouth daily      hydrocortisone 1 % cream Apply topically every 6-8 hours as needed As needed for discomfort      acetaminophen (TYLENOL) 325 MG tablet Take 650 mg by mouth every 6 hours as needed for Pain      DULoxetine (CYMBALTA) 30 MG extended release capsule Take 30 mg by mouth daily      Melatonin 5 MG CAPS Take 5 mg by mouth nightly as needed      omeprazole (PRILOSEC) 20 MG delayed release capsule Take 40 mg by mouth daily       loperamide (IMODIUM) 2 MG capsule Take 2 mg by mouth as needed for Diarrhea Two tabs after initial diarrhea episode, 1 tab after each additional episode, NTE      tamsulosin (FLOMAX) 0.4 MG capsule Take 0.4 mg by mouth daily      ferrous sulfate 325 (65 Fe) MG tablet Take 1 tablet by mouth three times a week  Qty: 30 tablet, Refills: 0      sennosides-docusate sodium (SENOKOT-S) 8.6-50 MG tablet Take 1 tablet by mouth daily  Qty: 30 tablet, Refills: 1      aspirin 81 MG tablet Take 81 mg by mouth daily      timolol (BETIMOL) 0.5 % ophthalmic solution Place 1 drop into the left eye 2 times daily Dosing times are 09:00 and 15:00.      atorvastatin (LIPITOR) 10 MG tablet   Take 10 mg by mouth nightly       Multiple Vitamins-Minerals (CENTRUM PO) Take 1 tablet by mouth daily. Indications: Centrum Silver      vitamin D (CHOLECALCIFEROL) 400 UNIT TABS tablet   Take 2,000 Units by mouth daily       latanoprost (XALATAN) 0.005 % ophthalmic solution   Place 1 drop into both eyes nightly              Time Spent on discharge is more than 45 minutes in the examination, evaluation, counseling and review of medications and discharge plan. Signed:    JAMILA Yao - CNP   3/24/2021      Thank you Mark Vickers MD for the opportunity to be involved in this patient's care. If you have any questions or concerns please feel free to contact me at [phone].

## 2021-03-24 NOTE — PROGRESS NOTES
Physician Progress Note      [facility]  [facility] #:                  [id number]  :                       1932  ADMIT DATE:       3/19/2021 2:07 PM  [address] [place] DATE:  RESPONDING  PROVIDER #:        Liz Brock CNP          QUERY TEXT:    Pt admitted with periprosthetic fracture  of right femur and underwent   surgical repair. If possible, please document in the progress notes and   discharge summary if you are evaluating and/or treating any of the following: The medical record reflects the following:  Risk Factors: 80 y.o. female s/p revision total knee  Clinical Indicators: preop without baseline supplemental oxygen requirement. post op oxygen supplementation required weaning from 11 ltr currently POD#4 at   1 ltr, failed attempt to wean 3/22 with desat to 79% ( @ 8532), repeat imaging   with atelectasis and pulmonary vascular congestion on radiology reading  Treatment: repeat imaging, supplemental oxygen, encourage IS per nursing,   ongoing monitoring and supportive care    Thank you,  Melissa Yang RN CDS  [phone]  Options provided:  -- Acute pulmonary insufficiency following surgery  -- Other - I will add my own diagnosis  -- Disagree - Not applicable / Not valid  -- Disagree - Clinically unable to determine / Unknown  -- Refer to Clinical Documentation Reviewer    PROVIDER RESPONSE TEXT:    This patient has acute postoperative pulmonary insufficiency. Query created by:  Jameel Nieves on 3/24/2021 10:21 AM      Electronically signed by:  Liz Brock CNP 3/24/2021 3:39 PM

## 2021-03-24 NOTE — PROGRESS NOTES
Pt d/c'd to [facility] [place]. Removed peripheral IV and stopped bleeding. Catheter intact. Pt tolerated well. No redness noted at site. Notified [facility] and removed tele box. Reviewed d/c instructions, home meds, and  f/u information utilizing teach-back method. Scripts picked up from pharmacy and given to transport along with script for percoset, placed in security bag. given to patient. Patient verbalized understanding.  Electronically signed by Stef Stone RN on 3/24/2021 at 5:13 PM

## 2021-03-24 NOTE — CARE COORDINATION
Tentative transport set up for 1700 with [facility] to [facility] [place] pending Ortho and EP sign off. Will follow.      Roma Newberry RN

## 2021-06-13 ENCOUNTER — APPOINTMENT (OUTPATIENT)
Dept: CT IMAGING | Age: 86
DRG: 377 | End: 2021-06-13
Payer: MEDICARE

## 2021-06-13 ENCOUNTER — HOSPITAL ENCOUNTER (INPATIENT)
Age: 86
LOS: 10 days | Discharge: NURSING, REHAB OR LONG-TERM CARE | DRG: 377 | End: 2021-06-23
Attending: INTERNAL MEDICINE | Admitting: INTERNAL MEDICINE
Payer: MEDICARE

## 2021-06-13 ENCOUNTER — APPOINTMENT (OUTPATIENT)
Dept: GENERAL RADIOLOGY | Age: 86
DRG: 377 | End: 2021-06-13
Payer: MEDICARE

## 2021-06-13 DIAGNOSIS — I26.99 OTHER ACUTE PULMONARY EMBOLISM WITHOUT ACUTE COR PULMONALE (HCC): Primary | ICD-10-CM

## 2021-06-13 DIAGNOSIS — K59.00 CONSTIPATION, UNSPECIFIED CONSTIPATION TYPE: ICD-10-CM

## 2021-06-13 DIAGNOSIS — K62.5 RECTAL BLEEDING: ICD-10-CM

## 2021-06-13 DIAGNOSIS — R55 SYNCOPE AND COLLAPSE: ICD-10-CM

## 2021-06-13 LAB
A/G RATIO: 0.6 (ref 1.1–2.2)
ALBUMIN SERPL-MCNC: 2.2 G/DL (ref 3.4–5)
ALP BLD-CCNC: 164 U/L (ref 40–129)
ALT SERPL-CCNC: 13 U/L (ref 10–40)
ANION GAP SERPL CALCULATED.3IONS-SCNC: 8 MMOL/L (ref 3–16)
APTT: 28.1 SEC (ref 24.2–36.2)
AST SERPL-CCNC: 28 U/L (ref 15–37)
BASOPHILS ABSOLUTE: 0.1 K/UL (ref 0–0.2)
BASOPHILS RELATIVE PERCENT: 0.8 %
BILIRUB SERPL-MCNC: 0.5 MG/DL (ref 0–1)
BILIRUBIN URINE: NEGATIVE
BLOOD, URINE: NEGATIVE
BUN BLDV-MCNC: 10 MG/DL (ref 7–20)
CALCIUM SERPL-MCNC: 8.7 MG/DL (ref 8.3–10.6)
CHLORIDE BLD-SCNC: 108 MMOL/L (ref 99–110)
CLARITY: CLEAR
CO2: 24 MMOL/L (ref 21–32)
COLOR: YELLOW
CREAT SERPL-MCNC: 0.6 MG/DL (ref 0.6–1.2)
EKG ATRIAL RATE: 75 BPM
EKG DIAGNOSIS: NORMAL
EKG P AXIS: -3 DEGREES
EKG P-R INTERVAL: 202 MS
EKG Q-T INTERVAL: 498 MS
EKG QRS DURATION: 76 MS
EKG QTC CALCULATION (BAZETT): 556 MS
EKG R AXIS: -20 DEGREES
EKG T AXIS: 19 DEGREES
EKG VENTRICULAR RATE: 75 BPM
EOSINOPHILS ABSOLUTE: 0.2 K/UL (ref 0–0.6)
EOSINOPHILS RELATIVE PERCENT: 2.1 %
GFR AFRICAN AMERICAN: >60
GFR NON-AFRICAN AMERICAN: >60
GLOBULIN: 3.4 G/DL
GLUCOSE BLD-MCNC: 141 MG/DL (ref 70–99)
GLUCOSE URINE: NEGATIVE MG/DL
HCT VFR BLD CALC: 32.6 % (ref 36–48)
HEMOGLOBIN: 10.9 G/DL (ref 12–16)
KETONES, URINE: NEGATIVE MG/DL
LEUKOCYTE ESTERASE, URINE: NEGATIVE
LYMPHOCYTES ABSOLUTE: 2 K/UL (ref 1–5.1)
LYMPHOCYTES RELATIVE PERCENT: 26.2 %
MCH RBC QN AUTO: 34.4 PG (ref 26–34)
MCHC RBC AUTO-ENTMCNC: 33.6 G/DL (ref 31–36)
MCV RBC AUTO: 102.2 FL (ref 80–100)
MICROSCOPIC EXAMINATION: NORMAL
MONOCYTES ABSOLUTE: 0.6 K/UL (ref 0–1.3)
MONOCYTES RELATIVE PERCENT: 7.4 %
NEUTROPHILS ABSOLUTE: 4.9 K/UL (ref 1.7–7.7)
NEUTROPHILS RELATIVE PERCENT: 63.5 %
NITRITE, URINE: NEGATIVE
PDW BLD-RTO: 16.4 % (ref 12.4–15.4)
PH UA: 6.5 (ref 5–8)
PLATELET # BLD: 489 K/UL (ref 135–450)
PMV BLD AUTO: 7.5 FL (ref 5–10.5)
POTASSIUM SERPL-SCNC: 4 MMOL/L (ref 3.5–5.1)
PROTEIN UA: NEGATIVE MG/DL
RBC # BLD: 3.19 M/UL (ref 4–5.2)
SODIUM BLD-SCNC: 140 MMOL/L (ref 136–145)
SPECIFIC GRAVITY UA: 1.01 (ref 1–1.03)
TOTAL PROTEIN: 5.6 G/DL (ref 6.4–8.2)
TROPONIN: <0.01 NG/ML
URINE TYPE: NORMAL
UROBILINOGEN, URINE: 0.2 E.U./DL
WBC # BLD: 7.8 K/UL (ref 4–11)

## 2021-06-13 PROCEDURE — 99284 EMERGENCY DEPT VISIT MOD MDM: CPT

## 2021-06-13 PROCEDURE — 6370000000 HC RX 637 (ALT 250 FOR IP): Performed by: INTERNAL MEDICINE

## 2021-06-13 PROCEDURE — 93010 ELECTROCARDIOGRAM REPORT: CPT | Performed by: INTERNAL MEDICINE

## 2021-06-13 PROCEDURE — 85025 COMPLETE CBC W/AUTO DIFF WBC: CPT

## 2021-06-13 PROCEDURE — 6360000004 HC RX CONTRAST MEDICATION: Performed by: PHYSICIAN ASSISTANT

## 2021-06-13 PROCEDURE — 93005 ELECTROCARDIOGRAM TRACING: CPT | Performed by: INTERNAL MEDICINE

## 2021-06-13 PROCEDURE — 85730 THROMBOPLASTIN TIME PARTIAL: CPT

## 2021-06-13 PROCEDURE — 6360000002 HC RX W HCPCS: Performed by: PHYSICIAN ASSISTANT

## 2021-06-13 PROCEDURE — 74174 CTA ABD&PLVS W/CONTRAST: CPT

## 2021-06-13 PROCEDURE — 84484 ASSAY OF TROPONIN QUANT: CPT

## 2021-06-13 PROCEDURE — 71045 X-RAY EXAM CHEST 1 VIEW: CPT

## 2021-06-13 PROCEDURE — 1200000000 HC SEMI PRIVATE

## 2021-06-13 PROCEDURE — 81003 URINALYSIS AUTO W/O SCOPE: CPT

## 2021-06-13 PROCEDURE — 80053 COMPREHEN METABOLIC PANEL: CPT

## 2021-06-13 RX ORDER — SODIUM CHLORIDE 9 MG/ML
25 INJECTION, SOLUTION INTRAVENOUS PRN
Status: DISCONTINUED | OUTPATIENT
Start: 2021-06-13 | End: 2021-06-23 | Stop reason: HOSPADM

## 2021-06-13 RX ORDER — HEPARIN SODIUM 1000 [USP'U]/ML
60 INJECTION, SOLUTION INTRAVENOUS; SUBCUTANEOUS PRN
Status: DISCONTINUED | OUTPATIENT
Start: 2021-06-13 | End: 2021-06-13

## 2021-06-13 RX ORDER — HEPARIN SODIUM 1000 [USP'U]/ML
30 INJECTION, SOLUTION INTRAVENOUS; SUBCUTANEOUS PRN
Status: DISCONTINUED | OUTPATIENT
Start: 2021-06-13 | End: 2021-06-13

## 2021-06-13 RX ORDER — HEPARIN SODIUM 1000 [USP'U]/ML
4000 INJECTION, SOLUTION INTRAVENOUS; SUBCUTANEOUS PRN
Status: DISCONTINUED | OUTPATIENT
Start: 2021-06-13 | End: 2021-06-15

## 2021-06-13 RX ORDER — HEPARIN SODIUM 10000 [USP'U]/100ML
910 INJECTION, SOLUTION INTRAVENOUS CONTINUOUS
Status: DISCONTINUED | OUTPATIENT
Start: 2021-06-13 | End: 2021-06-14

## 2021-06-13 RX ORDER — SODIUM CHLORIDE 0.9 % (FLUSH) 0.9 %
5-40 SYRINGE (ML) INJECTION EVERY 12 HOURS SCHEDULED
Status: DISCONTINUED | OUTPATIENT
Start: 2021-06-13 | End: 2021-06-23 | Stop reason: HOSPADM

## 2021-06-13 RX ORDER — ASPIRIN 81 MG/1
81 TABLET ORAL DAILY
Status: DISCONTINUED | OUTPATIENT
Start: 2021-06-14 | End: 2021-06-23 | Stop reason: HOSPADM

## 2021-06-13 RX ORDER — ACETAMINOPHEN 650 MG/1
650 SUPPOSITORY RECTAL EVERY 6 HOURS PRN
Status: DISCONTINUED | OUTPATIENT
Start: 2021-06-13 | End: 2021-06-23 | Stop reason: HOSPADM

## 2021-06-13 RX ORDER — HEPARIN SODIUM 1000 [USP'U]/ML
4000 INJECTION, SOLUTION INTRAVENOUS; SUBCUTANEOUS ONCE
Status: COMPLETED | OUTPATIENT
Start: 2021-06-13 | End: 2021-06-13

## 2021-06-13 RX ORDER — ATORVASTATIN CALCIUM 10 MG/1
10 TABLET, FILM COATED ORAL NIGHTLY
Status: DISCONTINUED | OUTPATIENT
Start: 2021-06-13 | End: 2021-06-23 | Stop reason: HOSPADM

## 2021-06-13 RX ORDER — ONDANSETRON 4 MG/1
4 TABLET, ORALLY DISINTEGRATING ORAL EVERY 8 HOURS PRN
Status: DISCONTINUED | OUTPATIENT
Start: 2021-06-13 | End: 2021-06-23 | Stop reason: HOSPADM

## 2021-06-13 RX ORDER — AMIODARONE HYDROCHLORIDE 200 MG/1
200 TABLET ORAL DAILY
Status: DISCONTINUED | OUTPATIENT
Start: 2021-06-14 | End: 2021-06-23 | Stop reason: HOSPADM

## 2021-06-13 RX ORDER — HEPARIN SODIUM 1000 [USP'U]/ML
2000 INJECTION, SOLUTION INTRAVENOUS; SUBCUTANEOUS PRN
Status: DISCONTINUED | OUTPATIENT
Start: 2021-06-13 | End: 2021-06-15

## 2021-06-13 RX ORDER — SODIUM CHLORIDE 0.9 % (FLUSH) 0.9 %
5-40 SYRINGE (ML) INJECTION PRN
Status: DISCONTINUED | OUTPATIENT
Start: 2021-06-13 | End: 2021-06-23 | Stop reason: HOSPADM

## 2021-06-13 RX ORDER — VITAMIN B COMPLEX
2000 TABLET ORAL DAILY
Status: DISCONTINUED | OUTPATIENT
Start: 2021-06-14 | End: 2021-06-23 | Stop reason: HOSPADM

## 2021-06-13 RX ORDER — HEPARIN SODIUM 10000 [USP'U]/100ML
291-1000 INJECTION, SOLUTION INTRAVENOUS CONTINUOUS
Status: DISCONTINUED | OUTPATIENT
Start: 2021-06-13 | End: 2021-06-13

## 2021-06-13 RX ORDER — POLYETHYLENE GLYCOL 3350 17 G/17G
17 POWDER, FOR SOLUTION ORAL DAILY PRN
Status: DISCONTINUED | OUTPATIENT
Start: 2021-06-13 | End: 2021-06-23 | Stop reason: HOSPADM

## 2021-06-13 RX ORDER — ONDANSETRON 2 MG/ML
4 INJECTION INTRAMUSCULAR; INTRAVENOUS EVERY 6 HOURS PRN
Status: DISCONTINUED | OUTPATIENT
Start: 2021-06-13 | End: 2021-06-23 | Stop reason: HOSPADM

## 2021-06-13 RX ORDER — PANTOPRAZOLE SODIUM 40 MG/1
40 TABLET, DELAYED RELEASE ORAL
Status: DISCONTINUED | OUTPATIENT
Start: 2021-06-14 | End: 2021-06-23 | Stop reason: HOSPADM

## 2021-06-13 RX ORDER — ACETAMINOPHEN 325 MG/1
650 TABLET ORAL EVERY 6 HOURS PRN
Status: DISCONTINUED | OUTPATIENT
Start: 2021-06-13 | End: 2021-06-23 | Stop reason: HOSPADM

## 2021-06-13 RX ORDER — HEPARIN SODIUM 1000 [USP'U]/ML
60 INJECTION, SOLUTION INTRAVENOUS; SUBCUTANEOUS ONCE
Status: DISCONTINUED | OUTPATIENT
Start: 2021-06-13 | End: 2021-06-13

## 2021-06-13 RX ADMIN — ATORVASTATIN CALCIUM 10 MG: 10 TABLET, FILM COATED ORAL at 22:25

## 2021-06-13 RX ADMIN — HEPARIN SODIUM 4000 UNITS: 1000 INJECTION INTRAVENOUS; SUBCUTANEOUS at 13:45

## 2021-06-13 RX ADMIN — HEPARIN SODIUM 910 UNITS/HR: 10000 INJECTION, SOLUTION INTRAVENOUS at 13:45

## 2021-06-13 RX ADMIN — IOPAMIDOL 75 ML: 755 INJECTION, SOLUTION INTRAVENOUS at 12:26

## 2021-06-13 RX ADMIN — SERTRALINE 12.5 MG: 50 TABLET, FILM COATED ORAL at 18:44

## 2021-06-13 ASSESSMENT — PAIN SCALES - GENERAL
PAINLEVEL_OUTOF10: 0
PAINLEVEL_OUTOF10: 0
PAINLEVEL_OUTOF10: 3
PAINLEVEL_OUTOF10: 0

## 2021-06-13 ASSESSMENT — PAIN DESCRIPTION - LOCATION: LOCATION: RECTUM

## 2021-06-13 ASSESSMENT — PAIN DESCRIPTION - PAIN TYPE: TYPE: ACUTE PAIN

## 2021-06-13 NOTE — H&P
Hospital Medicine History & Physical      PCP: Panda Gilliam MD    Date of Admission: 6/13/2021    Date of Service: Pt seen/examined on 06/13/21  and Admitted to Inpatient with expected LOS greater than two midnights due to medical therapy. Chief Complaint   Patient presents with    Rectal Bleeding     had near syncopal episode getting up from toilet today. pain in bottom started this morning. jahovah's witness patient. History Of Present Illness:   80 y.o. female who who is a long-term resident of \" [address]\" and PMHx as mentioned below presented to [facility] ED with c/o painless rectal bleeding associated with lightheadedness/syncopal episode getting up from toilet this morning. Patient reports that she has been feeling weak and tired for the last few days and has been noting \"  blood in her briefs\" . Denies any chest pain, shortness of breath, pedal edema. Reports that she has knee pain and has been restricting herself to bed lately. Complains of lower abdominal pain but denies nausea, vomiting, diarrhea. ED course : Patient hemodynamically stable upon arrival to ED. basic labs suggestive of hemoglobin 10.9 and the rest of the labs within normal limits. UA negative. EKG without ischemic changes. Patient underwent CTA chest abdomen pelvis s/o acute PE of left pulmonary artery segmental branches. No right heart strain on CT. Patient started on heparin gtt. and hospitalist consulted for admission for further evaluation and management. Upon evaluation by me on B3, patient offers no new complaints. Denies smoking, drinking, illicit drug use. Denies any prior personal/family history of DVT.     Past Medical History:      Diagnosis Date    Anemia     Anxiety     Arthritis     Atrial fibrillation ([address]     CAD (coronary artery disease)     Chronic GERD     Depression     Diverticulosis     Gall stone     GERD (gastroesophageal reflux disease) 3/19/2021    Glaucoma     Hypercholesteremia     Hypertension     Irregular heart beat     Rectal prolapse     Uterine [facility] ([facility] [address] 2008       Past Surgical History:        Procedure Laterality Date    APPENDECTOMY      COLONOSCOPY  2005    polyp    COLONOSCOPY  1010    diverticulosis    COLONOSCOPY  10/4/2014    diverticulosis, radiation colitis sigmoid    COLONOSCOPY N/A 2/15/2019    COLON performed by Norberto Morrissey MD at [place] [address] Right 8/4/15    hip pinning    HYSTERECTOMY  2008    RT in 2009   [address] [address]      OTHER SURGICAL HISTORY Right 10/29/2018    PATELLA OPEN REDUCTION INTERNAL FIXATION     PACEMAKER INSERTION      PACEMAKER PLACEMENT  2008    AL CPTR-ASST SURGICAL NAVIGATION IMAGE-LESS Right 9/5/2018    RIGHT TOTAL KNEE REPLACEMENT COMPUTER NAVIGATION WITH ADDUCTOR CANAL BLOCK FOR PAIN CONTROL                     SONAL performed by Romayne Hylan, MD at [address] [facility] OFFICE/OUTPT [facility] Right 10/29/2018    Right knee inferior pole patellectomy, primary repair of patellar tendon. performed by Romayne Hylan, MD at [address] Right 11/30/2018    RIGHT KNEE GASTROC FLAP WITH SKIN GRAFT, WOUND VAC PLACEMENT performed by Karo Burr MD at [address] [facility] Right 3/20/2021    REVISION RIGHT TOTAL KNEE ARTHROPLASTY       SONAL performed by Karo Burr MD at [facility] .         Medications Prior to Admission:    Prior to Admission medications    Medication Sig Start Date End Date Taking?  Authorizing Provider   amiodarone (CORDARONE) 200 MG tablet Take 1 tablet by mouth daily 3/29/21  Yes [address] [place] [address], [facility]   psyllium (KONSYL) 28.3 % PACK Take 1 packet by mouth daily   Yes Historical Provider, MD   nystatin (MYCOSTATIN) POWD powder Apply topically 2 times daily As needed for candidiasis   Yes Historical Provider, MD   sertraline (ZOLOFT) 25 MG tablet Take 12.5 mg by mouth daily   Yes Historical Provider, MD   hydrocortisone 1 % cream Apply topically every 6-8 hours as needed As needed for discomfort   Yes Historical Provider, MD   acetaminophen (TYLENOL) 325 MG tablet Take 650 mg by mouth every 6 hours as needed for Pain   Yes Historical Provider, MD   DULoxetine (CYMBALTA) 30 MG extended release capsule Take 30 mg by mouth daily   Yes Historical Provider, MD   Melatonin 5 MG CAPS Take 5 mg by mouth nightly as needed   Yes Historical Provider, MD   omeprazole (PRILOSEC) 20 MG delayed release capsule Take 40 mg by mouth daily    Yes Historical Provider, MD   tamsulosin (FLOMAX) 0.4 MG capsule Take 0.4 mg by mouth daily   Yes Historical Provider, MD   ferrous sulfate 325 (65 Fe) MG tablet Take 1 tablet by mouth three times a week 11/7/18  Yes Diana Cuevas MD   sennosides-docusate sodium (SENOKOT-S) 8.6-50 MG tablet Take 1 tablet by mouth daily 11/2/18  Yes Jb Rivera MD   aspirin 81 MG tablet Take 81 mg by mouth daily   Yes Historical Provider, MD   timolol (BETIMOL) 0.5 % ophthalmic solution Place 1 drop into the left eye 2 times daily Dosing times are 09:00 and 15:00. Yes Historical Provider, MD   atorvastatin (LIPITOR) 10 MG tablet   Take 10 mg by mouth nightly    Yes Historical Provider, MD   Multiple Vitamins-Minerals (CENTRUM PO) Take 1 tablet by mouth daily.  Indications: Centrum Silver 12/24/10  Yes Historical Provider, MD   vitamin D (CHOLECALCIFEROL) 400 UNIT TABS tablet   Take 2,000 Units by mouth daily    Yes Historical Provider, MD   latanoprost (XALATAN) 0.005 % ophthalmic solution   Place 1 drop into both eyes nightly    Yes Historical Provider, MD   amiodarone (PACERONE) 400 MG tablet Take 1 tablet by mouth daily for 5 days 3/24/21 3/29/21  JAMILA Rader CNP   diphenhydrAMINE (BENADRYL) 25 MG capsule Take 25 mg by mouth every 6 hours as needed for Itching    Historical Provider, MD   gabapentin (NEURONTIN) 100 MG capsule Take 100 mg by mouth 3 times daily. Historical Provider, MD   loperamide (IMODIUM) 2 MG capsule Take 2 mg by mouth as needed for Diarrhea Two tabs after initial diarrhea episode, 1 tab after each additional episode, NTE    Historical Provider, MD       Allergies:  Lisinopril, Levofloxacin, Levofloxacin, Vancomycin, Amlodipine, Avelox [moxifloxacin hcl in nacl], Buspirone, Hydrochlorothiazide, Lidocaine, Moxifloxacin, Moxifloxacin hcl in nacl, Mupirocin, Nsaids, Paroxetine, Phenergan [promethazine hcl], Phenothiazines, Promethazine hcl, Sulindac, Tolmetin, and Ciprofloxacin    Social History:    The patient currently lives at [place] [address] and gets assistance with PT OT. TOBACCO:   reports that she has quit smoking. She has never used smokeless tobacco.  ETOH:   reports no history of alcohol use. Family History:     Reviewed in detail and negative for DM, CAD, Cancer, CVA. Positive as follows:        Problem Relation Age of Onset    Mental Retardation Mother     Cancer Father         lung       REVIEW OF SYSTEMS:   Pertinent positives as noted in the HPI. All other systems reviewed and negative. PHYSICAL EXAM PERFORMED:  /60   Pulse 69   Temp 98.2 °F (36.8 °C) (Oral)   Resp 18   Ht 5' (1.524 m)   Wt 128 lb (58.1 kg)   SpO2 92%   BMI 25.00 kg/m²     General appearance: Elderly CF in no apparent distress, appears stated age and cooperative. HEENT:  Normal cephalic, atraumatic without obvious deformity. Pupils equal, round, and reactive to light. Extra ocular muscles intact. Conjunctivae/corneas clear. Neck: Supple, with full range of motion. No jugular venous distention. Trachea midline. Respiratory:  Normal respiratory effort. Clear to auscultation, bilaterally without Rales/Wheezes/Rhonchi. Cardiovascular:  Regular rate and rhythm with normal S1/S2 without murmurs, rubs or gallops.   Abdomen: Soft, non-tender, non-distended with normal bowel sounds. Musculoskeletal:  No clubbing, cyanosis or trace edema bilaterally. Full range of motion without deformity. Skin: Skin color, texture, turgor normal.  No rashes or lesions. Neurologic:  Neurovascularly intact without any focal sensory/motor deficits. Cranial nerves: II-XII intact, grossly non-focal.  Psychiatric:  Alert and oriented, thought content appropriate, normal insight  Capillary Refill: Brisk,< 3 seconds   Peripheral Pulses: +2 palpable, equal bilaterally       Labs:   Recent Labs     06/13/21  1025   WBC 7.8   HGB 10.9*   HCT 32.6*   *     Recent Labs     06/13/21  1025      K 4.0      CO2 24   BUN 10   CREATININE 0.6   CALCIUM 8.7     Recent Labs     06/13/21  1025   AST 28   ALT 13   BILITOT 0.5   ALKPHOS 164*     No results for input(s): INR in the last 72 hours. Recent Labs     06/13/21  1025   TROPONINI <0.01       Urinalysis:    Lab Results   Component Value Date    NITRU Negative 06/13/2021    BLOODU Negative 06/13/2021    SPECGRAV 1.015 06/13/2021    GLUCOSEU Negative 06/13/2021    GLUCOSEU Neg 04/25/2010       EKG:  I have reviewed the EKG with the following interpretation: Normal sinus rhythm with probable old inferior wall MI, age indeterminate as per cardiology interpretation. No prior EKGs to compare. Poor R wave progression and overall low amplitude EKG. Radiology:    I have reviewed the Imaging  with the following interpretation:   CTA CHEST ABDOMEN PELVIS W CONTRAST   Final Result   1. CT CHEST: Acute pulmonary embolism proximally within the pulmonary artery   branch to the left lower lobe. 2. No CT findings of right heart strain. 3. Mild calcific atherosclerosis aorta and its branches without aneurysm or   dissection. 4. CT ABDOMEN/PELVIS: No acute vascular abnormality. 5.  No CT evidence of an acute intra-abdominal or intrapelvic process. 6. Cholelithiasis without CT findings of acute cholecystitis.    7.  Diverticulosis coli without CT evidence of acute diverticulitis. 8. Small, fat containing left inguinal hernia. 9. Small, fat containing umbilical hernia. 10. Possible constipation. Critical results were called by Dr. Karen Gonzalez to Rosa Amador on   6/13/2021 at 13:03. XR CHEST PORTABLE   Final Result   No acute cardiopulmonary disease. Active Hospital Problems    Diagnosis Date Noted    Pulmonary embolism on left [facility]) [I26.99] 06/13/2021     ASSESSMENT/PLAN:  1. Syncope in the setting of acute left segmental PE   -As evidenced by CT PA. Patient started on heparin GTT in ED. Continue and will transition to p.o. Eliquis at discharge  -Check bilateral venous duplex of lower extremities.  -Continue telemetry monitor. 2.  Rectal bleeding POA -likely hemorrhoidal  -Hemoglobin 10.9 on admission. Monitor closely in the setting of anticoagulation for PE.    3.  Paroxysmal atrial fibrillation -currently rate controlled and in normal sinus rhythm. On amiodarone at home-continue ; patient on aspirin but not on any anticoagulation PTA    4. GERD -on PPI; continue    5. Hyperlipidemia -on statin; continue    DVT Prophylaxis: Heparin GTT  Diet: ADULT DIET; Regular  Code Status: Prior    PT/OT Eval Status: Not yet ordered    Dispo -anticipate more than 2 midnight stay in the hospital     Bety Mc MD    The note was completed using Dragon -speech recognition software & EMR  . Every effort was made to ensure accuracy; however, inadvertent computerized transcription errors may be present. Thank you Karen James MD for the opportunity to be involved in this patient's care. If you have any questions or concerns please feel free to contact me at [phone].

## 2021-06-13 NOTE — ED NOTES
Assisted to bedpan to attempt BM, unsuccessful. Patient states cannot urinate at this time.      Tiki Roca RN  06/13/21 [id number]

## 2021-06-13 NOTE — ED PROVIDER NOTES
[facility] Emergency Department    CHIEF COMPLAINT  Rectal Bleeding (had near syncopal episode getting up from toilet today. pain in bottom started this morning. jahovah's witness patient.)      SHARED SERVICE VISIT  I have seen and evaluated this patient with my supervising physician, Dr. Emmanuel Patel. HISTORY OF PRESENT ILLNESS  Bettie Valente is a 80 y.o. female who presents to the ED complaining of syncopal episode. Patient brought in by squad today for evaluation. Patient states that she was being helped from toilet back to the bed when she almost passed out. According to bystanders she did actually have brief syncopal episode. Patient reports chronic neck and back pain unchanged which she was having at the time. Does appear to have resolved. She denies any chest pain shortness of breath. No cough or congestion. No fevers or chills. No pain with deep breaths. Is having some pain in the lower abdomen. Denies any nausea vomiting. No change in appetite. She has had no urinary symptoms. Denies constipation. Stools have been loose. Reports that she has also noted some blood. History of hemorrhoids. No rectal pain. No other complaints, modifying factors or associated symptoms. Nursing notes reviewed.    Past Medical History:   Diagnosis Date    Anemia     Anxiety     Arthritis     Atrial fibrillation ([address]     CAD (coronary artery disease)     Chronic GERD     Depression     Diverticulosis     Gall stone     GERD (gastroesophageal reflux disease) 3/19/2021    Glaucoma     Hypercholesteremia     Hypertension     Irregular heart beat     Rectal prolapse     Uterine cancer ([address] 2008     Past Surgical History:   Procedure Laterality Date    APPENDECTOMY      COLONOSCOPY  2005    polyp    COLONOSCOPY  1010    diverticulosis    COLONOSCOPY  10/4/2014    diverticulosis, radiation colitis sigmoid    COLONOSCOPY N/A 2/15/2019    [facility]/15    hip pinning    HYSTERECTOMY  2008    RT in 2009   [address] [facility] [address]      OTHER SURGICAL HISTORY Right 10/29/2018    PATELLA OPEN REDUCTION INTERNAL FIXATION     PACEMAKER INSERTION      PACEMAKER PLACEMENT  2008    DE CPTR-ASST SURGICAL NAVIGATION IMAGE-LESS Right 9/5/2018    RIGHT TOTAL KNEE REPLACEMENT COMPUTER NAVIGATION WITH ADDUCTOR CANAL BLOCK FOR PAIN CONTROL                     SONAL performed by Tess Chapman MD at [address] [facility] OFFICE/OUTPT [facility] Right 10/29/2018    Right knee inferior pole patellectomy, primary repair of patellar tendon. performed by Tess Chapman MD at [address] <100SQCM Right 11/30/2018    RIGHT KNEE GASTROC FLAP WITH SKIN GRAFT, WOUND VAC PLACEMENT performed by Amaris Kiran MD at [address] [facility] Right 3/20/2021    REVISION RIGHT TOTAL KNEE ARTHROPLASTY       SONAL performed by Amaris Kiran MD at [address] [facility]       Family History   Problem Relation Age of Onset    Mental Retardation Mother     Cancer Father         lung     Social History     Socioeconomic History    Marital status:       Spouse name: Not on file    Number of children: Not on file    Years of education: Not on file    Highest education level: Not on file   Occupational History    Not on file   Tobacco Use    Smoking status: Former Smoker    Smokeless tobacco: Never Used    Tobacco comment: only smoked from age [age] yrs old   Vaping Use    Vaping Use: Never used   Substance and Sexual Activity    Alcohol use: No    Drug use: No    Sexual activity: Not Currently   Other Topics Concern    Not on file   Social History Narrative    Not on file     Social Determinants of Health     Financial Resource Strain:     Difficulty of Paying Living Expenses:    Food Insecurity:     Worried About [facility] in the Last Year:    [address] in the Last Year:    Transportation Needs:     Lack of Transportation (Medical):      Lack of Transportation (Non-Medical):    Physical Activity:     Days of Exercise per Week:     Minutes of Exercise per Session:    Stress:     Feeling of Stress :    Social Connections:     Frequency of Communication with Friends and Family:     Frequency of Social Gatherings with Friends and Family:     Attends Islam Services:     Active Member of Clubs or Organizations:     Attends Club or Organization Meetings:     Marital Status:    Intimate Partner Violence:     Fear of Current or Ex-Partner:     Emotionally Abused:     Physically Abused:     Sexually Abused:      Current Facility-Administered Medications   Medication Dose Route Frequency Provider Last Rate Last Admin    heparin (porcine) injection 4,000 Units  4,000 Units Intravenous PRN BLACK Paul        heparin (porcine) injection 2,000 Units  2,000 Units Intravenous PRN BLACK Paul        heparin 25,000 units in dextrose 5% 250 mL (premix) infusion  910 Units/hr Intravenous Continuous BLACK Paul 9.1 mL/hr at 06/13/21 1345 910 Units/hr at 06/13/21 1345     Current Outpatient Medications   Medication Sig Dispense Refill    amiodarone (CORDARONE) 200 MG tablet Take 1 tablet by mouth daily 30 tablet 0    psyllium (KONSYL) 28.3 % PACK Take 1 packet by mouth daily      nystatin (MYCOSTATIN) POWD powder Apply topically 2 times daily As needed for candidiasis      sertraline (ZOLOFT) 25 MG tablet Take 12.5 mg by mouth daily      hydrocortisone 1 % cream Apply topically every 6-8 hours as needed As needed for discomfort      acetaminophen (TYLENOL) 325 MG tablet Take 650 mg by mouth every 6 hours as needed for Pain      DULoxetine (CYMBALTA) 30 MG extended release capsule Take 30 mg by mouth daily      Melatonin 5 MG CAPS Take 5 mg by mouth nightly as needed  omeprazole (PRILOSEC) 20 MG delayed release capsule Take 40 mg by mouth daily       tamsulosin (FLOMAX) 0.4 MG capsule Take 0.4 mg by mouth daily      ferrous sulfate 325 (65 Fe) MG tablet Take 1 tablet by mouth three times a week 30 tablet 0    sennosides-docusate sodium (SENOKOT-S) 8.6-50 MG tablet Take 1 tablet by mouth daily 30 tablet 1    aspirin 81 MG tablet Take 81 mg by mouth daily      timolol (BETIMOL) 0.5 % ophthalmic solution Place 1 drop into the left eye 2 times daily Dosing times are 09:00 and 15:00.      atorvastatin (LIPITOR) 10 MG tablet   Take 10 mg by mouth nightly       Multiple Vitamins-Minerals (CENTRUM PO) Take 1 tablet by mouth daily. Indications: Centrum Silver      vitamin D (CHOLECALCIFEROL) 400 UNIT TABS tablet   Take 2,000 Units by mouth daily       latanoprost (XALATAN) 0.005 % ophthalmic solution   Place 1 drop into both eyes nightly       amiodarone (PACERONE) 400 MG tablet Take 1 tablet by mouth daily for 5 days 5 tablet 0    diphenhydrAMINE (BENADRYL) 25 MG capsule Take 25 mg by mouth every 6 hours as needed for Itching      gabapentin (NEURONTIN) 100 MG capsule Take 100 mg by mouth 3 times daily.  loperamide (IMODIUM) 2 MG capsule Take 2 mg by mouth as needed for Diarrhea Two tabs after initial diarrhea episode, 1 tab after each additional episode, NTE       Allergies   Allergen Reactions    Lisinopril Swelling     angioedema  angioedema    Levofloxacin     Levofloxacin Other (See Comments)     unsure    Vancomycin Rash     Rash and hives. CARLY'S SYNDROME    Amlodipine     Avelox [Moxifloxacin Hcl In Nacl]     Buspirone Itching    Hydrochlorothiazide Swelling    Lidocaine Other (See Comments)     Not sure    Moxifloxacin     Moxifloxacin Hcl In Nacl Other (See Comments)     Not sure    Mupirocin     Nsaids      bleeding    Paroxetine Other (See Comments)    Phenergan [Promethazine Hcl] Swelling    Phenothiazines     Promethazine Hcl Swelling  Sulindac      Gi bleed    Tolmetin      bleeding    Ciprofloxacin Rash and Swelling       REVIEW OF SYSTEMS  10 systems reviewed, pertinent positives per HPI otherwise noted to be negative    PHYSICAL EXAM  /65   Pulse 70   Temp 98.2 °F (36.8 °C) (Oral)   Resp 16   Ht 5' (1.524 m)   Wt 128 lb (58.1 kg)   SpO2 94%   BMI 25.00 kg/m²   GENERAL APPEARANCE: Awake and alert. Cooperative. No acute distress. HEAD: Normocephalic. Atraumatic. EYES: PERRL. EOM's grossly intact. ENT: Mucous membranes are moist.   NECK: Supple. HEART: RRR. No murmurs. No chest wall tenderness. LUNGS: Respirations unlabored. CTAB. Good air exchange. Speaking comfortably in full sentences. ABDOMEN: Soft. Non-distended. Mild lower abdominal discomfort without rigidity, guarding or rebound. Negative Broussard's, McBurney's, Rovsing's. No fluid waves or ascites. No hernias or masses. Bowel sounds normal in all quadrants. No CVA tenderness. EXTREMITIES: No peripheral edema. No unilateral calf pain or swelling. Moves all extremities equally. All extremities neurovascularly intact. SKIN: Warm and dry. No acute rashes. NEUROLOGICAL: Alert and oriented. CN's 2-12 intact. No gross facial drooping. Strength 5/5, sensation intact. PSYCHIATRIC: Normal mood and affect. RADIOLOGY  XR CHEST PORTABLE    Result Date: 6/13/2021  EXAMINATION: ONE XRAY VIEW OF THE CHEST 6/13/2021 10:28 am COMPARISON: 03/22/2021 HISTORY: ORDERING SYSTEM PROVIDED HISTORY: syncope TECHNOLOGIST PROVIDED HISTORY: Reason for exam:->syncope Reason for Exam: syncope Acuity: Acute Type of Exam: Initial FINDINGS: The exam is rotated. The cardiomediastinal silhouette is stable. Aortic vascular calcification with moderate tortuosity. The lungs are clear. No infiltrate, pleural fluid or evidence of overt failure. Left-sided transvenous pacer. Degenerative change at the shoulders, right greater than left. No acute cardiopulmonary disease. CTA CHEST ABDOMEN PELVIS W CONTRAST    Result Date: 6/13/2021  EXAMINATION: CTA OF THE CHEST, ABDOMEN AND PELVIS WITH CONTRAST 6/13/2021 12:09 pm: TECHNIQUE: CTA of the chest, abdomen and pelvis was performed after the administration of intravenous contrast.  Multiplanar reformatted images are provided for review. MIP images are provided for review. Dose modulation, iterative reconstruction, and/or weight based adjustment of the mA/kV was utilized to reduce the radiation dose to as low as reasonably achievable. COMPARISON: Chest radiograph performed earlier today. HISTORY: ORDERING SYSTEM PROVIDED HISTORY: syncope/acute mid abdominal pain, blood in the stool this morning, dizziness Decision Support Exception - unselect if not a suspected or confirmed emergency medical condition->Emergency Medical Condition (MA) Reason for Exam: pt had some abdominal pain last night and then this morning she started with blood in her stool. some dizziness as well Acuity: Acute Type of Exam: Initial Relevant Medical/Surgical History: A-fib, HTN, CAD,anemia. .. FINDINGS: CTA CHEST: Vasculature: Acute pulmonary embolism to the left lower lobe pulmonary branch with areas of surrounding flow. Timing for evaluation of pulmonary embolism is suboptimal.  No other PE focus evident. Unremarkable appearance of the aorta and its branches with exception of mild calcific atherosclerosis. Descending thoracic aorta 2.9 cm and descending thoracic aorta 2.8 cm period the main pulmonary artery is 2.6 cm. Mediastinum: Cardiac RV/LV ratio is 29/34 mm (0.85, where less than 0.90 is normal). No evidence of mediastinal lymphadenopathy. The heart and pericardium demonstrate no acute abnormality. Lungs/Pleura: The lungs are without acute process. No focal consolidation or pulmonary edema. No evidence of pleural effusion or pneumothorax. Soft Tissues/Bones: No acute bone or soft tissue abnormality.   Prominent glenohumeral joint osteoarthritic change bilateral. CTA ABDOMEN/PELVIS: Vasculature: Normal contrast enhancement without significant atherosclerosis involving the celiac axis, superior mesenteric artery, 2 right and single left renal and inferior mesenteric arteries. Infrarenal abdominal aorta is 1.7 cm. Distal abdominal aorta tapers normally with unremarkable appearance of the bilateral common, internal and external iliac arteries and visualized portions of the right and left femoral arteries. Organs: Calcifications diffusely throughout the spleen and liver reflect sequela of old granulomatous disease. The liver attenuation and texture appears unremarkable. No discrete hepatic lesion or intrahepatic bile duct dilatation is seen. The gallbladder has calcifications reflecting cholelithiasis without pericholecystic fluid or inflammatory change evident. Benign, Bosniak 1 simple cyst upper pole right kidney, 2.4 cm, 13 Hounsfield units density axial series 2, image 90. Similar Bosniak 1 cyst posterior midpole left kidney has density 15 Hounsfield units, 1.2 cm image 107. No nephrolithiasis evident. The kidneys, spleen, adrenal glands and pancreas appear unremarkable. GI/Bowel: Multifocal diverticula involve the descending and sigmoid colon without evidence of acute inflammatory change. The small bowel and stomach appear unremarkable. No diffuse or focal bowel wall thickening evident. No inflammatory changes evident. No obstruction is seen. The appendix is visualized right lower quadrant, unremarkable in appearance. Moderate fecal burden. Peritoneum/Retroperitoneum: Unremarkable appearance of the aorta. No aneurysm. Unremarkable appearance of the IVC. No adenopathy or fluid. Pelvis: Partially filled urinary bladder appears unremarkable. No pelvic adenopathy or free fluid is seen. Bones/Soft Tissues: Small, fat containing left inguinal hernia. Small, fat containing umbilical hernia. No definite acute osseous abnormality.   Chronic compression superior and inferior endplates L2 by about 67%. Bones appear osteoporotic. Degenerative changes with grade 1 anterolisthesis L4 on L5. 3D CTA IMAGES: 3D IMAGES:  3D reconstructed images were performed on a separate workstation and provided for review. These images were reviewed. 1.  CT CHEST: Acute pulmonary embolism proximally within the pulmonary artery branch to the left lower lobe. 2. No CT findings of right heart strain. 3. Mild calcific atherosclerosis aorta and its branches without aneurysm or dissection. 4. CT ABDOMEN/PELVIS: No acute vascular abnormality. 5.  No CT evidence of an acute intra-abdominal or intrapelvic process. 6. Cholelithiasis without CT findings of acute cholecystitis. 7.  Diverticulosis coli without CT evidence of acute diverticulitis. 8. Small, fat containing left inguinal hernia. 9. Small, fat containing umbilical hernia. 10. Possible constipation. Critical results were called by Dr. Juan Juan to [facility] on 6/13/2021 at 13:03. ED COURSE  Pain control was not required while here in the emergency department. Triage vitals stable. CBC without leukocytosis. H&H 10.9 and 32.6. Urinalysis was unremarkable. CMP without acute findings. Troponin less than 0.01. Stable portable chest x-ray. EKG was a normal sinus rhythm with nonspecific changes without evidence for acute ischemia. CT chest abdomen pelvis consistent with left lower lobe PE without evidence to suggest right heart strain. Lower abdominal discomfort may be secondary to constipation without evidence for acute intra-abdominal pathology on CT otherwise. Patient started on high-dose heparin. Discussed case with hospital medicine regarding admission and orders placed. A discussion was had with Ms. Fernandez regarding syncopal episode, ED findings and recommendations for admission. Risk management discussed and shared decision making had with patient and/or surrogate. All questions were answered.  Patient Protein, UA Negative Negative mg/dL    Urobilinogen, Urine 0.2 <2.0 E.U./dL    Nitrite, Urine Negative Negative    Leukocyte Esterase, Urine Negative Negative    Microscopic Examination Not Indicated     Urine Type NotGiven    APTT   Result Value Ref Range    aPTT 28.1 24.2 - 36.2 sec   EKG 12 Lead   Result Value Ref Range    Ventricular Rate 75 BPM    Atrial Rate 75 BPM    P-R Interval 202 ms    QRS Duration 76 ms    Q-T Interval 498 ms    QTc Calculation (Bazett) 556 ms    P Axis -3 degrees    R Axis -20 degrees    T Axis 19 degrees    Diagnosis       Normal sinus rhythm Probable old inferior wall MIAnterolateral infarct , age undeterminedProlonged QTAbnormal ECGWhen compared with ECG of 19-MAR-2021 20:13,Significant changes have occurredConfirmed by Burton Mcclain MD, [address] (1986) on 6/13/2021 11:24:29 AM     I spoke with Carmelo Barry and Delsie Galeazzi. We thoroughly discussed the history, physical exam, laboratory and imaging studies, as well as, emergency department course. Based upon that discussion, we've decided to admit Sebastian Stafford to the hospital for further observation, evaluation and treatment. Final Impression  1. Other acute pulmonary embolism without acute cor pulmonale ([address]    2. Syncope and collapse    3. Constipation, unspecified constipation type      Blood pressure 129/65, pulse 70, temperature 98.2 °F (36.8 °C), temperature source Oral, resp. rate 16, height 5' (1.524 m), weight 128 lb (58.1 kg), SpO2 94 %. DISPOSITION  Patient was admitted to the hospital in stable condition.           Connie Bays, [address] [facility]  06/13/21 2092

## 2021-06-13 NOTE — ED PROVIDER NOTES
K/uL    Basophils Absolute 0.1 0.0 - 0.2 K/uL   Comprehensive Metabolic Panel   Result Value Ref Range    Sodium 140 136 - 145 mmol/L    Potassium 4.0 3.5 - 5.1 mmol/L    Chloride 108 99 - 110 mmol/L    CO2 24 21 - 32 mmol/L    Anion Gap 8 3 - 16    Glucose 141 (H) 70 - 99 mg/dL    BUN 10 7 - 20 mg/dL    CREATININE 0.6 0.6 - 1.2 mg/dL    GFR Non-African American >60 >60    GFR African American >60 >60    Calcium 8.7 8.3 - 10.6 mg/dL    Total Protein 5.6 (L) 6.4 - 8.2 g/dL    Albumin 2.2 (L) 3.4 - 5.0 g/dL    Albumin/Globulin Ratio 0.6 (L) 1.1 - 2.2    Total Bilirubin 0.5 0.0 - 1.0 mg/dL    Alkaline Phosphatase 164 (H) 40 - 129 U/L    ALT 13 10 - 40 U/L    AST 28 15 - 37 U/L    Globulin 3.4 g/dL   Troponin   Result Value Ref Range    Troponin <0.01 <0.01 ng/mL   Urinalysis   Result Value Ref Range    Color, UA Yellow Straw/Yellow    Clarity, UA Clear Clear    Glucose, Ur Negative Negative mg/dL    Bilirubin Urine Negative Negative    Ketones, Urine Negative Negative mg/dL    Specific Gravity, UA 1.015 1.005 - 1.030    Blood, Urine Negative Negative    pH, UA 6.5 5.0 - 8.0    Protein, UA Negative Negative mg/dL    Urobilinogen, Urine 0.2 <2.0 E.U./dL    Nitrite, Urine Negative Negative    Leukocyte Esterase, Urine Negative Negative    Microscopic Examination Not Indicated     Urine Type NotGiven    APTT   Result Value Ref Range    aPTT 28.1 24.2 - 36.2 sec   APTT   Result Value Ref Range    aPTT >248.0 (HH) 24.2 - 36.2 sec   Basic Metabolic Panel w/ Reflex to MG   Result Value Ref Range    Sodium 140 136 - 145 mmol/L    Potassium reflex Magnesium 4.2 3.5 - 5.1 mmol/L    Chloride 106 99 - 110 mmol/L    CO2 26 21 - 32 mmol/L    Anion Gap 8 3 - 16    Glucose 82 70 - 99 mg/dL    BUN 13 7 - 20 mg/dL    CREATININE 0.6 0.6 - 1.2 mg/dL    GFR Non-African American >60 >60    GFR African American >60 >60    Calcium 7.9 (L) 8.3 - 10.6 mg/dL   CBC   Result Value Ref Range    WBC 7.9 4.0 - 11.0 K/uL    RBC 2.50 (L) 4.00 - 5.20 M/uL    Hemoglobin 8.6 (L) 12.0 - 16.0 g/dL    Hematocrit 25.2 (L) 36.0 - 48.0 %    .8 (H) 80.0 - 100.0 fL    MCH 34.5 (H) 26.0 - 34.0 pg    MCHC 34.2 31.0 - 36.0 g/dL    RDW 16.5 (H) 12.4 - 15.4 %    Platelets 705 087 - 537 K/uL    MPV 7.7 5.0 - 10.5 fL   Blood Occult Stool Screen #1   Result Value Ref Range    Occult Blood Screening Result: POSITIVE  Normal range: Negative   (A)    Hemoglobin and hematocrit, blood   Result Value Ref Range    Hemoglobin 8.8 (L) 12.0 - 16.0 g/dL    Hematocrit 26.1 (L) 36.0 - 48.0 %   Hemoglobin and hematocrit, blood   Result Value Ref Range    Hemoglobin 8.6 (L) 12.0 - 16.0 g/dL    Hematocrit 25.1 (L) 36.0 - 48.0 %   APTT   Result Value Ref Range    aPTT 137.3 (HH) 24.2 - 36.2 sec   EKG 12 Lead   Result Value Ref Range    Ventricular Rate 75 BPM    Atrial Rate 75 BPM    P-R Interval 202 ms    QRS Duration 76 ms    Q-T Interval 498 ms    QTc Calculation (Bazett) 556 ms    P Axis -3 degrees    R Axis -20 degrees    T Axis 19 degrees    Diagnosis       Normal sinus rhythm Probable old inferior wall MIAnterolateral infarct , age undeterminedProlonged QTAbnormal ECGWhen compared with ECG of 19-MAR-2021 20:13,Significant changes have occurredConfirmed by Caitie Massey MD, [address] (1986) on 6/13/2021 11:24:29 AM       For further details of [address] [place] [address] emergency department encounter, please see [facility]'s documentation. 1. Other acute pulmonary embolism without acute cor pulmonale ([address]    2. Syncope and collapse    3. Constipation, unspecified constipation type    4. Rectal bleeding      This chart was generated in part by using Dragon Dictation system and may contain errors related to that system including errors in grammar, punctuation, and spelling, as well as words and phrases that may be inappropriate. If there are any questions or concerns please feel free to contact the dictating provider for clarification.         Alec Flores MD  06/14/21 8756

## 2021-06-13 NOTE — CONSULTS
Pharmacy to Manage Heparin Infusion per Hospital Policy    Dx: PE   Pt wt = 50.5 kg adjusted   Baseline aPTT = ordered     High Dose Heparin Infusion  Heparin 80 units/kg IVP bolus followed by Heparin infusion at 18 units/kg/hr (recommended initial max dose 2100 units/hr). Recheck aPTT in 6 hours. Goal aPTT = 49-76 seconds. aPTT < 36.3 Heparin 80 units/kg bolus  Increase infusion by 4 units/kg/hr  aPTT 36.3 - 48.9   Heparin 40 units/kg bolus Increase infusion by 2 units/kg/hr  aPTT 49 - 76 No bolus No change   aPTT 76.1 - 85   No bolus Decrease infusion by 2 units/kg/hr  aPTT 85.1 - 94    Hold heparin for 1 hour Decrease infusion by 3 units/kg/hr  aPTT 94.1 - 103  Hold heparin for 1 hour Decrease infusion by 4 units/kg/hr  aPTT > 103 Hold heparin for 1 hour Decrease infusion by 6 units/kg/hr    Obtain aPTT 6 hours after bolus and 6 hours after any dose change until two consecutive therapeutic aPTT are achieved- then daily. Dayana Jaime, PharmD 6/13/2021 1:19 PM    6/13 2314  aptt = >248 sec. Hold x1 hr; rate = 6 ml/hr  Next aptt 0800. Manuelito Chaidez, Pharm D.6/14/2021 1:30 AM    6/14 0750  aPTT - 137.3 sec  Hold heparin drip for 1 hour and decrease rate to 3.0 mL/hr  Next aPTT at [address] Jesusita Celeste, PharmD 6/14/2021 9:00 AM    6/14/2021  Recent Labs     06/14/21  1612   APTT 70.0*     Continue heparin at current rate of 3ml/hr or 300 units/hr  Next aptt at [address] [facility] 6/14/2021 5:32 PM      6/14/2021  Recent Labs     06/14/21  2148   APTT 79.8*   Decrease heparin to 200 unit/hr  (2ml/hr)  Recheck aPTT in 6 hours @ 0500 6/15  David Pizarro [facility] 6/14/2021 10:54 PM     6/15 0139  Heparin drip stopped for colonoscopy.

## 2021-06-14 ENCOUNTER — APPOINTMENT (OUTPATIENT)
Dept: VASCULAR LAB | Age: 86
DRG: 377 | End: 2021-06-14
Payer: MEDICARE

## 2021-06-14 LAB
ANION GAP SERPL CALCULATED.3IONS-SCNC: 8 MMOL/L (ref 3–16)
APTT: 137.3 SEC (ref 24.2–36.2)
APTT: 70 SEC (ref 24.2–36.2)
APTT: 79.8 SEC (ref 24.2–36.2)
APTT: >248 SEC (ref 24.2–36.2)
BUN BLDV-MCNC: 13 MG/DL (ref 7–20)
CALCIUM SERPL-MCNC: 7.9 MG/DL (ref 8.3–10.6)
CHLORIDE BLD-SCNC: 106 MMOL/L (ref 99–110)
CO2: 26 MMOL/L (ref 21–32)
CREAT SERPL-MCNC: 0.6 MG/DL (ref 0.6–1.2)
GFR AFRICAN AMERICAN: >60
GFR NON-AFRICAN AMERICAN: >60
GLUCOSE BLD-MCNC: 82 MG/DL (ref 70–99)
HCT VFR BLD CALC: 25.1 % (ref 36–48)
HCT VFR BLD CALC: 25.2 % (ref 36–48)
HCT VFR BLD CALC: 26.1 % (ref 36–48)
HCT VFR BLD CALC: 26.3 % (ref 36–48)
HCT VFR BLD CALC: 28.2 % (ref 36–48)
HEMOGLOBIN: 8.6 G/DL (ref 12–16)
HEMOGLOBIN: 8.6 G/DL (ref 12–16)
HEMOGLOBIN: 8.8 G/DL (ref 12–16)
HEMOGLOBIN: 9 G/DL (ref 12–16)
HEMOGLOBIN: 9.4 G/DL (ref 12–16)
MCH RBC QN AUTO: 34.5 PG (ref 26–34)
MCHC RBC AUTO-ENTMCNC: 34.2 G/DL (ref 31–36)
MCV RBC AUTO: 100.8 FL (ref 80–100)
OCCULT BLOOD SCREENING: ABNORMAL
PDW BLD-RTO: 16.5 % (ref 12.4–15.4)
PLATELET # BLD: 407 K/UL (ref 135–450)
PMV BLD AUTO: 7.7 FL (ref 5–10.5)
POTASSIUM REFLEX MAGNESIUM: 4.2 MMOL/L (ref 3.5–5.1)
RBC # BLD: 2.5 M/UL (ref 4–5.2)
SODIUM BLD-SCNC: 140 MMOL/L (ref 136–145)
WBC # BLD: 7.9 K/UL (ref 4–11)

## 2021-06-14 PROCEDURE — 6370000000 HC RX 637 (ALT 250 FOR IP): Performed by: INTERNAL MEDICINE

## 2021-06-14 PROCEDURE — 85730 THROMBOPLASTIN TIME PARTIAL: CPT

## 2021-06-14 PROCEDURE — 36415 COLL VENOUS BLD VENIPUNCTURE: CPT

## 2021-06-14 PROCEDURE — 85027 COMPLETE CBC AUTOMATED: CPT

## 2021-06-14 PROCEDURE — 80048 BASIC METABOLIC PNL TOTAL CA: CPT

## 2021-06-14 PROCEDURE — 2700000000 HC OXYGEN THERAPY PER DAY

## 2021-06-14 PROCEDURE — 2580000003 HC RX 258: Performed by: INTERNAL MEDICINE

## 2021-06-14 PROCEDURE — 85018 HEMOGLOBIN: CPT

## 2021-06-14 PROCEDURE — 94761 N-INVAS EAR/PLS OXIMETRY MLT: CPT

## 2021-06-14 PROCEDURE — G0328 FECAL BLOOD SCRN IMMUNOASSAY: HCPCS

## 2021-06-14 PROCEDURE — 2500000003 HC RX 250 WO HCPCS: Performed by: NURSE PRACTITIONER

## 2021-06-14 PROCEDURE — 1200000000 HC SEMI PRIVATE

## 2021-06-14 PROCEDURE — 85014 HEMATOCRIT: CPT

## 2021-06-14 PROCEDURE — 93970 EXTREMITY STUDY: CPT

## 2021-06-14 RX ORDER — POLYETHYLENE GLYCOL 3350 17 G/17G
238 POWDER, FOR SOLUTION ORAL ONCE
Status: COMPLETED | OUTPATIENT
Start: 2021-06-14 | End: 2021-06-14

## 2021-06-14 RX ORDER — POLYETHYLENE GLYCOL 3350 17 G/17G
120 POWDER, FOR SOLUTION ORAL ONCE
Status: DISCONTINUED | OUTPATIENT
Start: 2021-06-15 | End: 2021-06-15

## 2021-06-14 RX ORDER — HEPARIN SODIUM 10000 [USP'U]/100ML
200 INJECTION, SOLUTION INTRAVENOUS CONTINUOUS
Status: DISCONTINUED | OUTPATIENT
Start: 2021-06-14 | End: 2021-06-15

## 2021-06-14 RX ADMIN — MICONAZOLE NITRATE: 2 POWDER TOPICAL at 09:26

## 2021-06-14 RX ADMIN — SERTRALINE 12.5 MG: 50 TABLET, FILM COATED ORAL at 09:25

## 2021-06-14 RX ADMIN — BISACODYL 20 MG: 5 TABLET, COATED ORAL at 17:59

## 2021-06-14 RX ADMIN — SODIUM CHLORIDE, PRESERVATIVE FREE 10 ML: 5 INJECTION INTRAVENOUS at 09:26

## 2021-06-14 RX ADMIN — MICONAZOLE NITRATE: 2 POWDER TOPICAL at 21:16

## 2021-06-14 RX ADMIN — ASPIRIN 81 MG: 81 TABLET, COATED ORAL at 09:25

## 2021-06-14 RX ADMIN — Medication 2000 UNITS: at 09:25

## 2021-06-14 RX ADMIN — POLYETHYLENE GLYCOL 3350 238 G: 17 POWDER, FOR SOLUTION ORAL at 21:16

## 2021-06-14 RX ADMIN — ATORVASTATIN CALCIUM 10 MG: 10 TABLET, FILM COATED ORAL at 21:16

## 2021-06-14 RX ADMIN — AMIODARONE HYDROCHLORIDE 200 MG: 200 TABLET ORAL at 09:25

## 2021-06-14 ASSESSMENT — PAIN SCALES - GENERAL
PAINLEVEL_OUTOF10: 0

## 2021-06-14 NOTE — PROGRESS NOTES
Hospitalist Progress Note      PCP: Tim Stevenson MD    Date of Admission: 6/13/2021    Chief Complaint   Patient presents with    Rectal Bleeding     had near syncopal episode getting up from toilet today. pain in bottom started this morning. jahovah's witness patient. Hospital Course: H&P reviewed     Subjective:   Patient states that the RN told her there was blood in her stool but she did not see herself. On 1L O2 via nasal canula. Denies worsening SOB. GI has been consulted for evaluation of GI bleed in setting of anticoagulation for left PE. Venous duplex negative. Medications:  Reviewed    Infusion Medications    heparin (PORCINE) Infusion 300 Units/hr (06/14/21 1424)    sodium chloride       Scheduled Medications    sodium chloride flush  5-40 mL Intravenous 2 times per day    amiodarone  200 mg Oral Daily    aspirin  81 mg Oral Daily    atorvastatin  10 mg Oral Nightly    pantoprazole  40 mg Oral QAM AC    vitamin D  2,000 Units Oral Daily    sertraline  12.5 mg Oral Daily    miconazole   Topical BID     PRN Meds: heparin (porcine), heparin (porcine), sodium chloride flush, sodium chloride, ondansetron **OR** ondansetron, polyethylene glycol, acetaminophen **OR** acetaminophen      Intake/Output Summary (Last 24 hours) at 6/14/2021 1638  Last data filed at 6/14/2021 1139  Gross per 24 hour   Intake 480 ml   Output --   Net 480 ml       Physical Exam Performed:    /72   Pulse 81   Temp 98.7 °F (37.1 °C) (Oral)   Resp 16   Ht 5' (1.524 m)   Wt 128 lb (58.1 kg)   SpO2 91%   BMI 25.00 kg/m²     General appearance: Elderly CF in no apparent distress, appears stated age and cooperative. HEENT: Pupils equal, round, and reactive to light. Conjunctivae/corneas clear. Neck: Supple, with full range of motion. No jugular venous distention. Trachea midline. Respiratory:  Normal respiratory effort.  Clear to auscultation, bilaterally without Rales/Wheezes/Rhonchi. Cardiovascular: Regular rate and rhythm with normal S1/S2 without murmurs, rubs or gallops. Abdomen: Soft, non-tender, non-distended with normal bowel sounds. Musculoskeletal: No clubbing, cyanosis or edema bilaterally. Full range of motion without deformity. Skin: Skin color, texture, turgor normal.  No rashes or lesions. Neurologic:  Neurovascularly intact without any focal sensory/motor deficits. Cranial nerves: II-XII intact, grossly non-focal.  Psychiatric: Alert and oriented, thought content appropriate, normal insight  Capillary Refill: Brisk,< 3 seconds   Peripheral Pulses: +2 palpable, equal bilaterally       Labs:   Recent Labs     06/13/21  1025 06/14/21  0750 06/14/21  1221 06/14/21  1612   WBC 7.8 7.9  --   --    HGB 10.9* 8.6* 8.6* 9.0*   HCT 32.6* 25.2* 25.1* 26.3*   * 407  --   --      Recent Labs     06/13/21  1025 06/14/21  0750    140   K 4.0 4.2    106   CO2 24 26   BUN 10 13   CREATININE 0.6 0.6   CALCIUM 8.7 7.9*     Recent Labs     06/13/21  1025   AST 28   ALT 13   BILITOT 0.5   ALKPHOS 164*     No results for input(s): INR in the last 72 hours. Recent Labs     06/13/21  1025   TROPONINI <0.01       Urinalysis:    Lab Results   Component Value Date    NITRU Negative 06/13/2021    BLOODU Negative 06/13/2021    SPECGRAV 1.015 06/13/2021    GLUCOSEU Negative 06/13/2021       Radiology:  VL Extremity Venous Bilateral         CTA CHEST ABDOMEN PELVIS W CONTRAST   Final Result   1. CT CHEST: Acute pulmonary embolism proximally within the pulmonary artery   branch to the left lower lobe. 2. No CT findings of right heart strain. 3. Mild calcific atherosclerosis aorta and its branches without aneurysm or   dissection. 4. CT ABDOMEN/PELVIS: No acute vascular abnormality. 5.  No CT evidence of an acute intra-abdominal or intrapelvic process. 6. Cholelithiasis without CT findings of acute cholecystitis.    7.  Diverticulosis coli without CT evidence of acute diverticulitis. 8. Small, fat containing left inguinal hernia. 9. Small, fat containing umbilical hernia. 10. Possible constipation. Critical results were called by Dr. Annis Rinne to [facility] on   6/13/2021 at 13:03. XR CHEST PORTABLE   Final Result   No acute cardiopulmonary disease. Active Hospital Problems    Diagnosis Date Noted    Pulmonary embolism on left [facility]) [I26.99] 06/13/2021     Assessment/Plan:  1. Syncope in the setting of acute left segmental PE   -As evidenced by CT PA. Patient started on heparin GTT in ED. Continue and will transition to p.o. Eliquis at discharge  -Bilateral venous duplex of lower extremities showed no evidence of DVT in either extremity.   -Continue telemetry monitor.     2.  Rectal bleeding POA -likely hemorrhoidal  -Continues to bleed with BM per RN. -GI consulted but had to be re-consulted to patient's home GI MD, Dr. Alicia Aguayo. -Hemoglobin 10.9 on admission, likely elevated d/t dehydration, as patient is currently at 9.0 and her baseline is ~8.7. Monitor closely in the setting of anticoagulation for PE. Patient is [employer].      3.  Paroxysmal atrial fibrillation -currently rate controlled and in normal sinus rhythm. On amiodarone at home-continue ; patient on aspirin but not on any anticoagulation PTA  -started on heparin gtt     4. GERD -on PPI; continue     5. Hyperlipidemia -on statin; continue     DVT Prophylaxis: heparin gtt  Diet: ADULT DIET; Regular  Diet NPO  Code Status: Full Code    PT/OT Eval Status: not yet ordered     Dispo - likely 1-2 days pending GI evaluation/recommendations and anticoagulation     Patient case seen and discussed under supervision of preceptor, Dr. Gabi Montoya. Note made by PA-S2 student in the status of a scribe, with review by preceptor.      Yesenia CLEANING-S2      Addendum to PA student progress note:  Pt seen, examined, and evaluated with PA student, who acted as my scribe for the above documentation. I have reviewed the current history, physical findings, labs, assessment, and plan and agree with the note as documented. Joel Martinez MD  Hospitalist Physician       The note was completed using EMR. Every effort was made to ensure accuracy; However, inadvertent computerized transcription errors may be present.

## 2021-06-14 NOTE — PROGRESS NOTES
Consult called to [address] [facility] on 6/14/2021 @ [address] [place] [address]

## 2021-06-14 NOTE — PROGRESS NOTES
Lab called. Critical APTT. Paged cross cover. Paused heparin gtt. Stool sample positive for blood. H&H 8.8/26.1 from 10.9/32. 6. GI returned phone call. Relayed information re:pt changes. Per MD, continue w/ heparin as needed - pharmacy to dose. NPO. Monitor closely. MD will continue to follow.

## 2021-06-14 NOTE — CARE COORDINATION
CASE MANAGEMENT INITIAL ASSESSMENT      Reviewed chart and completed assessment at bedside with: patient  Explained Case Management role/services. Health Care Decision Maker :   Primary Decision Maker: Ileana Bardales - [phone]          Can this person be reached and be able to respond quickly, such as within a few minutes or hours? Yes  Who would be your back-up decision maker? Name  nonne  Phone Number:     Admit date/status:6/13 Inpatient  Diagnosis: PE   Is this a Readmission?:  No      Insurance: Medicare; Karen Simon required for SNF: No       3 night stay required: No    Living arrangements, Adls, care needs, prior to admission: LTC at [facility];    Transportation: [employer]     [facility] [employer] [place] at home:  Walker__Cane__RTS__ BSC__Shower Chair__  02__ HHN__ CPAP__  BiPap__  Hospital Bed__ W/C___ Other__________    Services in the home and/or outpatient, prior to admission: LTC at [facility] (if applicable)   · Name:  · Address:  · Dialysis Schedule:  · Phone:  · Fax:    PT/OT recs: not ordered    Hospital Exemption Notification (HEN): no    Barriers to discharge: none    Plan/comments: plan to return to [facility].  Message left for Emmie to confirm return [phone]     ECOC on chart for MD signature

## 2021-06-14 NOTE — CONSULTS
Gastroenterology Consult Note    Patient:   Yara Bond   :    1932   Facility:   [facility]   Referring/PCP: Cem Lomeli MD  Date:     2021  Consultant:   Laurita Funk MD, MD      Chief Complaint   Patient presents with    Rectal Bleeding     had near syncopal episode getting up from toilet today. pain in bottom started this morning. jahovah's witness patient. History of Present illness     80year old female with history of HTN, HLD, GERD, A fib s/p pacemaker, arthritis, depression, uterine cancer s/p hysterectomy, diverticulosis and hemorrhoids presented to ER with rectal bleeding. She developed an episode of presyncope, lightheadedness at the nursing home. She was found to have L segmental PE per CTPA and was started on heparin drip. She reports rectal bleeding for past 2-3 days. She denies any abdominal pain, hematemesis or melena. She denies any recent change in medication. She denies any NSAID use. She has known history of GI bleed and her last colonoscopy was in 2019.      Past Medical History:   Diagnosis Date    Anemia     Anxiety     Arthritis     Atrial fibrillation ([address]     CAD (coronary artery disease)     Chronic GERD     Depression     Diverticulosis     Gall stone     GERD (gastroesophageal reflux disease) 3/19/2021    Glaucoma     Hypercholesteremia     Hypertension     Irregular heart beat     Rectal prolapse     Uterine cancer ([address]      Past Surgical History:   Procedure Laterality Date    APPENDECTOMY      COLONOSCOPY      polyp    COLONOSCOPY  1010    diverticulosis    COLONOSCOPY  10/4/2014    diverticulosis, radiation colitis sigmoid    COLONOSCOPY N/A 2/15/2019    COLON performed by Laurita Funk MD at [place] [address] Right 8/4/15    hip pinning    HYSTERECTOMY  2008    RT in    [address] HISTORY Right 10/29/2018    PATELLA OPEN REDUCTION INTERNAL FIXATION     PACEMAKER INSERTION      PACEMAKER PLACEMENT  2008    [place] CPTR-ASST SURGICAL NAVIGATION IMAGE-LESS Right 9/5/2018    RIGHT TOTAL KNEE REPLACEMENT COMPUTER NAVIGATION WITH ADDUCTOR CANAL BLOCK FOR PAIN CONTROL                     SONAL performed by Shayne Emmanuel MD at [address] [facility] OFFICE/OUTPT [facility] Right 10/29/2018    Right knee inferior pole patellectomy, primary repair of patellar tendon. performed by Shayne Emmanuel MD at [address] <100SQCM Right 11/30/2018    RIGHT KNEE GASTROC FLAP WITH SKIN GRAFT, WOUND VAC PLACEMENT performed by Renzo Bal MD at [place] [address] Right 3/20/2021    REVISION RIGHT TOTAL KNEE ARTHROPLASTY       SONAL performed by Renzo Bal MD at [place] [address].         Social:   Social History     Tobacco Use    Smoking status: Former Smoker    Smokeless tobacco: Never Used    Tobacco comment: only smoked from age 6-13 yrs old   Substance Use Topics    Alcohol use: No     Family:   Family History   Problem Relation Age of Onset    Mental Retardation Mother     Cancer Father         lung     No current facility-administered medications on file prior to encounter.      Current Outpatient Medications on File Prior to Encounter   Medication Sig Dispense Refill    amiodarone (CORDARONE) 200 MG tablet Take 1 tablet by mouth daily 30 tablet 0    psyllium (KONSYL) 28.3 % PACK Take 1 packet by mouth daily      nystatin (MYCOSTATIN) POWD powder Apply topically 2 times daily As needed for candidiasis      sertraline (ZOLOFT) 25 MG tablet Take 12.5 mg by mouth daily      hydrocortisone 1 % cream Apply topically every 6-8 hours as needed As needed for discomfort      acetaminophen (TYLENOL) 325 MG tablet Take 650 mg by mouth every 6 hours as needed for Pain      DULoxetine (CYMBALTA) 30 MG extended release capsule Take 30 mg by mouth daily      Melatonin 5 MG CAPS Take 5 mg by mouth nightly as needed      omeprazole (PRILOSEC) 20 MG delayed release capsule Take 40 mg by mouth daily       tamsulosin (FLOMAX) 0.4 MG capsule Take 0.4 mg by mouth daily      ferrous sulfate 325 (65 Fe) MG tablet Take 1 tablet by mouth three times a week 30 tablet 0    sennosides-docusate sodium (SENOKOT-S) 8.6-50 MG tablet Take 1 tablet by mouth daily 30 tablet 1    aspirin 81 MG tablet Take 81 mg by mouth daily      timolol (BETIMOL) 0.5 % ophthalmic solution Place 1 drop into the left eye 2 times daily Dosing times are 09:00 and 15:00.      atorvastatin (LIPITOR) 10 MG tablet   Take 10 mg by mouth nightly       Multiple Vitamins-Minerals (CENTRUM PO) Take 1 tablet by mouth daily. Indications: Centrum Silver      vitamin D (CHOLECALCIFEROL) 400 UNIT TABS tablet   Take 2,000 Units by mouth daily       latanoprost (XALATAN) 0.005 % ophthalmic solution   Place 1 drop into both eyes nightly       amiodarone (PACERONE) 400 MG tablet Take 1 tablet by mouth daily for 5 days 5 tablet 0    diphenhydrAMINE (BENADRYL) 25 MG capsule Take 25 mg by mouth every 6 hours as needed for Itching      gabapentin (NEURONTIN) 100 MG capsule Take 100 mg by mouth 3 times daily.  loperamide (IMODIUM) 2 MG capsule Take 2 mg by mouth as needed for Diarrhea Two tabs after initial diarrhea episode, 1 tab after each additional episode, NTE        Infusions:    heparin (PORCINE) Infusion 300 Units/hr (06/14/21 1424)    sodium chloride       PRN Medications: heparin (porcine), heparin (porcine), sodium chloride flush, sodium chloride, ondansetron **OR** ondansetron, polyethylene glycol, acetaminophen **OR** acetaminophen  Allergies: Allergies   Allergen Reactions    Lisinopril Swelling     angioedema  angioedema    Levofloxacin     Levofloxacin Other (See Comments)     unsure    Vancomycin Rash     Rash and hives. CARLY'S SYNDROME    Amlodipine     Avelox 100.8*  --   --    * 407  --   --      BMP:   Recent Labs     06/13/21  1025 06/14/21  0750    140   K 4.0 4.2    106   CO2 24 26   BUN 10 13   CREATININE 0.6 0.6     LIVER PROFILE:   Recent Labs     06/13/21  1025   AST 28   ALT 13   PROT 5.6*   BILITOT 0.5   ALKPHOS 164*     CT chest/abd/pelvis:  1.  CT CHEST: Acute pulmonary embolism proximally within the pulmonary artery   branch to the left lower lobe. 2. No CT findings of right heart strain. 3. Mild calcific atherosclerosis aorta and its branches without aneurysm or   dissection. 4. CT ABDOMEN/PELVIS: No acute vascular abnormality. 5.  No CT evidence of an acute intra-abdominal or intrapelvic process. 6. Cholelithiasis without CT findings of acute cholecystitis. 7.  Diverticulosis coli without CT evidence of acute diverticulitis. 8. Small, fat containing left inguinal hernia. 9. Small, fat containing umbilical hernia. 10. Possible constipation. Assessment:     80year old female with history of HTN, HLD, GERD, A fib, arthritis, depression, uterine cancer s/p hysterectomy, diverticulosis and hemorrhoid admitted with painless hematochezia. Differential includes hemorrhoids, diverticulosis, colitis, AVMs, and less likely malignancy    Plan:   1. Continue supportive care  2. Monitor Hgb  3. Observe for signs of bleeding  4. Continue anticoagulation  5. Colonoscopy tomorrow to rule out high risk bleeding lesions  6.  Continue PPI daily      Kimberley Savage MD, MD  4:57 PM 6/14/2021

## 2021-06-15 ENCOUNTER — ANESTHESIA (OUTPATIENT)
Dept: ENDOSCOPY | Age: 86
DRG: 377 | End: 2021-06-15
Payer: MEDICARE

## 2021-06-15 ENCOUNTER — ANESTHESIA EVENT (OUTPATIENT)
Dept: ENDOSCOPY | Age: 86
DRG: 377 | End: 2021-06-15
Payer: MEDICARE

## 2021-06-15 VITALS — SYSTOLIC BLOOD PRESSURE: 105 MMHG | DIASTOLIC BLOOD PRESSURE: 60 MMHG | OXYGEN SATURATION: 100 %

## 2021-06-15 LAB
ANION GAP SERPL CALCULATED.3IONS-SCNC: 7 MMOL/L (ref 3–16)
APTT: 32.8 SEC (ref 24.2–36.2)
BUN BLDV-MCNC: 13 MG/DL (ref 7–20)
CALCIUM SERPL-MCNC: 7.9 MG/DL (ref 8.3–10.6)
CHLORIDE BLD-SCNC: 107 MMOL/L (ref 99–110)
CO2: 25 MMOL/L (ref 21–32)
CREAT SERPL-MCNC: 0.6 MG/DL (ref 0.6–1.2)
GFR AFRICAN AMERICAN: >60
GFR NON-AFRICAN AMERICAN: >60
GLUCOSE BLD-MCNC: 91 MG/DL (ref 70–99)
HCT VFR BLD CALC: 25.2 % (ref 36–48)
HCT VFR BLD CALC: 26 % (ref 36–48)
HCT VFR BLD CALC: 27.2 % (ref 36–48)
HCT VFR BLD CALC: 27.3 % (ref 36–48)
HEMOGLOBIN: 8.6 G/DL (ref 12–16)
HEMOGLOBIN: 8.8 G/DL (ref 12–16)
HEMOGLOBIN: 9.2 G/DL (ref 12–16)
HEMOGLOBIN: 9.4 G/DL (ref 12–16)
MAGNESIUM: 2 MG/DL (ref 1.8–2.4)
MCH RBC QN AUTO: 34.6 PG (ref 26–34)
MCHC RBC AUTO-ENTMCNC: 34 G/DL (ref 31–36)
MCV RBC AUTO: 101.7 FL (ref 80–100)
PDW BLD-RTO: 16.3 % (ref 12.4–15.4)
PLATELET # BLD: 450 K/UL (ref 135–450)
PMV BLD AUTO: 7.6 FL (ref 5–10.5)
POTASSIUM REFLEX MAGNESIUM: 3.4 MMOL/L (ref 3.5–5.1)
RBC # BLD: 2.55 M/UL (ref 4–5.2)
SODIUM BLD-SCNC: 139 MMOL/L (ref 136–145)
WBC # BLD: 9.7 K/UL (ref 4–11)

## 2021-06-15 PROCEDURE — 2500000003 HC RX 250 WO HCPCS: Performed by: NURSE ANESTHETIST, CERTIFIED REGISTERED

## 2021-06-15 PROCEDURE — 85014 HEMATOCRIT: CPT

## 2021-06-15 PROCEDURE — 85027 COMPLETE CBC AUTOMATED: CPT

## 2021-06-15 PROCEDURE — 80048 BASIC METABOLIC PNL TOTAL CA: CPT

## 2021-06-15 PROCEDURE — 85018 HEMOGLOBIN: CPT

## 2021-06-15 PROCEDURE — 1200000000 HC SEMI PRIVATE

## 2021-06-15 PROCEDURE — 0DJD8ZZ INSPECTION OF LOWER INTESTINAL TRACT, VIA NATURAL OR ARTIFICIAL OPENING ENDOSCOPIC: ICD-10-PCS | Performed by: INTERNAL MEDICINE

## 2021-06-15 PROCEDURE — 6370000000 HC RX 637 (ALT 250 FOR IP): Performed by: INTERNAL MEDICINE

## 2021-06-15 PROCEDURE — 83735 ASSAY OF MAGNESIUM: CPT

## 2021-06-15 PROCEDURE — 85730 THROMBOPLASTIN TIME PARTIAL: CPT

## 2021-06-15 PROCEDURE — 7100000011 HC PHASE II RECOVERY - ADDTL 15 MIN: Performed by: INTERNAL MEDICINE

## 2021-06-15 PROCEDURE — 3609027000 HC COLONOSCOPY: Performed by: INTERNAL MEDICINE

## 2021-06-15 PROCEDURE — 6360000002 HC RX W HCPCS: Performed by: NURSE ANESTHETIST, CERTIFIED REGISTERED

## 2021-06-15 PROCEDURE — 7100000010 HC PHASE II RECOVERY - FIRST 15 MIN: Performed by: INTERNAL MEDICINE

## 2021-06-15 PROCEDURE — 36415 COLL VENOUS BLD VENIPUNCTURE: CPT

## 2021-06-15 PROCEDURE — 2709999900 HC NON-CHARGEABLE SUPPLY: Performed by: INTERNAL MEDICINE

## 2021-06-15 PROCEDURE — 3700000000 HC ANESTHESIA ATTENDED CARE: Performed by: INTERNAL MEDICINE

## 2021-06-15 PROCEDURE — 2580000003 HC RX 258: Performed by: NURSE ANESTHETIST, CERTIFIED REGISTERED

## 2021-06-15 PROCEDURE — 3700000001 HC ADD 15 MINUTES (ANESTHESIA): Performed by: INTERNAL MEDICINE

## 2021-06-15 RX ORDER — PROPOFOL 10 MG/ML
INJECTION, EMULSION INTRAVENOUS PRN
Status: DISCONTINUED | OUTPATIENT
Start: 2021-06-15 | End: 2021-06-15 | Stop reason: SDUPTHER

## 2021-06-15 RX ORDER — LIDOCAINE HYDROCHLORIDE 20 MG/ML
INJECTION, SOLUTION EPIDURAL; INFILTRATION; INTRACAUDAL; PERINEURAL PRN
Status: DISCONTINUED | OUTPATIENT
Start: 2021-06-15 | End: 2021-06-15 | Stop reason: SDUPTHER

## 2021-06-15 RX ORDER — SODIUM CHLORIDE, SODIUM LACTATE, POTASSIUM CHLORIDE, CALCIUM CHLORIDE 600; 310; 30; 20 MG/100ML; MG/100ML; MG/100ML; MG/100ML
INJECTION, SOLUTION INTRAVENOUS CONTINUOUS PRN
Status: DISCONTINUED | OUTPATIENT
Start: 2021-06-15 | End: 2021-06-15 | Stop reason: SDUPTHER

## 2021-06-15 RX ORDER — POLYETHYLENE GLYCOL 3350 17 G/17G
120 POWDER, FOR SOLUTION ORAL ONCE
Status: COMPLETED | OUTPATIENT
Start: 2021-06-15 | End: 2021-06-15

## 2021-06-15 RX ADMIN — APIXABAN 10 MG: 5 TABLET, FILM COATED ORAL at 14:09

## 2021-06-15 RX ADMIN — LIDOCAINE HYDROCHLORIDE 60 MG: 20 INJECTION, SOLUTION EPIDURAL; INFILTRATION; INTRACAUDAL; PERINEURAL at 08:09

## 2021-06-15 RX ADMIN — ACETAMINOPHEN 650 MG: 325 TABLET ORAL at 23:58

## 2021-06-15 RX ADMIN — ASPIRIN 81 MG: 81 TABLET, COATED ORAL at 09:50

## 2021-06-15 RX ADMIN — POLYETHYLENE GLYCOL 3350 120 G: 17 POWDER, FOR SOLUTION ORAL at 01:39

## 2021-06-15 RX ADMIN — PROPOFOL 20 MG: 10 INJECTION, EMULSION INTRAVENOUS at 08:15

## 2021-06-15 RX ADMIN — PROPOFOL 30 MG: 10 INJECTION, EMULSION INTRAVENOUS at 08:09

## 2021-06-15 RX ADMIN — MICONAZOLE NITRATE: 2 POWDER TOPICAL at 09:51

## 2021-06-15 RX ADMIN — MICONAZOLE NITRATE: 2 POWDER TOPICAL at 21:19

## 2021-06-15 RX ADMIN — SODIUM CHLORIDE, SODIUM LACTATE, POTASSIUM CHLORIDE, AND CALCIUM CHLORIDE: .6; .31; .03; .02 INJECTION, SOLUTION INTRAVENOUS at 08:07

## 2021-06-15 RX ADMIN — Medication 2000 UNITS: at 09:50

## 2021-06-15 RX ADMIN — ATORVASTATIN CALCIUM 10 MG: 10 TABLET, FILM COATED ORAL at 21:19

## 2021-06-15 RX ADMIN — SERTRALINE 12.5 MG: 50 TABLET, FILM COATED ORAL at 09:50

## 2021-06-15 RX ADMIN — APIXABAN 10 MG: 5 TABLET, FILM COATED ORAL at 21:19

## 2021-06-15 RX ADMIN — AMIODARONE HYDROCHLORIDE 200 MG: 200 TABLET ORAL at 09:50

## 2021-06-15 ASSESSMENT — PAIN SCALES - GENERAL
PAINLEVEL_OUTOF10: 8
PAINLEVEL_OUTOF10: 0
PAINLEVEL_OUTOF10: 1
PAINLEVEL_OUTOF10: 0
PAINLEVEL_OUTOF10: 0

## 2021-06-15 ASSESSMENT — PAIN DESCRIPTION - DESCRIPTORS: DESCRIPTORS: DISCOMFORT

## 2021-06-15 ASSESSMENT — PAIN DESCRIPTION - ORIENTATION: ORIENTATION: LOWER

## 2021-06-15 ASSESSMENT — PAIN DESCRIPTION - LOCATION: LOCATION: ABDOMEN

## 2021-06-15 NOTE — ANESTHESIA PRE PROCEDURE
Department of Anesthesiology  Preprocedure Note       Name:  Nuha Rodney   Age:  80 y.o.  :  1932                                          MRN:  [id number]         Date:  6/15/2021      Surgeon: Ashley Tyler):  Carlos Jorge MD    Procedure: Procedure(s):  COLONOSCOPY DIAGNOSTIC    Medications prior to admission:   Prior to Admission medications    Medication Sig Start Date End Date Taking?  Authorizing Provider   amiodarone (CORDARONE) 200 MG tablet Take 1 tablet by mouth daily 3/29/21  Yes JAMILA Noriega - CNP   psyllium (KONSYL) 28.3 % PACK Take 1 packet by mouth daily   Yes Historical Provider, MD   nystatin (MYCOSTATIN) POWD powder Apply topically 2 times daily As needed for candidiasis   Yes Historical Provider, MD   sertraline (ZOLOFT) 25 MG tablet Take 12.5 mg by mouth daily   Yes Historical Provider, MD   hydrocortisone 1 % cream Apply topically every 6-8 hours as needed As needed for discomfort   Yes Historical Provider, MD   acetaminophen (TYLENOL) 325 MG tablet Take 650 mg by mouth every 6 hours as needed for Pain   Yes Historical Provider, MD   DULoxetine (CYMBALTA) 30 MG extended release capsule Take 30 mg by mouth daily   Yes Historical Provider, MD   Melatonin 5 MG CAPS Take 5 mg by mouth nightly as needed   Yes Historical Provider, MD   omeprazole (PRILOSEC) 20 MG delayed release capsule Take 40 mg by mouth daily    Yes Historical Provider, MD   tamsulosin (FLOMAX) 0.4 MG capsule Take 0.4 mg by mouth daily   Yes Historical Provider, MD   ferrous sulfate 325 (65 Fe) MG tablet Take 1 tablet by mouth three times a week 18  Yes Michelet Joseph MD   sennosides-docusate sodium (SENOKOT-S) 8.6-50 MG tablet Take 1 tablet by mouth daily 18  Yes Kathe Ventura MD   aspirin 81 MG tablet Take 81 mg by mouth daily   Yes Historical Provider, MD   timolol (BETIMOL) 0.5 % ophthalmic solution Place 1 drop into the left eye 2 times daily Dosing times are 09:00 and 15:00. Yes Historical Provider, MD   atorvastatin (LIPITOR) 10 MG tablet   Take 10 mg by mouth nightly    Yes Historical Provider, MD   Multiple Vitamins-Minerals (CENTRUM PO) Take 1 tablet by mouth daily. Indications: Centrum Silver 12/24/10  Yes Historical Provider, MD   vitamin D (CHOLECALCIFEROL) 400 UNIT TABS tablet   Take 2,000 Units by mouth daily    Yes Historical Provider, MD   latanoprost (XALATAN) 0.005 % ophthalmic solution   Place 1 drop into both eyes nightly    Yes Historical Provider, MD   amiodarone (PACERONE) 400 MG tablet Take 1 tablet by mouth daily for 5 days 3/24/21 3/29/21  JAMILA Sandoval - CNP   diphenhydrAMINE (BENADRYL) 25 MG capsule Take 25 mg by mouth every 6 hours as needed for Itching    Historical Provider, MD   gabapentin (NEURONTIN) 100 MG capsule Take 100 mg by mouth 3 times daily.     Historical Provider, MD   loperamide (IMODIUM) 2 MG capsule Take 2 mg by mouth as needed for Diarrhea Two tabs after initial diarrhea episode, 1 tab after each additional episode, NTE    Historical Provider, MD       Current medications:    Current Facility-Administered Medications   Medication Dose Route Frequency Provider Last Rate Last Admin    heparin 25,000 units in dextrose 5% 250 mL (premix) infusion  200 Units/hr Intravenous Continuous Harpreet Mayorga MD   Stopped at 06/15/21 0139    heparin (porcine) injection 4,000 Units  4,000 Units Intravenous PRN Eveline Griggs, PA        heparin (porcine) injection 2,000 Units  2,000 Units Intravenous PRN Eveline Griggs PA        sodium chloride flush 0.9 % injection 5-40 mL  5-40 mL Intravenous 2 times per day Harpreet Mayorga MD   10 mL at 06/14/21 0926    sodium chloride flush 0.9 % injection 5-40 mL  5-40 mL Intravenous PRN Harpreet Mayorga MD        0.9 % sodium chloride infusion  25 mL Intravenous PRN Harpreet Mayorga MD        ondansetron (ZOFRAN-ODT) disintegrating tablet 4 mg  4 mg Oral Q8H PRN Farheen Hawthorne Jose Gupta MD        Or    ondansetron (ZOFRAN) injection 4 mg  4 mg Intravenous Q6H PRN Harpreet Mayorga MD        polyethylene glycol (GLYCOLAX) packet 17 g  17 g Oral Daily PRN Harpreet Mayorga MD        acetaminophen (TYLENOL) tablet 650 mg  650 mg Oral Q6H PRN Harpreet Mayorga MD        Or    acetaminophen (TYLENOL) suppository 650 mg  650 mg Rectal Q6H PRN Harpreet Mayorga MD        amiodarone (CORDARONE) tablet 200 mg  200 mg Oral Daily Harpreet Mayorga MD   200 mg at 06/14/21 0925    aspirin EC tablet 81 mg  81 mg Oral Daily Harpreet Mayorga MD   81 mg at 06/14/21 0925    atorvastatin (LIPITOR) tablet 10 mg  10 mg Oral Nightly Harpreet Mayorga MD   10 mg at 06/14/21 2116    pantoprazole (PROTONIX) tablet 40 mg  40 mg Oral QAM AC Harpreet Mayorga MD        Vitamin D (CHOLECALCIFEROL) tablet 2,000 Units  2,000 Units Oral Daily Harpreet Mayorga MD   2,000 Units at 06/14/21 0925    sertraline (ZOLOFT) tablet 12.5 mg  12.5 mg Oral Daily Hrapreet Mayorga MD   12.5 mg at 06/14/21 0925    miconazole (MICOTIN) 2 % powder   Topical BID JAMILA Forman - CNP   Given at 06/14/21 2116       Allergies: Allergies   Allergen Reactions    Lisinopril Swelling     angioedema  angioedema    Levofloxacin     Levofloxacin Other (See Comments)     unsure    Vancomycin Rash     Rash and hives. CARLY'S SYNDROME    Amlodipine     Avelox [Moxifloxacin Hcl In Nacl]     Buspirone Itching    Hydrochlorothiazide Swelling    Lidocaine Other (See Comments)     Not sure    Moxifloxacin     Moxifloxacin Hcl In Nacl Other (See Comments)     Not sure    Mupirocin     Nsaids      bleeding    Paroxetine Other (See Comments)    Phenergan [Promethazine Hcl] Swelling    Phenothiazines     Promethazine Hcl Swelling    Sulindac      Gi bleed    Tolmetin      bleeding    Ciprofloxacin Rash and Swelling       Problem List:    Patient Active Problem List   Diagnosis Code    Localized osteoarthrosis, lower leg M17.10    Rectal bleed K62.5    Osteoarthritis of hip M16.9    Hip fracture (HCC) RIGHT S72.009A    Hypertension I10    Hypercholesteremia E78.00    Primary osteoarthritis of right knee M17.11    Pain due to total right knee replacement ([facility]) T84.84XA, Z96.651    Chronic knee pain after total replacement of right knee joint M25.561, G89.29, Z96.651    Status post total right knee replacement Z96.651    Paroxysmal atrial fibrillation (HCC) I48.0    Pacemaker Z95.0    Displaced comminuted fracture of right patella, initial encounter for closed fracture S82.041A    Severe protein-calorie malnutrition (HCC) E43    Displaced transverse fracture of right patella, initial encounter for closed fracture S82.031A    Hyperlipidemia E78.5    Anemia D64.9    Unstable gait R26.81    Open knee wound, right, sequela S81.001S    GI bleeding K92.2    H/O skin graft Z94.5    Hyponatremia E87.1    CAD (coronary artery disease) I25.10    Closed fracture of right distal femur ([facility]) S72.401A    Hypoalbuminemia E88.09    GERD (gastroesophageal reflux disease) K21.9    Wide-complex tachycardia ([facility]) I47.2    Pulmonary embolism on left ([facility]ly [facility]) I26.99       Past Medical History:        Diagnosis Date    Anemia     Anxiety     Arthritis     Atrial fibrillation (HCC)     CAD (coronary artery disease)     Chronic GERD     Depression     Diverticulosis     Gall stone     GERD (gastroesophageal reflux disease) 3/19/2021    Glaucoma     Hypercholesteremia     Hypertension     Irregular heart beat     Rectal prolapse     Uterine cancer ([address] 2008       Past Surgical History:        Procedure Laterality Date    APPENDECTOMY      COLONOSCOPY  2005    polyp    COLONOSCOPY  1010    diverticulosis    COLONOSCOPY  10/4/2014    diverticulosis, radiation colitis sigmoid    COLONOSCOPY N/A 2/15/2019    COLON performed by Myrna Richards MD at [address] [place] [address] ENDOSCOPY    FRACTURE SURGERY      HIP SURGERY Right 8/4/15    hip pinning    HYSTERECTOMY  2008    RT in 2009   [address]      OTHER SURGICAL HISTORY Right 10/29/2018    PATELLA OPEN REDUCTION INTERNAL FIXATION     PACEMAKER INSERTION      PACEMAKER PLACEMENT  2008    [place] CPTR-ASST SURGICAL NAVIGATION IMAGE-LESS Right 9/5/2018    RIGHT TOTAL KNEE REPLACEMENT COMPUTER NAVIGATION WITH ADDUCTOR CANAL BLOCK FOR PAIN CONTROL                     SONAL performed by Kate Lewis MD at [address] [facility] OFFICE/OUTPT [address] Right 10/29/2018    Right knee inferior pole patellectomy, primary repair of patellar tendon. performed by Kate Lewis MD at [address] <100SQCM Right 11/30/2018    RIGHT KNEE GASTROC FLAP WITH SKIN GRAFT, WOUND VAC PLACEMENT performed by Jazlyn Lopez MD at [address] Right 3/20/2021    REVISION RIGHT TOTAL KNEE ARTHROPLASTY       SONAL performed by Jazlyn Lopez MD at [address] History:    Social History     Tobacco Use    Smoking status: Former Smoker    Smokeless tobacco: Never Used    Tobacco comment: only smoked from age [age] yrs old   Substance Use Topics    Alcohol use:  No                                Counseling given: Not Answered  Comment: only smoked from age [age] yrs old      Vital Signs (Current):   Vitals:    06/14/21 1815 06/14/21 2100 06/14/21 2300 06/15/21 0345   BP:  122/60 137/82 (!) 140/80   Pulse:  72 74 69   Resp:  18 18 18   Temp:  98.1 °F (36.7 °C) 97.7 °F (36.5 °C) 97.8 °F (36.6 °C)   TempSrc:  Oral Oral Oral   SpO2: 94% 92% 94% 94%   Weight:       Height:                                                  BP Readings from Last 3 Encounters:   06/15/21 (!) 140/80   03/24/21 (!) 106/58   03/20/21 95/61       NPO Status:                                                                                 BMI:   Wt Readings from Last 3 Encounters:   06/13/21 128 lb (58.1 kg)   03/24/21 128 lb 15.5 oz (58.5 kg)   02/14/19 111 lb (50.3 kg)     Body mass index is 25 kg/m². CBC:   Lab Results   Component Value Date    WBC 9.7 06/15/2021    RBC 2.55 06/15/2021    HGB 8.8 06/15/2021    HCT 26.0 06/15/2021    .7 06/15/2021    RDW 16.3 06/15/2021     06/15/2021       CMP:   Lab Results   Component Value Date     06/15/2021    K 3.4 06/15/2021     06/15/2021    CO2 25 06/15/2021    BUN 13 06/15/2021    CREATININE 0.6 06/15/2021    GFRAA >60 06/15/2021    GFRAA >60 06/09/2012    AGRATIO 0.6 06/13/2021    LABGLOM >60 06/15/2021    GLUCOSE 91 06/15/2021    PROT 5.6 06/13/2021    PROT 6.9 06/09/2012    CALCIUM 7.9 06/15/2021    BILITOT 0.5 06/13/2021    ALKPHOS 164 06/13/2021    AST 28 06/13/2021    ALT 13 06/13/2021       POC Tests: No results for input(s): POCGLU, POCNA, POCK, POCCL, POCBUN, POCHEMO, POCHCT in the last 72 hours.     Coags:   Lab Results   Component Value Date    PROTIME 15.9 03/18/2021    INR 1.37 03/18/2021    APTT 32.8 06/15/2021       HCG (If Applicable): No results found for: PREGTESTUR, PREGSERUM, HCG, HCGQUANT     ABGs: No results found for: PHART, PO2ART, LNL1WLY, GJB7RTJ, BEART, P0MCIMZE     Type & Screen (If Applicable):  No results found for: LABABO, LABRH    Drug/Infectious Status (If Applicable):  No results found for: HIV, HEPCAB    COVID-19 Screening (If Applicable):   Lab Results   Component Value Date    COVID19 Not Detected 03/24/2021           Anesthesia Evaluation  Patient summary reviewed and Nursing notes reviewed no history of anesthetic complications:   Airway: Mallampati: II     Neck ROM: full   Dental:          Pulmonary:                              Cardiovascular:    (+) hypertension:, pacemaker:, CAD:, dysrhythmias:,                ROS comment: anemia     Neuro/Psych:   (+) psychiatric history:            GI/Hepatic/Renal:   (+) GERD:,           Endo/Other: Abdominal:           Vascular:                                        Anesthesia Plan      MAC     ASA 4     (Medications & allergies reviewed  All available lab & EKG data reviewed)  Induction: intravenous. Anesthetic plan and risks discussed with patient. Plan discussed with CRNA.                   Annamaria Messer MD   6/15/2021

## 2021-06-15 NOTE — PROGRESS NOTES
RAC infiltrated -red /edemetous - unable to get iv access - 7 attempts - multiple staff - unsuccessful - report called to [facility] R[facility]

## 2021-06-15 NOTE — PROGRESS NOTES
Physical Therapy/Occupational Therapy  PT/OT Order received and chart reviewed. Pt noted to have acute PE, was started on heparin gtt which was stopped for procedure today. aPTT not in therapeutic range at this time. Will hold until medically appropriate and re-attempt tomorrow.      Donnie Ornelas, PT, DPT   Eleazar Esquivel, OTR/L

## 2021-06-15 NOTE — BRIEF OP NOTE
Brief Postoperative Note      Patient: Dipti Baires  YOB: 1932  MRN: [id number]    Date of Procedure: 6/15/2021    Pre-Op Diagnosis: Rectal bleeding    Post-Op Diagnosis: diverticulosis, internal hemorrhoids, old blood. no active bleeding. Procedure(s):  COLONOSCOPY DIAGNOSTIC    Surgeon(s):  Greg Phillips MD    Assistant:  * No surgical staff found *    Anesthesia: General    Estimated Blood Loss (mL): Minimal    Complications: None    Specimens:   * No specimens in log *    Implants:  * No implants in log *      Drains:   [REMOVED] Urethral Catheter Straight-tip 14 fr (Removed)       Findings: moderate sigmoid diverticulosis, internal hemorrhoids, old blood. no active bleeding. Recommendation  1. Continue supportive care  2. Restart heparin drip  3. Monitor Hgb  4. Observe for signs of bleeding  5. Restart heparin drip  6. Restart diet  7. If bleeding restarts, check tagged RBC scan vs CTA  8. Can consider IR consult for angiogram based on the above test  9.  Will follow        Electronically signed by Greg Phillips MD on 6/15/2021 at 8:23 AM

## 2021-06-15 NOTE — ANESTHESIA POSTPROCEDURE EVALUATION
Department of Anesthesiology  Postprocedure Note    Patient: Francheska Morales  MRN: [id number]  YOB: 1932  Date of evaluation: 6/15/2021  Time:  9:14 AM     Procedure Summary     Date: 06/15/21 [facility]    Anesthesia Start: 2469 Anesthesia Stop: 6044    Procedure: COLONOSCOPY DIAGNOSTIC (N/A ) Diagnosis: (Rectal bleeding)    Surgeons: Damián Mackey MD Responsible Provider: Elvira Freeman MD    Anesthesia Type: MAC ASA Status: 4          Anesthesia Type: MAC    Fay Phase I: Fay Score: 10    Fay Phase II:      Last vitals: Reviewed and per EMR flowsheets.        Anesthesia Post Evaluation    Comments: Postoperative Anesthesia Note    Name:    Francheska Morales  MRN:      [id number]    Patient Vitals in the past 12 hrs:  06/15/21 0830, BP:126/68, Pulse:83, Resp:18, SpO2:98 %  06/15/21 0715, BP:113/68, Temp:98.2 °F (36.8 °C), Temp src:Oral, Pulse:87, Resp:16, SpO2:94 %  06/15/21 0345, BP:(!) 140/80, Temp:97.8 °F (36.6 °C), Temp src:Oral, Pulse:69, Resp:18, SpO2:94 %  06/14/21 2300, BP:137/82, Temp:97.7 °F (36.5 °C), Temp src:Oral, Pulse:74, Resp:18, SpO2:94 %     LABS:    CBC  Lab Results       Component                Value               Date/Time                  WBC                      9.7                 06/15/2021 05:05 AM        HGB                      8.8 (L)             06/15/2021 05:05 AM        HCT                      26.0 (L)            06/15/2021 05:05 AM        PLT                      450                 06/15/2021 05:05 AM   RENAL  Lab Results       Component                Value               Date/Time                  NA                       139                 06/15/2021 05:05 AM        K                        3.4 (L)             06/15/2021 05:05 AM        CL                       107                 06/15/2021 05:05 AM        CO2                      25                  06/15/2021 05:05 AM        BUN 13                  06/15/2021 05:05 AM        CREATININE               0.6                 06/15/2021 05:05 AM        GLUCOSE                  91                  06/15/2021 05:05 AM   COAGS  Lab Results       Component                Value               Date/Time                  PROTIME                  15.9 (H)            03/18/2021 06:10 PM        INR                      1.37 (H)            03/18/2021 06:10 PM        APTT                     32.8                06/15/2021 05:05 AM     Intake & Output: In: 512.7 (I.V.:512.7)  Out: -     Nausea & Vomiting:  No    Level of Consciousness:  Awake    Pain Assessment:  Adequate analgesia    Anesthesia Complications:  No apparent anesthetic complications    SUMMARY      Vital signs stable  OK to discharge from Stage I post anesthesia care.   Care transferred from Anesthesiology department on discharge from perioperative area

## 2021-06-15 NOTE — PROGRESS NOTES
Hospitalist Progress Note      PCP: Lisbeth Reeder MD    Date of Admission: 6/13/2021    Chief Complaint   Patient presents with    Rectal Bleeding     had near syncopal episode getting up from toilet today. pain in bottom started this morning. jahovah's witness patient. Hospital Course: H&P reviewed     Subjective:   Colonoscopy this morning showing diverticulosis, internal hemorrhoids and old blood without evidence of active bleed. She is ORA and denies any SOB. She lost picc access and it was unable to be re-established. D/w GI, and ok with starting eliquis so will not need picc access for heparin. Discussed contraindications of NSAIDs with patient. Will consult PT/OT. Medications:  Reviewed    Infusion Medications    sodium chloride       Scheduled Medications    apixaban  10 mg Oral BID    sodium chloride flush  5-40 mL Intravenous 2 times per day    amiodarone  200 mg Oral Daily    aspirin  81 mg Oral Daily    atorvastatin  10 mg Oral Nightly    pantoprazole  40 mg Oral QAM AC    vitamin D  2,000 Units Oral Daily    sertraline  12.5 mg Oral Daily    miconazole   Topical BID     PRN Meds: sodium chloride flush, sodium chloride, ondansetron **OR** ondansetron, polyethylene glycol, acetaminophen **OR** acetaminophen      Intake/Output Summary (Last 24 hours) at 6/15/2021 1551  Last data filed at 6/15/2021 7859  Gross per 24 hour   Intake 512.66 ml   Output --   Net 512.66 ml       Physical Exam Performed:    /75   Pulse 76   Temp 97.5 °F (36.4 °C) (Oral)   Resp 16   Ht 5' (1.524 m)   Wt 128 lb (58.1 kg)   SpO2 95%   BMI 25.00 kg/m²     General appearance: Elderly CF in no apparent distress, appears stated age and cooperative. HEENT: Pupils equal, round, and reactive to light. Conjunctivae/corneas clear. Neck: Supple, with full range of motion. No jugular venous distention. Trachea midline. Respiratory:  Normal respiratory effort.  Clear to auscultation, bilaterally without Rales/Wheezes/Rhonchi. Cardiovascular: Regular rate and rhythm with normal S1/S2 without murmurs, rubs or gallops. Abdomen: Soft, non-tender, non-distended with normal bowel sounds. Musculoskeletal: No clubbing, cyanosis or edema bilaterally. Full range of motion without deformity. Skin: Skin color, texture, turgor normal.  No rashes or lesions. Neurologic:  Neurovascularly intact without any focal sensory/motor deficits. Cranial nerves: II-XII intact, grossly non-focal.  Psychiatric: Alert and oriented, thought content appropriate, normal insight  Capillary Refill: Brisk,< 3 seconds   Peripheral Pulses: +2 palpable, equal bilaterally       Labs:   Recent Labs     06/13/21  1025 06/14/21  0750 06/14/21  2147 06/15/21  0505 06/15/21  1304   WBC 7.8 7.9  --  9.7  --    HGB 10.9* 8.6* 9.4* 8.8* 9.4*   HCT 32.6* 25.2* 28.2* 26.0* 27.3*   * 407  --  450  --      Recent Labs     06/13/21  1025 06/14/21  0750 06/15/21  0505    140 139   K 4.0 4.2 3.4*    106 107   CO2 24 26 25   BUN 10 13 13   CREATININE 0.6 0.6 0.6   CALCIUM 8.7 7.9* 7.9*     Recent Labs     06/13/21  1025   AST 28   ALT 13   BILITOT 0.5   ALKPHOS 164*     No results for input(s): INR in the last 72 hours. Recent Labs     06/13/21  1025   TROPONINI <0.01       Urinalysis:      Lab Results   Component Value Date    NITRU Negative 06/13/2021    WBCUA 10-20 11/10/2018    BACTERIA 2+ 11/10/2018    RBCUA 0-2 11/10/2018    BLOODU Negative 06/13/2021    SPECGRAV 1.015 06/13/2021    GLUCOSEU Negative 06/13/2021    GLUCOSEU Neg 04/25/2010       Radiology:  VL Extremity Venous Bilateral   Final Result      CTA CHEST ABDOMEN PELVIS W CONTRAST   Final Result   1. CT CHEST: Acute pulmonary embolism proximally within the pulmonary artery   branch to the left lower lobe. 2. No CT findings of right heart strain. 3. Mild calcific atherosclerosis aorta and its branches without aneurysm or   dissection.    4. CT ABDOMEN/PELVIS: No acute placed     Dispo - likely tomorrow  If Hb  remains stable and no further Bleeding     Patient case seen and discussed under supervision of preceptor, Dr. Gabi Montoya. Note made by PA-S2 student in the status of a scribe, with review by preceptor. Yesenia CLEANING-S2      Addendum to PA student progress note:  Pt seen, examined, and evaluated with PA student, who acted as my scribe for the above documentation. I have reviewed the current history, physical findings, labs, assessment, and plan and agree with the note as documented. Rosalind Espinoza MD  Hospitalist Physician       The note was completed using EMR. Every effort was made to ensure accuracy; However, inadvertent computerized transcription errors may be present.

## 2021-06-15 NOTE — PROGRESS NOTES
Pt assessment completed and charted. VSS. Pt a/o x4. Bowel prep started. Will get consent for colonoscopy in AM. Pt having several BMs. Heparin drip infusing at 3mL/ hr. Medications given per Mar. Pt is x1 assist on bedpan. Pt denies any pain/ sob at this time. Bed in lowest position and wheels locked. Call light within reach. Bedside table within reach. Non-skid footwear in place. Pt denies any other needs at this time. Pt calls out appropriately. Will continue to monitor.

## 2021-06-15 NOTE — H&P
History and Physical / Pre-Sedation Assessment    Patient:  Priya Lr   :   1932     Intended Procedure:  Colonoscopy    HPI: 80year old female with history of HTN, HLD, GERD, A fib, arthritis, depression, uterine cancer s/p hysterectomy, diverticulosis and hemorrhoid admitted with painless hematochezia. Past Medical History:   Diagnosis Date    Anemia     Anxiety     Arthritis     Atrial fibrillation ([facility]     CAD (coronary artery disease)     Chronic GERD     Depression     Diverticulosis     Gall stone     GERD (gastroesophageal reflux disease) 3/19/2021    Glaucoma     Hypercholesteremia     Hypertension     Irregular heart beat     Rectal prolapse     Uterine cancer ([facility] [address]      Past Surgical History:   Procedure Laterality Date    APPENDECTOMY      COLONOSCOPY      polyp    COLONOSCOPY  1010    diverticulosis    COLONOSCOPY  10/4/2014    diverticulosis, radiation colitis sigmoid    COLONOSCOPY N/A 2/15/2019    COLON performed by Bonny Mcdonald MD at [place] [address] Right 8/4/15    hip pinning    HYSTERECTOMY      RT in    [facility]      OTHER SURGICAL HISTORY Right 10/29/2018    PATELLA OPEN REDUCTION INTERNAL FIXATION     PACEMAKER INSERTION      PACEMAKER PLACEMENT  2008    MD CPTR-ASST SURGICAL NAVIGATION IMAGE-LESS Right 2018    RIGHT TOTAL KNEE REPLACEMENT COMPUTER NAVIGATION WITH ADDUCTOR CANAL BLOCK FOR PAIN CONTROL                     SONAL performed by Dano Escobedo MD at [address] [facility] OFFICE/OUTPT [facility] Right 10/29/2018    Right knee inferior pole patellectomy, primary repair of patellar tendon.  performed by Dano Escobedo MD at [address] <100SQCM Right 2018    RIGHT KNEE GASTROC FLAP WITH SKIN GRAFT, WOUND VAC PLACEMENT performed by Lamont Pinto MD at [address] [facility] 3/20/2021    REVISION RIGHT TOTAL KNEE ARTHROPLASTY       SONAL performed by Flori Lentz MD at [address] [place] [address]         Medications reviewed  Prior to Admission medications    Medication Sig Start Date End Date Taking? Authorizing Provider   amiodarone (CORDARONE) 200 MG tablet Take 1 tablet by mouth daily 3/29/21  Yes JAMILA Becerra - CNP   psyllium (KONSYL) 28.3 % PACK Take 1 packet by mouth daily   Yes Historical Provider, MD   nystatin (MYCOSTATIN) POWD powder Apply topically 2 times daily As needed for candidiasis   Yes Historical Provider, MD   sertraline (ZOLOFT) 25 MG tablet Take 12.5 mg by mouth daily   Yes Historical Provider, MD   hydrocortisone 1 % cream Apply topically every 6-8 hours as needed As needed for discomfort   Yes Historical Provider, MD   acetaminophen (TYLENOL) 325 MG tablet Take 650 mg by mouth every 6 hours as needed for Pain   Yes Historical Provider, MD   DULoxetine (CYMBALTA) 30 MG extended release capsule Take 30 mg by mouth daily   Yes Historical Provider, MD   Melatonin 5 MG CAPS Take 5 mg by mouth nightly as needed   Yes Historical Provider, MD   omeprazole (PRILOSEC) 20 MG delayed release capsule Take 40 mg by mouth daily    Yes Historical Provider, MD   tamsulosin (FLOMAX) 0.4 MG capsule Take 0.4 mg by mouth daily   Yes Historical Provider, MD   ferrous sulfate 325 (65 Fe) MG tablet Take 1 tablet by mouth three times a week 11/7/18  Yes Viviana Partida MD   sennosides-docusate sodium (SENOKOT-S) 8.6-50 MG tablet Take 1 tablet by mouth daily 11/2/18  Yes Lilibeth Cohen MD   aspirin 81 MG tablet Take 81 mg by mouth daily   Yes Historical Provider, MD   timolol (BETIMOL) 0.5 % ophthalmic solution Place 1 drop into the left eye 2 times daily Dosing times are 09:00 and 15:00.    Yes Historical Provider, MD   atorvastatin (LIPITOR) 10 MG tablet   Take 10 mg by mouth nightly    Yes Historical Provider, MD   Multiple Vitamins-Minerals (CENTRUM PO) Take 1 tablet by mouth daily. Indications: Centrum Silver 12/24/10  Yes Historical Provider, MD   vitamin D (CHOLECALCIFEROL) 400 UNIT TABS tablet   Take 2,000 Units by mouth daily    Yes Historical Provider, MD   latanoprost (XALATAN) 0.005 % ophthalmic solution   Place 1 drop into both eyes nightly    Yes Historical Provider, MD   amiodarone (PACERONE) 400 MG tablet Take 1 tablet by mouth daily for 5 days 3/24/21 3/29/21  Wilhemenia Solum, APRN - CNP   diphenhydrAMINE (BENADRYL) 25 MG capsule Take 25 mg by mouth every 6 hours as needed for Itching    Historical Provider, MD   gabapentin (NEURONTIN) 100 MG capsule Take 100 mg by mouth 3 times daily. Historical Provider, MD   loperamide (IMODIUM) 2 MG capsule Take 2 mg by mouth as needed for Diarrhea Two tabs after initial diarrhea episode, 1 tab after each additional episode, NTE    Historical Provider, MD        Allergies: Allergies   Allergen Reactions    Lisinopril Swelling     angioedema  angioedema    Levofloxacin     Levofloxacin Other (See Comments)     unsure    Vancomycin Rash     Rash and hives. CARLY'S SYNDROME    Amlodipine     Avelox [Moxifloxacin Hcl In Nacl]     Buspirone Itching    Hydrochlorothiazide Swelling    Lidocaine Other (See Comments)     Not sure    Moxifloxacin     Moxifloxacin Hcl In Nacl Other (See Comments)     Not sure    Mupirocin     Nsaids      bleeding    Paroxetine Other (See Comments)    Phenergan [Promethazine Hcl] Swelling    Phenothiazines     Promethazine Hcl Swelling    Sulindac      Gi bleed    Tolmetin      bleeding    Ciprofloxacin Rash and Swelling       Nurses notes reviewed and agreed.     Physical Exam:  Vital Signs: /68   Pulse 87   Temp 98.2 °F (36.8 °C) (Oral)   Resp 16   Ht 5' (1.524 m)   Wt 128 lb (58.1 kg)   SpO2 94%   BMI 25.00 kg/m²    Airway: Mallampati: II (soft palate, uvula, fauces visible)  Pulmonary:Normal  Cardiac:Normal  Abdomen:Normal    Pre-Procedure Assessment /

## 2021-06-16 LAB
ANION GAP SERPL CALCULATED.3IONS-SCNC: 10 MMOL/L (ref 3–16)
BUN BLDV-MCNC: 11 MG/DL (ref 7–20)
CALCIUM SERPL-MCNC: 7.6 MG/DL (ref 8.3–10.6)
CHLORIDE BLD-SCNC: 108 MMOL/L (ref 99–110)
CO2: 22 MMOL/L (ref 21–32)
CREAT SERPL-MCNC: 0.6 MG/DL (ref 0.6–1.2)
GFR AFRICAN AMERICAN: >60
GFR NON-AFRICAN AMERICAN: >60
GLUCOSE BLD-MCNC: 110 MG/DL (ref 70–99)
HCT VFR BLD CALC: 22.3 % (ref 36–48)
HCT VFR BLD CALC: 23.1 % (ref 36–48)
HCT VFR BLD CALC: 23.5 % (ref 36–48)
HCT VFR BLD CALC: 24.4 % (ref 36–48)
HEMOGLOBIN: 7.5 G/DL (ref 12–16)
HEMOGLOBIN: 7.6 G/DL (ref 12–16)
HEMOGLOBIN: 8 G/DL (ref 12–16)
HEMOGLOBIN: 8.2 G/DL (ref 12–16)
INR BLD: 3.83 (ref 0.86–1.14)
MCH RBC QN AUTO: 34.9 PG (ref 26–34)
MCHC RBC AUTO-ENTMCNC: 33.9 G/DL (ref 31–36)
MCV RBC AUTO: 103 FL (ref 80–100)
PDW BLD-RTO: 16.1 % (ref 12.4–15.4)
PLATELET # BLD: 369 K/UL (ref 135–450)
PMV BLD AUTO: 7.8 FL (ref 5–10.5)
POTASSIUM REFLEX MAGNESIUM: 3.7 MMOL/L (ref 3.5–5.1)
PROTHROMBIN TIME: 45 SEC (ref 10–13.2)
RBC # BLD: 2.28 M/UL (ref 4–5.2)
SARS-COV-2, NAAT: NOT DETECTED
SODIUM BLD-SCNC: 140 MMOL/L (ref 136–145)
WBC # BLD: 10.8 K/UL (ref 4–11)

## 2021-06-16 PROCEDURE — 97535 SELF CARE MNGMENT TRAINING: CPT

## 2021-06-16 PROCEDURE — 85014 HEMATOCRIT: CPT

## 2021-06-16 PROCEDURE — 85018 HEMOGLOBIN: CPT

## 2021-06-16 PROCEDURE — 97167 OT EVAL HIGH COMPLEX 60 MIN: CPT

## 2021-06-16 PROCEDURE — 80048 BASIC METABOLIC PNL TOTAL CA: CPT

## 2021-06-16 PROCEDURE — 36415 COLL VENOUS BLD VENIPUNCTURE: CPT

## 2021-06-16 PROCEDURE — 85610 PROTHROMBIN TIME: CPT

## 2021-06-16 PROCEDURE — 97161 PT EVAL LOW COMPLEX 20 MIN: CPT

## 2021-06-16 PROCEDURE — C1751 CATH, INF, PER/CENT/MIDLINE: HCPCS

## 2021-06-16 PROCEDURE — 87635 SARS-COV-2 COVID-19 AMP PRB: CPT

## 2021-06-16 PROCEDURE — 97530 THERAPEUTIC ACTIVITIES: CPT

## 2021-06-16 PROCEDURE — 76937 US GUIDE VASCULAR ACCESS: CPT

## 2021-06-16 PROCEDURE — 1200000000 HC SEMI PRIVATE

## 2021-06-16 PROCEDURE — 85027 COMPLETE CBC AUTOMATED: CPT

## 2021-06-16 PROCEDURE — 6370000000 HC RX 637 (ALT 250 FOR IP): Performed by: INTERNAL MEDICINE

## 2021-06-16 RX ORDER — SODIUM CHLORIDE 9 MG/ML
25 INJECTION, SOLUTION INTRAVENOUS PRN
Status: DISCONTINUED | OUTPATIENT
Start: 2021-06-16 | End: 2021-06-23 | Stop reason: HOSPADM

## 2021-06-16 RX ORDER — SODIUM CHLORIDE 0.9 % (FLUSH) 0.9 %
5-40 SYRINGE (ML) INJECTION EVERY 12 HOURS SCHEDULED
Status: DISCONTINUED | OUTPATIENT
Start: 2021-06-16 | End: 2021-06-23 | Stop reason: HOSPADM

## 2021-06-16 RX ORDER — LIDOCAINE HYDROCHLORIDE 10 MG/ML
5 INJECTION, SOLUTION INFILTRATION; PERINEURAL ONCE
Status: DISCONTINUED | OUTPATIENT
Start: 2021-06-16 | End: 2021-06-23 | Stop reason: HOSPADM

## 2021-06-16 RX ORDER — TRAMADOL HYDROCHLORIDE 50 MG/1
50 TABLET ORAL EVERY 6 HOURS PRN
Status: DISCONTINUED | OUTPATIENT
Start: 2021-06-16 | End: 2021-06-23 | Stop reason: HOSPADM

## 2021-06-16 RX ORDER — SODIUM CHLORIDE 0.9 % (FLUSH) 0.9 %
5-40 SYRINGE (ML) INJECTION PRN
Status: DISCONTINUED | OUTPATIENT
Start: 2021-06-16 | End: 2021-06-23 | Stop reason: HOSPADM

## 2021-06-16 RX ORDER — DULOXETIN HYDROCHLORIDE 30 MG/1
30 CAPSULE, DELAYED RELEASE ORAL DAILY
Status: DISCONTINUED | OUTPATIENT
Start: 2021-06-16 | End: 2021-06-23 | Stop reason: HOSPADM

## 2021-06-16 RX ADMIN — TRAMADOL HYDROCHLORIDE 50 MG: 50 TABLET, FILM COATED ORAL at 15:01

## 2021-06-16 RX ADMIN — SERTRALINE 12.5 MG: 50 TABLET, FILM COATED ORAL at 10:02

## 2021-06-16 RX ADMIN — APIXABAN 10 MG: 5 TABLET, FILM COATED ORAL at 10:02

## 2021-06-16 RX ADMIN — Medication 2000 UNITS: at 10:03

## 2021-06-16 RX ADMIN — PANTOPRAZOLE SODIUM 40 MG: 40 TABLET, DELAYED RELEASE ORAL at 05:39

## 2021-06-16 RX ADMIN — DULOXETINE HYDROCHLORIDE 30 MG: 30 CAPSULE, DELAYED RELEASE ORAL at 15:01

## 2021-06-16 RX ADMIN — ATORVASTATIN CALCIUM 10 MG: 10 TABLET, FILM COATED ORAL at 20:00

## 2021-06-16 RX ADMIN — AMIODARONE HYDROCHLORIDE 200 MG: 200 TABLET ORAL at 10:02

## 2021-06-16 RX ADMIN — ACETAMINOPHEN 650 MG: 325 TABLET ORAL at 06:16

## 2021-06-16 RX ADMIN — MICONAZOLE NITRATE: 2 POWDER TOPICAL at 20:00

## 2021-06-16 RX ADMIN — ACETAMINOPHEN 650 MG: 325 TABLET ORAL at 18:24

## 2021-06-16 RX ADMIN — ACETAMINOPHEN 650 MG: 325 TABLET ORAL at 12:13

## 2021-06-16 RX ADMIN — MICONAZOLE NITRATE: 2 POWDER TOPICAL at 10:03

## 2021-06-16 RX ADMIN — ASPIRIN 81 MG: 81 TABLET, COATED ORAL at 10:02

## 2021-06-16 ASSESSMENT — PAIN SCALES - GENERAL
PAINLEVEL_OUTOF10: 8
PAINLEVEL_OUTOF10: 6
PAINLEVEL_OUTOF10: 0
PAINLEVEL_OUTOF10: 0
PAINLEVEL_OUTOF10: 7
PAINLEVEL_OUTOF10: 8
PAINLEVEL_OUTOF10: 0
PAINLEVEL_OUTOF10: 0
PAINLEVEL_OUTOF10: 7
PAINLEVEL_OUTOF10: 5

## 2021-06-16 ASSESSMENT — PAIN DESCRIPTION - LOCATION: LOCATION: ABDOMEN

## 2021-06-16 NOTE — PROGRESS NOTES
Call placed by this RN to [address] [place] [address] answering service to inform Dr. Luis Ardon about the patients bright red bowel movements that started tonight at 1845, awaiting call back from on-call Gastroenterologist for recommendations.

## 2021-06-16 NOTE — PROGRESS NOTES
PROGRESS NOTE  S:89 yrs Patient  admitted on 6/13/2021 with Pulmonary embolism on left ([address] [I26.99] . Today she feels ok. Denies any further bleeding. Tolerating diet. Exam:   Vitals:    06/16/21 0801   BP: 118/72   Pulse: 78   Resp: 20   Temp: 98.4 °F (36.9 °C)   SpO2: 95%      General appearance: alert, appears stated age and cooperative  HEENT: Neck supple with midline trachea  Neck: no adenopathy and supple, symmetrical, trachea midline  Lungs: clear to auscultation bilaterally  Heart: regular rate and rhythm, S1, S2 normal, no murmur, click, rub or gallop  Abdomen: soft, non-tender; bowel sounds normal; no masses,  no organomegaly  Extremities: extremities normal, atraumatic, no cyanosis or edema     Medications: Reviewed    Labs:  CBC:   Recent Labs     06/14/21  0750 06/15/21  0505 06/15/21  2030 06/16/21  0049 06/16/21  0754   WBC 7.9 9.7  --   --  10.8   HGB 8.6* 8.8* 8.6* 8.2* 8.0*   HCT 25.2* 26.0* 25.2* 24.4* 23.5*   .8* 101.7*  --   --  103.0*    450  --   --  369     BMP:   Recent Labs     06/14/21  0750 06/15/21  0505 06/16/21  0754    139 140   K 4.2 3.4* 3.7    107 108   CO2 26 25 22   BUN 13 13 11   CREATININE 0.6 0.6 0.6     LIVER PROFILE:   Recent Labs     06/13/21  1025   AST 28   ALT 13   PROT 5.6*   BILITOT 0.5   ALKPHOS 80*       Impression:80year old female with history of HTN, HLD, GERD, A fib, arthritis, depression, uterine cancer s/p hysterectomy, diverticulosis and hemorrhoid admitted with painless hematochezia. Colonoscopy showed diverticulosis and hemorrhoids but no active bleeding    Recommendation:  1. Continue supportive care  2. Monitor Hgb  3. Observe for signs of bleeding  4. High fiber diet, and bowel regimen with metamucil and miralax daily  5. Oral iron supplementation  6. Repeat CBC in 4wks upon discharge  7. Continue anticoagulation  8.  OK to d/c from GI standpoint      Myrna Richards MD, MD  10:06 AM 6/16/2021

## 2021-06-16 NOTE — PROGRESS NOTES
Physical Therapy    Facility/Department: [facility] B3 - MED SURG  Initial Assessment/Treatment    NAME: Radha Martínez  : 1932  MRN: [id number]    Date of Service: 2021    Discharge Recommendations:  Subacute/Skilled Nursing Facility   PT Equipment Recommendations  Equipment Needed: No  Other: Defer to facility    Assessment   Body structures, Functions, Activity limitations: Decreased functional mobility ; Decreased strength;Decreased balance;Decreased endurance  Assessment: Pt is an 80year old female with PMH including OA, CAD, HTN, cancer, a-fib, depression, TKA revision () who presented  from [facility] with near syncopal episode. Pt was found to have an acute PE, initially started on heparin gtt now managed with Eliquis. Pt also with rectal bleeding, underwent colonoscopy which revealed diverticulitis but no active bleeding. Upon evaluation, pt presents with above impairments and poor overall tolerance to therapy. Pt only able to tolerate rolling and sitting EOB briefly (~15 seconds) before c/o dizziness with decreased responsiveness noted. Pt returned to supine with BP drop from 112/60 to 82/49. Increased time for pt's dizziness to return to baseline. Pt would benefit from skilled PT to progress functional mobility while admitted. REcommend SNF at d/c as pt is currently functioning well below her baseline. Will continue to follow. Treatment Diagnosis: Impaired endurance  Prognosis: Fair  Decision Making: Low Complexity  PT Education: Goals;PT Role;Plan of Care;Gait Training;General Safety;Transfer Training;Disease Specific Education; Functional Mobility Training  Patient Education: Pt educated regarding importance of OOB mobility as tolerated - verbalized understanding  Barriers to Learning: [place]  REQUIRES PT FOLLOW UP: Yes  Activity Tolerance  Activity Tolerance: Patient limited by endurance;Treatment limited secondary to medical complications (free text)  Activity Tolerance: Pretx BP: 112/60. Upon attempt to sit EOB, pt visibly dizzy with decreased responsiveness to commands - BP after returning to supine 82/49. BP after 5 minutes supine: 99/62. Post tx: 102/58. RN was notified. Patient Diagnosis(es): The primary encounter diagnosis was Other acute pulmonary embolism without acute cor pulmonale ([facility] [address] Diagnoses of Syncope and collapse, Constipation, unspecified constipation type, and Rectal bleeding were also pertinent to this visit. has a past medical history of Anemia, Anxiety, Arthritis, Atrial fibrillation ([address] CAD (coronary artery disease), Chronic GERD, Depression, Diverticulosis, Gall stone, GERD (gastroesophageal reflux disease), Glaucoma, Hypercholesteremia, Hypertension, Irregular heart beat, Rectal prolapse, and Uterine cancer ([address] has a past surgical history that includes Hysterectomy (2008); Appendectomy; Pacemaker insertion; Tonsillectomy; Colonoscopy (2005); Colonoscopy (1010); Colonoscopy (10/4/2014); hip surgery (Right, 8/4/15); pacemaker placement (2008); fracture surgery; pr cptr-asst surgical navigation image-less (Right, 9/5/2018); joint replacement; knee surgery; other surgical history (Right, 10/29/2018); pr office/outpt visit,procedure only (Right, 10/29/2018); pr split grft trunk,arm,leg <100sqcm (Right, 11/30/2018); Colonoscopy (N/A, 2/15/2019); Revision total knee arthroplasty (Right, 3/20/2021); and Colonoscopy (N/A, 6/15/2021). Restrictions  Restrictions/Precautions  Restrictions/Precautions: Fall Risk  Implants present? : Pacemaker  Position Activity Restriction  Other position/activity restrictions:  Up with assist  Vision/Hearing  Vision: Impaired  Vision Exceptions: Wears glasses at all times  Hearing: Exceptions to [facility]  Hearing Exceptions: Hard of hearing/hearing concerns (Has hearing aids, does not wear them currently - needs new batteries.)     Subjective  General  Chart Reviewed: Yes  Patient assessed for rehabilitation services?: Yes  Response To Previous Treatment: Not applicable  Family / Caregiver Present: No  Referring Practitioner: Chris Caban MD  Referral Date : 06/15/21  Diagnosis: Pulmonary embolism  Follows Commands: Within Functional Limits  General Comment  Comments: RN clears for therapy. Reports pt is on Eliquis and has received 2 doses. Subjective  Subjective: Pt lying in bed upon arrival, pleasant and agreeable to PT evaluation. Pain Screening  Patient Currently in Pain: Denies  Vital Signs  Pulse: 90  Heart Rate Source: Monitor  BP: (!) 82/49 (Immediately following attempt at sitting EOB)  BP Location: Left upper arm  Patient Currently in Pain: Denies  Oxygen Therapy  SpO2: 96 %  Pulse Oximeter Device Mode: Intermittent  O2 Device: None (Room air)       Orientation  Orientation  Overall Orientation Status: Impaired  Orientation Level: Disoriented to time  Social/Functional History  Social/Functional History  Type of Home: Assisted living (2 years)  Home Layout: One level  Home Access: Level entry  Bathroom Shower/Tub: Walk-in shower  Bathroom Equipment: Shower chair, Grab bars in shower, Grab bars around toilet  Home Equipment: Rolling walker  ADL Assistance: Needs assistance (Assist from staff for showers, dressing and pericare)  Homemaking Assistance: Needs assistance (Staff brings tray to room)  Ambulation Assistance: Needs assistance (CGA with RW)  Transfer Assistance: Needs assistance  Additional Comments: Denies falls in the past 3 months.  Reports she just got back into bin at [facility], not sure if they have been doing physical activity classes yet with COVID, but reports she did enjoy doing those things prior to COVID       Objective     Observation/Palpation  Posture: Poor  Observation: Pt with impaired skin integrity    AROM RLE (degrees)  RLE AROM: WFL  AROM LLE (degrees)  LLE AROM : WFL  Strength RLE  Strength RLE: Exception  Comment: Ankle dorsiflexion 5/5, knee extension 3-/5, hip flexion <3/5  Strength LLE  Strength LLE: Exception  Comment: Ankle dorsiflexion 5/5, knee extension 3/5, hip flexion <3/5        Bed mobility  Rolling to Left: Minimal assistance  Rolling to Right: Minimal assistance  Supine to Sit: Minimal assistance (HOB slightly elevated, assist for trunk translation)  Sit to Supine: Maximum assistance  Comment: Pt assisted to partial sit EOB, pt with immediate report of dizziness with decreased responsiveness to commands, returned to supine with max A. Transfers  Sit to Stand: Unable to assess  Stand to sit: Unable to assess  Comment: Unsafe to attempt out of bed mobility at this time  Ambulation  Ambulation?: No (Unsafe to attempt at this time)     Balance  Posture: Poor  Sitting - Static: Poor (Min-max A as pt began to c/o dizziness)        Plan   Plan  Times per week: 2-3x/wk  Times per day: Daily  Current Treatment Recommendations: Strengthening, Transfer Training, Endurance Training, Patient/Caregiver Education & Training, Balance Training, Gait Training, Safety Education & Training, Functional Mobility Training  Safety Devices  Type of devices: All fall risk precautions in place, Bed alarm in place, Nurse notified, Left in bed, Patient at risk for falls, Call light within reach  Restraints  Initially in place: No      AM-PAC Score     AM-PAC Inpatient Mobility without Stair Climbing Raw Score : 12 (06/16/21 1255)  AM-PAC Inpatient without Stair Climbing T-Scale Score : 37.26 (06/16/21 1255)  Mobility Inpatient CMS 0-100% Score: 63.03 (06/16/21 1255)  Mobility Inpatient without Stair CMS G-Code Modifier : CL (06/16/21 1255)       Goals  Short term goals  Time Frame for Short term goals: 1 week, 6/23/21 unless otherwise noted.   Short term goal 1: Pt will perform supine<>sit transfer with min A  Short term goal 2: Pt will perform sit<>stand with CGA  Short term goal 3: Pt will perform bed<>chair with CGA and walker  Short term goal 4: Pt will ambulate 15ft with RW and CGA  Short term goal 5: By 6/19, pt will tolerate x 10-15 reps BLE exercise to assist with functional transfers and ambulation  Patient Goals   Patient goals : \"To stop feeling dizzy. \"       Therapy Time   Individual Concurrent Group Co-treatment   Time In 1033         Time Out 1107         Minutes 34         Timed Code Treatment Minutes: 24 Minutes (10 minute evaluation)       Adriana Cavanaugh PT     If pt is unable to be seen after this session, please let this note serve as discharge summary. Please see case management note for discharge disposition. Thank you.

## 2021-06-16 NOTE — PROGRESS NOTES
Hospitalist Progress Note      PCP: Darlyn Riedel, MD    Date of Admission: 6/13/2021    Chief Complaint   Patient presents with    Rectal Bleeding     had near syncopal episode getting up from toilet today. pain in bottom started this morning. jahovah's witness patient. Hospital Course: H&P reviewed     Subjective:   Pt with 3-4 large BM with bright red blood last night. No BM this morning. Hgb trending down from 8.8-9.4 yesterday to 7.5 today. She was seen by GI this morning for follow up on colonoscopy and was recommended to advance to high fiber diet and continue anticoagulation as she did not have any active bleeds. Pt c/o back pain but denies SOB. Not getting out of bed. PT/OT deferred yesterday due to concerns with aPPT not in therapeutic range and not on heparin, however Pt is on eliquis.   -Care plan d/w RN and will re-engage PT/OT for today.       Medications:  Reviewed    Infusion Medications    sodium chloride       Scheduled Medications    apixaban  10 mg Oral BID    sodium chloride flush  5-40 mL Intravenous 2 times per day    amiodarone  200 mg Oral Daily    aspirin  81 mg Oral Daily    atorvastatin  10 mg Oral Nightly    pantoprazole  40 mg Oral QAM AC    vitamin D  2,000 Units Oral Daily    sertraline  12.5 mg Oral Daily    miconazole   Topical BID     PRN Meds: sodium chloride flush, sodium chloride, ondansetron **OR** ondansetron, polyethylene glycol, acetaminophen **OR** acetaminophen      Intake/Output Summary (Last 24 hours) at 6/16/2021 1223  Last data filed at 6/15/2021 1857  Gross per 24 hour   Intake 15.38 ml   Output 1 ml   Net 14.38 ml       Physical Exam Performed:    BP (!) 82/49 Comment: Immediately following attempt at sitting EOB  Pulse 85   Temp 97.4 °F (36.3 °C) (Oral)   Resp 20   Ht 5' (1.524 m)   Wt 128 lb (58.1 kg)   SpO2 96%   BMI 25.00 kg/m²     General appearance: Elderly CF in no apparent distress, appears stated age and cooperative. HEENT: Pupils equal, round, and reactive to light. Conjunctivae/corneas clear. Neck: Supple, with full range of motion. No jugular venous distention. Trachea midline. Respiratory:  Normal respiratory effort. Clear to auscultation, bilaterally without Rales/Wheezes/Rhonchi. Cardiovascular: Regular rate and rhythm with normal S1/S2 without murmurs, rubs or gallops. Abdomen: Soft, non-tender, non-distended with normal bowel sounds. Musculoskeletal: No clubbing, cyanosis or edema bilaterally. Full range of motion without deformity. Skin: Skin color, texture, turgor normal.  No rashes or lesions. Neurologic:  Neurovascularly intact without any focal sensory/motor deficits. Cranial nerves: II-XII intact, grossly non-focal.  Psychiatric: Alert and oriented, thought content appropriate, normal insight  Capillary Refill: Brisk,< 3 seconds   Peripheral Pulses: +2 palpable, equal bilaterally       Labs:   Recent Labs     06/14/21  0750 06/15/21  0505 06/16/21  0049 06/16/21  0754 06/16/21  1142   WBC 7.9 9.7  --  10.8  --    HGB 8.6* 8.8* 8.2* 8.0* 7.5*   HCT 25.2* 26.0* 24.4* 23.5* 22.3*    450  --  369  --      Recent Labs     06/14/21  0750 06/15/21  0505 06/16/21  0754    139 140   K 4.2 3.4* 3.7    107 108   CO2 26 25 22   BUN 13 13 11   CREATININE 0.6 0.6 0.6   CALCIUM 7.9* 7.9* 7.6*     Urinalysis:    Lab Results   Component Value Date    NITRU Negative 06/13/2021    WBCUA 10-20 11/10/2018    BACTERIA 2+ 11/10/2018    RBCUA 0-2 11/10/2018    BLOODU Negative 06/13/2021    SPECGRAV 1.015 06/13/2021    GLUCOSEU Negative 06/13/2021    GLUCOSEU Neg 04/25/2010       Radiology:  VL Extremity Venous Bilateral   Final Result      CTA CHEST ABDOMEN PELVIS W CONTRAST   Final Result   1. CT CHEST: Acute pulmonary embolism proximally within the pulmonary artery   branch to the left lower lobe. 2. No CT findings of right heart strain.    3. Mild calcific atherosclerosis aorta and its for PE. Patient is Hindu.   -H&H q8.   -Will monitor overnight for stabilization of Hb and will likely discharge tomorrow if stable; will need rapid Covid prior to return to LTC.       3.  Paroxysmal atrial fibrillation -currently rate controlled and in normal sinus rhythm. On amiodarone at home-continued ; patient on aspirin but not on any anticoagulation PTA     4. GERD -on PPI; continue     5. Hyperlipidemia -on statin; continue     DVT Prophylaxis: eliquis   Diet: ADULT DIET; Regular; High Fiber  Code Status: Full Code    PT/OT Eval Status: recommend Subacute/skilled nursing facility     Dispo - likely tomorrow  If Hb  remains stable and no further Bleeding     Patient case seen and discussed under supervision of preceptor, Dr. Nomi Clark. Note made by PA-S2 student in the status of a scribe, with review by preceptor. Ynes CLEANING-S2      Addendum to PA student progress note:  Pt seen, examined, and evaluated with PA student, who acted as my scribe for the above documentation. I have reviewed the current history, physical findings, labs, assessment, and plan and agree with the note as documented. Gt Schrader MD  Hospitalist Physician       The note was completed using EMR. Every effort was made to ensure accuracy; However, inadvertent computerized transcription errors may be present. Addendum at 4:30 PM:  -Received PS from RN stating that patient had 2 bloody bowel movements since my morning evaluation. Requested H&H at 6 PM.  Advised RN to hold night dose of Eliquis if patient continues to have drop in H&H. Last H&H known to be 7.5. Patient is a Jehovah witness . We will get PICC line and consider CT abdomen pelvis Noncon to rule out retroperitoneal hematoma. Discussed care plan with RN. Abdominal examination without rigidity/rebound (per RN ) .     Gt Schrader MD  Hospitalist Physician

## 2021-06-16 NOTE — CARE COORDINATION
Maximiliano spoke with monique in admissions with [facility] who notes that pt is LTC and on a bed hold so she can return at anytime.      Pt will need a rapid covid completed prior to return

## 2021-06-16 NOTE — PROGRESS NOTES
Occupational Therapy   Occupational Therapy Initial Assessment and Treatment Note  Date: 2021   Patient Name: Gregory Brantley  MRN: [id number]     : 1932    Date of Service: 2021    Discharge Recommendations:  Subacute/Skilled Nursing Facility     Assessment   Performance deficits / Impairments: Decreased functional mobility ; Decreased ADL status; Decreased safe awareness;Decreased cognition;Decreased balance;Decreased endurance  Assessment: Pt would benefit from SNF level of care for skilled OT to improve ADL skills and mobility to baseline. Pt was previously able to perform transfers and ADLs at [address] [place] [address] with assist x1. She is currently extensive assist for ADLs and assist x2 for safe mobility. Cont skilled OT in acute care. Disease Specific Education: Pt educated on importance of OOB mobility, prevention of complications of bedrest, and general safety during hospitalization. Pt verbalized understanding  Prognosis: Fair  Decision Making: High Complexity  OT Education: OT Role;Plan of Care  Barriers to Learning: cognition  REQUIRES OT FOLLOW UP: Yes  Activity Tolerance  Activity Tolerance: Treatment limited secondary to medical complications (free text)  Activity Tolerance: BP decreased to 82/49 with attempt to sit EOB, BP improved to 102/58 in supine, HR 95, O2 sats 92% RA  Safety Devices  Safety Devices in place: Yes  Type of devices: Call light within reach;Nurse notified; Left in bed;Bed alarm in place         Patient Diagnosis(es): The primary encounter diagnosis was Other acute pulmonary embolism without acute cor pulmonale ([address] Diagnoses of Syncope and collapse, Constipation, unspecified constipation type, and Rectal bleeding were also pertinent to this visit.      has a past medical history of Anemia, Anxiety, Arthritis, Atrial fibrillation ([address] CAD (coronary artery disease), Chronic GERD, Depression, Diverticulosis, Gall stone, GERD (gastroesophageal reflux disease), Glaucoma, made;Assistance required to identify errors made  Initiation: Requires cues for some  Sequencing: Requires cues for some       LUE AROM (degrees)  LUE General AROM: Impaired L shoulder AROM (chronic deficit), L elbow 0-90, L forearm supin/pron WFL, L hand WFL  RUE AROM (degrees)  RUE AROM : WFL  LUE Strength  LUE Strength Comment: lack of shoulder movement, L elbow to hand WFL (pt is L handed)  RUE Strength  Gross RUE Strength: WFL       Plan   Plan  Times per week: 2-3x  Current Treatment Recommendations: Self-Care / ADL, Balance Training, Functional Mobility Training, Safety Education & Training, Endurance Training  AM-PAC Score   AM-[facility] Inpatient Daily Activity Raw Score: 11 (06/16/21 1223)  AM-PAC Inpatient ADL T-Scale Score : 29.04 (06/16/21 1223)  ADL Inpatient CMS 0-100% Score: 70.42 (06/16/21 1223)  ADL Inpatient CMS G-Code Modifier : CL (06/16/21 1223)  Goals  Short term goals  Time Frame for Short term goals: 1 week (6/23) unless noted  Short term goal 1: Perform functional transfers with min A  Short term goal 2: Perform 2 UE ADL tasks with min A  Short term goal 3: Perform UE exer 10-15x each as tolerated  Patient Goals   Patient goals : \"Be able to get up\"     Therapy Time   Individual Concurrent Group Co-treatment   Time In 1045         Time Out 1107         Minutes 22         Timed Code Treatment Minutes: 12 Minutes (10 min eval)    If pt is discharged prior to next OT session, this note will serve as the discharge summary.   Erin Sevilla OT

## 2021-06-16 NOTE — OP NOTE
[facility]                                OPERATIVE REPORT    PATIENT NAME: Otilia Ashley                   :        1932  MED REC NO:   [id number]                          ROOM:       6812  ACCOUNT NO:   [de-identified]                           ADMIT DATE: 2021  PROVIDER:     Ezequiel Wooten MD    DATE OF PROCEDURE:  06/15/2021    PREPROCEDURE DIAGNOSES:  1. Rectal bleeding. 2.  GI bleed. 3.  Anemia. PROCEDURE:  Colonoscopy to the cecum. POSTPROCEDURE DIAGNOSES:  1.  Moderate-to-severe left-sided diverticulosis. 2.  Old blood. 3.  Internal hemorrhoids. 4.  No active bleeding. PROCEDURE INDICATIONS:  This is an 75-year-old female with history of  hypertension, hyperlipidemia, GERD, atrial fibrillation, arthritis,  depression, uterine cancer status post hysterectomy, diverticulosis and  hemorrhoids, admitted with painless hematochezia. She is  hemodynamically stable. Her hemoglobin remained stable at 9, although,  it did decline initially from 11. She was initially transferred from  nursing home for presyncope and further workup did reveal pulmonary  embolism for which she was started on heparin drip. She has known  history of previous GI bleed. Her last colonoscopy was in 2019. Repeat  colonoscopy is being performed for diagnostic purposes in light of  bleeding and need of anticoagulation. MEDICATIONS:  MAC per Anesthesia. PROCEDURE DETAILS:  Informed consent obtained after discussing risks,  benefits and alternatives. Full history and physical was performed. The patient was classified as ASA class III. Medications were given by  Anesthesia. Cardiopulmonary status was continuously monitored  throughout the procedure. The patient was placed in left lateral  decubitus position.   Once adequately sedated, a standard pediatric  colonoscope was inserted in the anus and advanced to the cecum,  identified by landmarks of appendiceal orifice and IC valve. The entire  mucosa of the cecum, ascending colon, transverse colon, descending  colon, sigmoid colon, and rectum were examined carefully during  withdrawal.  The terminal ileum was also briefly intubated for  visualization. FINDINGS:  1. RECTUM:  The digital rectal exam was normal.  No masses were felt. 2.  COLON:  There was small amount of old blood and clots visualized in  the left colon. There was no evidence of active bleeding in the entire  visualized colon. The visualized terminal ileum also appeared normal.   No evidence of inflammation, ulcers, pseudomembranes, polyps or masses  identified on today's exam.  Retroflexed view of the rectum showed  internal hemorrhoids. SUMMARY:  1.  Moderate-to-severe left-sided diverticulosis. 2.  Old blood in the left colon likely caused by severe diverticulosis. 3.  Internal hemorrhoids. 4.  Otherwise, normal colon to cecum. RECOMMENDATIONS:  1. Return the patient to floor for continuous medical care. 2.  Monitor hemoglobin closely. 3.  Restart heparin drip in the form of anticoagulation as needed. 4.  Resume diet. 5.  If bleeding re-starts, check tagged RBC scan versus CT angiogram.  6.  If bleeding continues to persist or worsens, IR consult for  angiogram can also be obtained. EBL: <5mL    Claudia Krishnan MD    D: 06/15/2021 20:04:18       T: 06/15/2021 22:19:31     [id number]  Job#: [id number]     Doc#: [id number]    CC:   MD Caitlyn Coronado MD

## 2021-06-16 NOTE — PROGRESS NOTES
Message sent via perfect serve to attending MD @ 05 [phone]:  \"Pt is c/o increased abdominal pain, rec'ed PO Tylenol about noon, is calling out for more already, can we order something else PO for abdominal pain? Denies nausea, has 2 bloody BMs total since 7am. BP is 91/53, 93% on RA, last H/H 7.5/22. 3. \" N.O. rec'ed, meds administered. see MAR.     7069: Pt reported large BM, dark red/black bloody stool. Pt denies pain relief at this time, reports pain is unchanged. Pt reported feeling as thought she would \"pass out\" during related hygiene-care. BP remains 90s/50s, 02 90%, Placed on 2 Liters for comfort, pt relayed decrease in symptoms with 02 and position of comfort. MD notified. Eliquis held. Orders rec'ed for repeat H/H @ 1800, PICC line; with plan to obtain CT sans contrast of abdomen/pelvis pending H/H result. Per MD request, writer to contact with result for confirmation of plan. Ice pack placed over site of abdominal pain, currently resting , otherwise denies needs/wants. 1725: H/H 7.6/23.1 Reported to MD. Plan to obtain PICC line and monitor at this time. Pt resting with both eyes closed.

## 2021-06-16 NOTE — PROGRESS NOTES
PICC consent obtained. Call placed to [phone] for PICC line, PICC nurse to arrive for placement questionnaire, form completed and placed on chart.

## 2021-06-17 ENCOUNTER — APPOINTMENT (OUTPATIENT)
Dept: NUCLEAR MEDICINE | Age: 86
DRG: 377 | End: 2021-06-17
Payer: MEDICARE

## 2021-06-17 ENCOUNTER — APPOINTMENT (OUTPATIENT)
Dept: CT IMAGING | Age: 86
DRG: 377 | End: 2021-06-17
Payer: MEDICARE

## 2021-06-17 LAB
A/G RATIO: 0.7 (ref 1.1–2.2)
ALBUMIN SERPL-MCNC: 1.9 G/DL (ref 3.4–5)
ALP BLD-CCNC: 125 U/L (ref 40–129)
ALT SERPL-CCNC: 10 U/L (ref 10–40)
ANION GAP SERPL CALCULATED.3IONS-SCNC: 10 MMOL/L (ref 3–16)
ANION GAP SERPL CALCULATED.3IONS-SCNC: 7 MMOL/L (ref 3–16)
AST SERPL-CCNC: 23 U/L (ref 15–37)
BILIRUB SERPL-MCNC: 0.5 MG/DL (ref 0–1)
BUN BLDV-MCNC: 19 MG/DL (ref 7–20)
BUN BLDV-MCNC: 21 MG/DL (ref 7–20)
CALCIUM SERPL-MCNC: 7.5 MG/DL (ref 8.3–10.6)
CALCIUM SERPL-MCNC: 7.7 MG/DL (ref 8.3–10.6)
CHLORIDE BLD-SCNC: 103 MMOL/L (ref 99–110)
CHLORIDE BLD-SCNC: 104 MMOL/L (ref 99–110)
CO2: 23 MMOL/L (ref 21–32)
CO2: 24 MMOL/L (ref 21–32)
CREAT SERPL-MCNC: 1.2 MG/DL (ref 0.6–1.2)
CREAT SERPL-MCNC: 1.4 MG/DL (ref 0.6–1.2)
DAT POLYSPECIFIC: NORMAL
FERRITIN: 444.3 NG/ML (ref 15–150)
FOLATE: 4.74 NG/ML (ref 4.78–24.2)
GFR AFRICAN AMERICAN: 43
GFR AFRICAN AMERICAN: 51
GFR NON-AFRICAN AMERICAN: 35
GFR NON-AFRICAN AMERICAN: 42
GLOBULIN: 2.6 G/DL
GLUCOSE BLD-MCNC: 118 MG/DL (ref 70–99)
GLUCOSE BLD-MCNC: 95 MG/DL (ref 70–99)
HAPTOGLOBIN: 117 MG/DL (ref 30–200)
HCT VFR BLD CALC: 19.1 % (ref 36–48)
HCT VFR BLD CALC: 19.3 % (ref 36–48)
HCT VFR BLD CALC: 21.4 % (ref 36–48)
HEMOGLOBIN: 6.4 G/DL (ref 12–16)
HEMOGLOBIN: 6.6 G/DL (ref 12–16)
HEMOGLOBIN: 7.1 G/DL (ref 12–16)
IMMATURE RETIC FRACT: 0.75 (ref 0.21–0.37)
INR BLD: 2.89 (ref 0.86–1.14)
IRON SATURATION: 23 % (ref 15–50)
IRON: 15 UG/DL (ref 37–145)
LACTATE DEHYDROGENASE: 303 U/L (ref 100–190)
LACTIC ACID: 0.9 MMOL/L (ref 0.4–2)
MCH RBC QN AUTO: 34.6 PG (ref 26–34)
MCHC RBC AUTO-ENTMCNC: 33.5 G/DL (ref 31–36)
MCV RBC AUTO: 103.4 FL (ref 80–100)
OCCULT BLOOD SCREENING: ABNORMAL
PDW BLD-RTO: 16 % (ref 12.4–15.4)
PLATELET # BLD: 403 K/UL (ref 135–450)
PMV BLD AUTO: 7.8 FL (ref 5–10.5)
POTASSIUM REFLEX MAGNESIUM: 4 MMOL/L (ref 3.5–5.1)
POTASSIUM REFLEX MAGNESIUM: 4.2 MMOL/L (ref 3.5–5.1)
PROTHROMBIN TIME: 33.9 SEC (ref 10–13.2)
RBC # BLD: 2.05 M/UL (ref 4–5.2)
RETICULOCYTE ABSOLUTE COUNT: 0.12 M/UL (ref 0.02–0.1)
RETICULOCYTE COUNT PCT: 6.68 % (ref 0.5–2.18)
SODIUM BLD-SCNC: 134 MMOL/L (ref 136–145)
SODIUM BLD-SCNC: 137 MMOL/L (ref 136–145)
TOTAL IRON BINDING CAPACITY: 64 UG/DL (ref 260–445)
TOTAL PROTEIN: 4.5 G/DL (ref 6.4–8.2)
TSH REFLEX FT4: 3.4 UIU/ML (ref 0.27–4.2)
VITAMIN B-12: 337 PG/ML (ref 211–911)
WBC # BLD: 14.4 K/UL (ref 4–11)

## 2021-06-17 PROCEDURE — 6360000002 HC RX W HCPCS: Performed by: NURSE PRACTITIONER

## 2021-06-17 PROCEDURE — 6370000000 HC RX 637 (ALT 250 FOR IP): Performed by: NURSE PRACTITIONER

## 2021-06-17 PROCEDURE — 83605 ASSAY OF LACTIC ACID: CPT

## 2021-06-17 PROCEDURE — 84443 ASSAY THYROID STIM HORMONE: CPT

## 2021-06-17 PROCEDURE — 80053 COMPREHEN METABOLIC PANEL: CPT

## 2021-06-17 PROCEDURE — 2700000000 HC OXYGEN THERAPY PER DAY

## 2021-06-17 PROCEDURE — 86038 ANTINUCLEAR ANTIBODIES: CPT

## 2021-06-17 PROCEDURE — 84238 ASSAY NONENDOCRINE RECEPTOR: CPT

## 2021-06-17 PROCEDURE — 99221 1ST HOSP IP/OBS SF/LOW 40: CPT | Performed by: SURGERY

## 2021-06-17 PROCEDURE — 82728 ASSAY OF FERRITIN: CPT

## 2021-06-17 PROCEDURE — 82607 VITAMIN B-12: CPT

## 2021-06-17 PROCEDURE — 2580000003 HC RX 258: Performed by: INTERNAL MEDICINE

## 2021-06-17 PROCEDURE — 83550 IRON BINDING TEST: CPT

## 2021-06-17 PROCEDURE — 1200000000 HC SEMI PRIVATE

## 2021-06-17 PROCEDURE — 83010 ASSAY OF HAPTOGLOBIN QUANT: CPT

## 2021-06-17 PROCEDURE — 85045 AUTOMATED RETICULOCYTE COUNT: CPT

## 2021-06-17 PROCEDURE — 86880 COOMBS TEST DIRECT: CPT

## 2021-06-17 PROCEDURE — 82668 ASSAY OF ERYTHROPOIETIN: CPT

## 2021-06-17 PROCEDURE — 78278 ACUTE GI BLOOD LOSS IMAGING: CPT

## 2021-06-17 PROCEDURE — 94761 N-INVAS EAR/PLS OXIMETRY MLT: CPT

## 2021-06-17 PROCEDURE — 83540 ASSAY OF IRON: CPT

## 2021-06-17 PROCEDURE — G0328 FECAL BLOOD SCRN IMMUNOASSAY: HCPCS

## 2021-06-17 PROCEDURE — 6370000000 HC RX 637 (ALT 250 FOR IP): Performed by: INTERNAL MEDICINE

## 2021-06-17 PROCEDURE — 74176 CT ABD & PELVIS W/O CONTRAST: CPT

## 2021-06-17 PROCEDURE — A9538 TC99M PYROPHOSPHATE: HCPCS | Performed by: INTERNAL MEDICINE

## 2021-06-17 PROCEDURE — 3430000000 HC RX DIAGNOSTIC RADIOPHARMACEUTICAL: Performed by: INTERNAL MEDICINE

## 2021-06-17 PROCEDURE — 85610 PROTHROMBIN TIME: CPT

## 2021-06-17 PROCEDURE — 83615 LACTATE (LD) (LDH) ENZYME: CPT

## 2021-06-17 PROCEDURE — 85018 HEMOGLOBIN: CPT

## 2021-06-17 PROCEDURE — 85014 HEMATOCRIT: CPT

## 2021-06-17 PROCEDURE — 85027 COMPLETE CBC AUTOMATED: CPT

## 2021-06-17 PROCEDURE — 82746 ASSAY OF FOLIC ACID SERUM: CPT

## 2021-06-17 PROCEDURE — 2580000003 HC RX 258: Performed by: NURSE PRACTITIONER

## 2021-06-17 PROCEDURE — 36415 COLL VENOUS BLD VENIPUNCTURE: CPT

## 2021-06-17 RX ORDER — SODIUM CHLORIDE 9 MG/ML
INJECTION, SOLUTION INTRAVENOUS CONTINUOUS
Status: DISCONTINUED | OUTPATIENT
Start: 2021-06-17 | End: 2021-06-21

## 2021-06-17 RX ORDER — FOLIC ACID 1 MG/1
2 TABLET ORAL DAILY
Status: DISCONTINUED | OUTPATIENT
Start: 2021-06-17 | End: 2021-06-23 | Stop reason: HOSPADM

## 2021-06-17 RX ORDER — CYANOCOBALAMIN 1000 UG/ML
1000 INJECTION INTRAMUSCULAR; SUBCUTANEOUS DAILY
Status: COMPLETED | OUTPATIENT
Start: 2021-06-17 | End: 2021-06-23

## 2021-06-17 RX ADMIN — PHYTONADIONE 5 MG: 10 INJECTION, EMULSION INTRAMUSCULAR; INTRAVENOUS; SUBCUTANEOUS at 12:01

## 2021-06-17 RX ADMIN — SODIUM CHLORIDE: 9 INJECTION, SOLUTION INTRAVENOUS at 21:11

## 2021-06-17 RX ADMIN — AMIODARONE HYDROCHLORIDE 200 MG: 200 TABLET ORAL at 08:46

## 2021-06-17 RX ADMIN — Medication 24 MILLICURIE: at 15:52

## 2021-06-17 RX ADMIN — SODIUM CHLORIDE: 9 INJECTION, SOLUTION INTRAVENOUS at 08:46

## 2021-06-17 RX ADMIN — ASPIRIN 81 MG: 81 TABLET, COATED ORAL at 08:46

## 2021-06-17 RX ADMIN — SODIUM CHLORIDE, PRESERVATIVE FREE 10 ML: 5 INJECTION INTRAVENOUS at 08:47

## 2021-06-17 RX ADMIN — DULOXETINE HYDROCHLORIDE 30 MG: 30 CAPSULE, DELAYED RELEASE ORAL at 08:46

## 2021-06-17 RX ADMIN — SODIUM CHLORIDE, PRESERVATIVE FREE 10 ML: 5 INJECTION INTRAVENOUS at 21:09

## 2021-06-17 RX ADMIN — CYANOCOBALAMIN 1000 MCG: 1000 INJECTION, SOLUTION INTRAMUSCULAR; SUBCUTANEOUS at 11:58

## 2021-06-17 RX ADMIN — FOLIC ACID 2 MG: 1 TABLET ORAL at 12:01

## 2021-06-17 RX ADMIN — IRON SUCROSE 200 MG: 20 INJECTION, SOLUTION INTRAVENOUS at 12:00

## 2021-06-17 RX ADMIN — MICONAZOLE NITRATE: 2 POWDER TOPICAL at 08:47

## 2021-06-17 RX ADMIN — MICONAZOLE NITRATE: 2 POWDER TOPICAL at 21:09

## 2021-06-17 RX ADMIN — ATORVASTATIN CALCIUM 10 MG: 10 TABLET, FILM COATED ORAL at 21:09

## 2021-06-17 RX ADMIN — PANTOPRAZOLE SODIUM 40 MG: 40 TABLET, DELAYED RELEASE ORAL at 05:21

## 2021-06-17 RX ADMIN — Medication 2000 UNITS: at 08:46

## 2021-06-17 RX ADMIN — SODIUM CHLORIDE, PRESERVATIVE FREE 10 ML: 5 INJECTION INTRAVENOUS at 08:46

## 2021-06-17 ASSESSMENT — PAIN SCALES - GENERAL
PAINLEVEL_OUTOF10: 0

## 2021-06-17 NOTE — PROGRESS NOTES
20 mg propofol  ivp     Jones Anaya, RN  11/08/20 8850 PROGRESS NOTE  S:89 yrs Patient  admitted on 6/13/2021 with Pulmonary embolism on left ([address] [I26.99] . Today she feels ok. No bleeding overnight per RN but Hgb declined. Exam:   Vitals:    06/17/21 1506   BP: (!) 113/59   Pulse: 75   Resp: 18   Temp: 97.7 °F (36.5 °C)   SpO2: 91%      General appearance: alert, appears stated age and cooperative  HEENT: Neck supple with midline trachea  Neck: supple, symmetrical, trachea midline  Lungs: clear to auscultation bilaterally  Heart: regular rate and rhythm, S1, S2 normal, no murmur, click, rub or gallop  Abdomen: soft, non-tender; bowel sounds normal; no masses,  no organomegaly  Extremities: extremities normal, atraumatic, no cyanosis or edema        Medications: Reviewed    Labs:  CBC:   Recent Labs     06/15/21  0505 06/16/21  0754 06/16/21  1639 06/17/21  0206 06/17/21  1115   WBC 9.7 10.8  --  14.4*  --    HGB 8.8* 8.0* 7.6* 7.1* 6.4*   HCT 26.0* 23.5* 23.1* 21.4* 19.1*   .7* 103.0*  --  103.4*  --     369  --  403  --      BMP:   Recent Labs     06/16/21  0754 06/17/21  0206 06/17/21  1115    137 134*   K 3.7 4.2 4.0    104 103   CO2 22 23 24   BUN 11 19 21*   CREATININE 0.6 1.4* 1.2     LIVER PROFILE:   Recent Labs     06/17/21  1115   AST 23   ALT 10   PROT 4.5*   BILITOT 0.5   ALKPHOS 125     PT/INR:   Recent Labs     06/16/21  1639 06/17/21  1115   INR 3.83* 2.89*       Impression:80year old female with history of HTN, HLD, GERD, A fib, arthritis, depression, uterine cancer s/p hysterectomy, diverticulosis and hemorrhoid admitted with painless hematochezia. Colonoscopy showed diverticulosis and hemorrhoids but no active bleeding. Recurrent bleeding since eliquis started    Recommendation:  1. Continue supportive care  2. Monitor Hgb  3. Tagged RBC scan STAT  4. Agree with IV iron therapy  5. IR angiography if RBC scan is positive  6.  Hold/Reverse coagulopathy      [place] Harry, MD LINNETTE  5:30 PM 6/17/2021

## 2021-06-17 NOTE — PROGRESS NOTES
PICC PLACEMENT:    Upon arrival to place PICC line assessed chart for issues related to picc placement, check for consent, and did time out with ZHEN Diaz. No difficulty accessing right  basilic vein however I was unable to thread wire past 24 cm. Right brachial was too small to place line and Left arm was unavailable to place picc due to pt having pacemaker or ICD. Pt. Tolerated MIDLINE placement well and line verified with blood return and flushes without resistance. Reported off to RN [facility] to use line and to replace all IV tubing with new tubing to reduce contamination of new line, she verbalized understanding.

## 2021-06-17 NOTE — PLAN OF CARE
Problem: Pain:  Goal: Pain level will decrease  Description: Pain level will decrease  Outcome: Ongoing   Pt denies abd pain at this time.  PRN Tramadol & Tylenol available

## 2021-06-17 NOTE — PROGRESS NOTES
ondansetron **OR** ondansetron, polyethylene glycol, acetaminophen **OR** acetaminophen      Intake/Output Summary (Last 24 hours) at 6/17/2021 1059  Last data filed at 6/17/2021 0554  Gross per 24 hour   Intake 240 ml   Output 0 ml   Net 240 ml       Physical Exam Performed:    /65   Pulse 85   Temp 98 °F (36.7 °C) (Oral)   Resp 18   Ht 5' (1.524 m)   Wt 128 lb (58.1 kg)   SpO2 94%   BMI 25.00 kg/m²     General appearance: Elderly CF in no apparent distress, appears stated age and cooperative. HEENT: Pupils equal, round, and reactive to light. Conjunctivae/corneas clear. Pallor present   Neck: Supple, with full range of motion. No jugular venous distention. Trachea midline. Respiratory:  Normal respiratory effort. Clear to auscultation, bilaterally without Rales/Wheezes/Rhonchi. Cardiovascular: Regular rate and rhythm with normal S1/S2 without murmurs, rubs or gallops. Abdomen: Soft, non-tender, non-distended with normal bowel sounds. Tender to palpation over periumbilical region. Musculoskeletal: No clubbing, cyanosis or edema bilaterally. Full range of motion without deformity. Skin: Skin color, texture, turgor normal.  No rashes or lesions. Neurologic:  Neurovascularly intact without any focal sensory/motor deficits.  Cranial nerves: II-XII intact, grossly non-focal.  Psychiatric: Alert and oriented, thought content appropriate, normal insight  Capillary Refill: Brisk,< 3 seconds   Peripheral Pulses: +2 palpable, equal bilaterally       Labs:   Recent Labs     06/15/21  0505 06/16/21  0754 06/16/21  1142 06/16/21  1639 06/17/21  0206   WBC 9.7 10.8  --   --  14.4*   HGB 8.8* 8.0* 7.5* 7.6* 7.1*   HCT 26.0* 23.5* 22.3* 23.1* 21.4*    369  --   --  403     Recent Labs     06/15/21  0505 06/16/21  0754 06/17/21  0206    140 137   K 3.4* 3.7 4.2    108 104   CO2 25 22 23   BUN 13 11 19   CREATININE 0.6 0.6 1.4*   CALCIUM 7.9* 7.6* 7.5*     Urinalysis:    Lab Results with starting eliquis so will no longer require IV. -Bilateral venous duplex of lower extremities showed no evidence of DVT in either extremity.  -Some increasing SOB, requiring 2L/min O2, likely r/t progressive anemia with acute blood loss. Request picc placement in anticipation of volume replacement.   -Continues with BRBPR and eliquis has been held since 6/17 am d/t downtrending Hb. INR obtained 2/2 picc placement and found to be 3.83.   -Hb trended down to 7.1 early 6/17.   -CT abdomen 6/17 showed no acute abnormality to correlate to history of blood loss/anemia.   -Hematology was consulted and plan for IVC filter placement with Dr. Francisca Hernandez, Venofer 200 mg IV X 3 doses, Vitamin B12 shots daily X 7 days, and Folate 2 mg PO daily, in order to discontinue anticoagulation in anemic [place]. Also recommended Vitamin K 5mg IV x1 today.   -Continue telemetry monitor.  -check hemolysis labs, KIARA, TSH, smear, and micronutrients for baseline      2. Rectal bleeding POA -likely hemorrhoidal  -Presented with painless hematochezia. -GI consulted ( Dr. Tito Hay) - S/p  Colonoscopy 6/15 revealed diverticulosis, internal hemorrhoids, old blood and no active bleeding. GI recommended restarting anticoagulation, initiated eliquis d/t loss of IV access. -Several episodes of hematochezia s/p colonoscopy and initiation of eliquis. Discussed r/b with patient of needing anticoagulation with current PE and the likelihood that she will continue to bleed from her known internal hemorrhoids/diverticulosis. -Hemoglobin 10.9 on admission, likely initally elevated d/t hemoconcentration, as her baseline is ~8.7. She has dropped to 7.1 today with hematochezia.    -Initiated IVF with the expectation that will likely see a subsequent dilutional decrease in Hb.   -Antidepressants and ASA held d/t increased risk of GI bleeding.   -Hematology recommends discontinue AC and place IVC filter as well as IV dextran, B12, and folate

## 2021-06-17 NOTE — ACP (ADVANCE CARE PLANNING)
Advanced Care Planning Note. Purpose of Encounter: Advanced care planning in light of acute blood loss anemia due to persistent hematochezia; Taoism and accepts no blood products; acute PE requiring anticoagulation; hypertension; paroxysmal atrial fibrillation  Parties in attendance:   Patient Keya Serrano), Dr. Bob Vasquez   (myself). Decisional Capacity: Yes     Subjective/Patient Story: Patient  understands that her medical condition continues to deteriorate. She  no longer wishes further curative interventions, including hospitalization, if there is no long term benefit : No  Patient  wishes to continue further interventions, patient will re-evaluate at a later time: Yes     Subjective and Objective Medical history :   Patient seen and examined  General appearance: Elderly CF in no apparent distress, appears stated age and cooperative. HEENT: Pupils equal, round, and reactive to light. Conjunctivae/corneas clear. Pallor present   Neck: Supple, with full range of motion. No jugular venous distention. Trachea midline. Respiratory:  Normal respiratory effort. Clear to auscultation, bilaterally without Rales/Wheezes/Rhonchi. Cardiovascular: Regular rate and rhythm with normal S1/S2 without murmurs, rubs or gallops. Abdomen: Soft, non-tender, non-distended with normal bowel sounds. Tender to palpation over periumbilical region. Musculoskeletal: No clubbing, cyanosis or edema bilaterally. Full range of motion without deformity. Skin: Skin color, texture, turgor normal.  No rashes or lesions. Neurologic:  Neurovascularly intact without any focal sensory/motor deficits.  Cranial nerves: II-XII intact, grossly non-focal.  Psychiatric: Alert and oriented, thought content appropriate, normal insight  Capillary Refill: Brisk,< 3 seconds   Peripheral Pulses: +2 palpable, equal bilaterally         Goals of Care Determinations: Patient/Family

## 2021-06-17 NOTE — FLOWSHEET NOTE
06/17/21 1323   Encounter Summary   Services provided to: Patient   Referral/Consult From: Nurse   [address]   ([place] Witnesses)   Contact [facility] Yes   Continue Visiting   (6-17, clarified patient wishes, assisted in contacting dtr.)   Complexity of Encounter Moderate   Length of Encounter 45 minutes   Spiritual/Voodoo   Type Spiritual support   Assessment Calm; Approachable   Intervention Active listening;Explored feelings, thoughts, concerns; Discussed death;Discussed belief system/Tenriism practices/ayde;Discussed illness/injury and it's impact   Outcome Engaged in conversation;Expressed feelings/needs/concerns;Receptive   Patient awake and alert. She is able to articulate her beliefs about refraining from any blood products. She is able to state she realizes her decision might result in her death at some point. She states she would like to talk to the hospital elders for her ayde community. Witnesses have their own health care power of  documents, which patient has in her chart. I spoke directly with Elder for [employer] that attends to hospital issues. Will continue to support prn. The patient has a right to make an informed decision, including refusal of treatment, as long as she understands the risk and benefits.

## 2021-06-17 NOTE — CONSULTS
Hematology/Oncology Consultation         Patient:   Elisa Chang       Reason for Consult:  Bleeding, PE, Hinduism   Requesting Physician: No ref. provider found    Chief Complaint:     Chief Complaint   Patient presents with    Rectal Bleeding     had near syncopal episode getting up from toilet today. pain in bottom started this morning. jahovah's witness patient. History Obtained from:     patient, electronic medical record    History of Present Illness:     Elisa Chang is a 80 y.o. female  with significant past medical history of atrial fibb, anemia, CAD, and hx of uterine cancer who presents with rectal bleeding post C-scope. Hgb today is 7. Hgb on 6/13 was 10.9. March 2021 her Hgb was 11.5. She is on 2 liters via NC. She is a nursing home resident. She is still passing red stools - but less. She was started on Eliquis for her new LLL PE. However, this was stopped 2 days ago for bleeding. INR is over 3. Plt are 403. No signs of malignancy on CT C/A/P. We are asked to guide the anti-coagulation plan for an anemic Hinduism patient with new PE and rectal bleeding. No SOB or chest pain now.      Past Medical History:         Diagnosis Date    Anemia     Anxiety     Arthritis     Atrial fibrillation ([address]     CAD (coronary artery disease)     Chronic GERD     Depression     Diverticulosis     Gall stone     GERD (gastroesophageal reflux disease) 3/19/2021    Glaucoma     Hypercholesteremia     Hypertension     Irregular heart beat     Rectal prolapse     Uterine cancer ([address] 2008       Past Surgical History:         Procedure Laterality Date    APPENDECTOMY      COLONOSCOPY  2005    polyp    COLONOSCOPY  1010    diverticulosis    COLONOSCOPY  10/4/2014    diverticulosis, radiation colitis sigmoid    COLONOSCOPY N/A 2/15/2019    COLON performed by Arelis Garcia MD at [address] [facility] COLONOSCOPY N/A 6/15/2021    COLONOSCOPY (CYMBALTA) extended release capsule 30 mg  30 mg Oral Daily Hilda Egan MD   30 mg at 06/17/21 0846    lidocaine 1 % injection 5 mL  5 mL Intradermal Once Hilda Egan MD        sodium chloride flush 0.9 % injection 5-40 mL  5-40 mL Intravenous 2 times per day Hilda Egan MD   10 mL at 06/17/21 0846    sodium chloride flush 0.9 % injection 5-40 mL  5-40 mL Intravenous PRN Hilda Egan MD        0.9 % sodium chloride infusion  25 mL Intravenous PRN Hilda Egan MD        [Held by provider] apixaban (ELIQUIS) tablet 10 mg  10 mg Oral BID Hilda Egan MD   10 mg at 06/16/21 1002    sodium chloride flush 0.9 % injection 5-40 mL  5-40 mL Intravenous 2 times per day Hilda Egan MD   10 mL at 06/17/21 0847    sodium chloride flush 0.9 % injection 5-40 mL  5-40 mL Intravenous PRN Hilda Egan MD        0.9 % sodium chloride infusion  25 mL Intravenous PRN Hilda Egan MD        ondansetron (ZOFRAN-ODT) disintegrating tablet 4 mg  4 mg Oral Q8H PRN Hilda Egan MD        Or    ondansetron (ZOFRAN) injection 4 mg  4 mg Intravenous Q6H PRN Hilda Egan MD        polyethylene glycol (GLYCOLAX) packet 17 g  17 g Oral Daily PRN Hilda Egan MD        acetaminophen (TYLENOL) tablet 650 mg  650 mg Oral Q6H PRN Hilda Egan MD   650 mg at 06/16/21 1824    Or    acetaminophen (TYLENOL) suppository 650 mg  650 mg Rectal Q6H PRN Hilda Egan MD        amiodarone (CORDARONE) tablet 200 mg  200 mg Oral Daily Hilda Egan MD   200 mg at 06/17/21 0846    aspirin EC tablet 81 mg  81 mg Oral Daily Hilda Egan MD   81 mg at 06/17/21 0846    atorvastatin (LIPITOR) tablet 10 mg  10 mg Oral Nightly Hilda Egan MD   10 mg at 06/16/21 2000    pantoprazole (PROTONIX) tablet 40 mg  40 mg Oral QAM AC Hilda Egan MD   40 mg at 06/17/21 0521    Vitamin D Transportation Needs:     Lack of Transportation (Medical):  Lack of Transportation (Non-Medical):    Physical Activity:     Days of Exercise per Week:     Minutes of Exercise per Session:    Stress:     Feeling of Stress :    Social Connections:     Frequency of Communication with Friends and Family:     Frequency of Social Gatherings with Friends and Family:     Attends Druze Services:     Active Member of Clubs or Organizations:     Attends Club or Organization Meetings:     Marital Status:    Intimate Partner Violence:     Fear of Current or Ex-Partner:     Emotionally Abused:     Physically Abused:     Sexually Abused:      Social History     Substance and Sexual Activity   Drug Use No     Social History     Substance and Sexual Activity   Alcohol Use No     Social History     Substance and Sexual Activity   Sexual Activity Not Currently     Social History     Tobacco Use   Smoking Status Former Smoker   Smokeless Tobacco Never Used   Tobacco Comment    only smoked from age [age] yrs old       Family History:     Family History   Problem Relation Age of Onset    Mental Retardation Mother     Cancer Father         lung       Review of Systems:     Constitutional: Denies fever, sweats, weight loss. .     Eyes: No visual changes or diplopia. No scleral icterus. ENT: No Headaches, no hearing loss, no  vertigo. No mouth sores or sore throat. Cardiovascular: No chest pain, no dyspnea on exertion, no palpitations, no  loss of consciousness. Respiratory: No cough, no  wheezing, no dyspnea, no sputum production. No hemoptysis. .    Gastrointestinal: see HPI   Genitourinary: No dysuria, trouble voiding, or hematuria. Musculoskeletal:  Generalized weakness. No joint complaints. Integumentary: No rash or pruritis. Neurological: No headache, diplopia. No change in gait, balance, or coordination. No paresthesias. Endocrine: No temperature intolerance.  No excessive thirst, fluid intake, or CO2 23 22 25   BUN 19 11 13   CREATININE 1.4* 0.6 0.6   MG  --   --  2.00       HEPATIC:  Recent Labs     21  1025   AST 28   ALT 13   ALKPHOS 164*   PROT 5.6*   BILITOT 0.5       TUMOR MARKERS:  No results for input(s): PSA, CEA, , [id number],  in the last 720 hours. MAGNESIUM:    Lab Results   Component Value Date    MG 2.00 06/15/2021       PT/INR:    No results found for: PTINR    Lab Results   Component Value Date    INR 3.83 2021       PTT:    Lab Results   Component Value Date    APTT 32.8 06/15/2021       U/A:    Lab Results   Component Value Date    COLORU Yellow 2021    PHUR 6.5 2021    WBCUA 10-20 11/10/2018    RBCUA 0-2 11/10/2018    BACTERIA 2+ 11/10/2018    CLARITYU Clear 2021    SPECGRAV 1.015 2021    LEUKOCYTESUR Negative 2021    UROBILINOGEN 0.2 2021    BILIRUBINUR Negative 2021    BILIRUBINUR Neg 2010    BLOODU Negative 2021    GLUCOSEU Negative 2021    GLUCOSEU Neg 2010    AMORPHOUS 3+ 2017       Imagin.  CT CHEST: Acute pulmonary embolism proximally within the pulmonary artery   branch to the left lower lobe. 2. No CT findings of right heart strain. 3. Mild calcific atherosclerosis aorta and its branches without aneurysm or   dissection. 4. CT ABDOMEN/PELVIS: No acute vascular abnormality. 5.  No CT evidence of an acute intra-abdominal or intrapelvic process. 6. Cholelithiasis without CT findings of acute cholecystitis. 7.  Diverticulosis coli without CT evidence of acute diverticulitis. 8. Small, fat containing left inguinal hernia. 9. Small, fat containing umbilical hernia. 10. Possible constipation. Critical results were called by Dr. Kleber Osei to Debi Baron on   2021 at 13:03.        Impression:     See below     Plan:     PE LLL   - Hyper coag work up outpatient   - Stop Eliquis due to BRBPR   - s/p colonoscopy outpatient with non bleeding internal

## 2021-06-17 NOTE — PROGRESS NOTES
Occupational Therapy   Treatment Attempt Note    Attempted OT treatment this date, however pt with low BP and H/H this date, Hold per RN. Will re-attempt next date as schedule permits and pt medically stable.      Jenny Butt, OTR/L

## 2021-06-17 NOTE — CONSULTS
Vascular Surgery Consultation    Date of Admission:  6/13/2021 10:16 AM  Date of Consultation:  6/17/2021    PCP:  Darlyn Riedel, MD       Reason for Consult: IVC filter placement    History of Present Illness: We are asked to see this patient in consultation by Bennie He regarding the above. Donal Cardenas is a 80 y.o. female [address]. She recently had a PE and was started on Eliquis. She has now developed BRBPR. Hgb down to 6.4. Eliquis has been stopped. She was also noted to have an elevated INR to 3.83. Today it is 2.89 and she is receiving Vit K.          Past Medical History:  Past Medical History:   Diagnosis Date    Anemia     Anxiety     Arthritis     Atrial fibrillation ([address]     CAD (coronary artery disease)     Chronic GERD     Depression     Diverticulosis     Gall stone     GERD (gastroesophageal reflux disease) 3/19/2021    Glaucoma     Hypercholesteremia     Hypertension     Irregular heart beat     Rectal prolapse     Uterine cancer ([address] 2008       Past Surgical History:  Past Surgical History:   Procedure Laterality Date    APPENDECTOMY      COLONOSCOPY  2005    polyp    COLONOSCOPY  1010    diverticulosis    COLONOSCOPY  10/4/2014    diverticulosis, radiation colitis sigmoid    COLONOSCOPY N/A 2/15/2019    COLON performed by Carlitos Chacko MD at [facility] N/A 6/15/2021    COLONOSCOPY DIAGNOSTIC performed by Carlitos Chacko MD at [address] [place] [address] Right 8/4/15    hip pinning    HYSTERECTOMY  2008    RT in 2009   [address] Right 10/29/2018    PATELLA OPEN REDUCTION INTERNAL FIXATION     PACEMAKER INSERTION      PACEMAKER PLACEMENT  2008    [place] CPTR-ASST SURGICAL NAVIGATION IMAGE-LESS Right 9/5/2018    RIGHT TOTAL KNEE REPLACEMENT COMPUTER NAVIGATION WITH ADDUCTOR CANAL BLOCK FOR PAIN CONTROL                     SONAL performed by Frieda Feliciano MD at [facility] OFFICE/OUTPT [facility] Right 10/29/2018    Right knee inferior pole patellectomy, primary repair of patellar tendon. [facility] [address] [facility] 11/30/2018    RIGHT KNEE GASTROC FLAP WITH SKIN GRAFT, WOUND VAC PLACEMENT performed by Christin Joseph MD at [address] [facility] Right 3/20/2021    REVISION RIGHT TOTAL KNEE ARTHROPLASTY       SONAL performed by Christin Joseph MD at [place] [address] Medications:   Prior to Admission medications    Medication Sig Start Date End Date Taking?  Authorizing Provider   amiodarone (CORDARONE) 200 MG tablet Take 1 tablet by mouth daily 3/29/21  Yes [address] [place] [address], [facility]   psyllium (KONSYL) 28.3 % PACK Take 1 packet by mouth daily   Yes Historical Provider, MD   nystatin (MYCOSTATIN) POWD powder Apply topically 2 times daily As needed for candidiasis   Yes Historical Provider, MD   sertraline (ZOLOFT) 25 MG tablet Take 12.5 mg by mouth daily   Yes Historical Provider, MD   hydrocortisone 1 % cream Apply topically every 6-8 hours as needed As needed for discomfort   Yes Historical Provider, MD   acetaminophen (TYLENOL) 325 MG tablet Take 650 mg by mouth every 6 hours as needed for Pain   Yes Historical Provider, MD   DULoxetine (CYMBALTA) 30 MG extended release capsule Take 30 mg by mouth daily   Yes Historical Provider, MD   Melatonin 5 MG CAPS Take 5 mg by mouth nightly as needed   Yes Historical Provider, MD   omeprazole (PRILOSEC) 20 MG delayed release capsule Take 40 mg by mouth daily    Yes Historical Provider, MD   tamsulosin (FLOMAX) 0.4 MG capsule Take 0.4 mg by mouth daily   Yes Historical Provider, MD   ferrous sulfate 325 (65 Fe) MG tablet Take 1 tablet by mouth three times a week 11/7/18  Yes Johan Stanley MD   sennosides-docusate sodium atorvastatin  10 mg Oral Nightly    pantoprazole  40 mg Oral QAM AC    vitamin D  2,000 Units Oral Daily    [Held by provider] sertraline  12.5 mg Oral Daily    miconazole   Topical BID       Allergies:  Lisinopril, Levofloxacin, Levofloxacin, Vancomycin, Amlodipine, Avelox [moxifloxacin hcl in nacl], Buspirone, Hydrochlorothiazide, Lidocaine, Moxifloxacin, Moxifloxacin hcl in nacl, Mupirocin, Nsaids, Paroxetine, Phenergan [promethazine hcl], Phenothiazines, Promethazine hcl, Sulindac, Tolmetin, and Ciprofloxacin     Social History:      Social History     Socioeconomic History    Marital status:      Spouse name: Not on file    Number of children: Not on file    Years of education: Not on file    Highest education level: Not on file   Occupational History    Not on file   Tobacco Use    Smoking status: Former Smoker    Smokeless tobacco: Never Used    Tobacco comment: only smoked from age [age] yrs old   Vaping Use    Vaping Use: Never used   Substance and Sexual Activity    Alcohol use: No    Drug use: No    Sexual activity: Not Currently   Other Topics Concern    Not on file   Social History Narrative    Not on file     Social Determinants of Health     Financial Resource Strain:     Difficulty of Paying Living Expenses:    Food Insecurity:     Worried About [address] in the Last Year:     [address] in the Last Year:    Transportation Needs:     Lack of Transportation (Medical):      Lack of Transportation (Non-Medical):    Physical Activity:     Days of Exercise per Week:     Minutes of Exercise per Session:    Stress:     Feeling of Stress :    Social Connections:     Frequency of Communication with Friends and Family:     Frequency of Social Gatherings with Friends and Family:     Attends Advent Services:     Active Member of Clubs or Organizations:     Attends Club or Organization Meetings:     Marital Status:    Intimate Partner Violence:     Fear of 33.9*   INR 3.83* 2.89*     APTT:   Recent Labs     06/14/21  1612 06/14/21  2148 06/15/21  0505   APTT 70.0* 79.8* 32.8     Liver Profile:  Lab Results   Component Value Date    AST 23 06/17/2021    ALT 10 06/17/2021    BILITOT 0.5 06/17/2021    ALKPHOS 125 06/17/2021   No results found for: CHOL, HDL, TRIG  TSH:  Lab Results   Component Value Date    TSH 1.05 03/19/2021     UA:   Lab Results   Component Value Date    COLORU Yellow 06/13/2021    PHUR 6.5 06/13/2021    WBCUA 10-20 11/10/2018    RBCUA 0-2 11/10/2018    BACTERIA 2+ 11/10/2018    CLARITYU Clear 06/13/2021    SPECGRAV 1.015 06/13/2021    LEUKOCYTESUR Negative 06/13/2021    UROBILINOGEN 0.2 06/13/2021    BILIRUBINUR Negative 06/13/2021    BILIRUBINUR Neg 04/25/2010    BLOODU Negative 06/13/2021    GLUCOSEU Negative 06/13/2021    GLUCOSEU Neg 04/25/2010    AMORPHOUS 3+ 01/11/2017           Assessment:  Hx PE on anticoagulation, now with GI bleed. Plan: IVC filter will be place, likely tomorrow am if INR <2.     Procedure, risks, benefits, and alternatives explained to patient and family- all questions answered.

## 2021-06-18 ENCOUNTER — APPOINTMENT (OUTPATIENT)
Dept: CARDIAC CATH/INVASIVE PROCEDURES | Age: 86
DRG: 377 | End: 2021-06-18
Payer: MEDICARE

## 2021-06-18 ENCOUNTER — APPOINTMENT (OUTPATIENT)
Dept: CT IMAGING | Age: 86
DRG: 377 | End: 2021-06-18
Payer: MEDICARE

## 2021-06-18 LAB
ANION GAP SERPL CALCULATED.3IONS-SCNC: 5 MMOL/L (ref 3–16)
ANTI-NUCLEAR ANTIBODY (ANA): NEGATIVE
BUN BLDV-MCNC: 21 MG/DL (ref 7–20)
CALCIUM SERPL-MCNC: 7.1 MG/DL (ref 8.3–10.6)
CHLORIDE BLD-SCNC: 112 MMOL/L (ref 99–110)
CO2: 23 MMOL/L (ref 21–32)
CREAT SERPL-MCNC: 0.7 MG/DL (ref 0.6–1.2)
ERYTHROPOIETIN: 418 MU/ML (ref 4–27)
GFR AFRICAN AMERICAN: >60
GFR NON-AFRICAN AMERICAN: >60
GLUCOSE BLD-MCNC: 81 MG/DL (ref 70–99)
HCT VFR BLD CALC: 13.8 % (ref 36–48)
HCT VFR BLD CALC: 14.5 % (ref 36–48)
HCT VFR BLD CALC: 14.6 % (ref 36–48)
HCT VFR BLD CALC: 15.2 % (ref 36–48)
HEMATOLOGY PATH CONSULT: NO
HEMATOLOGY PATH CONSULT: NORMAL
HEMATOLOGY PATH CONSULT: YES
HEMOGLOBIN: 4.6 G/DL (ref 12–16)
HEMOGLOBIN: 4.8 G/DL (ref 12–16)
HEMOGLOBIN: 4.9 G/DL (ref 12–16)
HEMOGLOBIN: 5.1 G/DL (ref 12–16)
INR BLD: 1.81 (ref 0.86–1.14)
MCH RBC QN AUTO: 35.4 PG (ref 26–34)
MCHC RBC AUTO-ENTMCNC: 33.8 G/DL (ref 31–36)
MCV RBC AUTO: 104.8 FL (ref 80–100)
PDW BLD-RTO: 16.4 % (ref 12.4–15.4)
PLATELET # BLD: 291 K/UL (ref 135–450)
PMV BLD AUTO: 7.8 FL (ref 5–10.5)
POTASSIUM REFLEX MAGNESIUM: 3.6 MMOL/L (ref 3.5–5.1)
PROTHROMBIN TIME: 21.1 SEC (ref 10–13.2)
RBC # BLD: 1.39 M/UL (ref 4–5.2)
SODIUM BLD-SCNC: 140 MMOL/L (ref 136–145)
SOLUBLE TRANSFERRIN RECEPT: 5.8 MG/L (ref 1.9–4.4)
WBC # BLD: 9 K/UL (ref 4–11)

## 2021-06-18 PROCEDURE — 80048 BASIC METABOLIC PNL TOTAL CA: CPT

## 2021-06-18 PROCEDURE — 85014 HEMATOCRIT: CPT

## 2021-06-18 PROCEDURE — 6370000000 HC RX 637 (ALT 250 FOR IP): Performed by: INTERNAL MEDICINE

## 2021-06-18 PROCEDURE — 99222 1ST HOSP IP/OBS MODERATE 55: CPT | Performed by: SURGERY

## 2021-06-18 PROCEDURE — 6360000002 HC RX W HCPCS

## 2021-06-18 PROCEDURE — 6360000002 HC RX W HCPCS: Performed by: NURSE PRACTITIONER

## 2021-06-18 PROCEDURE — 6360000002 HC RX W HCPCS: Performed by: INTERNAL MEDICINE

## 2021-06-18 PROCEDURE — 1200000000 HC SEMI PRIVATE

## 2021-06-18 PROCEDURE — B5191ZZ FLUOROSCOPY OF INFERIOR VENA CAVA USING LOW OSMOLAR CONTRAST: ICD-10-PCS | Performed by: SURGERY

## 2021-06-18 PROCEDURE — 85018 HEMOGLOBIN: CPT

## 2021-06-18 PROCEDURE — 85027 COMPLETE CBC AUTOMATED: CPT

## 2021-06-18 PROCEDURE — 37191 INS ENDOVAS VENA CAVA FILTR: CPT

## 2021-06-18 PROCEDURE — 36415 COLL VENOUS BLD VENIPUNCTURE: CPT

## 2021-06-18 PROCEDURE — 94761 N-INVAS EAR/PLS OXIMETRY MLT: CPT

## 2021-06-18 PROCEDURE — 06H03DZ INSERTION OF INTRALUMINAL DEVICE INTO INFERIOR VENA CAVA, PERCUTANEOUS APPROACH: ICD-10-PCS | Performed by: SURGERY

## 2021-06-18 PROCEDURE — 2709999900 HC NON-CHARGEABLE SUPPLY

## 2021-06-18 PROCEDURE — C1880 VENA CAVA FILTER: HCPCS

## 2021-06-18 PROCEDURE — 6370000000 HC RX 637 (ALT 250 FOR IP): Performed by: NURSE PRACTITIONER

## 2021-06-18 PROCEDURE — 99152 MOD SED SAME PHYS/QHP 5/>YRS: CPT | Performed by: SURGERY

## 2021-06-18 PROCEDURE — 37191 INS ENDOVAS VENA CAVA FILTR: CPT | Performed by: SURGERY

## 2021-06-18 PROCEDURE — C1894 INTRO/SHEATH, NON-LASER: HCPCS

## 2021-06-18 PROCEDURE — 6360000004 HC RX CONTRAST MEDICATION: Performed by: INTERNAL MEDICINE

## 2021-06-18 PROCEDURE — 74174 CTA ABD&PLVS W/CONTRAST: CPT

## 2021-06-18 PROCEDURE — 85610 PROTHROMBIN TIME: CPT

## 2021-06-18 PROCEDURE — 2700000000 HC OXYGEN THERAPY PER DAY

## 2021-06-18 PROCEDURE — 2580000003 HC RX 258: Performed by: NURSE PRACTITIONER

## 2021-06-18 PROCEDURE — 2580000003 HC RX 258: Performed by: INTERNAL MEDICINE

## 2021-06-18 RX ORDER — FENTANYL CITRATE 50 UG/ML
INJECTION, SOLUTION INTRAMUSCULAR; INTRAVENOUS
Status: COMPLETED | OUTPATIENT
Start: 2021-06-18 | End: 2021-06-18

## 2021-06-18 RX ORDER — POLYETHYLENE GLYCOL 3350 17 G/17G
120 POWDER, FOR SOLUTION ORAL ONCE
Status: COMPLETED | OUTPATIENT
Start: 2021-06-18 | End: 2021-06-18

## 2021-06-18 RX ORDER — POLYETHYLENE GLYCOL 3350 17 G/17G
120 POWDER, FOR SOLUTION ORAL ONCE
Status: DISCONTINUED | OUTPATIENT
Start: 2021-06-19 | End: 2021-06-23 | Stop reason: HOSPADM

## 2021-06-18 RX ORDER — MIDAZOLAM HYDROCHLORIDE 5 MG/ML
INJECTION INTRAMUSCULAR; INTRAVENOUS
Status: COMPLETED | OUTPATIENT
Start: 2021-06-18 | End: 2021-06-18

## 2021-06-18 RX ADMIN — SODIUM CHLORIDE, PRESERVATIVE FREE 10 ML: 5 INJECTION INTRAVENOUS at 09:20

## 2021-06-18 RX ADMIN — ATORVASTATIN CALCIUM 10 MG: 10 TABLET, FILM COATED ORAL at 20:14

## 2021-06-18 RX ADMIN — TRAMADOL HYDROCHLORIDE 50 MG: 50 TABLET, FILM COATED ORAL at 16:25

## 2021-06-18 RX ADMIN — IRON SUCROSE 200 MG: 20 INJECTION, SOLUTION INTRAVENOUS at 11:29

## 2021-06-18 RX ADMIN — CYANOCOBALAMIN 1000 MCG: 1000 INJECTION, SOLUTION INTRAMUSCULAR; SUBCUTANEOUS at 09:27

## 2021-06-18 RX ADMIN — SODIUM CHLORIDE: 9 INJECTION, SOLUTION INTRAVENOUS at 07:17

## 2021-06-18 RX ADMIN — TRAMADOL HYDROCHLORIDE 50 MG: 50 TABLET, FILM COATED ORAL at 11:34

## 2021-06-18 RX ADMIN — IOPAMIDOL 75 ML: 755 INJECTION, SOLUTION INTRAVENOUS at 12:49

## 2021-06-18 RX ADMIN — DULOXETINE HYDROCHLORIDE 30 MG: 30 CAPSULE, DELAYED RELEASE ORAL at 09:16

## 2021-06-18 RX ADMIN — MICONAZOLE NITRATE: 2 POWDER TOPICAL at 20:22

## 2021-06-18 RX ADMIN — Medication 2000 UNITS: at 09:17

## 2021-06-18 RX ADMIN — PANTOPRAZOLE SODIUM 40 MG: 40 TABLET, DELAYED RELEASE ORAL at 05:55

## 2021-06-18 RX ADMIN — FENTANYL CITRATE 25 MCG: 50 INJECTION, SOLUTION INTRAMUSCULAR; INTRAVENOUS at 07:35

## 2021-06-18 RX ADMIN — MIDAZOLAM HYDROCHLORIDE 1 MG: 5 INJECTION INTRAMUSCULAR; INTRAVENOUS at 07:36

## 2021-06-18 RX ADMIN — BISACODYL 20 MG: 5 TABLET, COATED ORAL at 16:25

## 2021-06-18 RX ADMIN — MICONAZOLE NITRATE: 2 POWDER TOPICAL at 09:19

## 2021-06-18 RX ADMIN — POLYETHYLENE GLYCOL 3350 120 G: 17 POWDER, FOR SOLUTION ORAL at 20:14

## 2021-06-18 RX ADMIN — FOLIC ACID 2 MG: 1 TABLET ORAL at 09:16

## 2021-06-18 RX ADMIN — ASPIRIN 81 MG: 81 TABLET, COATED ORAL at 09:17

## 2021-06-18 RX ADMIN — EPOETIN ALFA-EPBX 10000 UNITS: 10000 INJECTION, SOLUTION INTRAVENOUS; SUBCUTANEOUS at 11:27

## 2021-06-18 RX ADMIN — AMIODARONE HYDROCHLORIDE 200 MG: 200 TABLET ORAL at 09:17

## 2021-06-18 ASSESSMENT — PAIN SCALES - GENERAL
PAINLEVEL_OUTOF10: 0
PAINLEVEL_OUTOF10: 9
PAINLEVEL_OUTOF10: 8

## 2021-06-18 NOTE — PROGRESS NOTES
Patient back from cath lab. Left femoral site clean, dry, and intact. Scant blood noticed. Patient to remain flat until 0900. VSS, BP slightly low but consistent.

## 2021-06-18 NOTE — PROGRESS NOTES
ONCOLOGY HEMATOLOGY CARE PROGRESS NOTE      SUBJECTIVE:     The patient is resting comfortably. ROS:     Constitutional:  No weight loss, No fever, No chills, No night sweats. Energy level good. Eyes:  No impairment or change in vision  ENT / Mouth:  No pain, abnormal ulceration, bleeding, nasal drip or change in voice or hearing  Cardiovascular:  No chest pain, palpitations, new edema, or calf discomfort  Respiratory:  No pain, hemoptysis, change to breathing  Breast:  No pain, discharge, change in appearance or texture  Gastrointestinal:  No pain, cramping, jaundice, change to eating and bowel habits  Urinary:  No pain, bleeding or change in continence  Genitalia: No pain, bleeding or discharge  Musculoskeletal:  No redness, pain, edema or weakness  Skin:  No pruritus, rash, change to nodules or lesions  Neurologic:  No discomfort, change in mental status, speech, sensory or motor activity  Psychiatric:  No change in concentration or change to affect or mood  Endocrine:  No hot flashes, increased thirst, or change to urine production  Hematologic: No petechiae, ecchymosis or bleeding  Lymphatic:  No lymphadenopathy or lymphedema  Allergy / Immunologic:  No eczema, hives, frequent or recurrent infections    OBJECTIVE        Physical    VITALS:  BP (!) 101/47   Pulse 63   Temp 97.9 °F (36.6 °C) (Oral)   Resp 20   Ht 5' (1.524 m)   Wt 128 lb (58.1 kg)   SpO2 95%   BMI 25.00 kg/m²   TEMPERATURE:  Current - Temp: 97.9 °F (36.6 °C);  Max - Temp  Av.7 °F (36.5 °C)  Min: 97.4 °F (36.3 °C)  Max: 98 °F (36.7 °C)  PULSE OXIMETRY RANGE: SpO2  Av.3 %  Min: 91 %  Max: 100 %  24HR INTAKE/OUTPUT:      Intake/Output Summary (Last 24 hours) at 2021 0806  Last data filed at 2021 0606  Gross per 24 hour   Intake 1262.27 ml   Output 0 ml   Net 1262.27 ml       CONSTITUTIONAL: awake, alert, cooperative, no apparent distress   EYES: pupils equal, round and right patella, initial encounter for closed fracture    Severe protein-calorie malnutrition ([address]    Displaced transverse fracture of right patella, initial encounter for closed fracture    Hyperlipidemia    Anemia    Unstable gait    Open knee wound, right, sequela    GI bleeding    H/O skin graft    Hyponatremia    CAD (coronary artery disease)    Closed fracture of right distal femur (HCC)    Hypoalbuminemia    GERD (gastroesophageal reflux disease)    Wide-complex tachycardia (HCC)    Pulmonary embolism on left [facility])       ASSESSMENT AND PLAN:    PE:  -She was on Eliquis and then had a GI bleed  -She has a substantial risk for bleeding including her age, and fall risk  -Furthermore 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 I am not optimistic with respect to her prognosis. Coagulopathy:  -She is receiving vitamin K  -INR is down to 1.81    Morbidity:  -I went over the patient's care with a family member today. I told her that she is at high risk of dying due to being a Scientology and not excepting blood. This is a risk that the patient is willing to take. On the one hand, it would be nice to anticoagulate her because she has high risk of a second pulmonary embolism. This is due to having a previous pulmonary embolism and being relatively sedentary. On the other hand, she has a very high risk of bleeding, but she is already anemic and has had an episode of bleeding. I do believe the best solution is to place a filter, but this could also have problems. The patient's family member believes the filter is in the patient's best interest and understands the risks of everything were doing. She has a very poor prognosis.           ONCOLOGIC DISPOSITION:    Once for hemoglobin has stabilized and a filter has been placed    Luma Adhikari MD  Please contact through [address]

## 2021-06-18 NOTE — PLAN OF CARE
Problem: Falls - Risk of:  Goal: Will remain free from falls  Description: Will remain free from falls  Outcome: Ongoing   Bed alarm on & in place. 2/4 side rails up. Pt wearing non skid footwear. Bed locked & in lowest position. Call light within reach.

## 2021-06-18 NOTE — PROGRESS NOTES
PROGRESS NOTE  S:89 yrs Patient  admitted on 6/13/2021 with Pulmonary embolism on left ([address] [I26.99] . Today she feels ok. She complains of RLQ Abdominal Pain. She had multiple bloody BMs overnight but none this AM shift. She continues to refuse blood products    Exam:   Vitals:    06/18/21 1515   BP: 98/61   Pulse: 65   Resp: 18   Temp: 97.5 °F (36.4 °C)   SpO2: 95%      General appearance: alert, appears stated age and cooperative  HEENT: Oropharynx clear, no lesions  Neck: no adenopathy and supple, symmetrical, trachea midline  Lungs: clear to auscultation bilaterally  Heart: regular rate and rhythm, S1, S2 normal, no murmur, click, rub or gallop  Abdomen: soft, non-tender; bowel sounds normal; no masses,  no organomegaly  Extremities: extremities normal, atraumatic, no cyanosis or edema     Medications: Reviewed    Labs:  CBC:   Recent Labs     06/16/21  0754 06/17/21  0206 06/18/21  0136 06/18/21  0627 06/18/21  0906   WBC 10.8 14.4*  --  9.0  --    HGB 8.0* 7.1* 5.1* 4.9* 4.6*   HCT 23.5* 21.4* 15.2* 14.6* 13.8*   .0* 103.4*  --  104.8*  --     403  --  291  --      BMP:   Recent Labs     06/17/21  0206 06/17/21  1115 06/18/21  0627    134* 140   K 4.2 4.0 3.6    103 112*   CO2 23 24 23   BUN 19 21* 21*   CREATININE 1.4* 1.2 0.7     LIVER PROFILE:   Recent Labs     06/17/21  1115   AST 23   ALT 10   PROT 4.5*   BILITOT 0.5   ALKPHOS 125     PT/INR:   Recent Labs     06/16/21  1639 06/17/21  1115 06/18/21  0627   INR 3.83* 2.89* 1.81*     Tagged RBC scan:   Focus of extravascular accumulation of activity in the left pelvis suspicious   for small acute GI bleed.  Location corresponds to the sigmoid colon which   has very severe diverticular disease       CTA:  There is some hyperdensity on the arterial phase imaging within a small bowel   loop in the left abdomen which is unchanged on delayed imaging and atypical   for active extravasation.  This likely represents migration of oral contrast,   which is seen within small bowel loops in the right abdomen, given there was   a delay between the noncontrast and arterial phase imaging.  Hyperenhancing   small-bowel fall per mucosa at is another possibility.  No definite active   extravasation/arterial GI bleed.       There is new wall thickening involving the ascending and transverse colon   with mild adjacent inflammatory changes concerning for developing colitis.       Extensive sigmoid diverticulitis.       Cholelithiasis.  Gallbladder is mildly distended which may be due to NPO   status.  If there is concern for acute cholecystitis, right upper quadrant   ultrasound may be helpful for further evaluation. Impression:80[facility] with history of HTN, HLD, GERD, A fib, arthritis, depression, uterine cancer s/p hysterectomy, diverticulosis and hemorrhoid admitted with painless hematochezia. Her colonoscopy showed diverticulosis and hemorrhoids but no active bleeding. Recurrent bleeding when eliquis started. Recommendation:  1. Continue supportive care  2. Monitor Hgb  3. Observe for signs of bleeding  4. IV iron and epogen  5. Repeat colonoscopy tomorrow  6.  Reverse anticoagulation with Yossi Scales MD, MD  3:45 PM 6/18/2021

## 2021-06-18 NOTE — PROGRESS NOTES
Interventional Radiology Note    Spoke with Dr. Marco Roberts and Dr. Angela Bardales. The patient is currently hemodynamically stable and recommend repeat colonoscopy to assess the site of bleeding seen on nuclear medicine scan yesterday. There is concern from GI that though the patient is hemodynamically stable, her hemoglobin has dropped since admission to 4. I recommended a CTA of the abdomen and pelvis to better assess for active GI bleed and location of bleed to direct possible embolization. CTA today did not show an active bleed, therefore, there is nothing to do from an IR standpoint at this point. Recommend repeat colonoscopy.

## 2021-06-18 NOTE — CONSULTS
IR was called regarding possible angiogram/embolization for lower GI bleed. Imaging was reviewed. There are significant diverticuli. Recent CTA was negative and NM bleed scan was indeterminant, suspicious for small bleed. Although hemoglobin is low, patient is hemodynamically stable. Diverticular bleeds are intermittent and angiogram is of little yield in the abscence of positive CTA or hemodynamic/clinic findings of significant bleed. Recommend colonoscopy for further evaluation.

## 2021-06-18 NOTE — PROGRESS NOTES
Comprehensive Nutrition Assessment    Type and Reason for Visit:  Initial, RD Nutrition Re-Screen/LOS    Nutrition Recommendations/Plan:   1. Continue clear liquid diet - advance as medically feasible per MD   2. Add Ensure Clears with meals - modify to full liquid ONS following diet advancement   3. Encourage PO intakes   4. Obtain new weight as able   5. Monitor nutrition adequacy, pertinent labs, bowel habits, wt changes, and clinical progress    Nutrition Assessment:  79 yo female admitted with pulmonary embolism. Hospital course complicated by rectal bleeding, H&H critically low and trending down. S/p colonoscopy with findings of diverticulosis and hemorrhoids. S/p IVF filter placement today. Diet upgraded to clear liquids. Very few PO intakes recorded this admission and pt frequently NPO for tests/procedures. Will add ONS to optimize nutrition. Malnutrition Assessment:  Malnutrition Status: At risk for malnutrition (Comment)    Context:  Acute Illness     Findings of the 6 clinical characteristics of malnutrition:  Energy Intake:  1 - 75% or less of estimated energy requirements for 7 or more days    Estimated Daily Nutrient Needs:  Energy (kcal):  6889-2956 kcal; Weight Used for Energy Requirements:  Current (58 kg)     Protein (g):  58-70 g; Weight Used for Protein Requirements:  Current (1.0-1.2 g/kg)        Fluid (ml/day):   ; Method Used for Fluid Requirements:  1 ml/kcal      Nutrition Related Findings:  Last BM on 6/18 - diarrhea w/ bright red blood. H&H trending down. Wounds:  None       Current Nutrition Therapies:    ADULT DIET; Clear Liquid    Anthropometric Measures:  · Height: 5' (152.4 cm)  · Current Body Weight: 128 lb (58.1 kg)   · Usual Body Weight: 128 lb (58.1 kg) (bed scale on 3/19/21)     · Ideal Body Weight: 100 lbs; % Ideal Body Weight 128 %   · BMI: 25  · BMI Categories: Overweight (BMI 25.0-29. 9)       Nutrition Diagnosis:   · Inadequate oral intake related to inadequate protein-energy intake, altered GI function as evidenced by intake 0-25%, intake 26-50%      Nutrition Interventions:   Food and/or Nutrient Delivery:  Continue Current Diet, Start Oral Nutrition Supplement  Nutrition Education/Counseling:  No recommendation at this time   Coordination of Nutrition Care:  Continue to monitor while inpatient    Goals:  Consume and tolerate greater than 50% of meals and ONS this admission       Nutrition Monitoring and Evaluation:   Behavioral-Environmental Outcomes:  None Identified   Food/Nutrient Intake Outcomes:  Diet Advancement/Tolerance, Food and Nutrient Intake, Supplement Intake  Physical Signs/Symptoms Outcomes:  Biochemical Data, GI Status, Nutrition Focused Physical Findings, Weight     Discharge Planning:     Too soon to determine     Electronically signed by Janelle Craig MS, RD, LD on 6/18/21 at 2:46 PM EDT    Contact: [phone]

## 2021-06-18 NOTE — CONSULTS
Department of General Surgery Consult    PATIENT NAME: Sebastian Stafford   YOB: 1932    ADMISSION DATE: 6/13/2021 10:16 AM      TODAY'S DATE: 6/18/2021    Reason for Consult:  Heidy Lass bleed    Chief Complaint: blood per rectum    Requesting Physician:  Delsie Galeazzi    HISTORY OF PRESENT ILLNESS:              The patient is a 80 y.o. female who presents with GI bleed. [place] weak at home and had bloody bms. Some mild abd pain. No nausea or emesis. CTA at admission showed acute PE but no source of bleeding in abdomen/pelvis. Was started on anticoagulation. Continued to have bleeding and progressive anemia. Colonoscopy on 6/15 showed diverticulosis and internal hemorrhoids but no active bleeding. Tagged RBC yesterday showed bleeding in lower left pelvis but CTA from today did not show bleeding. Last received eliquis on 6/16. Had IVC filter placed today. Is a Christian and is refusing blood products.     Past Medical History:        Diagnosis Date    Anemia     Anxiety     Arthritis     Atrial fibrillation ([address]     CAD (coronary artery disease)     Chronic GERD     Depression     Diverticulosis     Gall stone     GERD (gastroesophageal reflux disease) 3/19/2021    Glaucoma     Hypercholesteremia     Hypertension     Irregular heart beat     Rectal prolapse     Uterine cancer ([address] 2008       Past Surgical History:        Procedure Laterality Date    APPENDECTOMY      COLONOSCOPY  2005    polyp    COLONOSCOPY  1010    diverticulosis    COLONOSCOPY  10/4/2014    diverticulosis, radiation colitis sigmoid    COLONOSCOPY N/A 2/15/2019    COLON performed by Nancy Burgess MD at [facility] N/A 6/15/2021    COLONOSCOPY DIAGNOSTIC performed by Nancy Burgess MD at [address] [place] [address] Right 8/4/15    hip pinning    HYSTERECTOMY  2008    RT in 2009   [facility]  OTHER SURGICAL HISTORY Right 10/29/2018    PATELLA OPEN REDUCTION INTERNAL FIXATION     PACEMAKER INSERTION      PACEMAKER PLACEMENT  2008    AR CPTR-ASST SURGICAL NAVIGATION IMAGE-LESS Right 9/5/2018    RIGHT TOTAL KNEE REPLACEMENT COMPUTER NAVIGATION WITH ADDUCTOR CANAL BLOCK FOR PAIN CONTROL                     SONAL performed by Yesika Berrios MD at [address] [facility] OFFICE/OUTPT [facility] Right 10/29/2018    Right knee inferior pole patellectomy, primary repair of patellar tendon.  performed by Yesika Berrios MD at [address] <100SQCM Right 11/30/2018    RIGHT KNEE GASTROC FLAP WITH SKIN GRAFT, WOUND VAC PLACEMENT performed by Sarah Youngblood MD at [facility] Right 3/20/2021    REVISION RIGHT TOTAL KNEE ARTHROPLASTY       SONAL performed by Sarah Youngblood MD at [place] [address].         Current Medications:   Current Facility-Administered Medications: epoetin tyrone-epbx (RETACRIT) injection 10,000 Units, 10,000 Units, Subcutaneous, Q7 Days  0.9 % sodium chloride infusion, , Intravenous, Continuous  cyanocobalamin injection 1,000 mcg, 1,000 mcg, Subcutaneous, Daily  folic acid (FOLVITE) tablet 2 mg, 2 mg, Oral, Daily  iron sucrose (VENOFER) 200 mg in sodium chloride 0.9 % 100 mL IVPB, 200 mg, Intravenous, Q24H  traMADol (ULTRAM) tablet 50 mg, 50 mg, Oral, Q6H PRN  DULoxetine (CYMBALTA) extended release capsule 30 mg, 30 mg, Oral, Daily  lidocaine 1 % injection 5 mL, 5 mL, Intradermal, Once  sodium chloride flush 0.9 % injection 5-40 mL, 5-40 mL, Intravenous, 2 times per day  sodium chloride flush 0.9 % injection 5-40 mL, 5-40 mL, Intravenous, PRN  0.9 % sodium chloride infusion, 25 mL, Intravenous, PRN  [Held by provider] apixaban (ELIQUIS) tablet 10 mg, 10 mg, Oral, BID  sodium chloride flush 0.9 % injection 5-40 mL, 5-40 mL, Intravenous, 2 times per day  sodium chloride flush 0.9 % injection 5-40 mL, 5-40 mL, Intravenous, PRN  0.9 % sodium chloride infusion, 25 mL, Intravenous, PRN  ondansetron (ZOFRAN-ODT) disintegrating tablet 4 mg, 4 mg, Oral, Q8H PRN **OR** ondansetron (ZOFRAN) injection 4 mg, 4 mg, Intravenous, Q6H PRN  polyethylene glycol (GLYCOLAX) packet 17 g, 17 g, Oral, Daily PRN  acetaminophen (TYLENOL) tablet 650 mg, 650 mg, Oral, Q6H PRN **OR** acetaminophen (TYLENOL) suppository 650 mg, 650 mg, Rectal, Q6H PRN  amiodarone (CORDARONE) tablet 200 mg, 200 mg, Oral, Daily  [Held by provider] aspirin EC tablet 81 mg, 81 mg, Oral, Daily  atorvastatin (LIPITOR) tablet 10 mg, 10 mg, Oral, Nightly  pantoprazole (PROTONIX) tablet 40 mg, 40 mg, Oral, QAM AC  Vitamin D (CHOLECALCIFEROL) tablet 2,000 Units, 2,000 Units, Oral, Daily  [Held by provider] sertraline (ZOLOFT) tablet 12.5 mg, 12.5 mg, Oral, Daily  miconazole (MICOTIN) 2 % powder, , Topical, BID  Prior to Admission medications    Medication Sig Start Date End Date Taking?  Authorizing Provider   amiodarone (CORDARONE) 200 MG tablet Take 1 tablet by mouth daily 3/29/21  Yes Nina Brothers, APRN - CNP   psyllium (KONSYL) 28.3 % PACK Take 1 packet by mouth daily   Yes Historical Provider, MD   nystatin (MYCOSTATIN) POWD powder Apply topically 2 times daily As needed for candidiasis   Yes Historical Provider, MD   sertraline (ZOLOFT) 25 MG tablet Take 12.5 mg by mouth daily   Yes Historical Provider, MD   hydrocortisone 1 % cream Apply topically every 6-8 hours as needed As needed for discomfort   Yes Historical Provider, MD   acetaminophen (TYLENOL) 325 MG tablet Take 650 mg by mouth every 6 hours as needed for Pain   Yes Historical Provider, MD   DULoxetine (CYMBALTA) 30 MG extended release capsule Take 30 mg by mouth daily   Yes Historical Provider, MD   Melatonin 5 MG CAPS Take 5 mg by mouth nightly as needed   Yes Historical Provider, MD   omeprazole (PRILOSEC) 20 MG delayed release capsule Take 40 mg by mouth daily    Yes Historical Provider, MD   tamsulosin (FLOMAX) 0.4 MG capsule Take 0.4 mg by mouth daily   Yes Historical Provider, MD   ferrous sulfate 325 (65 Fe) MG tablet Take 1 tablet by mouth three times a week 11/7/18  Yes Yasmani Guillermo MD   sennosides-docusate sodium (SENOKOT-S) 8.6-50 MG tablet Take 1 tablet by mouth daily 11/2/18  Yes Renetta Vega MD   aspirin 81 MG tablet Take 81 mg by mouth daily   Yes Historical Provider, MD   timolol (BETIMOL) 0.5 % ophthalmic solution Place 1 drop into the left eye 2 times daily Dosing times are 09:00 and 15:00. Yes Historical Provider, MD   atorvastatin (LIPITOR) 10 MG tablet   Take 10 mg by mouth nightly    Yes Historical Provider, MD   Multiple Vitamins-Minerals (CENTRUM PO) Take 1 tablet by mouth daily. Indications: Centrum Silver 12/24/10  Yes Historical Provider, MD   vitamin D (CHOLECALCIFEROL) 400 UNIT TABS tablet   Take 2,000 Units by mouth daily    Yes Historical Provider, MD   latanoprost (XALATAN) 0.005 % ophthalmic solution   Place 1 drop into both eyes nightly    Yes Historical Provider, MD   amiodarone (PACERONE) 400 MG tablet Take 1 tablet by mouth daily for 5 days 3/24/21 3/29/21  [address] [place] [address]   diphenhydrAMINE (BENADRYL) 25 MG capsule Take 25 mg by mouth every 6 hours as needed for Itching    Historical Provider, MD   gabapentin (NEURONTIN) 100 MG capsule Take 100 mg by mouth 3 times daily.     Historical Provider, MD   loperamide (IMODIUM) 2 MG capsule Take 2 mg by mouth as needed for Diarrhea Two tabs after initial diarrhea episode, 1 tab after each additional episode, NTE    Historical Provider, MD        Allergies:  Lisinopril, Levofloxacin, Levofloxacin, Vancomycin, Amlodipine, Avelox [moxifloxacin hcl in nacl], Buspirone, Hydrochlorothiazide, Lidocaine, Moxifloxacin, Moxifloxacin hcl in nacl, Mupirocin, Nsaids, Paroxetine, Phenergan [promethazine hcl], Phenothiazines, Promethazine hcl, Sulindac, Tolmetin, and Ciprofloxacin    Social History:   TOBACCO: quit  ETOH:  no    Family History:        Problem Relation Age of Onset    Mental Retardation Mother     Cancer Father         lung       REVIEW OF SYSTEMS:  CONSTITUTIONAL:  weakness  HEENT:  negative  RESPIRATORY:  negative  CARDIOVASCULAR:  negative  GASTROINTESTINAL:  negative except for abdominal pain and hemtochezia  GENITOURINARY:  negative  HEMATOLOGIC/LYMPHATIC:  negative  NEUROLOGICAL:  Negative  * All other ROS reviewed and negative. PHYSICAL EXAM:  VITALS:  BP 98/61   Pulse 65   Temp 97.5 °F (36.4 °C) (Oral)   Resp 18   Ht 5' (1.524 m)   Wt 128 lb (58.1 kg)   SpO2 95%   BMI 25.00 kg/m²   24HR INTAKE/OUTPUT:    I/O last 3 completed shifts: In: 1900.8 [I.V.:1858.3; IV Piggyback:42.5]  Out: 0   No intake/output data recorded. CONSTITUTIONAL:  alert, no apparent distress and normal weight  EYES:  PERRL, sclera clear  ENT:  Normocephalic,atraumatic, without obvious abnormality  NECK:  supple, symmetrical, trachea midline  LUNGS: Resp effort easy and unlabored  CARDIOVASCULAR:  NO JVD, regular rate  ABDOMEN:  , normal bowel sounds, soft, non-distended, non-tender,   MUSCULOSKELETAL: No clubbing or cyanosis, 0+ pitting edema lower extremities  NEUROLOGIC:  Mental Status Exam:  Level of Alertness:   awake  PSYCHIATRIC:   person, place, time  SKIN:  no rashes    DATA:    CBC:   Recent Labs     06/16/21  0754 06/17/21  0206 06/18/21  0136 06/18/21  0627 06/18/21  0906   WBC 10.8 14.4*  --  9.0  --    HGB 8.0* 7.1* 5.1* 4.9* 4.6*   HCT 23.5* 21.4* 15.2* 14.6* 13.8*    403  --  291  --      BMP:    Recent Labs     06/17/21  0206 06/17/21  1115 06/18/21  0627    134* 140   K 4.2 4.0 3.6    103 112*   CO2 23 24 23   BUN 19 21* 21*   CREATININE 1.4* 1.2 0.7   GLUCOSE 118* 95 81     Hepatic:   Recent Labs     06/17/21  1115   AST 23   ALT 10   BILITOT 0.5   ALKPHOS 125     Mag:    No results for input(s): MG in the last 72 hours.    Phos:   No results for input(s): PHOS in the last 72 hours. INR:   Recent Labs     06/16/21  1639 06/17/21  1115 06/18/21  0627   INR 3.83* 2.89* 1.81*       Radiology Review: Images personally reviewed by me. CTA - no contrast extravasation      IMPRESSION/RECOMMENDATIONS:    79 yo with GI bleed  1. Agree with plan for repeating colonoscopy tomorrow  2. Hold anticoagulation  3. Would attempt other measures of bleeding control (endoscopy, IR) prior to operative intervention if she continues to bleed given her current level of anemia.     Electronically signed by Ethel Saha[address] [facility]  [id number]

## 2021-06-18 NOTE — OP NOTE
[facility] [place] [address]                                OPERATIVE REPORT    PATIENT NAME: Braydon Sagastume                   :        1932  MED REC NO:   [id number]                          ROOM:       8858  ACCOUNT NO:   [de-identified]                           ADMIT DATE: 2021  PROVIDER:     Christine Negrete MD    DATE OF PROCEDURE:  2021    PREOPERATIVE DIAGNOSES:  Pulmonary embolus with rectal bleeding and  contraindication to anticoagulation. POSTOPERATIVE DIAGNOSES:  Pulmonary embolus with rectal bleeding and  contraindication to anticoagulation. PROCEDURE PERFORMED:  Ultrasound-guided right femoral venous access was  placed with inferior vena cavogram and placement of Celect IVC filter. ANESTHESIA:  Local with moderate monitored sedation. INDICATIONS:  The patient is a 40-year-old female, who was recently  diagnosed with a pulmonary embolus and was started on anticoagulation. However, she has developed significant rectal bleeding. Therefore  anticoagulation is contraindicated. She is brought to the angio suite  at this time to undergo placement of an inferior vena cava filter for  prevention of further pulmonary emboli. DESCRIPTION OF PROCEDURE:  The patient was brought to the angio suite,  placed in supine position. Under my direct supervision, Versed and  fentanyl were administered for moderate sedation. The patient was  monitored by an independent trained nurse observer using continuous  blood pressure, EKG and pulse oximetry. The right femoral region was  prepped and draped in sterile fashion. Ultrasound was used to identify  the common femoral vein. The vein was compressible and free of  thrombus. Under direct visualization, this was accessed using a  5-Sami micropuncture sheath. Photocopy of the image was saved in the  patient's record.   Bentson wire was advanced through the

## 2021-06-19 LAB
ANION GAP SERPL CALCULATED.3IONS-SCNC: 6 MMOL/L (ref 3–16)
BUN BLDV-MCNC: 11 MG/DL (ref 7–20)
CALCIUM SERPL-MCNC: 7.2 MG/DL (ref 8.3–10.6)
CHLORIDE BLD-SCNC: 110 MMOL/L (ref 99–110)
CO2: 21 MMOL/L (ref 21–32)
CREAT SERPL-MCNC: <0.5 MG/DL (ref 0.6–1.2)
GFR AFRICAN AMERICAN: >60
GFR NON-AFRICAN AMERICAN: >60
GLUCOSE BLD-MCNC: 73 MG/DL (ref 70–99)
GLUCOSE BLD-MCNC: 73 MG/DL (ref 70–99)
GLUCOSE BLD-MCNC: 79 MG/DL (ref 70–99)
HCT VFR BLD CALC: 15 % (ref 36–48)
HCT VFR BLD CALC: 15.4 % (ref 36–48)
HCT VFR BLD CALC: 18.5 % (ref 36–48)
HEMATOLOGY PATH CONSULT: NO
HEMOGLOBIN: 4.9 G/DL (ref 12–16)
HEMOGLOBIN: 5 G/DL (ref 12–16)
HEMOGLOBIN: 5.9 G/DL (ref 12–16)
MAGNESIUM: 1.9 MG/DL (ref 1.8–2.4)
MCH RBC QN AUTO: 34.6 PG (ref 26–34)
MCHC RBC AUTO-ENTMCNC: 32.6 G/DL (ref 31–36)
MCV RBC AUTO: 105.9 FL (ref 80–100)
PDW BLD-RTO: 16.9 % (ref 12.4–15.4)
PERFORMED ON: NORMAL
PERFORMED ON: NORMAL
PLATELET # BLD: 344 K/UL (ref 135–450)
PMV BLD AUTO: 7.4 FL (ref 5–10.5)
POTASSIUM REFLEX MAGNESIUM: 2.9 MMOL/L (ref 3.5–5.1)
RBC # BLD: 1.46 M/UL (ref 4–5.2)
SODIUM BLD-SCNC: 137 MMOL/L (ref 136–145)
WBC # BLD: 9.6 K/UL (ref 4–11)

## 2021-06-19 PROCEDURE — 2580000003 HC RX 258: Performed by: INTERNAL MEDICINE

## 2021-06-19 PROCEDURE — 2500000003 HC RX 250 WO HCPCS: Performed by: NURSE PRACTITIONER

## 2021-06-19 PROCEDURE — 94761 N-INVAS EAR/PLS OXIMETRY MLT: CPT

## 2021-06-19 PROCEDURE — 80048 BASIC METABOLIC PNL TOTAL CA: CPT

## 2021-06-19 PROCEDURE — 6370000000 HC RX 637 (ALT 250 FOR IP): Performed by: INTERNAL MEDICINE

## 2021-06-19 PROCEDURE — 85018 HEMOGLOBIN: CPT

## 2021-06-19 PROCEDURE — 6360000002 HC RX W HCPCS: Performed by: INTERNAL MEDICINE

## 2021-06-19 PROCEDURE — 6370000000 HC RX 637 (ALT 250 FOR IP): Performed by: NURSE PRACTITIONER

## 2021-06-19 PROCEDURE — 1200000000 HC SEMI PRIVATE

## 2021-06-19 PROCEDURE — 85014 HEMATOCRIT: CPT

## 2021-06-19 PROCEDURE — 2580000003 HC RX 258: Performed by: NURSE PRACTITIONER

## 2021-06-19 PROCEDURE — 99231 SBSQ HOSP IP/OBS SF/LOW 25: CPT | Performed by: SURGERY

## 2021-06-19 PROCEDURE — 2700000000 HC OXYGEN THERAPY PER DAY

## 2021-06-19 PROCEDURE — 6360000002 HC RX W HCPCS: Performed by: NURSE PRACTITIONER

## 2021-06-19 PROCEDURE — 85027 COMPLETE CBC AUTOMATED: CPT

## 2021-06-19 PROCEDURE — 83735 ASSAY OF MAGNESIUM: CPT

## 2021-06-19 PROCEDURE — 97530 THERAPEUTIC ACTIVITIES: CPT

## 2021-06-19 PROCEDURE — 36415 COLL VENOUS BLD VENIPUNCTURE: CPT

## 2021-06-19 RX ORDER — POTASSIUM CHLORIDE 20 MEQ/1
20 TABLET, EXTENDED RELEASE ORAL 3 TIMES DAILY
Status: DISCONTINUED | OUTPATIENT
Start: 2021-06-19 | End: 2021-06-19

## 2021-06-19 RX ORDER — POTASSIUM CHLORIDE 29.8 MG/ML
20 INJECTION INTRAVENOUS ONCE
Status: COMPLETED | OUTPATIENT
Start: 2021-06-19 | End: 2021-06-19

## 2021-06-19 RX ADMIN — PANTOPRAZOLE SODIUM 40 MG: 40 TABLET, DELAYED RELEASE ORAL at 05:26

## 2021-06-19 RX ADMIN — CYANOCOBALAMIN 1000 MCG: 1000 INJECTION, SOLUTION INTRAMUSCULAR; SUBCUTANEOUS at 09:43

## 2021-06-19 RX ADMIN — SODIUM CHLORIDE, PRESERVATIVE FREE 10 ML: 5 INJECTION INTRAVENOUS at 09:42

## 2021-06-19 RX ADMIN — DULOXETINE HYDROCHLORIDE 30 MG: 30 CAPSULE, DELAYED RELEASE ORAL at 11:04

## 2021-06-19 RX ADMIN — ATORVASTATIN CALCIUM 10 MG: 10 TABLET, FILM COATED ORAL at 20:48

## 2021-06-19 RX ADMIN — SODIUM CHLORIDE, PRESERVATIVE FREE 10 ML: 5 INJECTION INTRAVENOUS at 20:50

## 2021-06-19 RX ADMIN — MICONAZOLE NITRATE: 2 POWDER TOPICAL at 09:42

## 2021-06-19 RX ADMIN — AMIODARONE HYDROCHLORIDE 200 MG: 200 TABLET ORAL at 11:04

## 2021-06-19 RX ADMIN — IRON SUCROSE 200 MG: 20 INJECTION, SOLUTION INTRAVENOUS at 09:51

## 2021-06-19 RX ADMIN — Medication 2000 UNITS: at 11:03

## 2021-06-19 RX ADMIN — ACETAMINOPHEN 650 MG: 325 TABLET ORAL at 11:14

## 2021-06-19 RX ADMIN — FOLIC ACID 2 MG: 1 TABLET ORAL at 11:04

## 2021-06-19 RX ADMIN — MICONAZOLE NITRATE: 2 POWDER TOPICAL at 20:51

## 2021-06-19 RX ADMIN — BISACODYL 10 MG: 5 TABLET, COATED ORAL at 20:48

## 2021-06-19 RX ADMIN — SODIUM CHLORIDE 25 ML: 9 INJECTION, SOLUTION INTRAVENOUS at 02:44

## 2021-06-19 RX ADMIN — POTASSIUM CHLORIDE 20 MEQ: 400 INJECTION, SOLUTION INTRAVENOUS at 10:18

## 2021-06-19 ASSESSMENT — PAIN SCALES - GENERAL
PAINLEVEL_OUTOF10: 2
PAINLEVEL_OUTOF10: 0
PAINLEVEL_OUTOF10: 5

## 2021-06-19 ASSESSMENT — PAIN DESCRIPTION - LOCATION: LOCATION: ABDOMEN

## 2021-06-19 ASSESSMENT — PAIN DESCRIPTION - ORIENTATION: ORIENTATION: LOWER

## 2021-06-19 ASSESSMENT — PAIN DESCRIPTION - PAIN TYPE: TYPE: ACUTE PAIN

## 2021-06-19 NOTE — PROGRESS NOTES
Fluids stopped per verbal order from Dr. Shan Stroud. Patient is currently swollen in her upper and lower extremities, including her perineal area. Patient has fine crackles in the lower lobes of her lungs bilaterally. Day shift doctor to assess per Dr. Shan Stroud.

## 2021-06-19 NOTE — PROGRESS NOTES
For patient safety, an AVAS[employer] camera has been placed in patient's room. AVAS[employer] monitoring needed for Confusion, getting up unassisted with risk of falls, elopement risk, and pulling at lines. Writer placed call to monitor observer to verify camera number 2 and review patient name, reason for monitoring, and contact information for primary RN. Patient, Family/Healthcare Decision Maker, HCPOA and Legal Guardian notified of use of remote monitoring upon implementation of camera and verbalized understanding.

## 2021-06-19 NOTE — PROGRESS NOTES
Progress Note  Date:2021       Room:[facility]-  Patient Isaiah Puente     YOB: 1932     Age:89 y.o. Subjective    Subjective:  Symptoms:  Stable. Pain:  She reports no pain. Review of Systems  Objective         Vitals Last 24 Hours:  TEMPERATURE:  Temp  Av.4 °F (36.3 °C)  Min: 97.1 °F (36.2 °C)  Max: 97.7 °F (36.5 °C)  RESPIRATIONS RANGE: Resp  Av.3  Min: 18  Max: 20  PULSE OXIMETRY RANGE: SpO2  Av.5 %  Min: 95 %  Max: 98 %  PULSE RANGE: Pulse  Av.7  Min: 61  Max: 69  BLOOD PRESSURE RANGE: Systolic (20ETQ), QI , Min:98 , BBF:006   ; Diastolic (14VZI), TVC:69, Min:47, Max:64    I/O (24Hr): Intake/Output Summary (Last 24 hours) at 2021 1044  Last data filed at 2021 1419  Gross per 24 hour   Intake 1295.7 ml   Output --   Net 1295.7 ml     Objective:  General Appearance: In no acute distress and not in pain. Vital signs: (most recent): Blood pressure (!) 102/47, pulse 69, temperature 97.5 °F (36.4 °C), temperature source Oral, resp. rate 18, height 5' (1.524 m), weight 128 lb (58.1 kg), SpO2 97 %. Heart: Normal rate. Regular rhythm. S1 normal and S2 normal.    Abdomen: Abdomen is soft. Bowel sounds are normal.   There is no abdominal tenderness.        Labs/Imaging/Diagnostics    Labs:  CBC:  Recent Labs     21  0206 21  0627 21  1751 21  0142 21  0639   WBC 14.4* 9.0  --   --  9.6   RBC 2.05* 1.39*  --   --  1.46*   HGB 7.1* 4.9* 4.8* 4.9* 5.0*   HCT 21.4* 14.6* 14.5* 15.0* 15.4*   .4* 104.8*  --   --  105.9*   RDW 16.0* 16.4*  --   --  16.9*    291  --   --  344     CHEMISTRIES:  Recent Labs     21  1115 21  0627 21  0639   * 140 137   K 4.0 3.6 2.9*    112* 110   CO2 24 23 21   BUN 21* 21* 11   CREATININE 1.2 0.7 <0.5*   GLUCOSE 95 81 73   MG  --   --  1.90     PT/INR:  Recent Labs     21  1639 21  1115 21  0627   PROTIME 45.0* 33.9* THE ABDOMEN AND PELVIS WITH AND WITHOUT CONTRAST 3D reconstructed images were performed on a separate workstation and provided for review. 6/18/2021 12:39 pm: TECHNIQUE: CTA of the abdomen and pelvis was performed without and with the administration of intravenous contrast. Multiplanar reformatted images are provided for review. MIP images are provided for review. Dose modulation, iterative reconstruction, and/or weight based adjustment of the mA/kV was utilized to reduce the radiation dose to as low as reasonably achievable. 3D reconstructed images were performed on a separate workstation and provided for review. COMPARISON: Nuclear medicine scan yesterday, CT abdomen and pelvis June 17, 2021, CT a chest, abdomen and pelvis June 13, 2021 and multiple priors. HISTORY: ORDERING SYSTEM PROVIDED HISTORY: GI bleed Protocol TECHNOLOGIST PROVIDED HISTORY: Reason for exam:->GI bleed Protocol Reason for exam:->Triple phase, noncontrast, arterial phase, delayed phase Reason for Exam: r/o gi bleed Acuity: Acute Type of Exam: Initial Relevant Medical/Surgical History: hyst ; appy GI bleed. FINDINGS: CTA: There is some hyperdensity within small bowel loop in the left abdomen, images number 67 through 85. This is atypical in appearance for active extravasation is unchanged on the delayed images, possibly artifact from migration of oral contrast within small bowel. No definite active extravasation on arterial and venous phase imaging. There is mild stenosis of the celiac artery at its origin. The celiac artery, superior mesenteric artery, single bilateral renal arteries, and inferior mesenteric artery are patent. No aneurysm, dissection, or acute aortic injury. Both common, internal, and external iliac arteries are patent. Common femoral arteries and the visualized proximal deep and superficial femoral arteries are without acute process. CT ABDOMEN/PELVIS: Lower Chest: There is bibasilar dependent atelectasis. Organs:  The liver, spleen, pancreas, and adrenal glands are without acute process. Some stones are seen within the gallbladder which is mildly distended, otherwise unremarkable. Both kidneys are symmetric in size and enhancement. Simple appearing bilateral renal cysts are again seen. No evidence of hydronephrosis or hydroureter. GI/Bowel: The stomach is collapsed, limiting evaluation. On noncontrast exam there is some hyperdense material within the stomach, a few small bowel loops and within the diverticuli in the sigmoid colon. This limits evaluation for very subtle bleed. On arterial phase imaging there is some hyperdensity within the small bowel loop in the left abdomen, image number 67 through 85. This is very dense and is unchanged on the delayed images, possibly representing some movement of the contrast within the small bowel given the 3 minutes delayed between the non con an arterial imaging or possibly an area of hyperemia within the small bowel. No definite active extravasation on arterial and delayed imaging. Although evaluation of the ascending and transverse colon is limited due to collapse, the wall appears thickened with mild adjacent fat stranding concerning for developing colitis. Extensive colonic diverticula are again seen within the sigmoid colon. No evidence of obstruction. The appendix is not definitely visualized. Pelvis: Some dense material is seen within the bladder from prior study. Uterus is surgically absent. Peritoneum/Retroperitoneum: See above for arterial findings. There is an infrarenal inferior vena cava filter. No lymphadenopathy, free intraperitoneal air, free fluid, or focal fluid collection identified. Bones/Soft Tissues: There is a small hiatal hernia. Postsurgical changes are seen within the right hip. Soft tissues and osseous structures are without acute process.      There is some hyperdensity on the arterial phase imaging within a small bowel loop in the left abdomen which is unchanged on delayed imaging and atypical for active extravasation. This likely represents migration of oral contrast, which is seen within small bowel loops in the right abdomen, given there was a delay between the noncontrast and arterial phase imaging. Hyperenhancing small-bowel fall per mucosa at is another possibility. No definite active extravasation/arterial GI bleed. There is new wall thickening involving the ascending and transverse colon with mild adjacent inflammatory changes concerning for developing colitis. Extensive sigmoid diverticulitis. Cholelithiasis. Gallbladder is mildly distended which may be due to NPO status. If there is concern for acute cholecystitis, right upper quadrant ultrasound may be helpful for further evaluation. Assessment//Plan           Hospital Problems         Last Modified POA    Rectal bleeding 6/18/2021 Yes    Pulmonary embolus ([address] 6/18/2021 Yes        Assessment & Plan  80year old Mik[place] [address] witness female with history of HTN, HLD, GERD, A fib, arthritis, depression, uterine cancer s/p hysterectomy, diverticulosis and hemorrhoid admitted with painless hematochezia. Her colonoscopy showed diverticulosis and hemorrhoids but no active bleeding. Recurrent bleeding when eliquis started. Colonoscopy today was cancelled due to hypokalemia and prep quality.     Recommendation:  1. Continue supportive care  2. Monitor Hgb  3. Replace electrolytes  4. IV iron and epogen  5.  Colonoscopy tomorrow    Emily Rodriguez MD       ([phone]) [phone]        Electronically signed by Emily Rodriguez MD on 6/19/21 at 10:44 AM EDT

## 2021-06-19 NOTE — PROGRESS NOTES
ONCOLOGY HEMATOLOGY CARE PROGRESS NOTE      SUBJECTIVE:     Hgb unchanged at 5.0 this AM. GI planning c-scope. Remains on retacrit and iron. IVC filter was placed. ROS:     Constitutional:  No weight loss, No fever, No chills, No night sweats. Energy level good. Eyes:  No impairment or change in vision  ENT / Mouth:  No pain, abnormal ulceration, bleeding, nasal drip or change in voice or hearing  Cardiovascular:  No chest pain, palpitations, new edema, or calf discomfort  Respiratory:  No pain, hemoptysis, change to breathing  Breast:  No pain, discharge, change in appearance or texture  Gastrointestinal:  No pain, cramping, jaundice, change to eating and bowel habits  Urinary:  No pain, bleeding or change in continence  Genitalia: No pain, bleeding or discharge  Musculoskeletal:  No redness, pain, edema or weakness  Skin:  No pruritus, rash, change to nodules or lesions  Neurologic:  No discomfort, change in mental status, speech, sensory or motor activity  Psychiatric:  No change in concentration or change to affect or mood  Endocrine:  No hot flashes, increased thirst, or change to urine production  Hematologic: No petechiae, ecchymosis or bleeding  Lymphatic:  No lymphadenopathy or lymphedema  Allergy / Immunologic:  No eczema, hives, frequent or recurrent infections    OBJECTIVE        Physical    VITALS:  BP (!) 102/47   Pulse 69   Temp 97.5 °F (36.4 °C) (Oral)   Resp 18   Ht 5' (1.524 m)   Wt 128 lb (58.1 kg)   SpO2 97%   BMI 25.00 kg/m²   TEMPERATURE:  Current - Temp: 97.5 °F (36.4 °C);  Max - Temp  Av.4 °F (36.3 °C)  Min: 97.1 °F (36.2 °C)  Max: 97.7 °F (36.5 °C)  PULSE OXIMETRY RANGE: SpO2  Av.5 %  Min: 95 %  Max: 98 %  24HR INTAKE/OUTPUT:      Intake/Output Summary (Last 24 hours) at 2021 1017  Last data filed at 2021 1419  Gross per 24 hour   Intake 1295.7 ml   Output --   Net 1295.7 ml       CONSTITUTIONAL: awake, alert, cooperative, no apparent distress   EYES: pupils equal, round and reactive to light, sclera clear and conjunctiva normal  ENT: Normocephalic, without obvious abnormality, atraumatic  NECK: supple, symmetrical, no jugular venous distension and no carotid bruits   HEMATOLOGIC/LYMPHATIC: no cervical, supraclavicular or axillary lymphadenopathy   LUNGS: no increased work of breathing and clear to auscultation   CARDIOVASCULAR: regular rate and rhythm, normal S1 and S2, no murmur noted  ABDOMEN: normal bowel sounds x 4, soft, non-distended, non-tender, no masses palpated, no hepatosplenomgaly   MUSCULOSKELETAL: full range of motion noted, tone is normal  NEUROLOGIC: awake, alert, oriented to name, place and time. Motor skills grossly intact. SKIN: Normal skin color, texture, turgor and no jaundice.  appears intact   EXTREMITIES: no LE edema       Data      Recent Labs     06/17/21  0206 06/18/21  0627 06/18/21  1751 06/19/21  0142 06/19/21  0639   WBC 14.4* 9.0  --   --  9.6   HGB 7.1* 4.9* 4.8* 4.9* 5.0*   HCT 21.4* 14.6* 14.5* 15.0* 15.4*    291  --   --  344   .4* 104.8*  --   --  105.9*        Recent Labs     06/17/21  1115 06/18/21  0627 06/19/21  0639   * 140 137   K 4.0 3.6 2.9*    112* 110   CO2 24 23 21   BUN 21* 21* 11   CREATININE 1.2 0.7 <0.5*     Recent Labs     06/17/21  1115   AST 23   ALT 10   BILITOT 0.5   ALKPHOS 125       Magnesium:    Lab Results   Component Value Date    MG 1.90 06/19/2021    MG 2.00 06/15/2021    MG 2.20 03/19/2021         Problem List  Patient Active Problem List   Diagnosis    Localized osteoarthrosis, lower leg    Rectal bleeding    Osteoarthritis of hip    Hip fracture ([facility] [address] RIGHT    Hypertension    Hypercholesteremia    Primary osteoarthritis of right knee    Pain due to total right knee replacement (HCC)    Chronic knee pain after total replacement of right knee joint    Status post total right knee replacement    Paroxysmal atrial fibrillation ([facility] [address]    Pacemaker    Displaced comminuted fracture of right patella, initial encounter for closed fracture    Severe protein-calorie malnutrition ([facility] [address]    Displaced transverse fracture of right patella, initial encounter for closed fracture    Hyperlipidemia    Anemia    Unstable gait    Open knee wound, right, sequela    GI bleeding    H/O skin graft    Hyponatremia    CAD (coronary artery disease)    Closed fracture of right distal femur (HCC)    Hypoalbuminemia    GERD (gastroesophageal reflux disease)    Wide-complex tachycardia (HCC)    Pulmonary embolus (HCC)       ASSESSMENT AND PLAN:    PE:  -She was on Eliquis and then had a GI bleed  -She has a substantial risk for bleeding including her age, and fall risk  -Furthermore she does not take blood products due to being a Shinto  - filter has been placed    Anemia:  -She is on Venofer and folate  - started Retacrit  -This is likely to cause her significant morbidity  -Hemoglobin 5.0.  - c-scope planned tomorrow    Coagulopathy:  -She is receiving vitamin K  -INR is down to 1.81    Cecilia Rodriguez MD

## 2021-06-19 NOTE — PROGRESS NOTES
Discussed w/ Dr Roderick Kraft. No further active bleeding noted. H/H stable, slightly improved vs yesterday. Colonoscopy delayed due to low K+    Still no surgical role at this time. If pt rebleeds, will still need to re-attempt to localize source of bleeding; ideally a source could be found angiographically and then managed non-operatively, but if not, we can consider surgery at that time. Will cont to follow remotely.     DTVIDAL

## 2021-06-19 NOTE — PROGRESS NOTES
Gentle bowel prep started, patient educated on importance of compliance. Call light handed to patient, instructed to call when she has a bowel movement so it can be assessed and cleaned.

## 2021-06-19 NOTE — PROGRESS NOTES
Md notified: Critical lab result: K 2.9 no current orders to replace was 3.6 yesterday. Will look for replacement orders should you choose to place.

## 2021-06-19 NOTE — PROGRESS NOTES
Patient has drank 4/7 packets of Glycolax, patient states that if she drinks more she will get sick. RN attempted to educate the patient on the importance of drinking enough Glycolax to have clear stool, patient has had 2 small bloody bowel movements. Will attempt to continue to educate.

## 2021-06-19 NOTE — PROGRESS NOTES
Physical Therapy  Facility/Department: [facility] B3 - MED SURG  Daily Treatment Note  NAME: Robert Flores  : 1932  MRN: [id number]    Date of Service: 2021    Discharge Recommendations:  Subacute/Skilled Nursing Facility   PT Equipment Recommendations  Equipment Needed: No    Assessment   Assessment:   Pt was limited by generalized deconditioning, confusion and dizziness. Pt struggling with incontinence during session requiring extensive pericare in bed. BP dropped to 90/47 and recovered to 102/53 after sitting in the in the chair for approx  5 mins. Transfers and gait training were unsteady and unsafe; requiring intermittent assistance for balance. Pt was left in the chair with needs in reach and RN present. Treatment Diagnosis: Impaired endurance  Prognosis: Fair  Decision Making: Low Complexity  Patient Education: Pt educated regarding importance of OOB mobility as tolerated - verbalized understanding  Barriers to Learning: [place]  REQUIRES PT FOLLOW UP: Yes     Patient Diagnosis(es): The primary encounter diagnosis was Other acute pulmonary embolism without acute cor pulmonale ([address] Diagnoses of Syncope and collapse, Constipation, unspecified constipation type, and Rectal bleeding were also pertinent to this visit. has a past medical history of Anemia, Anxiety, Arthritis, Atrial fibrillation ([address] CAD (coronary artery disease), Chronic GERD, Depression, Diverticulosis, Gall stone, GERD (gastroesophageal reflux disease), Glaucoma, Hypercholesteremia, Hypertension, Irregular heart beat, Rectal prolapse, and Uterine cancer ([address] has a past surgical history that includes Hysterectomy (); Appendectomy; Pacemaker insertion; Tonsillectomy; Colonoscopy (); Colonoscopy ();  Colonoscopy (10/4/2014); hip surgery (Right, 8/4/15); pacemaker placement (); fracture surgery; pr cptr-asst surgical navigation image-less (Right, 2018); joint replacement; knee surgery; other surgical history walker  Short term goal 4: Pt will ambulate 15ft with RW and CGA  Short term goal 5: By 6/19, pt will tolerate x 10-15 reps BLE exercise to assist with functional transfers and ambulation  Patient Goals   Patient goals : \"To stop feeling dizzy. \"    Plan    Plan  Times per week: 2-3x/wk  Times per day: Daily  Current Treatment Recommendations: Strengthening, Transfer Training, Endurance Training, Patient/Caregiver Education & Training, Balance Training, Gait Training, Safety Education & Training, Functional Mobility Training  Safety Devices  Type of devices:  All fall risk precautions in place, Nurse notified, Patient at risk for falls, Call light within reach, Left in chair  Restraints  Initially in place: No     Therapy Time   Individual Concurrent Group Co-treatment   Time In 1430         Time Out 1510         Minutes 40         Timed Code Treatment Minutes: 20 [facility]

## 2021-06-19 NOTE — PROGRESS NOTES
Hospitalist Progress Note      PCP: Damián James MD    Date of Admission: 6/13/2021    Chief Complaint   Patient presents with    Rectal Bleeding     had near syncopal episode getting up from toilet today. pain in bottom started this morning. jahovah's witness patient. Hospital Course: H&P reviewed     Subjective: Patient seen and examined this morning. Hemoglobin 5 this morning. Stable and reports no bloody bowel movements overnight. Noted GI plan for colonoscopy this morning. Will follow. Patient denies any lightheadedness/dizziness, abdominal pain, nausea, vomiting, diarrhea.     Medications:  Reviewed    Infusion Medications    sodium chloride Stopped (06/19/21 0514)    sodium chloride      sodium chloride Stopped (06/19/21 0514)     Scheduled Medications    bisacodyl  10 mg Oral Once    epoetin tyrone-epbx  10,000 Units Subcutaneous Q7 Days    polyethylene glycol  120 g Oral Once    cyanocobalamin  1,000 mcg Subcutaneous Daily    folic acid  2 mg Oral Daily    iron sucrose (VENOFER) iv piggyback 100 mL (Admin over 60 minutes)  200 mg Intravenous Q24H    DULoxetine  30 mg Oral Daily    lidocaine 1 % injection  5 mL Intradermal Once    sodium chloride flush  5-40 mL Intravenous 2 times per day    [Held by provider] apixaban  10 mg Oral BID    sodium chloride flush  5-40 mL Intravenous 2 times per day    amiodarone  200 mg Oral Daily    [Held by provider] aspirin  81 mg Oral Daily    atorvastatin  10 mg Oral Nightly    pantoprazole  40 mg Oral QAM AC    vitamin D  2,000 Units Oral Daily    [Held by provider] sertraline  12.5 mg Oral Daily    miconazole   Topical BID     PRN Meds: traMADol, sodium chloride flush, sodium chloride, sodium chloride flush, sodium chloride, ondansetron **OR** ondansetron, polyethylene glycol, acetaminophen **OR** acetaminophen      Intake/Output Summary (Last 24 hours) at 6/19/2021 1020  Last data filed at 6/18/2021 1419  Gross per 24 hour Intake 1295.7 ml   Output --   Net 1295.7 ml       Physical Exam Performed:  BP (!) 102/47   Pulse 69   Temp 97.5 °F (36.4 °C) (Oral)   Resp 18   Ht 5' (1.524 m)   Wt 128 lb (58.1 kg)   SpO2 97%   BMI 25.00 kg/m²     General appearance: Elderly CF in no apparent distress, appears stated age and cooperative. HEENT: Pupils equal, round, and reactive to light. Conjunctivae/corneas clear. Pallor present   Neck: Supple, with full range of motion. No jugular venous distention. Trachea midline. Respiratory:  Normal respiratory effort. Clear to auscultation, bilaterally without Rales/Wheezes/Rhonchi. Cardiovascular: Regular rate and rhythm with normal S1/S2 without murmurs, rubs or gallops. Abdomen: Soft, non-tender, non-distended with normal bowel sounds. Musculoskeletal: No clubbing, cyanosis or edema bilaterally. Full range of motion without deformity. Skin: Skin color, texture, turgor normal.  No rashes or lesions. Neurologic:  Neurovascularly intact without any focal sensory/motor deficits.  Cranial nerves: II-XII intact, grossly non-focal.  Psychiatric: Alert and oriented, thought content appropriate, normal insight  Capillary Refill: Brisk,< 3 seconds   Peripheral Pulses: +2 palpable, equal bilaterally     Labs:   Recent Labs     06/17/21  0206 06/18/21  0627 06/18/21  1751 06/19/21  0142 06/19/21  0639   WBC 14.4* 9.0  --   --  9.6   HGB 7.1* 4.9* 4.8* 4.9* 5.0*   HCT 21.4* 14.6* 14.5* 15.0* 15.4*    291  --   --  344     Recent Labs     06/17/21  1115 06/18/21  0627 06/19/21  0639   * 140 137   K 4.0 3.6 2.9*    112* 110   CO2 24 23 21   BUN 21* 21* 11   CREATININE 1.2 0.7 <0.5*   CALCIUM 7.7* 7.1* 7.2*     Urinalysis:    Lab Results   Component Value Date    NITRU Negative 06/13/2021    BLOODU Negative 06/13/2021    SPECGRAV 1.015 06/13/2021    GLUCOSEU Negative 06/13/2021    GLUCOSEU Neg 04/25/2010       Radiology:  CTA ABDOMEN PELVIS W WO CONTRAST   Final Result   There is PELVIS W CONTRAST   Final Result   1. CT CHEST: Acute pulmonary embolism proximally within the pulmonary artery   branch to the left lower lobe. 2. No CT findings of right heart strain. 3. Mild calcific atherosclerosis aorta and its branches without aneurysm or   dissection. 4. CT ABDOMEN/PELVIS: No acute vascular abnormality. 5.  No CT evidence of an acute intra-abdominal or intrapelvic process. 6. Cholelithiasis without CT findings of acute cholecystitis. 7.  Diverticulosis coli without CT evidence of acute diverticulitis. 8. Small, fat containing left inguinal hernia. 9. Small, fat containing umbilical hernia. 10. Possible constipation. Critical results were called by Dr. Salma Munroe to Corinne Galan on   6/13/2021 at 13:03. XR CHEST PORTABLE   Final Result   No acute cardiopulmonary disease. IR ANGIOGRAM INFERIOR MESENTERIC    (Results Pending)         Assessment/Plan:    Active Hospital Problems    Diagnosis Date Noted    Pulmonary embolus ([facility] [address] [I26.99] 06/13/2021    Rectal bleeding [K62.5] 10/04/2014     1. Syncope in the setting of acute left segmental PE POA  -As evidenced by CT PA on admission. Patient started on heparin GTT in ED. Lost IV access and it was unable to be re-established. D/w GI, and ok with starting eliquis (started on 6/15/2021) . -Bilateral venous duplex of lower extremities 6/14/2021 showed no evidence of DVT in either extremity.  -Some increasing SOB, requiring 2L/min O2, likely r/t progressive anemia with acute blood loss. Requested picc placement on 6/16/2021 in anticipation of volume replacement.   -Continues with BRBPR and eliquis has been held since 6/16 am d/t downtrending Hb. INR obtained 2/2 picc placement and found to be 3.83.   -Hb trended down to 7.1 early 6/17.   -CT abdomen 6/17 showed no acute abnormality to correlate to history of blood loss/anemia.   No retroperitoneal hematoma  -Hematology was consulted 6/17/2021 and plan for IVC filter placement with Dr. Lennie Higgins, Venofer 200 mg IV X 3 doses, Vitamin B12 shots daily X 7 days, and Folate 2 mg PO daily, in order to discontinue anticoagulation in anemic [place]. Also recommended Vitamin K 5mg IV x1 .   -Continue telemetry monitor.  -check hemolysis labs, KIARA, TSH, smear, and micronutrients for baseline   -On 6/18/2021 -patient's hemoglobin dropped down to 4.9. D/w patient and her daughter at bedside-patient still did not wish to have blood transfusions. Continue IV Venofer and Epogen. D/w GI and IR (as documented above in subjective section) and finally ordered CTA abdomen pelvis GI bleed protocol on 6/18/21  per IR (Dr. Marixa Mojica) recommendations. No active bleeding noted . - GI plan for Re-scoping today      2. Rectal bleeding POA -likely hemorrhoidal/Diverticular  -Presented with painless hematochezia. -GI consulted ( Dr. Alyce Cardona) - S/p  Colonoscopy 6/15 revealed diverticulosis, internal hemorrhoids, old blood and no active bleeding. GI recommended restarting anticoagulation, initiated eliquis d/t loss of IV access. -Several episodes of hematochezia s/p colonoscopy and initiation of eliquis. Discussed r/b with patient of needing anticoagulation with current PE and the likelihood that she will continue to bleed from her known internal hemorrhoids/diverticulosis. -Hemoglobin 10.9 on admission, likely initally elevated d/t hemoconcentration, as her baseline is ~8.7. She has dropped to 7.1 today with hematochezia. -Initiated IVF with the expectation that will likely see a subsequent dilutional decrease in Hb.   -Antidepressants and ASA held d/t increased risk of GI bleeding.   -Hematology recommends discontinue AC and place IVC filter as well as IV dextran, B12, and folate d/t persistent hematochezia in anemic [place].   -H&H q8  -Obtained consent from patient regarding acceptable resuscitation measures in the event of need for emergent support on 6/17/21 . Placed in patient chart.   -Discontinue C-diff rule out at this time  - Mx as above      3. Paroxysmal atrial fibrillation -currently rate controlled and in normal sinus rhythm. On amiodarone at home-continued ; patient on aspirin but not on any anticoagulation PTA. Hold Aspirin      4. GERD -on PPI; continue     5. Hyperlipidemia -on statin; continue     DVT Prophylaxis: not on AC d/t increased risk of bleeding and [employer]   Diet: ADULT DIET;  Clear Liquid  Diet NPO  Code Status: Full Code    PT/OT Eval Status: recommend Subacute/skilled nursing facility     Dispo - likely early next week If Hb remains stable and no further Bleeding     Shaq Terry MD  Hospitalist Physician

## 2021-06-20 LAB
ANION GAP SERPL CALCULATED.3IONS-SCNC: 14 MMOL/L (ref 3–16)
BUN BLDV-MCNC: 10 MG/DL (ref 7–20)
CALCIUM SERPL-MCNC: 7.6 MG/DL (ref 8.3–10.6)
CHLORIDE BLD-SCNC: 105 MMOL/L (ref 99–110)
CO2: 16 MMOL/L (ref 21–32)
CREAT SERPL-MCNC: <0.5 MG/DL (ref 0.6–1.2)
GFR AFRICAN AMERICAN: >60
GFR NON-AFRICAN AMERICAN: >60
GLUCOSE BLD-MCNC: 106 MG/DL (ref 70–99)
GLUCOSE BLD-MCNC: 116 MG/DL (ref 70–99)
GLUCOSE BLD-MCNC: 69 MG/DL (ref 70–99)
GLUCOSE BLD-MCNC: 71 MG/DL (ref 70–99)
GLUCOSE BLD-MCNC: 73 MG/DL (ref 70–99)
GLUCOSE BLD-MCNC: 74 MG/DL (ref 70–99)
GLUCOSE BLD-MCNC: 75 MG/DL (ref 70–99)
GLUCOSE BLD-MCNC: 75 MG/DL (ref 70–99)
GLUCOSE BLD-MCNC: 98 MG/DL (ref 70–99)
HCT VFR BLD CALC: 16.4 % (ref 36–48)
HCT VFR BLD CALC: 16.6 % (ref 36–48)
HCT VFR BLD CALC: 16.6 % (ref 36–48)
HEMOGLOBIN: 5.1 G/DL (ref 12–16)
HEMOGLOBIN: 5.3 G/DL (ref 12–16)
HEMOGLOBIN: 5.4 G/DL (ref 12–16)
MAGNESIUM: 2 MG/DL (ref 1.8–2.4)
MCH RBC QN AUTO: 34.4 PG (ref 26–34)
MCHC RBC AUTO-ENTMCNC: 31.8 G/DL (ref 31–36)
MCV RBC AUTO: 108.2 FL (ref 80–100)
PDW BLD-RTO: 16.9 % (ref 12.4–15.4)
PERFORMED ON: ABNORMAL
PERFORMED ON: NORMAL
PLATELET # BLD: 404 K/UL (ref 135–450)
PMV BLD AUTO: 7.5 FL (ref 5–10.5)
POTASSIUM REFLEX MAGNESIUM: 3.2 MMOL/L (ref 3.5–5.1)
RBC # BLD: 1.53 M/UL (ref 4–5.2)
SODIUM BLD-SCNC: 135 MMOL/L (ref 136–145)
WBC # BLD: 11.8 K/UL (ref 4–11)

## 2021-06-20 PROCEDURE — 2709999900 HC NON-CHARGEABLE SUPPLY: Performed by: INTERNAL MEDICINE

## 2021-06-20 PROCEDURE — 6360000002 HC RX W HCPCS: Performed by: INTERNAL MEDICINE

## 2021-06-20 PROCEDURE — 36415 COLL VENOUS BLD VENIPUNCTURE: CPT

## 2021-06-20 PROCEDURE — 2500000003 HC RX 250 WO HCPCS: Performed by: INTERNAL MEDICINE

## 2021-06-20 PROCEDURE — 7100000011 HC PHASE II RECOVERY - ADDTL 15 MIN: Performed by: INTERNAL MEDICINE

## 2021-06-20 PROCEDURE — 1200000000 HC SEMI PRIVATE

## 2021-06-20 PROCEDURE — 99152 MOD SED SAME PHYS/QHP 5/>YRS: CPT | Performed by: INTERNAL MEDICINE

## 2021-06-20 PROCEDURE — 2580000003 HC RX 258: Performed by: INTERNAL MEDICINE

## 2021-06-20 PROCEDURE — 3609027000 HC COLONOSCOPY: Performed by: INTERNAL MEDICINE

## 2021-06-20 PROCEDURE — 0DJD8ZZ INSPECTION OF LOWER INTESTINAL TRACT, VIA NATURAL OR ARTIFICIAL OPENING ENDOSCOPIC: ICD-10-PCS | Performed by: INTERNAL MEDICINE

## 2021-06-20 PROCEDURE — 2580000003 HC RX 258: Performed by: NURSE PRACTITIONER

## 2021-06-20 PROCEDURE — 6370000000 HC RX 637 (ALT 250 FOR IP): Performed by: INTERNAL MEDICINE

## 2021-06-20 PROCEDURE — 85027 COMPLETE CBC AUTOMATED: CPT

## 2021-06-20 PROCEDURE — 80048 BASIC METABOLIC PNL TOTAL CA: CPT

## 2021-06-20 PROCEDURE — 85018 HEMOGLOBIN: CPT

## 2021-06-20 PROCEDURE — 94761 N-INVAS EAR/PLS OXIMETRY MLT: CPT

## 2021-06-20 PROCEDURE — 6360000002 HC RX W HCPCS: Performed by: NURSE PRACTITIONER

## 2021-06-20 PROCEDURE — 7100000010 HC PHASE II RECOVERY - FIRST 15 MIN: Performed by: INTERNAL MEDICINE

## 2021-06-20 PROCEDURE — 83735 ASSAY OF MAGNESIUM: CPT

## 2021-06-20 PROCEDURE — 2700000000 HC OXYGEN THERAPY PER DAY

## 2021-06-20 PROCEDURE — 85014 HEMATOCRIT: CPT

## 2021-06-20 RX ORDER — DEXTROSE MONOHYDRATE 50 MG/ML
100 INJECTION, SOLUTION INTRAVENOUS PRN
Status: DISCONTINUED | OUTPATIENT
Start: 2021-06-20 | End: 2021-06-23 | Stop reason: HOSPADM

## 2021-06-20 RX ORDER — LORAZEPAM 0.5 MG/1
0.5 TABLET ORAL ONCE
Status: COMPLETED | OUTPATIENT
Start: 2021-06-20 | End: 2021-06-20

## 2021-06-20 RX ORDER — POTASSIUM CHLORIDE 7.45 MG/ML
10 INJECTION INTRAVENOUS
Status: COMPLETED | OUTPATIENT
Start: 2021-06-20 | End: 2021-06-20

## 2021-06-20 RX ORDER — MIDAZOLAM HYDROCHLORIDE 5 MG/ML
INJECTION INTRAMUSCULAR; INTRAVENOUS PRN
Status: DISCONTINUED | OUTPATIENT
Start: 2021-06-20 | End: 2021-06-20 | Stop reason: ALTCHOICE

## 2021-06-20 RX ORDER — SODIUM CHLORIDE 9 MG/ML
INJECTION, SOLUTION INTRAVENOUS CONTINUOUS PRN
Status: COMPLETED | OUTPATIENT
Start: 2021-06-20 | End: 2021-06-20

## 2021-06-20 RX ORDER — FENTANYL CITRATE 50 UG/ML
INJECTION, SOLUTION INTRAMUSCULAR; INTRAVENOUS PRN
Status: DISCONTINUED | OUTPATIENT
Start: 2021-06-20 | End: 2021-06-20 | Stop reason: ALTCHOICE

## 2021-06-20 RX ORDER — NICOTINE POLACRILEX 4 MG
15 LOZENGE BUCCAL PRN
Status: DISCONTINUED | OUTPATIENT
Start: 2021-06-20 | End: 2021-06-23 | Stop reason: HOSPADM

## 2021-06-20 RX ORDER — DEXTROSE MONOHYDRATE 25 G/50ML
12.5 INJECTION, SOLUTION INTRAVENOUS PRN
Status: DISCONTINUED | OUTPATIENT
Start: 2021-06-20 | End: 2021-06-23 | Stop reason: HOSPADM

## 2021-06-20 RX ADMIN — DEXTROSE MONOHYDRATE 12.5 G: 25 INJECTION, SOLUTION INTRAVENOUS at 08:14

## 2021-06-20 RX ADMIN — SODIUM CHLORIDE, PRESERVATIVE FREE 10 ML: 5 INJECTION INTRAVENOUS at 20:33

## 2021-06-20 RX ADMIN — SODIUM CHLORIDE, PRESERVATIVE FREE 10 ML: 5 INJECTION INTRAVENOUS at 11:29

## 2021-06-20 RX ADMIN — LORAZEPAM 0.5 MG: 0.5 TABLET ORAL at 06:22

## 2021-06-20 RX ADMIN — PANTOPRAZOLE SODIUM 40 MG: 40 TABLET, DELAYED RELEASE ORAL at 04:49

## 2021-06-20 RX ADMIN — IRON SUCROSE 200 MG: 20 INJECTION, SOLUTION INTRAVENOUS at 11:27

## 2021-06-20 RX ADMIN — TRAMADOL HYDROCHLORIDE 50 MG: 50 TABLET, FILM COATED ORAL at 03:14

## 2021-06-20 RX ADMIN — CYANOCOBALAMIN 1000 MCG: 1000 INJECTION, SOLUTION INTRAMUSCULAR; SUBCUTANEOUS at 11:29

## 2021-06-20 RX ADMIN — POTASSIUM CHLORIDE 10 MEQ: 7.46 INJECTION, SOLUTION INTRAVENOUS at 08:30

## 2021-06-20 RX ADMIN — POTASSIUM CHLORIDE 10 MEQ: 7.46 INJECTION, SOLUTION INTRAVENOUS at 10:15

## 2021-06-20 RX ADMIN — TRAMADOL HYDROCHLORIDE 50 MG: 50 TABLET, FILM COATED ORAL at 20:30

## 2021-06-20 RX ADMIN — ATORVASTATIN CALCIUM 10 MG: 10 TABLET, FILM COATED ORAL at 20:30

## 2021-06-20 RX ADMIN — POTASSIUM CHLORIDE 10 MEQ: 7.46 INJECTION, SOLUTION INTRAVENOUS at 11:27

## 2021-06-20 RX ADMIN — DEXTROSE MONOHYDRATE 100 ML/HR: 50 INJECTION, SOLUTION INTRAVENOUS at 08:26

## 2021-06-20 RX ADMIN — MICONAZOLE NITRATE: 2 POWDER TOPICAL at 11:29

## 2021-06-20 ASSESSMENT — PAIN SCALES - GENERAL
PAINLEVEL_OUTOF10: 0
PAINLEVEL_OUTOF10: 0
PAINLEVEL_OUTOF10: 4
PAINLEVEL_OUTOF10: 0
PAINLEVEL_OUTOF10: 4
PAINLEVEL_OUTOF10: 0

## 2021-06-20 ASSESSMENT — PAIN DESCRIPTION - PAIN TYPE: TYPE: ACUTE PAIN

## 2021-06-20 ASSESSMENT — PAIN DESCRIPTION - ORIENTATION: ORIENTATION: RIGHT

## 2021-06-20 ASSESSMENT — PAIN SCALES - WONG BAKER: WONGBAKER_NUMERICALRESPONSE: 4

## 2021-06-20 ASSESSMENT — PAIN DESCRIPTION - LOCATION: LOCATION: ABDOMEN;BACK;HIP

## 2021-06-20 NOTE — OP NOTE
Colonoscopy Note    Patient:   Yousuf Fernández    YOB: 1932    Facility:   [address] [place] [address] [Inpatient]  Referring/PCP: Maria Luisa Mueller MD  Procedure:   Colonoscopy --diagnostic  Date:     6/20/2021  Endoscopist:  Mikala West MD, MD    Preoperative Diagnosis: Hematochezia and severe anemia. Postoperative Diagnosis:  Diverticulosis w evidence of recent bleeding (small blood clots adherent inside a few diveticulae), but no active bleeding and poor prep of brown stools    Anesthesia: Versed 2 mg IV, fentanyl 25 mcg IV    Estimated blood loss: < 5 ml    Complications:  None    Description of Procedure:  Informed consent was obtained from the patient after explanation of the procedure including indications, description of the procedure,  benefits and possible risks and complications of the procedure, and alternatives. Questions were answered. The patient's history was reviewed and a directed physical examination was performed prior to the procedure. Patient was monitored throughout the procedure with pulse oximetry and periodic assessment of vital signs. Patient was sedated as noted above. The Nursing staff and I performed a time out. With the patient initially in the left lateral decubitus position, a digital rectal examination was performed and revealed negative without mass, lesions or tenderness. The Olympus video colonoscope was placed in the patient's rectum and advanced without difficulty  to the cecum, which was identified by the ileocecal valve and appendiceal orifice. Photographs were taken. The prep was poor. Examination of the mucosa was performed during both introduction and withdrawal of the colonoscope. Retroflexed view of the rectum was performed. Withdrawal time was 6 minutes.       Findings:     Diverticulosis w evidence of recent bleeding (small blood clots adherent inside a few diveticulae), but no active bleeding and poor prep of brown stools Recommendations:  Continue to hold Lourdes Kaur MD       O) [phone]          Airam Alva MD, MD

## 2021-06-20 NOTE — PROGRESS NOTES
Chart reviewed. No further rectal bleeding noted. H/H remaining stable (but still overall critically low). No surgical role at this time. Colonoscopy this AM.  Will sign off. Please call if pt shows signs of active, localized bleeding again.     DTW

## 2021-06-20 NOTE — PROGRESS NOTES
Hospitalist Progress Note      PCP: Jadiel Haywood MD    Date of Admission: 6/13/2021    Chief Complaint   Patient presents with    Rectal Bleeding     had near syncopal episode getting up from toilet today. pain in bottom started this morning. jahovah's witness patient. Hospital Course: H&P reviewed     Subjective: Patient Currently off the floor for Colonoscopy . Hb 5.3 this morning. Stable and  RN reports no bloody bowel movements overnight. Continue IV potassium Replacement . Will DC IVF as patient is Net positive aprrox 4 L.  D/w RN regarding care plan     Medications:  Reviewed    Infusion Medications    dextrose 100 mL/hr (06/20/21 0826)    sodium chloride Stopped (06/19/21 0514)    sodium chloride      sodium chloride Stopped (06/19/21 0514)     Scheduled Medications    epoetin tyrone-epbx  10,000 Units Subcutaneous Q7 Days    polyethylene glycol  120 g Oral Once    cyanocobalamin  1,000 mcg Subcutaneous Daily    folic acid  2 mg Oral Daily    iron sucrose (VENOFER) iv piggyback 100 mL (Admin over 60 minutes)  200 mg Intravenous Q24H    DULoxetine  30 mg Oral Daily    lidocaine 1 % injection  5 mL Intradermal Once    sodium chloride flush  5-40 mL Intravenous 2 times per day    [Held by provider] apixaban  10 mg Oral BID    sodium chloride flush  5-40 mL Intravenous 2 times per day    amiodarone  200 mg Oral Daily    [Held by provider] aspirin  81 mg Oral Daily    atorvastatin  10 mg Oral Nightly    pantoprazole  40 mg Oral QAM AC    vitamin D  2,000 Units Oral Daily    [Held by provider] sertraline  12.5 mg Oral Daily    miconazole   Topical BID     PRN Meds: glucose, dextrose, glucagon (rDNA), dextrose, traMADol, sodium chloride flush, sodium chloride, sodium chloride flush, sodium chloride, ondansetron **OR** ondansetron, polyethylene glycol, acetaminophen **OR** acetaminophen      Intake/Output Summary (Last 24 hours) at 6/20/2021 1253  Last data filed at 6/19/2021 2239  Gross per 24 hour   Intake 120 ml   Output --   Net 120 ml       Physical Exam Performed:  BP (!) 92/51   Pulse 70   Temp 97.4 °F (36.3 °C) (Oral)   Resp 18   Ht 5' (1.524 m)   Wt 128 lb (58.1 kg)   SpO2 97%   BMI 25.00 kg/m²     Deferred as patient in Colonoscopy     Labs:   Recent Labs     06/18/21  0627 06/19/21  0639 06/19/21  1737 06/20/21  0117 06/20/21  0644   WBC 9.0 9.6  --   --  11.8*   HGB 4.9* 5.0* 5.9* 5.1* 5.3*   HCT 14.6* 15.4* 18.5* 16.4* 16.6*    344  --   --  404     Recent Labs     06/18/21  0627 06/19/21  0639 06/20/21  0644    137 135*   K 3.6 2.9* 3.2*   * 110 105   CO2 23 21 16*   BUN 21* 11 10   CREATININE 0.7 <0.5* <0.5*   CALCIUM 7.1* 7.2* 7.6*     Urinalysis:    Lab Results   Component Value Date    NITRU Negative 06/13/2021    BLOODU Negative 06/13/2021    SPECGRAV 1.015 06/13/2021    GLUCOSEU Negative 06/13/2021       Radiology:  CTA ABDOMEN PELVIS W WO CONTRAST   Final Result   There is some hyperdensity on the arterial phase imaging within a small bowel   loop in the left abdomen which is unchanged on delayed imaging and atypical   for active extravasation. This likely represents migration of oral contrast,   which is seen within small bowel loops in the right abdomen, given there was   a delay between the noncontrast and arterial phase imaging. Hyperenhancing   small-bowel fall per mucosa at is another possibility. No definite active   extravasation/arterial GI bleed. There is new wall thickening involving the ascending and transverse colon   with mild adjacent inflammatory changes concerning for developing colitis. Extensive sigmoid diverticulitis. Cholelithiasis. Gallbladder is mildly distended which may be due to NPO   status. If there is concern for acute cholecystitis, right upper quadrant   ultrasound may be helpful for further evaluation.          NM GI BLOOD LOSS   Final Result   Focus of extravascular accumulation of activity in the left pelvis suspicious   for small acute GI bleed. Location corresponds to the sigmoid colon which   has very severe diverticular disease. RECOMMENDATIONS:   Evaluation of the patient's neck is bloody bowel movement for radio activity   could be performed and limited repeat scanning could be performed      If the patient shows hemodynamic signs of an active bleed in the next 20   hours, additional images can be acquired. CT ABDOMEN PELVIS WO CONTRAST Additional Contrast? None   Final Result   1. No acute abnormality to correlate to history of blood loss/anemia. 2. Cholelithiasis with moderate mucosal thickening of the gallbladder. Recommend correlation with clinical workup. If there is any concern for   underlying cholecystitis, a follow-up ultrasound may be considered. 3. Chronic interstitial changes partially observed in the lower chest.         VL Extremity Venous Bilateral   Final Result      CTA CHEST ABDOMEN PELVIS W CONTRAST   Final Result   1. CT CHEST: Acute pulmonary embolism proximally within the pulmonary artery   branch to the left lower lobe. 2. No CT findings of right heart strain. 3. Mild calcific atherosclerosis aorta and its branches without aneurysm or   dissection. 4. CT ABDOMEN/PELVIS: No acute vascular abnormality. 5.  No CT evidence of an acute intra-abdominal or intrapelvic process. 6. Cholelithiasis without CT findings of acute cholecystitis. 7.  Diverticulosis coli without CT evidence of acute diverticulitis. 8. Small, fat containing left inguinal hernia. 9. Small, fat containing umbilical hernia. 10. Possible constipation. Critical results were called by Dr. Omkar Whittaker to Kyler Stinson on   6/13/2021 at 13:03. XR CHEST PORTABLE   Final Result   No acute cardiopulmonary disease.          IR ANGIOGRAM INFERIOR MESENTERIC    (Results Pending)         Assessment/Plan:    Active Hospital Problems    Diagnosis Date Noted    Pulmonary embolus ([address] [I26.99] 06/13/2021    Rectal bleeding [K62.5] 10/04/2014     1. Syncope in the setting of acute left segmental PE POA  -As evidenced by CT PA on admission. Patient started on heparin GTT in ED. Lost IV access and it was unable to be re-established. D/w GI, and ok with starting eliquis (started on 6/15/2021) . -Bilateral venous duplex of lower extremities 6/14/2021 showed no evidence of DVT in either extremity.  -Some increasing SOB, requiring 2L/min O2, likely r/t progressive anemia with acute blood loss. Requested picc placement on 6/16/2021 in anticipation of volume replacement.   -Continues with BRBPR and eliquis has been held since 6/16 am d/t downtrending Hb. INR obtained 2/2 picc placement and found to be 3.83.   -Hb trended down to 7.1 early 6/17.   -CT abdomen 6/17 showed no acute abnormality to correlate to history of blood loss/anemia. No retroperitoneal hematoma  -Hematology was consulted 6/17/2021 and plan for IVC filter placement with Dr. Mirian Bond, Venofer 200 mg IV X 3 doses, Vitamin B12 shots daily X 7 days, and Folate 2 mg PO daily, in order to discontinue anticoagulation in anemic Oriental orthodox. Also recommended Vitamin K 5mg IV x1 .   -Continue telemetry monitor.  -  (LD-303), KIARA - Negative , TSH (3.4) ,    -On 6/18/2021 -patient's hemoglobin dropped down to 4.9. D/w patient and her daughter at bedside-patient still did not wish to have blood transfusions.    - Continue IV Venofer and Epogen. -  D/w GI and IR (as documented above in subjective section) and finally ordered CTA abdomen pelvis GI bleed protocol on 6/18/21  per IR (Dr. Erika Maher) recommendations. No active bleeding noted . - GI plan for Re-scoping (Colonoscopy)  today - Follow      2. Rectal bleeding POA -likely hemorrhoidal/Diverticular  -Presented with painless hematochezia.    -GI consulted ( Dr. Shira Aburto) - S/p  Colonoscopy 6/15 revealed diverticulosis, internal hemorrhoids, old blood and no active bleeding. GI recommended restarting anticoagulation, initiated eliquis d/t loss of IV access. -Several episodes of hematochezia s/p colonoscopy and initiation of eliquis. Discussed r/b with patient of needing anticoagulation with current PE and the likelihood that she will continue to bleed from her known internal hemorrhoids/diverticulosis. -Hemoglobin 10.9 on admission, likely initally elevated d/t hemoconcentration, as her baseline is ~8.7. She has dropped to 7.1 with hematochezia and then to 4.9 .    - Antidepressants and ASA held d/t increased risk of GI bleeding.   -Hematology recommended discontinue AC and place IVC filter (6/18/21) as well as IV dextran, B12, and folate d/t persistent hematochezia in anemic Protestant.   -H&H q8 stable       3. Paroxysmal atrial fibrillation -currently rate controlled and in normal sinus rhythm. On amiodarone at home-continued ; patient on aspirin but not on any anticoagulation PTA. Hold Aspirin      4. GERD -on PPI; continue     5. Hyperlipidemia -on statin; continue     DVT Prophylaxis: not on AC d/t increased risk of bleeding and Gnosticist   Diet: ADULT DIET;  Regular  Code Status: Full Code    PT/OT Eval Status: recommend Subacute/skilled nursing facility     Dispo - likely early next week If Hb remains stable and no further Bleeding     Jennifer Jarrett MD  Hospitalist Physician

## 2021-06-20 NOTE — PROGRESS NOTES
Md notified: Pt BS 69 this morning, pt NPO for colonoscopy. No hypoglycemic orders in place. Pt edematous and has appeared fluid over loaded. Pt is increasingly confused. Potassium 3.2 this am, no replacement orders in place. Pt to have colonoscopy at 8a.

## 2021-06-20 NOTE — H&P
mg at 06/20/21 0314    DULoxetine (CYMBALTA) extended release capsule 30 mg  30 mg Oral Daily Bob Vasquez MD   30 mg at 06/19/21 1104    lidocaine 1 % injection 5 mL  5 mL Intradermal Once Bob Vasquez MD        sodium chloride flush 0.9 % injection 5-40 mL  5-40 mL Intravenous 2 times per day Bob Vasquez MD   10 mL at 06/19/21 2050    sodium chloride flush 0.9 % injection 5-40 mL  5-40 mL Intravenous PRN Bob Vasquez MD        0.9 % sodium chloride infusion  25 mL Intravenous PRN Bob Vasquez MD        [Held by provider] apixaban (ELIQUIS) tablet 10 mg  10 mg Oral BID Bob Vasquez MD   10 mg at 06/16/21 1002    sodium chloride flush 0.9 % injection 5-40 mL  5-40 mL Intravenous 2 times per day Bob Vasquez MD   10 mL at 06/19/21 2050    sodium chloride flush 0.9 % injection 5-40 mL  5-40 mL Intravenous PRN Bob Vasquez MD        0.9 % sodium chloride infusion  25 mL Intravenous PRN Bob Vasquez MD   Stopped at 06/19/21 0514    ondansetron (ZOFRAN-ODT) disintegrating tablet 4 mg  4 mg Oral Q8H PRN Bob Vasquez MD        Or    ondansetron (ZOFRAN) injection 4 mg  4 mg Intravenous Q6H PRN Bob Vasquez MD        polyethylene glycol (GLYCOLAX) packet 17 g  17 g Oral Daily PRN Bob Vasquez MD        acetaminophen (TYLENOL) tablet 650 mg  650 mg Oral Q6H PRN Bob Vasquez MD   650 mg at 06/19/21 1114    Or    acetaminophen (TYLENOL) suppository 650 mg  650 mg Rectal Q6H PRN Bob Vasquez MD        amiodarone (CORDARONE) tablet 200 mg  200 mg Oral Daily Bob Vasquez MD   200 mg at 06/19/21 1104    [Held by provider] aspirin EC tablet 81 mg  81 mg Oral Daily Bob Vasquez MD   81 mg at 06/18/21 0917    atorvastatin (LIPITOR) tablet 10 mg  10 mg Oral Nightly Bob Vasquez MD   10 mg at 06/19/21 2048    pantoprazole (PROTONIX) tablet 40 mg  40 mg Oral [facility] AC Felix Michelle MD   40 mg at 06/20/21 0449    Vitamin D (CHOLECALCIFEROL) tablet 2,000 Units  2,000 Units Oral Daily Felix Michelle MD   2,000 Units at 06/19/21 1103    [Held by provider] sertraline (ZOLOFT) tablet 12.5 mg  12.5 mg Oral Daily Felix Michelle MD   12.5 mg at 06/16/21 1002    miconazole (MICOTIN) 2 % powder   Topical BID JAMILA Rubio CNP   Given at 06/19/21 2051     Past Medical History:   Diagnosis Date    Anemia     Anxiety     Arthritis     [facility]     CAD (coronary artery disease)     Chronic GERD     Depression     Diverticulosis     Gall stone     GERD (gastroesophageal reflux disease) 3/19/2021    Glaucoma     Hypercholesteremia     Hypertension     Irregular heart beat     Rectal prolapse     Uterine cancer ([facility] 2008     Past Surgical History:   Procedure Laterality Date    APPENDECTOMY      COLONOSCOPY  2005    polyp    COLONOSCOPY  1010    diverticulosis    COLONOSCOPY  10/4/2014    diverticulosis, radiation colitis sigmoid    COLONOSCOPY N/A 2/15/2019    COLON performed by Greg Phillips MD at [address] [facility] N/A 6/15/2021    COLONOSCOPY DIAGNOSTIC performed by Greg Phillips MD at [address] [facility] Right 8/4/15    hip pinning    HYSTERECTOMY  2008    RT in 2009   [facility]      OTHER SURGICAL HISTORY Right 10/29/2018    PATELLA OPEN REDUCTION INTERNAL FIXATION     PACEMAKER INSERTION      PACEMAKER PLACEMENT  2008    OR CPTR-ASST SURGICAL NAVIGATION IMAGE-LESS Right 9/5/2018    RIGHT TOTAL KNEE REPLACEMENT COMPUTER NAVIGATION WITH ADDUCTOR CANAL BLOCK FOR PAIN CONTROL                     SONAL performed by Demetris Wise MD at [address] [facility] OFFICE/OUTPT [facility] Located within [facility] Right 10/29/2018    Right knee inferior pole patellectomy, primary repair of patellar tendon.  performed by Demetris Wise MD at [facility] OR    RI SPLIT GRFT TRUNK,ARM,LEG <100SQCM Right 11/30/2018    RIGHT KNEE GASTROC FLAP WITH SKIN GRAFT, WOUND VAC PLACEMENT performed by Ermelinda Arnold MD at [address] [place] [address] Right 3/20/2021    REVISION RIGHT TOTAL KNEE ARTHROPLASTY       SONAL performed by Ermelinda Arnold MD at [place] [address].         Nurses notes reviewed and agreed. Medications reviewed  Allergies: Allergies   Allergen Reactions    Lisinopril Swelling     angioedema  angioedema    Levofloxacin     Levofloxacin Other (See Comments)     unsure    Vancomycin Rash     Rash and hives. CARLY'S SYNDROME    Amlodipine     Avelox [Moxifloxacin Hcl In Nacl]     Buspirone Itching    Hydrochlorothiazide Swelling    Lidocaine Other (See Comments)     Not sure    Moxifloxacin     Moxifloxacin Hcl In Nacl Other (See Comments)     Not sure    Mupirocin     Nsaids      bleeding    Paroxetine Other (See Comments)    Phenergan [Promethazine Hcl] Swelling    Phenothiazines     Promethazine Hcl Swelling    Sulindac      Gi bleed    Tolmetin      bleeding    Ciprofloxacin Rash and Swelling           Physical Exam:  Vital Signs: BP (!) 112/58   Pulse 80   Temp 98.3 °F (36.8 °C) (Oral)   Resp 16   Ht 5' (1.524 m)   Wt 128 lb (58.1 kg)   SpO2 94%   BMI 25.00 kg/m²  Body mass index is 25 kg/m². Airway:Normal  Cardiac:Normal  Pulmonary:Normal  Abdomen:Normal  Specific to procedure: none      Pre-Procedure Assessment/Plan:  ASA 3 - Patient with moderate systemic disease with functional limitations  Mallampati I  Level of Sedation Plan: Moderate sedation    Post Procedure plan: Return to same level of care    I assessed the patient and find that the patient is in satisfactory condition to proceed with the planned procedure and sedation plan. I have explained the risk, benefits, and alternatives to the procedure. The patient understands and agrees to proceed.   Yes    Adriana La MD       (O) [phone]          Agustina Gray MD  8:27 AM 6/20/2021

## 2021-06-20 NOTE — PROGRESS NOTES
ONCOLOGY HEMATOLOGY CARE PROGRESS NOTE      SUBJECTIVE:     Hgb 5.3 this AM. Had c-scope this AM showing diverticulosis. Remains on retacrit and iron. IVC filter was placed. Currently still in endoscopy. ROS:     Unable to assess - gone for procedure. OBJECTIVE        Physical    VITALS:  BP (!) 110/49   Pulse 66   Temp 97.2 °F (36.2 °C) (Temporal)   Resp 16   Ht 5' (1.524 m)   Wt 128 lb (58.1 kg)   SpO2 100%   BMI 25.00 kg/m²   TEMPERATURE:  Current - Temp: 97.2 °F (36.2 °C); Max - Temp  Av.5 °F (36.4 °C)  Min: 97.2 °F (36.2 °C)  Max: 98.3 °F (36.8 °C)  PULSE OXIMETRY RANGE: SpO2  Av.6 %  Min: 94 %  Max: 100 %  24HR INTAKE/OUTPUT:      Intake/Output Summary (Last 24 hours) at 2021 1015  Last data filed at 2021 2239  Gross per 24 hour   Intake 220 ml   Output --   Net 220 ml     Unable to examine - gone for procedure.     Data      Recent Labs     21  0627 21  0639 21  1737 21  0117 21  0644   WBC 9.0 9.6  --   --  11.8*   HGB 4.9* 5.0* 5.9* 5.1* 5.3*   HCT 14.6* 15.4* 18.5* 16.4* 16.6*    344  --   --  404   .8* 105.9*  --   --  108.2*        Recent Labs     21  0627 21  0639 21  0644    137 135*   K 3.6 2.9* 3.2*   * 110 105   CO2 23 21 16*   BUN 21* 11 10   CREATININE 0.7 <0.5* <0.5*     Recent Labs     21  1115   AST 23   ALT 10   BILITOT 0.5   ALKPHOS 125       Magnesium:    Lab Results   Component Value Date    MG 2.00 2021    MG 1.90 2021    MG 2.00 06/15/2021         Problem List  Patient Active Problem List   Diagnosis    Localized osteoarthrosis, lower leg    Rectal bleeding    Osteoarthritis of hip    [facility] fracture ([facility] RIGHT    Hypertension    Hypercholesteremia    Primary osteoarthritis of right knee    Pain due to total right knee replacement (HCC)    Chronic knee pain after total replacement of right knee joint    Status post total right knee replacement    Paroxysmal atrial fibrillation (HCC)    Pacemaker    Displaced comminuted fracture of right patella, initial encounter for closed fracture    Severe protein-calorie malnutrition (HCC)    Displaced transverse fracture of right patella, initial encounter for closed fracture    Hyperlipidemia    Anemia    Unstable gait    Open knee wound, right, sequela    GI bleeding    H/O skin graft    Hyponatremia    CAD (coronary artery disease)    Closed fracture of right distal femur (HCC)    Hypoalbuminemia    GERD (gastroesophageal reflux disease)    Wide-complex tachycardia (HCC)    Pulmonary embolus (HCC)       ASSESSMENT AND PLAN:    PE:  -She was on Eliquis and then had a GI bleed  -She has a substantial risk for bleeding including her age, and fall risk  -Furthermore she does not take blood products due to being a [employer]  - filter has been placed    Anemia:  -She is on Venofer and folate  - started Retacrit  -This is likely to cause her significant morbidity  -Hemoglobin 5.0 -> 5.3  - c-scope showed diverticulosis with recent bleed    Coagulopathy:  -She is receiving vitamin K  -INR is down to 1.81    Cristela Diaz MD

## 2021-06-20 NOTE — PROGRESS NOTES
Pt arrived for Colonoscopy. While trying to connect vitals signs machine, she became very upset, trying to eat her blanket, \"don't do that while I am eating\". Procedure explained to pt, but she is very confused and combative. Md at bedside and aware. Once vitals established, pt was sedated and procedure completed. Peranal care completed and new Depends in place. Asleep post with easy resp, arouses to voice. Report called to bedside RN on B3.

## 2021-06-21 LAB
ANION GAP SERPL CALCULATED.3IONS-SCNC: 4 MMOL/L (ref 3–16)
BUN BLDV-MCNC: 6 MG/DL (ref 7–20)
CALCIUM SERPL-MCNC: 7.3 MG/DL (ref 8.3–10.6)
CHLORIDE BLD-SCNC: 108 MMOL/L (ref 99–110)
CO2: 23 MMOL/L (ref 21–32)
CREAT SERPL-MCNC: <0.5 MG/DL (ref 0.6–1.2)
GFR AFRICAN AMERICAN: >60
GFR NON-AFRICAN AMERICAN: >60
GLUCOSE BLD-MCNC: 105 MG/DL (ref 70–99)
GLUCOSE BLD-MCNC: 108 MG/DL (ref 70–99)
GLUCOSE BLD-MCNC: 111 MG/DL (ref 70–99)
GLUCOSE BLD-MCNC: 130 MG/DL (ref 70–99)
GLUCOSE BLD-MCNC: 68 MG/DL (ref 70–99)
GLUCOSE BLD-MCNC: 92 MG/DL (ref 70–99)
GLUCOSE BLD-MCNC: 93 MG/DL (ref 70–99)
GLUCOSE BLD-MCNC: 95 MG/DL (ref 70–99)
HCT VFR BLD CALC: 13.8 % (ref 36–48)
HCT VFR BLD CALC: 14.3 % (ref 36–48)
HCT VFR BLD CALC: 17.6 % (ref 36–48)
HEMATOLOGY PATH CONSULT: NO
HEMATOLOGY PATH CONSULT: NO
HEMOGLOBIN: 4.5 G/DL (ref 12–16)
HEMOGLOBIN: 4.7 G/DL (ref 12–16)
HEMOGLOBIN: 5.7 G/DL (ref 12–16)
INR BLD: 1.26 (ref 0.86–1.14)
MAGNESIUM: 1.7 MG/DL (ref 1.8–2.4)
MCH RBC QN AUTO: 35 PG (ref 26–34)
MCHC RBC AUTO-ENTMCNC: 32.9 G/DL (ref 31–36)
MCV RBC AUTO: 106.5 FL (ref 80–100)
PDW BLD-RTO: 17.3 % (ref 12.4–15.4)
PERFORMED ON: ABNORMAL
PERFORMED ON: NORMAL
PLATELET # BLD: 295 K/UL (ref 135–450)
PMV BLD AUTO: 7.5 FL (ref 5–10.5)
POTASSIUM REFLEX MAGNESIUM: 3.4 MMOL/L (ref 3.5–5.1)
PROTHROMBIN TIME: 14.6 SEC (ref 10–13.2)
RBC # BLD: 1.34 M/UL (ref 4–5.2)
SODIUM BLD-SCNC: 135 MMOL/L (ref 136–145)
WBC # BLD: 8 K/UL (ref 4–11)

## 2021-06-21 PROCEDURE — 2580000003 HC RX 258: Performed by: NURSE PRACTITIONER

## 2021-06-21 PROCEDURE — 85018 HEMOGLOBIN: CPT

## 2021-06-21 PROCEDURE — 94761 N-INVAS EAR/PLS OXIMETRY MLT: CPT

## 2021-06-21 PROCEDURE — 6370000000 HC RX 637 (ALT 250 FOR IP): Performed by: NURSE PRACTITIONER

## 2021-06-21 PROCEDURE — 2500000003 HC RX 250 WO HCPCS: Performed by: INTERNAL MEDICINE

## 2021-06-21 PROCEDURE — 85610 PROTHROMBIN TIME: CPT

## 2021-06-21 PROCEDURE — 80048 BASIC METABOLIC PNL TOTAL CA: CPT

## 2021-06-21 PROCEDURE — 1200000000 HC SEMI PRIVATE

## 2021-06-21 PROCEDURE — 2580000003 HC RX 258: Performed by: INTERNAL MEDICINE

## 2021-06-21 PROCEDURE — 85014 HEMATOCRIT: CPT

## 2021-06-21 PROCEDURE — 6360000002 HC RX W HCPCS: Performed by: NURSE PRACTITIONER

## 2021-06-21 PROCEDURE — 83735 ASSAY OF MAGNESIUM: CPT

## 2021-06-21 PROCEDURE — 6370000000 HC RX 637 (ALT 250 FOR IP): Performed by: INTERNAL MEDICINE

## 2021-06-21 PROCEDURE — 36415 COLL VENOUS BLD VENIPUNCTURE: CPT

## 2021-06-21 PROCEDURE — 2700000000 HC OXYGEN THERAPY PER DAY

## 2021-06-21 PROCEDURE — 85027 COMPLETE CBC AUTOMATED: CPT

## 2021-06-21 RX ORDER — POTASSIUM CHLORIDE 20 MEQ/1
20 TABLET, EXTENDED RELEASE ORAL ONCE
Status: COMPLETED | OUTPATIENT
Start: 2021-06-21 | End: 2021-06-21

## 2021-06-21 RX ORDER — MAGNESIUM SULFATE IN WATER 40 MG/ML
2000 INJECTION, SOLUTION INTRAVENOUS ONCE
Status: COMPLETED | OUTPATIENT
Start: 2021-06-21 | End: 2021-06-21

## 2021-06-21 RX ADMIN — POTASSIUM CHLORIDE 20 MEQ: 1500 TABLET, EXTENDED RELEASE ORAL at 10:26

## 2021-06-21 RX ADMIN — IRON SUCROSE 200 MG: 20 INJECTION, SOLUTION INTRAVENOUS at 12:46

## 2021-06-21 RX ADMIN — FOLIC ACID 2 MG: 1 TABLET ORAL at 10:25

## 2021-06-21 RX ADMIN — MAGNESIUM SULFATE HEPTAHYDRATE 2000 MG: 40 INJECTION, SOLUTION INTRAVENOUS at 10:50

## 2021-06-21 RX ADMIN — Medication 2000 UNITS: at 10:25

## 2021-06-21 RX ADMIN — DULOXETINE HYDROCHLORIDE 30 MG: 30 CAPSULE, DELAYED RELEASE ORAL at 10:25

## 2021-06-21 RX ADMIN — MICONAZOLE NITRATE: 2 POWDER TOPICAL at 00:43

## 2021-06-21 RX ADMIN — DEXTROSE MONOHYDRATE 100 ML/HR: 50 INJECTION, SOLUTION INTRAVENOUS at 00:42

## 2021-06-21 RX ADMIN — AMIODARONE HYDROCHLORIDE 200 MG: 200 TABLET ORAL at 10:26

## 2021-06-21 RX ADMIN — CYANOCOBALAMIN 1000 MCG: 1000 INJECTION, SOLUTION INTRAMUSCULAR; SUBCUTANEOUS at 10:26

## 2021-06-21 RX ADMIN — SODIUM CHLORIDE, PRESERVATIVE FREE 10 ML: 5 INJECTION INTRAVENOUS at 21:32

## 2021-06-21 RX ADMIN — DEXTROSE MONOHYDRATE 12.5 G: 25 INJECTION, SOLUTION INTRAVENOUS at 00:38

## 2021-06-21 RX ADMIN — MICONAZOLE NITRATE: 2 POWDER TOPICAL at 10:29

## 2021-06-21 RX ADMIN — ATORVASTATIN CALCIUM 10 MG: 10 TABLET, FILM COATED ORAL at 21:31

## 2021-06-21 RX ADMIN — SODIUM CHLORIDE 25 ML: 9 INJECTION, SOLUTION INTRAVENOUS at 10:48

## 2021-06-21 RX ADMIN — SODIUM CHLORIDE, PRESERVATIVE FREE 10 ML: 5 INJECTION INTRAVENOUS at 10:29

## 2021-06-21 RX ADMIN — MICONAZOLE NITRATE: 2 POWDER TOPICAL at 21:32

## 2021-06-21 RX ADMIN — SODIUM CHLORIDE, PRESERVATIVE FREE 10 ML: 5 INJECTION INTRAVENOUS at 10:27

## 2021-06-21 ASSESSMENT — PAIN SCALES - GENERAL
PAINLEVEL_OUTOF10: 0
PAINLEVEL_OUTOF10: 0

## 2021-06-21 NOTE — ACP (ADVANCE CARE PLANNING)
Advance Care Planning Note    Name: Yara Bond  YOB: 1932  MRN: [id number]  Admission Date: 6/13/2021 10:16 AM    Date of Discussion:\6/21/21  Active Diagnoses:  PE, GI bleeding, anemia - profound in JW, HTN, Uterine CA, depression, AF, CAD         These active diagnoses are of sufficient risk that focused discussion on advance care planning is indicated in order to allow the patient to thoughtfully consider personal goals of care; and, if situations arise that prevent the ability to personally give input, to ensure appropriate representation of their personal desires for different levels and agressiveness of care. Discussion:    Persons present and participating in discussion: JAMILA Chu CNP, Daughter     Patient's decisional capacity or surrogate:  PT does not/ daughter does    Discussion in summary: Discussed in the setting of her Mothers profound anemia and compounding medical issues and out spiritual preferences can continue to treat with supportive care however this will likely be her demise. Decided to continue to care with hospice. Code status: changed to limited     Time Spent:     Total time spent face-to-face in education and discussion:  40minutes.

## 2021-06-21 NOTE — PROGRESS NOTES
Occupational Therapy/Physical Therapy  Hold: Spoke with RN who reports that pt is not medically appropriate for therapy at this time. Pt very lethargic with low hemoglobin and hematocrit. Will hold therapy per RN request and follow up another date as pt is appropriate.     Lacey Cuello, OTR/L [id number]  Chavez Clark, PT, DPT

## 2021-06-21 NOTE — PROGRESS NOTES
Hospitalist Progress Note      PCP: Maida Spence MD    Date of Admission: 6/13/2021    Chief Complaint   Patient presents with    Rectal Bleeding     had near syncopal episode getting up from toilet today. pain in bottom started this morning. jahovah's witness patient. Hospital Course: H&P reviewed     Subjective:    EMR and notes reviewed pt seen and examined.  No complaints from pt/ daughter at bedside   Medications:  Reviewed    Infusion Medications    dextrose 100 mL/hr (06/21/21 0042)    sodium chloride Stopped (06/19/21 0514)    sodium chloride      sodium chloride Stopped (06/19/21 0514)     Scheduled Medications    epoetin tyrone-epbx  10,000 Units Subcutaneous Q7 Days    polyethylene glycol  120 g Oral Once    cyanocobalamin  1,000 mcg Subcutaneous Daily    folic acid  2 mg Oral Daily    iron sucrose (VENOFER) iv piggyback 100 mL (Admin over 60 minutes)  200 mg Intravenous Q24H    DULoxetine  30 mg Oral Daily    lidocaine 1 % injection  5 mL Intradermal Once    sodium chloride flush  5-40 mL Intravenous 2 times per day    [Held by provider] apixaban  10 mg Oral BID    sodium chloride flush  5-40 mL Intravenous 2 times per day    amiodarone  200 mg Oral Daily    [Held by provider] aspirin  81 mg Oral Daily    atorvastatin  10 mg Oral Nightly    pantoprazole  40 mg Oral QAM AC    vitamin D  2,000 Units Oral Daily    [Held by provider] sertraline  12.5 mg Oral Daily    miconazole   Topical BID     PRN Meds: glucose, dextrose, glucagon (rDNA), dextrose, traMADol, sodium chloride flush, sodium chloride, sodium chloride flush, sodium chloride, ondansetron **OR** ondansetron, polyethylene glycol, acetaminophen **OR** acetaminophen      Intake/Output Summary (Last 24 hours) at 6/21/2021 0758  Last data filed at 6/20/2021 2235  Gross per 24 hour   Intake 0 ml   Output 0 ml   Net 0 ml       Physical Exam Performed:  BP (!) 98/51   Pulse 60   Temp 98.1 °F (36.7 °C) (Oral) Resp 16   Ht 5' (1.524 m)   Wt 128 lb (58.1 kg)   SpO2 93%   BMI 25.00 kg/m²     Deferred as patient in Colonoscopy     Labs:   Recent Labs     06/19/21  0639 06/20/21  0644 06/20/21  1755 06/21/21  0157 06/21/21  0655   WBC 9.6 11.8*  --   --  8.0   HGB 5.0* 5.3* 5.4* 4.5* 4.7*   HCT 15.4* 16.6* 16.6* 13.8* 14.3*    404  --   --  295     Recent Labs     06/19/21  0639 06/20/21  0644 06/21/21  0655    135* 135*   K 2.9* 3.2* 3.4*    105 108   CO2 21 16* 23   BUN 11 10 6*   CREATININE <0.5* <0.5* <0.5*   CALCIUM 7.2* 7.6* 7.3*     Urinalysis:    Lab Results   Component Value Date    NITRU Negative 06/13/2021    BLOODU Negative 06/13/2021    SPECGRAV 1.015 06/13/2021    GLUCOSEU Negative 06/13/2021       Radiology:  CTA ABDOMEN PELVIS W WO CONTRAST   Final Result   There is some hyperdensity on the arterial phase imaging within a small bowel   loop in the left abdomen which is unchanged on delayed imaging and atypical   for active extravasation. This likely represents migration of oral contrast,   which is seen within small bowel loops in the right abdomen, given there was   a delay between the noncontrast and arterial phase imaging. Hyperenhancing   small-bowel fall per mucosa at is another possibility. No definite active   extravasation/arterial GI bleed. There is new wall thickening involving the ascending and transverse colon   with mild adjacent inflammatory changes concerning for developing colitis. Extensive sigmoid diverticulitis. Cholelithiasis. Gallbladder is mildly distended which may be due to NPO   status. If there is concern for acute cholecystitis, right upper quadrant   ultrasound may be helpful for further evaluation. NM GI BLOOD LOSS   Final Result   Focus of extravascular accumulation of activity in the left pelvis suspicious   for small acute GI bleed. Location corresponds to the sigmoid colon which   has very severe diverticular disease. RECOMMENDATIONS:   Evaluation of the patient's neck is bloody bowel movement for radio activity   could be performed and limited repeat scanning could be performed      If the patient shows hemodynamic signs of an active bleed in the next 20   hours, additional images can be acquired. CT ABDOMEN PELVIS WO CONTRAST Additional Contrast? None   Final Result   1. No acute abnormality to correlate to history of blood loss/anemia. 2. Cholelithiasis with moderate mucosal thickening of the gallbladder. Recommend correlation with clinical workup. If there is any concern for   underlying cholecystitis, a follow-up ultrasound may be considered. 3. Chronic interstitial changes partially observed in the lower chest.         VL Extremity Venous Bilateral   Final Result      CTA CHEST ABDOMEN PELVIS W CONTRAST   Final Result   1. CT CHEST: Acute pulmonary embolism proximally within the pulmonary artery   branch to the left lower lobe. 2. No CT findings of right heart strain. 3. Mild calcific atherosclerosis aorta and its branches without aneurysm or   dissection. 4. CT ABDOMEN/PELVIS: No acute vascular abnormality. 5.  No CT evidence of an acute intra-abdominal or intrapelvic process. 6. Cholelithiasis without CT findings of acute cholecystitis. 7.  Diverticulosis coli without CT evidence of acute diverticulitis. 8. Small, fat containing left inguinal hernia. 9. Small, fat containing umbilical hernia. 10. Possible constipation. Critical results were called by Dr. Sai Weinstein to Wally Son on   6/13/2021 at 13:03. XR CHEST PORTABLE   Final Result   No acute cardiopulmonary disease. IR ANGIOGRAM INFERIOR MESENTERIC    (Results Pending)         Assessment/Plan:    Active Hospital Problems    Diagnosis Date Noted    Pulmonary embolus ([address] [I26.99] 06/13/2021    Rectal bleeding [K62.5] 10/04/2014     1.   Syncope in the setting of acute left segmental PE POA  -As evidenced by CT PA on admission. Patient started on heparin GTT in ED. Lost IV access and it was unable to be re-established. D/w GI, and ok with starting eliquis (started on 6/15/2021) . -Bilateral venous duplex of lower extremities 6/14/2021 showed no evidence of DVT in either extremity.  -Some increasing SOB, requiring 2L/min O2, likely r/t progressive anemia with acute blood loss. Requested picc placement on 6/16/2021 in anticipation of volume replacement.   -Continues with BRBPR and eliquis has been held since 6/16 am d/t downtrending Hb. INR obtained 2/2 picc placement and found to be 3.83.   -Hb trended down to 7.1 early 6/17.   -CT abdomen 6/17 showed no acute abnormality to correlate to history of blood loss/anemia. No retroperitoneal hematoma  -Hematology was consulted 6/17/2021 and plan for IVC filter placement with Dr. Francisca Mejia, Venofer 200 mg IV X 3 doses, Vitamin B12 shots daily X 7 days, and Folate 2 mg PO daily, in order to discontinue anticoagulation in anemic Jew. Also recommended Vitamin K 5mg IV x1 .   -Continue telemetry monitor.  -  (LD-303), KIARA - Negative , TSH (3.4) ,    -On 6/18/2021 -patient's hemoglobin dropped down to 4.9. D/w patient and her daughter at bedside-patient still did not wish to have blood transfusions.    - Continue IV Venofer and Epogen. -  D/w GI and IR (as documented above in subjective section) and finally ordered CTA abdomen pelvis GI bleed protocol on 6/18/21  per IR (Dr. Royal Lau) recommendations. No active bleeding noted . - GI plan for Re-scoping (Colonoscopy) 6/20- Follow up      2. Rectal bleeding POA -likely hemorrhoidal/Diverticular  -Presented with painless hematochezia. -GI consulted ( Dr. Tima Peterson) - S/p  Colonoscopy 6/15 revealed diverticulosis, internal hemorrhoids, old blood and no active bleeding. GI recommended restarting anticoagulation, initiated eliquis d/t loss of IV access.     -Several episodes of hematochezia s/p colonoscopy and

## 2021-06-21 NOTE — DISCHARGE INSTR - COC
Continuity of Care Form    Patient Name: Anival Nelson   :  1932  MRN:  [id number]    Admit date:  2021  Discharge date:      Code Status Order: Full Code   Advance Directives:   Advance Care Flowsheet Documentation       Date/Time Healthcare Directive Type of Healthcare Directive Copy in [address] Agent's Name Healthcare Agent's Phone Number    [phone]  Unknown, patient unable to respond due to medical condition -- -- -- -- --    06/15/21 0742  Yes, patient has an advance directive for healthcare treatment -- -- -- -- --            Admitting Physician:  Lizandro Lara MD  PCP: Erin Munson MD    [facility]Room[facility] 3897/1519-47  Discharging Unit Phone Number: [phone]    Emergency Contact:   Extended Emergency Contact Information  Primary Emergency Contact: Viviane Martini [place]  Mobile Phone: [phone]  Relation: Child    Past Surgical History:  Past Surgical History:   Procedure Laterality Date    APPENDECTOMY      COLONOSCOPY  2005    polyp    COLONOSCOPY  1010    diverticulosis    COLONOSCOPY  10/4/2014    diverticulosis, radiation colitis sigmoid    COLONOSCOPY N/A 2/15/2019    COLON performed by Poonam Shah MD at [address] [facility] N/A 6/15/2021    COLONOSCOPY DIAGNOSTIC performed by Poonam Sahh MD at [address] [facility] Right 8/4/15    hip pinning    HYSTERECTOMY  2008    RT in    [address]      OTHER SURGICAL HISTORY Right 10/29/2018    PATELLA OPEN REDUCTION INTERNAL FIXATION     PACEMAKER INSERTION      PACEMAKER PLACEMENT  2008    AR CPTR-ASST SURGICAL NAVIGATION [facility] Right 2018    RIGHT TOTAL KNEE REPLACEMENT COMPUTER NAVIGATION WITH ADDUCTOR CANAL BLOCK FOR PAIN CONTROL                     SONAL performed by Chencho Maldonado MD at [facility] OR    AL OFFICE/OUTPT VISIT,PROCEDURE ONLY Right 10/29/2018    Right knee inferior pole patellectomy, primary repair of patellar tendon.  performed by Demetris Wise MD at [address] <100SQCM Right 11/30/2018    RIGHT KNEE GASTROC FLAP WITH SKIN GRAFT, WOUND VAC PLACEMENT performed by Lolis Sahu MD at [address] Right 3/20/2021    REVISION RIGHT TOTAL KNEE ARTHROPLASTY       SONAL performed by Lolis Sahu MD at [place] [address].         Immunization History:   Immunization History   Administered Date(s) Administered    Influenza Virus Vaccine 09/10/2014, 10/31/2018    Pneumococcal Conjugate 13-valent ([id number]) 02/14/2019    Pneumococcal Conjugate 7-valent (Orest Mckusick) 10/06/2011       Active Problems:  Patient Active Problem List   Diagnosis Code    Localized osteoarthrosis, lower leg M17.10    Rectal bleeding K62.5    Osteoarthritis of hip M16.9    Hip fracture ([facility] RIGHT S72.009A    Hypertension I10    Hypercholesteremia E78.00    Primary osteoarthritis of right knee M17.11    Pain due to total right knee replacement (HCC) T84.84XA, Z96.651    Chronic knee pain after total replacement of right knee joint M25.561, G89.29, Z96.651    Status post total right knee replacement Z96.651    Paroxysmal atrial fibrillation (HCC) I48.0    Pacemaker Z95.0    Displaced comminuted fracture of right patella, initial encounter for closed fracture S82.041A    Severe protein-calorie malnutrition ([facility] E43    Displaced transverse fracture of right patella, initial encounter for closed fracture S82.031A    Hyperlipidemia E78.5    Anemia D64.9    Unstable gait R26.81    Open knee wound, right, sequela S81.001S    GI bleeding K92.2    H/O skin graft Z94.5    Hyponatremia E87.1    CAD (coronary artery disease) I25.10    Closed fracture of right distal femur ([facility]) S72.401A    Hypoalbuminemia E88.09    GERD (gastroesophageal reflux disease) K21.9    Wide-complex tachycardia (HCC) I47.2    Pulmonary embolus (HCC) I26.99       Isolation/Infection:   Isolation            No Isolation          Patient Infection Status       Infection Onset Added Last Indicated Last Indicated By Review Planned Expiration Resolved Resolved By    None active    Resolved    C-diff Rule Out 06/15/21 06/15/21 06/15/21 Clostridium difficile toxin/antigen (Ordered)   06/18/21 Mariam Stephen RN    COVID-19 Rule Out 03/22/21 03/24/21 03/24/21 COVID-19, Rapid (Ordered)   03/24/21 Rule-Out Test Resulted            Nurse Assessment:  Last Vital Signs: BP (!) 111/58   Pulse 65   Temp 98.1 °F (36.7 °C) (Oral)   Resp 16   Ht 5' (1.524 m)   Wt 128 lb (58.1 kg)   SpO2 95%   BMI 25.00 kg/m²     Last documented pain score (0-10 scale): Pain Level: 0  Last Weight:   Wt Readings from Last 1 Encounters:   06/13/21 128 lb (58.1 kg)     Mental Status:  oriented and alert    IV Access:  - None    Nursing Mobility/ADLs:  Walking   Assisted  Transfer  Assisted  Bathing  Assisted  Dressing  Assisted  Toileting  Assisted  Feeding  Assisted  Med Admin  Assisted  Med Delivery   whole    Wound Care Documentation and Therapy:        Elimination:  Continence:   · Bowel: No  · Bladder: No  Urinary Catheter: Insertion Date: 6/22   Colostomy/Ileostomy/Ileal Conduit: No       Date of Last BM: 6/22    Intake/Output Summary (Last 24 hours) at 6/21/2021 0915  Last data filed at 6/20/2021 2235  Gross per 24 hour   Intake 0 ml   Output 0 ml   Net 0 ml     No intake/output data recorded. Safety Concerns:     [employer] [place]    Impairments/Disabilities:      None    Nutrition Therapy:  Current Nutrition Therapy:   - Oral Diet:  Dental Soft    Routes of Feeding: Oral  Liquids:  Thin Liquids  Daily Fluid Restriction: no  Last Modified Barium Swallow with Video (Video Swallowing Test): not done    Treatments at the Time of Hospital Discharge:   Respiratory Treatments:   Oxygen Therapy:  is not on home oxygen therapy. Ventilator:    - No ventilator support    Rehab Therapies: Physical Therapy and Occupational Therapy  Weight Bearing Status/Restrictions: No weight bearing restirctions  Other Medical Equipment (for information only, NOT a DME order):  wheelchair and walker  Other Treatments:       Patient's personal belongings (please select all that are sent with patient):  None  1 bag   RN SIGNATURE:  Electronically signed by Enma Cain RN on 6/23/21 at 1:38 PM EDT    CASE MANAGEMENT/SOCIAL WORK SECTION    Inpatient Status Date: 6/13/21    Readmission Risk Assessment Score:  Readmission Risk              Risk of Unplanned Readmission:  26           Discharging to Facility/ Agency   · Name: [facility]   · Address:  · Phone: [phone]  · Fax: [phone]    / signature: {Esignature:[id number]}    PHYSICIAN SECTION    Prognosis: Guarded    Condition at Discharge: Stable    Rehab Potential (if transferring to Rehab): {Prognosis:[id number]}    Recommended Labs or Other Treatments After Discharge:   Recommended Follow-up, Labs or Other Treatments After Discharge:    Supportive Care and Partner with Hospice                Physician Certification: I certify the above information and transfer of Opal Her  is necessary for the continuing treatment of the diagnosis listed and that she requires ECF with Hospice for less 30 days.      Update Admission H&P: Changes in H&P as follows - ***    PHYSICIAN SIGNATURE:  Electronically signed by JAMILA Gonzalez CNP on 6/23/21 at 11:26 AM EDT

## 2021-06-21 NOTE — PROGRESS NOTES
ONCOLOGY HEMATOLOGY CARE PROGRESS NOTE      SUBJECTIVE:     Hgb 4.7. Despite that, her vitals are stable and she denies SOB or chest pain. Family is considering hospice care as well due to her age of 80 with profound anemia. ROS:     Unable to assess - gone for procedure. OBJECTIVE        Physical    VITALS:  BP (!) 110/48   Pulse 65   Temp 98.2 °F (36.8 °C) (Oral)   Resp 16   Ht 5' (1.524 m)   Wt 128 lb (58.1 kg)   SpO2 95%   BMI 25.00 kg/m²   TEMPERATURE:  Current - Temp: 98.2 °F (36.8 °C); Max - Temp  Av.1 °F (36.7 °C)  Min: 97.8 °F (36.6 °C)  Max: 98.4 °F (36.9 °C)  PULSE OXIMETRY RANGE: SpO2  Av.7 %  Min: 93 %  Max: 100 %  24HR INTAKE/OUTPUT:      Intake/Output Summary (Last 24 hours) at 2021 1332  Last data filed at 2021 1244  Gross per 24 hour   Intake 200 ml   Output 0 ml   Net 200 ml     Unable to examine - gone for procedure. Data      Recent Labs     21  0639 21  0644 21  1755 21  0157 21  0655   WBC 9.6 11.8*  --   --  8.0   HGB 5.0* 5.3* 5.4* 4.5* 4.7*   HCT 15.4* 16.6* 16.6* 13.8* 14.3*    404  --   --  295   .9* 108.2*  --   --  106.5*        Recent Labs     21  0639 21  0644 21  0655    135* 135*   K 2.9* 3.2* 3.4*    105 108   CO2 21 16* 23   BUN 11 10 6*   CREATININE <0.5* <0.5* <0.5*     No results for input(s): AST, ALT, ALB, BILIDIR, BILITOT, ALKPHOS in the last 72 hours.     Magnesium:    Lab Results   Component Value Date    MG 1.70 2021    MG 2.00 2021    MG 1.90 2021         Problem List  Patient Active Problem List   Diagnosis    Localized osteoarthrosis, lower leg    Rectal bleeding    Osteoarthritis of hip    Hip fracture ([facility] [address] RIGHT    Hypertension    Hypercholesteremia    Primary osteoarthritis of right knee    Pain due to total right knee replacement (HCC)    Chronic knee pain after total replacement of right

## 2021-06-21 NOTE — CARE COORDINATION
Chart reviewed by . Triggered by LOS. Day 8    Pt on b3 at this time. Is long term care at [facility]. Pt disposition pending stable labs - Hgb still low. VM left for admissions team, Emmie, at [facility] to provide updates and direct call back for CM.

## 2021-06-21 NOTE — PROGRESS NOTES
For patient safety, an AVASYS camera has been placed in patient's room. AVASYS monitoring needed for Confusion, Increased Fall Risk, Pulling at Lines and Delirium. Writer placed call to monitor observer to verify camera number 2 and review patient name, reason for monitoring, and contact information for primary RN. Patient and HCPOA notified of use of remote monitoring upon implementation of camera and verbalized understanding.

## 2021-06-22 ENCOUNTER — APPOINTMENT (OUTPATIENT)
Dept: VASCULAR LAB | Age: 86
DRG: 377 | End: 2021-06-22
Payer: MEDICARE

## 2021-06-22 LAB
ANION GAP SERPL CALCULATED.3IONS-SCNC: 5 MMOL/L (ref 3–16)
BUN BLDV-MCNC: 4 MG/DL (ref 7–20)
CALCIUM SERPL-MCNC: 7.1 MG/DL (ref 8.3–10.6)
CHLORIDE BLD-SCNC: 104 MMOL/L (ref 99–110)
CO2: 24 MMOL/L (ref 21–32)
CREAT SERPL-MCNC: <0.5 MG/DL (ref 0.6–1.2)
GFR AFRICAN AMERICAN: >60
GFR NON-AFRICAN AMERICAN: >60
GLUCOSE BLD-MCNC: 73 MG/DL (ref 70–99)
GLUCOSE BLD-MCNC: 76 MG/DL (ref 70–99)
GLUCOSE BLD-MCNC: 80 MG/DL (ref 70–99)
GLUCOSE BLD-MCNC: 85 MG/DL (ref 70–99)
GLUCOSE BLD-MCNC: 85 MG/DL (ref 70–99)
GLUCOSE BLD-MCNC: 88 MG/DL (ref 70–99)
GLUCOSE BLD-MCNC: 92 MG/DL (ref 70–99)
HCT VFR BLD CALC: 16.2 % (ref 36–48)
HCT VFR BLD CALC: 16.5 % (ref 36–48)
HEMOGLOBIN: 5.3 G/DL (ref 12–16)
HEMOGLOBIN: 5.3 G/DL (ref 12–16)
MAGNESIUM: 2.1 MG/DL (ref 1.8–2.4)
MCH RBC QN AUTO: 34.4 PG (ref 26–34)
MCHC RBC AUTO-ENTMCNC: 32.3 G/DL (ref 31–36)
MCV RBC AUTO: 106.5 FL (ref 80–100)
PDW BLD-RTO: 17.2 % (ref 12.4–15.4)
PERFORMED ON: NORMAL
PLATELET # BLD: 321 K/UL (ref 135–450)
PMV BLD AUTO: 7.9 FL (ref 5–10.5)
POTASSIUM REFLEX MAGNESIUM: 3.1 MMOL/L (ref 3.5–5.1)
RBC # BLD: 1.55 M/UL (ref 4–5.2)
SODIUM BLD-SCNC: 133 MMOL/L (ref 136–145)
WBC # BLD: 9.6 K/UL (ref 4–11)

## 2021-06-22 PROCEDURE — 6370000000 HC RX 637 (ALT 250 FOR IP): Performed by: INTERNAL MEDICINE

## 2021-06-22 PROCEDURE — 36415 COLL VENOUS BLD VENIPUNCTURE: CPT

## 2021-06-22 PROCEDURE — 83735 ASSAY OF MAGNESIUM: CPT

## 2021-06-22 PROCEDURE — 6370000000 HC RX 637 (ALT 250 FOR IP)

## 2021-06-22 PROCEDURE — 6360000002 HC RX W HCPCS: Performed by: NURSE PRACTITIONER

## 2021-06-22 PROCEDURE — 85018 HEMOGLOBIN: CPT

## 2021-06-22 PROCEDURE — 93971 EXTREMITY STUDY: CPT

## 2021-06-22 PROCEDURE — 2580000003 HC RX 258: Performed by: INTERNAL MEDICINE

## 2021-06-22 PROCEDURE — 85014 HEMATOCRIT: CPT

## 2021-06-22 PROCEDURE — 94761 N-INVAS EAR/PLS OXIMETRY MLT: CPT

## 2021-06-22 PROCEDURE — 80048 BASIC METABOLIC PNL TOTAL CA: CPT

## 2021-06-22 PROCEDURE — 1200000000 HC SEMI PRIVATE

## 2021-06-22 PROCEDURE — 6370000000 HC RX 637 (ALT 250 FOR IP): Performed by: PEDIATRICS

## 2021-06-22 PROCEDURE — 85027 COMPLETE CBC AUTOMATED: CPT

## 2021-06-22 PROCEDURE — 6370000000 HC RX 637 (ALT 250 FOR IP): Performed by: NURSE PRACTITIONER

## 2021-06-22 PROCEDURE — 2700000000 HC OXYGEN THERAPY PER DAY

## 2021-06-22 RX ORDER — POTASSIUM CHLORIDE 20 MEQ/1
40 TABLET, EXTENDED RELEASE ORAL ONCE
Status: DISCONTINUED | OUTPATIENT
Start: 2021-06-22 | End: 2021-06-23 | Stop reason: HOSPADM

## 2021-06-22 RX ADMIN — TRAMADOL HYDROCHLORIDE 50 MG: 50 TABLET, FILM COATED ORAL at 23:59

## 2021-06-22 RX ADMIN — Medication 2000 UNITS: at 09:38

## 2021-06-22 RX ADMIN — SODIUM CHLORIDE, PRESERVATIVE FREE 10 ML: 5 INJECTION INTRAVENOUS at 09:40

## 2021-06-22 RX ADMIN — ACETAMINOPHEN 650 MG: 325 TABLET ORAL at 00:25

## 2021-06-22 RX ADMIN — ATORVASTATIN CALCIUM 10 MG: 10 TABLET, FILM COATED ORAL at 20:59

## 2021-06-22 RX ADMIN — MICONAZOLE NITRATE: 2 POWDER TOPICAL at 09:39

## 2021-06-22 RX ADMIN — FOLIC ACID 2 MG: 1 TABLET ORAL at 09:38

## 2021-06-22 RX ADMIN — Medication: at 04:22

## 2021-06-22 RX ADMIN — DULOXETINE HYDROCHLORIDE 30 MG: 30 CAPSULE, DELAYED RELEASE ORAL at 09:38

## 2021-06-22 RX ADMIN — MICONAZOLE NITRATE: 2 POWDER TOPICAL at 20:59

## 2021-06-22 RX ADMIN — POTASSIUM BICARBONATE 40 MEQ: 782 TABLET, EFFERVESCENT ORAL at 20:59

## 2021-06-22 RX ADMIN — AMIODARONE HYDROCHLORIDE 200 MG: 200 TABLET ORAL at 09:38

## 2021-06-22 RX ADMIN — CYANOCOBALAMIN 1000 MCG: 1000 INJECTION, SOLUTION INTRAMUSCULAR; SUBCUTANEOUS at 09:39

## 2021-06-22 RX ADMIN — SODIUM CHLORIDE, PRESERVATIVE FREE 10 ML: 5 INJECTION INTRAVENOUS at 09:41

## 2021-06-22 ASSESSMENT — PAIN DESCRIPTION - LOCATION: LOCATION: THROAT

## 2021-06-22 ASSESSMENT — PAIN SCALES - GENERAL
PAINLEVEL_OUTOF10: 5
PAINLEVEL_OUTOF10: 0
PAINLEVEL_OUTOF10: 6
PAINLEVEL_OUTOF10: 0
PAINLEVEL_OUTOF10: 0
PAINLEVEL_OUTOF10: 5
PAINLEVEL_OUTOF10: 0

## 2021-06-22 NOTE — PROGRESS NOTES
Occupational Therapy  No treatment  Noted HGB below 7, will hold OT per therapeutic protocol. RN aware. Bret Ferris.  Miguel, OTR/L #[id number]  Monty Pollard, PTA #[id number]

## 2021-06-22 NOTE — PROGRESS NOTES
protein-energy intake, altered GI function as evidenced by intake 0-25%, intake 26-50%      Nutrition Interventions:   Food and/or Nutrient Delivery:  Continue Current Diet, Start Oral Nutrition Supplement  Nutrition Education/Counseling:  No recommendation at this time   Coordination of Nutrition Care:  Continue to monitor while inpatient    Goals:  Consume and tolerate greater than 50% of meals and ONS this admission       Nutrition Monitoring and Evaluation:   Behavioral-Environmental Outcomes:  None Identified   Food/Nutrient Intake Outcomes:  Diet Advancement/Tolerance, Food and Nutrient Intake, Supplement Intake  Physical Signs/Symptoms Outcomes:  Biochemical Data, GI Status, Nutrition Focused Physical Findings, Weight     Discharge Planning:    Continue current diet, Continue Oral Nutrition Supplement     Electronically signed by Carroll Salazar MS, RD, LD on 6/22/21 at 12:41 PM EDT    Contact: [phone]

## 2021-06-22 NOTE — PROGRESS NOTES
Perfect serve message sent to [employer], [facility] [employer]. Large GI bleed after treatment for PE, is not AC appropriate did receive a filter. PT is a JW and does not take blood products. Daughter has use [employer] in the past and is a resident at [facility] awaiting response. Sent consult through [employer] @ [phone].  Sergio Lopez RN

## 2021-06-22 NOTE — PROGRESS NOTES
Hospitalist Progress Note      PCP: Emir Meza MD    Date of Admission: 6/13/2021    Chief Complaint   Patient presents with    Rectal Bleeding     had near syncopal episode getting up from toilet today. pain in bottom started this morning. jahovah's witness patient. Hospital Course: H&P reviewed     Subjective:    EMR and notes reviewed pt seen and examined.  No complaints from pt/ daughter at bedside   Medications:  Reviewed    Infusion Medications    dextrose 100 mL/hr (06/21/21 0042)    sodium chloride 25 mL (06/21/21 1048)    sodium chloride Stopped (06/19/21 0514)     Scheduled Medications    [START ON 6/25/2021] epoetin tyrone-epbx  20,000 Units Subcutaneous Q7 Days    polyethylene glycol  120 g Oral Once    cyanocobalamin  1,000 mcg Subcutaneous Daily    folic acid  2 mg Oral Daily    DULoxetine  30 mg Oral Daily    lidocaine 1 % injection  5 mL Intradermal Once    sodium chloride flush  5-40 mL Intravenous 2 times per day    sodium chloride flush  5-40 mL Intravenous 2 times per day    amiodarone  200 mg Oral Daily    [Held by provider] aspirin  81 mg Oral Daily    atorvastatin  10 mg Oral Nightly    pantoprazole  40 mg Oral QAM AC    vitamin D  2,000 Units Oral Daily    [Held by provider] sertraline  12.5 mg Oral Daily    miconazole   Topical BID     PRN Meds: glucose, dextrose, glucagon (rDNA), dextrose, traMADol, sodium chloride flush, sodium chloride, sodium chloride flush, sodium chloride, ondansetron **OR** ondansetron, polyethylene glycol, acetaminophen **OR** acetaminophen      Intake/Output Summary (Last 24 hours) at 6/22/2021 1003  Last data filed at 6/22/2021 0051  Gross per 24 hour   Intake 200 ml   Output 600 ml   Net -400 ml       Physical Exam Performed:  BP (!) 104/53   Pulse 73   Temp 98.5 °F (36.9 °C) (Oral)   Resp 16   Ht 5' (1.524 m)   Wt 128 lb (58.1 kg)   SpO2 95%   BMI 25.00 kg/m²     Deferred as patient in Colonoscopy     Labs:   Recent Labs     06/20/21  0644 06/21/21  0655 06/21/21  1725 06/22/21  0104   WBC 11.8* 8.0  --   --    HGB 5.3* 4.7* 5.7* 5.3*   HCT 16.6* 14.3* 17.6* 16.2*    295  --   --      Recent Labs     06/20/21  0644 06/21/21  0655   * 135*   K 3.2* 3.4*    108   CO2 16* 23   BUN 10 6*   CREATININE <0.5* <0.5*   CALCIUM 7.6* 7.3*     Urinalysis:    Lab Results   Component Value Date    NITRU Negative 06/13/2021    BLOODU Negative 06/13/2021    SPECGRAV 1.015 06/13/2021    GLUCOSEU Negative 06/13/2021       Radiology:  CTA ABDOMEN PELVIS W WO CONTRAST   Final Result   There is some hyperdensity on the arterial phase imaging within a small bowel   loop in the left abdomen which is unchanged on delayed imaging and atypical   for active extravasation. This likely represents migration of oral contrast,   which is seen within small bowel loops in the right abdomen, given there was   a delay between the noncontrast and arterial phase imaging. Hyperenhancing   small-bowel fall per mucosa at is another possibility. No definite active   extravasation/arterial GI bleed. There is new wall thickening involving the ascending and transverse colon   with mild adjacent inflammatory changes concerning for developing colitis. Extensive sigmoid diverticulitis. Cholelithiasis. Gallbladder is mildly distended which may be due to NPO   status. If there is concern for acute cholecystitis, right upper quadrant   ultrasound may be helpful for further evaluation. NM GI BLOOD LOSS   Final Result   Focus of extravascular accumulation of activity in the left pelvis suspicious   for small acute GI bleed. Location corresponds to the sigmoid colon which   has very severe diverticular disease.       RECOMMENDATIONS:   Evaluation of the patient's neck is bloody bowel movement for radio activity   could be performed and limited repeat scanning could be performed      If the patient shows hemodynamic signs of an active bleed in the next 20   hours, additional images can be acquired. CT ABDOMEN PELVIS WO CONTRAST Additional Contrast? None   Final Result   1. No acute abnormality to correlate to history of blood loss/anemia. 2. Cholelithiasis with moderate mucosal thickening of the gallbladder. Recommend correlation with clinical workup. If there is any concern for   underlying cholecystitis, a follow-up ultrasound may be considered. 3. Chronic interstitial changes partially observed in the lower chest.         VL Extremity Venous Bilateral   Final Result      CTA CHEST ABDOMEN PELVIS W CONTRAST   Final Result   1. CT CHEST: Acute pulmonary embolism proximally within the pulmonary artery   branch to the left lower lobe. 2. No CT findings of right heart strain. 3. Mild calcific atherosclerosis aorta and its branches without aneurysm or   dissection. 4. CT ABDOMEN/PELVIS: No acute vascular abnormality. 5.  No CT evidence of an acute intra-abdominal or intrapelvic process. 6. Cholelithiasis without CT findings of acute cholecystitis. 7.  Diverticulosis coli without CT evidence of acute diverticulitis. 8. Small, fat containing left inguinal hernia. 9. Small, fat containing umbilical hernia. 10. Possible constipation. Critical results were called by Dr. Claudette May to Ranulfo Brewster on   6/13/2021 at 13:03. XR CHEST PORTABLE   Final Result   No acute cardiopulmonary disease. IR ANGIOGRAM INFERIOR MESENTERIC    (Results Pending)   VL Extremity Venous Right    (Results Pending)         Assessment/Plan:    Active Hospital Problems    Diagnosis Date Noted    Pulmonary embolus ([address] [I26.99] 06/13/2021    Rectal bleeding [K62.5] 10/04/2014     1. Syncope in the setting of acute left segmental PE POA  -As evidenced by CT PA on admission. Patient started on heparin GTT in ED. Lost IV access and it was unable to be re-established.  D/w GI, and ok with starting eliquis (started on 6/15/2021) . -Bilateral venous duplex of lower extremities 6/14/2021 showed no evidence of DVT in either extremity.  -Some increasing SOB, requiring 2L/min O2, likely r/t progressive anemia with acute blood loss. Requested picc placement on 6/16/2021 in anticipation of volume replacement.   -Continues with BRBPR and eliquis has been held since 6/16 am d/t downtrending Hb. INR obtained 2/2 picc placement and found to be 3.83.   -Hb trended down to 7.1 early 6/17.   -CT abdomen 6/17 showed no acute abnormality to correlate to history of blood loss/anemia. No retroperitoneal hematoma  -Hematology was consulted 6/17/2021 and plan for IVC filter placement with Dr. Minerva Lennox, Venofer 200 mg IV X 3 doses, Vitamin B12 shots daily X 7 days, and Folate 2 mg PO daily, in order to discontinue anticoagulation in anemic [place]. Also recommended Vitamin K 5mg IV x1 .   -Continue telemetry monitor.  -  (LD-303), KIARA - Negative , TSH (3.4) ,    -On 6/18/2021 -patient's hemoglobin dropped down to 4.9. D/w patient and her daughter at bedside-patient still did not wish to have blood transfusions.    - Continue IV Venofer and Epogen. -  D/w GI and IR (as documented above in subjective section) and finally ordered CTA abdomen pelvis GI bleed protocol on 6/18/21  per IR (Dr. Alyce Carlton) recommendations. No active bleeding noted . - GI plan for Re-scoping (Colonoscopy) 6/20- Follow up      2. Rectal bleeding POA -likely hemorrhoidal/Diverticular  -Presented with painless hematochezia. -GI consulted ( Dr. Tamela Littlejohn) - S/p  Colonoscopy 6/15 revealed diverticulosis, internal hemorrhoids, old blood and no active bleeding. GI recommended restarting anticoagulation, initiated eliquis d/t loss of IV access. -Several episodes of hematochezia s/p colonoscopy and initiation of eliquis.  Discussed r/b with patient of needing anticoagulation with current PE and the likelihood that she will continue to bleed from her known internal

## 2021-06-22 NOTE — SIGNIFICANT EVENT
- R arm swelling at midline site, history provided concerning for acute line thrombus   - ultrasound ordered in AM   - may require IR consult for line placement if ongoing need for long term IV access     Linh Enrique MD

## 2021-06-22 NOTE — PROGRESS NOTES
PROGRESS NOTE  S:89 yrs Patient  admitted on 6/13/2021 with Pulmonary embolism on left ([address] [I26.99] . Today she complains of weakness and loss of appetite. No further bleeding. Patient and family decided to pursue home hospice yesterday    Exam:   Vitals:    06/22/21 1147   BP:    Pulse:    Resp:    Temp:    SpO2: 96%      General appearance: alert, appears stated age and cooperative  HEENT: Neck supple with midline trachea  Neck: supple, symmetrical, trachea midline  Lungs: clear to auscultation bilaterally  Heart: regular rate and rhythm, S1, S2 normal, no murmur, click, rub or gallop  Abdomen: soft, non-tender; bowel sounds normal; no masses,  no organomegaly  Extremities: extremities normal, atraumatic, no cyanosis or edema     Medications: Reviewed    Labs:  CBC:   Recent Labs     06/20/21  0644 06/21/21  0655 06/21/21  1725 06/22/21  0104 06/22/21  0950   WBC 11.8* 8.0  --   --  9.6   HGB 5.3* 4.7* 5.7* 5.3* 5.3*   HCT 16.6* 14.3* 17.6* 16.2* 16.5*   .2* 106.5*  --   --  106.5*    295  --   --  321     BMP:   Recent Labs     06/20/21  0644 06/21/21  0655 06/22/21  0950   * 135* 133*   K 3.2* 3.4* 3.1*    108 104   CO2 16* 23 24   BUN 10 6* 4*   CREATININE <0.5* <0.5* <0.5*     LIVER PROFILE: No results for input(s): AST, ALT, LIPASE, PROT, BILIDIR, BILITOT, ALKPHOS in the last 72 hours. Invalid input(s): AMYLASE,  ALB  PT/INR:   Recent Labs     06/21/21  1031   INR 1.26*         Impression:80year old Buddh[employer] female with history of HTN, HLD, GERD, A fib, arthritis, depression, uterine cancer s/p hysterectomy, diverticulosis and hemorrhoid admitted with painless hematochezia. Her initial colonoscopy showed diverticulosis and hemorrhoids but no active bleeding. Recurrent bleeding when eliquis started. RBC scan positive but follow up CTA and colonoscopy negative for active bleeding    Recommendation:  1.  Continue supportive care  2. Monitor Hgb  3. Continue IV iron and Epo  4. Encourage nutrition  5. Add supplements with meals  6.  Agree with Mayito Roche MD, MD  12:56 PM 6/22/2021

## 2021-06-22 NOTE — PROGRESS NOTES
ONCOLOGY HEMATOLOGY CARE PROGRESS NOTE      SUBJECTIVE:     She is tired. Oxygenation is stable. Hgb improved some to 5.3.      ROS:     Unable to assess - gone for procedure. OBJECTIVE        Physical    VITALS:  BP (!) 104/53   Pulse 73   Temp 98.5 °F (36.9 °C) (Oral)   Resp 16   Ht 5' (1.524 m)   Wt 128 lb (58.1 kg)   SpO2 95%   BMI 25.00 kg/m²   TEMPERATURE:  Current - Temp: 98.5 °F (36.9 °C); Max - Temp  Av.4 °F (36.9 °C)  Min: 98.1 °F (36.7 °C)  Max: 98.6 °F (37 °C)  PULSE OXIMETRY RANGE: SpO2  Av.3 %  Min: 94 %  Max: 97 %  24HR INTAKE/OUTPUT:      Intake/Output Summary (Last 24 hours) at 2021 1054  Last data filed at 2021 0051  Gross per 24 hour   Intake 200 ml   Output 600 ml   Net -400 ml     Unable to examine - gone for procedure. Data      Recent Labs     21  0644 21  0655 21  1725 21  0104 21  0950   WBC 11.8* 8.0  --   --  9.6   HGB 5.3* 4.7* 5.7* 5.3* 5.3*   HCT 16.6* 14.3* 17.6* 16.2* 16.5*    295  --   --  321   .2* 106.5*  --   --  106.5*        Recent Labs     21  0644 21  0655 21  0950   * 135* 133*   K 3.2* 3.4* 3.1*    108 104   CO2 16* 23 24   BUN 10 6* 4*   CREATININE <0.5* <0.5* <0.5*     No results for input(s): AST, ALT, ALB, BILIDIR, BILITOT, ALKPHOS in the last 72 hours.     Magnesium:    Lab Results   Component Value Date    MG 2.10 2021    MG 1.70 2021    MG 2.00 2021         Problem List  Patient Active Problem List   Diagnosis    Localized osteoarthrosis, lower leg    Rectal bleeding    Osteoarthritis of hip    [facility] RIGHT    Hypertension    Hypercholesteremia    Primary osteoarthritis of right knee    Pain due to total right knee replacement (HCC)    Chronic knee pain after total replacement of right knee joint    Status post total right knee replacement    Paroxysmal atrial fibrillation ([facility]  Pacemaker    Displaced comminuted fracture of right patella, initial encounter for closed fracture    Severe protein-calorie malnutrition (HCC)    Displaced transverse fracture of right patella, initial encounter for closed fracture    Hyperlipidemia    Anemia    Unstable gait    Open knee wound, right, sequela    GI bleeding    H/O skin graft    Hyponatremia    CAD (coronary artery disease)    Closed fracture of right distal femur (HCC)    Hypoalbuminemia    GERD (gastroesophageal reflux disease)    Wide-complex tachycardia (HCC)    Pulmonary embolus (HCC)       ASSESSMENT AND PLAN:    PE:  -She was on Eliquis and then had a GI bleed  -She has a substantial risk for bleeding including her age, and fall risk  -Furthermore she does not take blood products due to being a Gnosticist  - filter has been placed    Anemia:  -She is on Venofer and folate- completed 5 doses of Venofer now  - started Retacrit over weekend - increase dose to 20,000 units weekly   -This is likely to cause her significant morbidity  -Hemoglobin 5.0 -> 5.3--> 4.7 ---> 5.3   - c-scope showed diverticulosis with recent bleed    Coagulopathy:  -She is receiving vitamin K  -INR is down to 1.26     RUE swelling with midline  - doppler pending rule out line associated clot     Agree with cont supportive care and hospice ; stop every 8 hour H/H and use CBC only     JAMILA De Souza - CNP

## 2021-06-23 VITALS
HEART RATE: 79 BPM | SYSTOLIC BLOOD PRESSURE: 114 MMHG | HEIGHT: 60 IN | RESPIRATION RATE: 16 BRPM | OXYGEN SATURATION: 92 % | DIASTOLIC BLOOD PRESSURE: 44 MMHG | WEIGHT: 128 LBS | TEMPERATURE: 98.2 F | BODY MASS INDEX: 25.13 KG/M2

## 2021-06-23 LAB
ANION GAP SERPL CALCULATED.3IONS-SCNC: 7 MMOL/L (ref 3–16)
BUN BLDV-MCNC: 3 MG/DL (ref 7–20)
CALCIUM SERPL-MCNC: 6.9 MG/DL (ref 8.3–10.6)
CHLORIDE BLD-SCNC: 107 MMOL/L (ref 99–110)
CO2: 25 MMOL/L (ref 21–32)
CREAT SERPL-MCNC: <0.5 MG/DL (ref 0.6–1.2)
GFR AFRICAN AMERICAN: >60
GFR NON-AFRICAN AMERICAN: >60
GLUCOSE BLD-MCNC: 76 MG/DL (ref 70–99)
GLUCOSE BLD-MCNC: 78 MG/DL (ref 70–99)
GLUCOSE BLD-MCNC: 78 MG/DL (ref 70–99)
GLUCOSE BLD-MCNC: 82 MG/DL (ref 70–99)
HCT VFR BLD CALC: 14.8 % (ref 36–48)
HEMOGLOBIN: 4.9 G/DL (ref 12–16)
MAGNESIUM: 1.9 MG/DL (ref 1.8–2.4)
MCH RBC QN AUTO: 34.9 PG (ref 26–34)
MCHC RBC AUTO-ENTMCNC: 33 G/DL (ref 31–36)
MCV RBC AUTO: 105.9 FL (ref 80–100)
PDW BLD-RTO: 16.1 % (ref 12.4–15.4)
PERFORMED ON: NORMAL
PLATELET # BLD: 279 K/UL (ref 135–450)
PMV BLD AUTO: 7.6 FL (ref 5–10.5)
POTASSIUM REFLEX MAGNESIUM: 3.1 MMOL/L (ref 3.5–5.1)
RBC # BLD: 1.4 M/UL (ref 4–5.2)
SARS-COV-2, NAAT: NOT DETECTED
SODIUM BLD-SCNC: 139 MMOL/L (ref 136–145)
WBC # BLD: 8.1 K/UL (ref 4–11)

## 2021-06-23 PROCEDURE — 6360000002 HC RX W HCPCS: Performed by: NURSE PRACTITIONER

## 2021-06-23 PROCEDURE — 85027 COMPLETE CBC AUTOMATED: CPT

## 2021-06-23 PROCEDURE — 6370000000 HC RX 637 (ALT 250 FOR IP): Performed by: INTERNAL MEDICINE

## 2021-06-23 PROCEDURE — 6370000000 HC RX 637 (ALT 250 FOR IP): Performed by: NURSE PRACTITIONER

## 2021-06-23 PROCEDURE — 36415 COLL VENOUS BLD VENIPUNCTURE: CPT

## 2021-06-23 PROCEDURE — 80048 BASIC METABOLIC PNL TOTAL CA: CPT

## 2021-06-23 PROCEDURE — 83735 ASSAY OF MAGNESIUM: CPT

## 2021-06-23 PROCEDURE — 87635 SARS-COV-2 COVID-19 AMP PRB: CPT

## 2021-06-23 RX ORDER — FOLIC ACID 1 MG/1
2 TABLET ORAL DAILY
Qty: 30 TABLET | Refills: 3 | Status: SHIPPED | OUTPATIENT
Start: 2021-06-24

## 2021-06-23 RX ADMIN — AMIODARONE HYDROCHLORIDE 200 MG: 200 TABLET ORAL at 10:08

## 2021-06-23 RX ADMIN — MICONAZOLE NITRATE: 2 POWDER TOPICAL at 10:09

## 2021-06-23 RX ADMIN — ACETAMINOPHEN 650 MG: 325 TABLET ORAL at 14:18

## 2021-06-23 RX ADMIN — PANTOPRAZOLE SODIUM 40 MG: 40 TABLET, DELAYED RELEASE ORAL at 06:24

## 2021-06-23 RX ADMIN — FOLIC ACID 2 MG: 1 TABLET ORAL at 10:08

## 2021-06-23 RX ADMIN — Medication 2000 UNITS: at 10:08

## 2021-06-23 RX ADMIN — CYANOCOBALAMIN 1000 MCG: 1000 INJECTION, SOLUTION INTRAMUSCULAR; SUBCUTANEOUS at 10:08

## 2021-06-23 ASSESSMENT — PAIN SCALES - GENERAL
PAINLEVEL_OUTOF10: 0
PAINLEVEL_OUTOF10: 4
PAINLEVEL_OUTOF10: 0

## 2021-06-23 NOTE — PROGRESS NOTES
PROGRESS NOTE  S:89 yrs Patient  admitted on 6/13/2021 with Pulmonary embolism on left ([address] [I26.99] . Today she feels ok. complains of weakness. No further bleeding    Exam:   Vitals:    06/23/21 1006   BP: (!) 114/44   Pulse: 79   Resp: 16   Temp: 98.2 °F (36.8 °C)   SpO2: 92%      General appearance: alert, appears stated age and cooperative  HEENT: Neck supple with midline trachea  Neck: no adenopathy and supple, symmetrical, trachea midline  Lungs: clear to auscultation bilaterally  Heart: regular rate and rhythm, S1, S2 normal, no murmur, click, rub or gallop  Abdomen: soft, non-tender; bowel sounds normal; no masses,  no organomegaly  Extremities: extremities normal, atraumatic, no cyanosis or edema     Medications: Reviewed    Labs:  CBC:   Recent Labs     06/21/21  0655 06/22/21  0104 06/22/21  0950 06/23/21  0751   WBC 8.0  --  9.6 8.1   HGB 4.7* 5.3* 5.3* 4.9*   HCT 14.3* 16.2* 16.5* 14.8*   .5*  --  106.5* 105.9*     --  321 279     BMP:   Recent Labs     06/21/21  0655 06/22/21  0950 06/23/21  0751   * 133* 139   K 3.4* 3.1* 3.1*    104 107   CO2 23 24 25   BUN 6* 4* 3*   CREATININE <0.5* <0.5* <0.5*     PT/INR:   Recent Labs     06/21/21  1031   INR 1.26*       Impression:80year old Christian female with history of HTN, HLD, GERD, A fib, arthritis, depression, uterine cancer s/p hysterectomy, diverticulosis and hemorrhoid admitted with painless hematochezia. Her initial colonoscopy showed diverticulosis and hemorrhoids but no active bleeding. Recurrent bleeding when eliquis started. RBC scan positive but follow up CTA and colonoscopy negative for active bleeding. Patient and family decided on hospice    Recommendation:  1. Continue supportive care  2. Continue IV iron and Epo  3. Encourage nutrition  4. Add supplements to meals  5. OK to d/c to Hospice   6.  Will sign off, call with questions      Greg Phillips MD, MD  12:03 PM 6/23/2021

## 2021-06-23 NOTE — PROGRESS NOTES
Patient discharged to [facility] with completed alexandra inlcuded in packet. Report to transport and nurse at facility.

## 2021-06-23 NOTE — CARE COORDINATION
Call back received from Alma Delia Soliz with [facility]. States agency has meeting scheduled with family for 6/23 @ [id number].

## 2021-06-23 NOTE — DISCHARGE SUMMARY
Hospital Medicine Discharge Summary    Patient ID: Kathi Lange      Patient's PCP: Lulú Camilo MD    Admit Date: 6/13/2021     Discharge Date:   6/23/21    Admitting Physician: Gisela Gupta MD     Discharge Physician: JAMILA Mejia - CNP     Discharge Diagnoses: Active Hospital Problems    Diagnosis     Pulmonary embolus ([facility] [address] [I26.99]     Rectal bleeding [K62.5]        The patient was seen and examined on day of discharge and this discharge summary is in conjunction with any daily progress note from day of discharge. [age] Bar[age] y.o. female who who is a long-term resident of \" [address]\" and PMHx as mentioned below presented to [facility] ED with c/o painless rectal bleeding associated with lightheadedness/syncopal episode getting up from toilet this morning. Patient reports that she has been feeling weak and tired for the last few days and has been noting \"  blood in her briefs\" . Denies any chest pain, shortness of breath, pedal edema. Reports that she has knee pain and has been restricting herself to bed lately. Complains of lower abdominal pain but denies nausea, vomiting, diarrhea.      ED course : Patient hemodynamically stable upon arrival to ED. basic labs suggestive of hemoglobin 10.9 and the rest of the labs within normal limits. UA negative. EKG without ischemic changes. Patient underwent CTA chest abdomen pelvis s/o acute PE of left pulmonary artery segmental branches. No right heart strain on CT. Patient started on heparin gtt. and hospitalist consulted for admission for further evaluation and management.         1.  Syncope in the setting of acute left segmental PE POA  -As evidenced by CT PA on admission. Patient started on heparin GTT in ED. Lost IV access and it was unable to be re-established. D/w GI, and ok with starting eliquis (started on 6/15/2021) .    -Bilateral venous duplex of lower extremities 6/14/2021 showed no evidence of DVT in either extremity.  -Some increasing SOB, requiring 2L/min O2, likely r/t progressive anemia with acute blood loss. Requested picc placement on 6/16/2021 in anticipation of volume replacement.   -Continues with BRBPR and eliquis has been held since 6/16 am d/t downtrending Hb. INR obtained 2/2 picc placement and found to be 3.83.   -Hb trended down to 7.1 early 6/17.   -CT abdomen 6/17 showed no acute abnormality to correlate to history of blood loss/anemia. No retroperitoneal hematoma  -Hematology was consulted 6/17/2021 and plan for IVC filter placement with Dr. Fang Abbott, Venofer 200 mg IV X 3 doses, Vitamin B12 shots daily X 7 days, and Folate 2 mg PO daily, in order to discontinue anticoagulation in anemic [employer]. Also recommended Vitamin K 5mg IV x1 .   -Continue telemetry monitor.  -  (LD-303), KIARA - Negative , TSH (3.4) ,    -On 6/18/2021 -patient's hemoglobin dropped down to 4.9. D/w patient and her daughter at bedside-patient still did not wish to have blood transfusions.    - Continue IV Venofer and Epogen. -  D/w GI and IR (as documented above in subjective section) and finally ordered CTA abdomen pelvis GI bleed protocol on 6/18/21  per IR (Dr. Josephine Smart) recommendations. No active bleeding noted . - GI plan for Re-scoping (Colonoscopy) 6/20- Follow up      2.  Rectal bleeding POA -likely hemorrhoidal/Diverticular  -Presented with painless hematochezia. -GI consulted ( Dr. Radha Rider) - S/p  Colonoscopy 6/15 revealed diverticulosis, internal hemorrhoids, old blood and no active bleeding. GI recommended restarting anticoagulation, initiated eliquis d/t loss of IV access. -Several episodes of hematochezia s/p colonoscopy and initiation of eliquis. Discussed r/b with patient of needing anticoagulation with current PE and the likelihood that she will continue to bleed from her known internal hemorrhoids/diverticulosis.    -Hemoglobin 10.9 on admission, likely initally elevated d/t hemoconcentration, as her baseline is ~8.7. She has dropped to 7.1 with hematochezia and then to 4.9 .    - Antidepressants and ASA held d/t increased risk of GI bleeding.   -Hematology recommended discontinue AC and place IVC filter (6/18/21) as well as IV dextran, B12, and folate d/t persistent hematochezia in anemic [employer].   -H&H q8   - 6/21 despite very low H/H she continues to have stable VS      3.  Paroxysmal atrial fibrillation -currently rate controlled and in normal sinus rhythm.  On amiodarone at home-continued ; patient on aspirin but not on any anticoagulation PTA. Hold Aspirin      4.  GERD -on PPI; continue     5.  Hyperlipidemia -on statin; continue      After discussion with pt and daughter moved forward with Hospice approach. Pt discharged with scheduled meeting for [place]      Physical Exam Performed:     BP (!) 114/44   Pulse 79   Temp 98.2 °F (36.8 °C) (Oral)   Resp 16   Ht 5' (1.524 m)   Wt 128 lb (58.1 kg)   SpO2 92%   BMI 25.00 kg/m²         Renal:    Lab Results   Component Value Date     06/23/2021    K 3.1 06/23/2021     06/23/2021    CO2 25 06/23/2021    BUN 3 06/23/2021    CREATININE <0.5 06/23/2021    CALCIUM 6.9 06/23/2021         Significant Diagnostic Studies    Radiology:   VL Extremity Venous Right   Final Result      CTA ABDOMEN PELVIS W WO CONTRAST   Final Result   There is some hyperdensity on the arterial phase imaging within a small bowel   loop in the left abdomen which is unchanged on delayed imaging and atypical   for active extravasation. This likely represents migration of oral contrast,   which is seen within small bowel loops in the right abdomen, given there was   a delay between the noncontrast and arterial phase imaging. Hyperenhancing   small-bowel fall per mucosa at is another possibility. No definite active   extravasation/arterial GI bleed.       There is new wall thickening involving the ascending and transverse colon   with mild adjacent inflammatory changes concerning for developing colitis. Extensive sigmoid diverticulitis. Cholelithiasis. Gallbladder is mildly distended which may be due to NPO   status. If there is concern for acute cholecystitis, right upper quadrant   ultrasound may be helpful for further evaluation. NM GI BLOOD LOSS   Final Result   Focus of extravascular accumulation of activity in the left pelvis suspicious   for small acute GI bleed. Location corresponds to the sigmoid colon which   has very severe diverticular disease. RECOMMENDATIONS:   Evaluation of the patient's neck is bloody bowel movement for radio activity   could be performed and limited repeat scanning could be performed      If the patient shows hemodynamic signs of an active bleed in the next 20   hours, additional images can be acquired. CT ABDOMEN PELVIS WO CONTRAST Additional Contrast? None   Final Result   1. No acute abnormality to correlate to history of blood loss/anemia. 2. Cholelithiasis with moderate mucosal thickening of the gallbladder. Recommend correlation with clinical workup. If there is any concern for   underlying cholecystitis, a follow-up ultrasound may be considered. 3. Chronic interstitial changes partially observed in the lower chest.         VL Extremity Venous Bilateral   Final Result      CTA CHEST ABDOMEN PELVIS W CONTRAST   Final Result   1. CT CHEST: Acute pulmonary embolism proximally within the pulmonary artery   branch to the left lower lobe. 2. No CT findings of right heart strain. 3. Mild calcific atherosclerosis aorta and its branches without aneurysm or   dissection. 4. CT ABDOMEN/PELVIS: No acute vascular abnormality. 5.  No CT evidence of an acute intra-abdominal or intrapelvic process. 6. Cholelithiasis without CT findings of acute cholecystitis. 7.  Diverticulosis coli without CT evidence of acute diverticulitis.    8. Small, fat containing left inguinal hernia. 9. Small, fat containing umbilical hernia. 10. Possible constipation. Critical results were called by Dr. Neha Lima to Connie Zhangmark on   6/13/2021 at 13:03. XR CHEST PORTABLE   Final Result   No acute cardiopulmonary disease.          IR ANGIOGRAM INFERIOR MESENTERIC    (Results Pending)          Consults:     IP CONSULT TO GI  IP CONSULT TO SOCIAL WORK  IP CONSULT TO ONCOLOGY  IP CONSULT TO VASCULAR SURGERY  IP CONSULT TO GENERAL SURGERY  IP CONSULT TO HOSPICE    Disposition:  Return to [facility] with hospice    Condition at Discharge: Terminal    Discharge Instructions/Follow-up:      Code Status:  Limited    Activity: activity as tolerated    Diet: regular diet      Discharge Medications:     Current Discharge Medication List           Details   folic acid (FOLVITE) 1 MG tablet Take 2 tablets by mouth daily  Qty: 30 tablet, Refills: 3              Details   amiodarone (CORDARONE) 200 MG tablet Take 1 tablet by mouth daily  Qty: 30 tablet, Refills: 0      psyllium (KONSYL) 28.3 % PACK Take 1 packet by mouth daily      nystatin (MYCOSTATIN) POWD powder Apply topically 2 times daily As needed for candidiasis      sertraline (ZOLOFT) 25 MG tablet Take 12.5 mg by mouth daily      hydrocortisone 1 % cream Apply topically every 6-8 hours as needed As needed for discomfort      acetaminophen (TYLENOL) 325 MG tablet Take 650 mg by mouth every 6 hours as needed for Pain      DULoxetine (CYMBALTA) 30 MG extended release capsule Take 30 mg by mouth daily      Melatonin 5 MG CAPS Take 5 mg by mouth nightly as needed      omeprazole (PRILOSEC) 20 MG delayed release capsule Take 40 mg by mouth daily       tamsulosin (FLOMAX) 0.4 MG capsule Take 0.4 mg by mouth daily      ferrous sulfate 325 (65 Fe) MG tablet Take 1 tablet by mouth three times a week  Qty: 30 tablet, Refills: 0      sennosides-docusate sodium (SENOKOT-S) 8.6-50 MG tablet Take 1 tablet by mouth daily  Qty: 30 tablet, Refills: 1      aspirin 81 MG tablet Take 81 mg by mouth daily      timolol (BETIMOL) 0.5 % ophthalmic solution Place 1 drop into the left eye 2 times daily Dosing times are 09:00 and 15:00.      atorvastatin (LIPITOR) 10 MG tablet   Take 10 mg by mouth nightly       Multiple Vitamins-Minerals (CENTRUM PO) Take 1 tablet by mouth daily. Indications: Centrum Silver      vitamin D (CHOLECALCIFEROL) 400 UNIT TABS tablet   Take 2,000 Units by mouth daily       latanoprost (XALATAN) 0.005 % ophthalmic solution   Place 1 drop into both eyes nightly       diphenhydrAMINE (BENADRYL) 25 MG capsule Take 25 mg by mouth every 6 hours as needed for Itching      gabapentin (NEURONTIN) 100 MG capsule Take 100 mg by mouth 3 times daily. loperamide (IMODIUM) 2 MG capsule Take 2 mg by mouth as needed for Diarrhea Two tabs after initial diarrhea episode, 1 tab after each additional episode, NTE             Time Spent on discharge is more than 30 minutes in the examination, evaluation, counseling and review of medications and discharge plan. Signed:    JAMILA Busby - CNP   6/23/2021      Thank you Tim Stevenson MD for the opportunity to be involved in this patient's care. If you have any questions or concerns please feel free to contact me at [phone].

## 2021-06-23 NOTE — PROGRESS NOTES
Displaced comminuted fracture of right patella, initial encounter for closed fracture    Severe protein-calorie malnutrition (HCC)    Displaced transverse fracture of right patella, initial encounter for closed fracture    Hyperlipidemia    Anemia    Unstable gait    Open knee wound, right, sequela    GI bleeding    H/O skin graft    Hyponatremia    CAD (coronary artery disease)    Closed fracture of right distal femur (HCC)    Hypoalbuminemia    GERD (gastroesophageal reflux disease)    Wide-complex tachycardia (HCC)    Pulmonary embolus (HCC)     Venous Duplex Scan: B-mode imaging of the deep and superficial veins, with   compression maneuvers, including color and Doppler spectral waveform analysis.       Impressions   Right Impression   There is acute deep venous thrombosis seen involving the axillary vein .    There is acute superficial venous thrombosis seen involving the basilic vein.       Left Impression   There is no evidence of deep venous thrombosis seen involving the subclavian   vein of the left upper extremity.       Conclusions        Summary        There is acute partially occluding deep venous thrombosis involving the    right axillary vein.    There is acute partially occluding superficial venous thrombosis involving    the right basilic vein.             ASSESSMENT AND PLAN:    PE:  -She was on Eliquis and then had a GI bleed  -She has a substantial risk for bleeding including her age, and fall risk  -Furthermore she does not take blood products due to being a [employer]  - filter has been placed    Anemia:  -She is on Venofer and folate- completed 5 doses of Venofer now  - started Retacrit over weekend - increase dose to 20,000 units weekly   -This is likely to cause her significant morbidity  -Hemoglobin 5.0 -> 5.3--> 4.7 ---> 5.3   - c-scope showed diverticulosis with recent bleed    Coagulopathy:  -She is receiving vitamin K  -INR is down to 1.26     RUE swelling with midline  - R axillary/basilic vein with DVT   - cannot use AC as above     DC to [facility] today ; hospice to meet with family at [address] Ye[address], APRN - CNP

## 2021-06-23 NOTE — CARE COORDINATION
CASE MANAGEMENT DISCHARGE SUMMARY      Discharge to: [address] [place] [address] completed: [facility] Exemption Notification (HENS) completed: n/a    IMM given: (date) 6/22/21    New Durable Medical Equipment ordered/agency: n/a    Transportation:    Medical Transport explained to [facility]. Pt/family voice no agency preference. Agency used: [facility] up time: 1400   Ambulance form completed: Yes    Confirmed discharge plan with:     Patient: no     Family:  yes    Name: DaughterPerry [phone]     Facility/Agency, name:  [facility] faxed to facility and [facility] in admissions notified     Phone number for report to facility: [phone]     RN, name: Iwona Saleh    Note: Discharging nurse to complete [facility], reconcile AVS, and place final copy with patient's discharge packet. RN to ensure that written prescriptions for Level II medications are sent with patient to the facility as per protocol.     Irene Fairchild RN

## 2021-06-23 NOTE — CARE COORDINATION
Call received from Raji Srinivasan at [facility]. States family cancelled mtg for today and intends to reschedule for tomorrow at 10am. CM will discharge pt to [facility] today and hospice agency can meet with pt/family tomorrow as scheduled at facility. 10:21 AM  EDUARDO called and spoke with daughter, Arlene Lopez, to update on plans for discharge back to [facility]. Arlene Lopez is in agreement. States she \"has a migraine today\" and that is why she cancelled the meeting with Hospice.  She intends to keep meeting with [facility] tomorrow at [facility] at 10am.     10:23 AM  EDUARDO called and scheduled transport with [facility] at 2pm.

## 2021-06-23 NOTE — FLOWSHEET NOTE
06/23/21 0909   Provider Notification   Reason for Communication Critical Value (comment)  (hgb 4.9 hct 14.8)   Provider Name lars santos   Provider Notification Advance Practice Clinician (CNS/NP/CNM/CRNA/PA)   Method of Communication Secure Message   Response Waiting for response   Notification Time 9924

## 2021-06-23 NOTE — PLAN OF CARE
Problem: Falls - Risk of:  Goal: Will remain free from falls  Description: Will remain free from falls  Outcome: Ongoing   Bed alarm on & in place. 2/4 side rails up. Pt wearing non skid footwear. Bed locked & in lowest position. Call light within reach. For patient safety, an AVASYS camera has been placed in patient's room. AVASYS monitoring needed for   , Confusion, Increased Fall Risk, and Pulling at Lines. Writer placed call to monitor observer to verify camera number 11 and review patient name, reason for monitoring, and contact information for primary RN. Patient and Family/Healthcare Decision Maker notified of use of remote monitoring upon implementation of camera and verbalized understanding.

## (undated) DEVICE — GLOVE ORANGE PI 8   MSG9080

## (undated) DEVICE — BOWL AND CEMENT CARTRIDGE WITH BREAKAWAY FEMORAL NOZZLE AND MEDIUM PRESSURIZER: Brand: ACM

## (undated) DEVICE — GAUZE,SPONGE,4"X4",8PLY,STRL,LF,10/TRAY: Brand: MEDLINE

## (undated) DEVICE — BANDAGE COMPR ELASTIC 5 YDX4 IN LT

## (undated) DEVICE — SUTURE VCRL SZ 2-0 L18IN ABSRB VLT CT-1 L36MM 1/2 CIR J739D

## (undated) DEVICE — PAD,ABDOMINAL,8"X10",ST,LF: Brand: MEDLINE

## (undated) DEVICE — KNEE HOLDER DISPOSABLE LINER: Brand: ALVARADO®  KNEE SUPPORT

## (undated) DEVICE — 3M™ TEGADERM™ TRANSPARENT FILM DRESSING FRAME STYLE, 1624W, 2-3/8 IN X 2-3/4 IN (6 CM X 7 CM), 100/CT 4CT/CASE: Brand: 3M™ TEGADERM™

## (undated) DEVICE — GLOVE ORANGE PI 8 1/2   MSG9085

## (undated) DEVICE — BIPOLAR SEALER 23-112-1 AQM 6.0: Brand: AQUAMANTYS ®

## (undated) DEVICE — SYRINGE BLB 50CC IRRIG PLIABLE FNGR FLNG GRAD FLSK DISP

## (undated) DEVICE — SUTURE MCRYL SZ 2-0 L18IN ABSRB VLT L36MM CT-1 1/2 CIR Y739D

## (undated) DEVICE — SUTURE VCRL SZ 4-0 L18IN ABSRB UD L19MM PS-2 3/8 CIR PRIM J496H

## (undated) DEVICE — 3M™ COBAN™ SELF-ADHERENT WRAP, 1586S, STERILE, 6 IN X 5 YD (15 CM X 4,5 M), 12 ROLLS/CASE: Brand: 3M™ COBAN™

## (undated) DEVICE — HANDPIECE SET WITH HIGH FLOW TIP AND SUCTION TUBE: Brand: INTERPULSE

## (undated) DEVICE — COVER,TABLE,HEAVY DUTY,50"X90",STRL: Brand: MEDLINE

## (undated) DEVICE — SMARTGOWN BREATHABLE SURGICAL GOWN: Brand: CONVERTORS

## (undated) DEVICE — ELECTRODE PT RET AD L9FT HI MOIST COND ADH HYDRGEL CORDED

## (undated) DEVICE — SUTURE ETHBND EXCEL SZ 0 L18IN NONABSORBABLE GRN L36MM CT-1 CX21D

## (undated) DEVICE — TIP SUCT DIA12FR W STYL CTRL VENT DISPOSABLE FRAZ

## (undated) DEVICE — STOCKINETTE,IMPERVIOUS,12X48,STERILE: Brand: MEDLINE

## (undated) DEVICE — SUTURE ETHBND EXCEL SZ 2 L30IN NONABSORBABLE GRN L40MM V-37 MX69G

## (undated) DEVICE — Device

## (undated) DEVICE — STAPLER EXT SKIN 35 WIDE S STL STPL SQUEEZE HNDL VISISTAT

## (undated) DEVICE — ESMARK: Brand: DEROYAL

## (undated) DEVICE — ELECTRODE,RADIOTRANSLUCENT,FOAM,3PK: Brand: MEDLINE

## (undated) DEVICE — BONE PREPARATION KIT: Brand: BIOPREP

## (undated) DEVICE — CANISTER VAC 500ML TBNG RESVR FOR INFOVAC W O GEL CLMP CONN

## (undated) DEVICE — 3M™ COBAN™ NL STERILE NON-LATEX SELF-ADHERENT WRAP, 2084S, 4 IN X 5 YD (10 CM X 4,5 M), 18 ROLLS/CASE: Brand: 3M™ COBAN™

## (undated) DEVICE — Z INACTIVE NO SUPPLIER SOLUTIONIRRIG 3000ML 0.9% SOD CHL FLX CONT [79720808] [HOSPIRA WORLDWIDE INC]

## (undated) DEVICE — SPLINT KNEE UNIV FOR LESS THAN 36IN L20IN FOAM LAM E CNTCT

## (undated) DEVICE — DERMATOME BLADES: Brand: DERMATOME

## (undated) DEVICE — CATHETER PH CONT SUCT

## (undated) DEVICE — SUTURE VCRL + SZ 2-0 L18IN ABSRB UD CT1 L36MM 1/2 CIR VCP839D

## (undated) DEVICE — 3M™ TEGADERM™ TRANSPARENT FILM DRESSING FRAME STYLE, 1627, 4 IN X 10 IN (10 CM X 25 CM), 20/CT 4CT/CASE: Brand: 3M™ TEGADERM™

## (undated) DEVICE — GLOVE SURG SZ 65 THK91MIL LTX FREE SYN POLYISOPRENE

## (undated) DEVICE — STERILE PVP: Brand: MEDLINE INDUSTRIES, INC.

## (undated) DEVICE — DRESSING FOAM W4XL12IN SIL RECT ADH WTRPRF FLM BK W/ BORD

## (undated) DEVICE — SOLUTION IV IRRIG WATER 1000ML POUR BRL 2F7114

## (undated) DEVICE — SOLUTION IV IRRIG 500ML 0.9% SODIUM CHL 2F7123

## (undated) DEVICE — SMARTGOWN SURGICAL GOWN, XL: Brand: CONVERTORS

## (undated) DEVICE — STERILE LATEX POWDER-FREE SURGICAL GLOVESWITH NITRILE COATING: Brand: PROTEXIS

## (undated) DEVICE — PIN GUIDE ORTHO 3.2X89MM FLTD DISP PK OF 4 PER PATIENT

## (undated) DEVICE — MEDI-VAC NON-CONDUCTIVE SUCTION TUBING: Brand: CARDINAL HEALTH

## (undated) DEVICE — INSTRUMENT BATTERY

## (undated) DEVICE — SUTURE VCRL + SZ 2-0 L18IN ABSRB VLT L26MM SH 1/2 CIR VCP775D

## (undated) DEVICE — CUTTER SURG OD42MM PAT KNEE DISP FOR RM SYS XCELERATE

## (undated) DEVICE — PEEL-AWAY TOGA, 2X LARGE: Brand: FLYTE

## (undated) DEVICE — ENDOSCOPIC KIT 2 12 FT OP4 DE2 GWN SYR

## (undated) DEVICE — APPLIER LIG CLP M L11IN TI STR RNG HNDL FOR 20 CLP DISP

## (undated) DEVICE — CANNULA NSL AD TBNG L7FT PVC STR NONFLARED PRNG O2 DEL W STD

## (undated) DEVICE — Z CONVERTED USE 2271377 BANDAGE COMPR W6INXL5YD EC E REB

## (undated) DEVICE — ELECTRODE,ECG,STRESS,FOAM,3PK: Brand: MEDLINE

## (undated) DEVICE — INTENDED FOR TISSUE SEPARATION, AND OTHER PROCEDURES THAT REQUIRE A SHARP SURGICAL BLADE TO PUNCTURE OR CUT.: Brand: BARD-PARKER ® STAINLESS STEEL BLADES

## (undated) DEVICE — GLOVE ORANGE PI 7 1/2   MSG9075

## (undated) DEVICE — COVER XR CASS W20XL41IN UNIV ADH STRP

## (undated) DEVICE — SOLUTION IV IRRIG POUR BRL 0.9% SODIUM CHL 2F7124

## (undated) DEVICE — SPONGE LAP W18XL18IN WHT COT 4 PLY FLD STRUNG RADPQ DISP ST

## (undated) DEVICE — PENCIL SMK EVAC ALL IN 1 DSGN ENH VISIBILITY IMPROVED AIR

## (undated) DEVICE — SOLUTION IRRIG 2000ML 0.9% SOD CHL USP UROMATIC PLAS CONT

## (undated) DEVICE — PADDING CAST W6INXL4YD ST COT RAYON MICROPLEATED HIGHLY

## (undated) DEVICE — X-RAY DETECTABLE SPONGES,16 PLY: Brand: VISTEC

## (undated) DEVICE — SUTURE FIBERWIRE SZ 5 L38IN NONABSORBABLE BLU L48MM 1/2 AR7211

## (undated) DEVICE — OCCLUSIVE GAUZE STRIP,3% BISMUTH TRIBROMOPHENATE IN PETROLATUM BLEND: Brand: XEROFORM

## (undated) DEVICE — 3 BONE CEMENT MIXER: Brand: MIXEVAC

## (undated) DEVICE — PACK PROCEDURE SURG EXTREMITY SORGER CDS

## (undated) DEVICE — KIT INSTR TRANSTIBIAL CRUCE W/O SAW BLDE DISP

## (undated) DEVICE — ENDO CARRY-ON PROCEDURE KIT INCLUDES SUCTION TUBING, LUBRICANT, GAUZE, BIOHAZARD STICKER, TRANSPORT PAD AND INTERCEPT BEDSIDE KIT.: Brand: ENDO CARRY-ON PROCEDURE KIT

## (undated) DEVICE — SUTURE VCRL SZ 0 L18IN ABSRB VLT L40MM CT 1/2 CIR J752D

## (undated) DEVICE — PACK ORTH LO EXT VI

## (undated) DEVICE — GOWN,REINF,POLY,AURORA,XLNG/XXL,STRL: Brand: MEDLINE

## (undated) DEVICE — KIT NEG PRSS M W12.5XH3.2XL18CM 2 SHT STD DRP 1 SENSATRAC

## (undated) DEVICE — SYSTEM SKIN CLSR 22CM DERMBND PRINEO

## (undated) DEVICE — JP 3-SPRING RES W/10FR PVC DRAIN/TR: Brand: CARDINAL HEALTH

## (undated) DEVICE — 12FR FRAZIER SUCTION HANDLE: Brand: CARDINAL HEALTH

## (undated) DEVICE — ZIMMER® STERILE DISPOSABLE TOURNIQUET CUFF WITH PLC, DUAL PORT, SINGLE BLADDER, 30 IN. (76 CM)

## (undated) DEVICE — GOWN,REINFORCED,POLY,AURORA,XLARGE,STRL: Brand: MEDLINE

## (undated) DEVICE — 1.5 TO 1 DERMACARRIER: Brand: MESHGRAFTTM  II TISSUE EXPANSION SYSTEM